# Patient Record
Sex: MALE | Race: WHITE | Employment: UNEMPLOYED | URBAN - METROPOLITAN AREA
[De-identification: names, ages, dates, MRNs, and addresses within clinical notes are randomized per-mention and may not be internally consistent; named-entity substitution may affect disease eponyms.]

---

## 2020-08-17 ENCOUNTER — HOSPITAL ENCOUNTER (INPATIENT)
Age: 55
LOS: 2 days | Discharge: HOME | DRG: 751 | End: 2020-08-20
Attending: EMERGENCY MEDICINE | Admitting: PSYCHIATRY & NEUROLOGY
Payer: MEDICAID

## 2020-08-17 LAB
ETHANOL PERCENT: 0.26 %
ETHANOL: 258 MG/DL

## 2020-08-17 PROCEDURE — 36415 COLL VENOUS BLD VENIPUNCTURE: CPT

## 2020-08-17 PROCEDURE — G0480 DRUG TEST DEF 1-7 CLASSES: HCPCS

## 2020-08-17 PROCEDURE — 99285 EMERGENCY DEPT VISIT HI MDM: CPT

## 2020-08-17 RX ORDER — LISINOPRIL 5 MG/1
5 TABLET ORAL DAILY
Status: ON HOLD | COMMUNITY
Start: 2020-07-14 | End: 2020-09-18 | Stop reason: SDUPTHER

## 2020-08-17 RX ORDER — ESCITALOPRAM OXALATE 20 MG/1
20 TABLET ORAL DAILY
Status: ON HOLD | COMMUNITY
Start: 2020-07-14 | End: 2020-09-18 | Stop reason: SDUPTHER

## 2020-08-17 ASSESSMENT — ENCOUNTER SYMPTOMS
COUGH: 0
BACK PAIN: 0
DIARRHEA: 0
VOMITING: 0
SHORTNESS OF BREATH: 0
ABDOMINAL PAIN: 0

## 2020-08-18 PROBLEM — F33.9 MAJOR DEPRESSION, RECURRENT (HCC): Status: ACTIVE | Noted: 2020-08-18

## 2020-08-18 LAB
SARS-COV-2, PCR: NORMAL
SARS-COV-2, RAPID: NORMAL
SARS-COV-2: NOT DETECTED
SOURCE: NORMAL

## 2020-08-18 PROCEDURE — 1240000000 HC EMOTIONAL WELLNESS R&B

## 2020-08-18 PROCEDURE — 6370000000 HC RX 637 (ALT 250 FOR IP): Performed by: PSYCHIATRY & NEUROLOGY

## 2020-08-18 PROCEDURE — 90792 PSYCH DIAG EVAL W/MED SRVCS: CPT | Performed by: NURSE PRACTITIONER

## 2020-08-18 PROCEDURE — 6370000000 HC RX 637 (ALT 250 FOR IP): Performed by: NURSE PRACTITIONER

## 2020-08-18 PROCEDURE — U0003 INFECTIOUS AGENT DETECTION BY NUCLEIC ACID (DNA OR RNA); SEVERE ACUTE RESPIRATORY SYNDROME CORONAVIRUS 2 (SARS-COV-2) (CORONAVIRUS DISEASE [COVID-19]), AMPLIFIED PROBE TECHNIQUE, MAKING USE OF HIGH THROUGHPUT TECHNOLOGIES AS DESCRIBED BY CMS-2020-01-R: HCPCS

## 2020-08-18 RX ORDER — ACETAMINOPHEN 325 MG/1
650 TABLET ORAL EVERY 4 HOURS PRN
Status: DISCONTINUED | OUTPATIENT
Start: 2020-08-18 | End: 2020-08-20 | Stop reason: HOSPADM

## 2020-08-18 RX ORDER — TRAZODONE HYDROCHLORIDE 50 MG/1
50 TABLET ORAL NIGHTLY PRN
Status: DISCONTINUED | OUTPATIENT
Start: 2020-08-18 | End: 2020-08-20 | Stop reason: HOSPADM

## 2020-08-18 RX ORDER — MAGNESIUM HYDROXIDE/ALUMINUM HYDROXICE/SIMETHICONE 120; 1200; 1200 MG/30ML; MG/30ML; MG/30ML
30 SUSPENSION ORAL EVERY 6 HOURS PRN
Status: DISCONTINUED | OUTPATIENT
Start: 2020-08-18 | End: 2020-08-20 | Stop reason: HOSPADM

## 2020-08-18 RX ORDER — HYDROXYZINE HYDROCHLORIDE 25 MG/1
25 TABLET, FILM COATED ORAL 3 TIMES DAILY PRN
Status: DISCONTINUED | OUTPATIENT
Start: 2020-08-18 | End: 2020-08-20 | Stop reason: HOSPADM

## 2020-08-18 RX ORDER — BENZTROPINE MESYLATE 1 MG/ML
2 INJECTION INTRAMUSCULAR; INTRAVENOUS 2 TIMES DAILY PRN
Status: DISCONTINUED | OUTPATIENT
Start: 2020-08-18 | End: 2020-08-20 | Stop reason: HOSPADM

## 2020-08-18 RX ORDER — ESCITALOPRAM OXALATE 20 MG/1
20 TABLET ORAL DAILY
Status: DISCONTINUED | OUTPATIENT
Start: 2020-08-19 | End: 2020-08-20 | Stop reason: HOSPADM

## 2020-08-18 RX ORDER — LISINOPRIL 5 MG/1
5 TABLET ORAL DAILY
Status: DISCONTINUED | OUTPATIENT
Start: 2020-08-18 | End: 2020-08-20 | Stop reason: HOSPADM

## 2020-08-18 RX ORDER — NICOTINE 21 MG/24HR
1 PATCH, TRANSDERMAL 24 HOURS TRANSDERMAL DAILY
Status: DISCONTINUED | OUTPATIENT
Start: 2020-08-18 | End: 2020-08-18

## 2020-08-18 RX ORDER — OLANZAPINE 5 MG/1
5 TABLET ORAL EVERY 4 HOURS PRN
Status: DISCONTINUED | OUTPATIENT
Start: 2020-08-18 | End: 2020-08-20 | Stop reason: HOSPADM

## 2020-08-18 RX ORDER — OLANZAPINE 10 MG/1
10 INJECTION, POWDER, LYOPHILIZED, FOR SOLUTION INTRAMUSCULAR EVERY 4 HOURS PRN
Status: DISCONTINUED | OUTPATIENT
Start: 2020-08-18 | End: 2020-08-20 | Stop reason: HOSPADM

## 2020-08-18 RX ADMIN — LISINOPRIL 5 MG: 5 TABLET ORAL at 11:25

## 2020-08-18 RX ADMIN — NICOTINE POLACRILEX 2 MG: 2 GUM, CHEWING BUCCAL at 11:25

## 2020-08-18 ASSESSMENT — SLEEP AND FATIGUE QUESTIONNAIRES
DIFFICULTY ARISING: NO
DO YOU HAVE DIFFICULTY SLEEPING: YES
AVERAGE NUMBER OF SLEEP HOURS: 5
DIFFICULTY STAYING ASLEEP: YES
DIFFICULTY FALLING ASLEEP: YES
SLEEP PATTERN: DIFFICULTY FALLING ASLEEP;DISTURBED/INTERRUPTED SLEEP;INSOMNIA;RESTLESSNESS
RESTFUL SLEEP: NO
DO YOU USE A SLEEP AID: NO

## 2020-08-18 ASSESSMENT — LIFESTYLE VARIABLES
HISTORY_ALCOHOL_USE: YES
HISTORY_ALCOHOL_USE: YES

## 2020-08-18 ASSESSMENT — PATIENT HEALTH QUESTIONNAIRE - PHQ9: SUM OF ALL RESPONSES TO PHQ QUESTIONS 1-9: 13

## 2020-08-18 NOTE — BH NOTE
1:1 discussion/interaction with the patient which addressed the following   1) Recognizing danger situations (alcohol use during first month, being around smoke/other smokers or times/situations when the patient routinely smoked or other triggers). 2) Developing coping skills (managing stress, exercising, relaxation breathing, changing routines & distraction techniques to prevent tobacco use).    3) Basic information provided on quitting (benefits of quitting tobacco, how to quit techniques, & available resources to support quitting - including discussion regarding Quitline Referral [phone])

## 2020-08-18 NOTE — BH NOTE
Patient given tobacco quitline number 81331200542 at this time, refusing to call at this time, states \" I just dont want to quit now\"- patient given information as to the dangers of long term tobacco use. Continue to reinforce the importance of tobacco cessation.

## 2020-08-18 NOTE — ED PROVIDER NOTES
EMERGENCY DEPARTMENT ENCOUNTER    Pt Name: Tiffanie Hartmann  MRN: [id number]  Brandy Lindsey 1965  Date of evaluation: 8/17/20  CHIEF COMPLAINT       Chief Complaint   Patient presents with    Other     hopelessness, troubles on the home front    Suicidal     wants to jump in front of a train     [facility]     This is a 52-year-old male with a history of alcohol use and mental health disorder who comes in today. The patient states that when normally happens his him and his girlfriend drink alcohol and then they get into a fight. The patient states that he left his house and he has a plan to jump in front of a train tracks. Patient states that he normally goes to [id number] [place] [address] he has never been to [facility]. He denies any medical complaints at this time. REVIEW OF SYSTEMS     Review of Systems   Constitutional: Negative for fever. HENT: Negative for congestion. Respiratory: Negative for cough and shortness of breath. Cardiovascular: Negative for chest pain. Gastrointestinal: Negative for abdominal pain, diarrhea and vomiting. Genitourinary: Negative for dysuria. Musculoskeletal: Negative for back pain. Skin: Negative for rash. Neurological: Negative for headaches. Psychiatric/Behavioral: Positive for suicidal ideas. All other systems reviewed and are negative. PASTMEDICAL HISTORY   No past medical history on file. SURGICAL HISTORY     No past surgical history on file. CURRENT MEDICATIONS       Previous Medications    ESCITALOPRAM (LEXAPRO) 20 MG TABLET    Take 20 mg by mouth daily    LISINOPRIL (PRINIVIL;ZESTRIL) 5 MG TABLET    Take 5 mg by mouth daily     ALLERGIES     has No Known Allergies. FAMILY HISTORY     has no family status information on file.       SOCIAL HISTORY       Social History     Tobacco Use    Smoking status: Not on file   Substance Use Topics    Alcohol use: Not on file    Drug use: Not on file     PHYSICAL EXAM     INITIAL VITALS: BP 98/67   Pulse 89   Temp 98.3 °F (36.8 °C)   Resp 15   Ht 5' 11\" (1.803 m)   Wt 194 lb (88 kg)   SpO2 96%   BMI 27.06 kg/m²    Physical Exam  Vitals signs and nursing note reviewed. Constitutional:       General: He is not in acute distress. Appearance: He is well-developed. HENT:      Head: Normocephalic and atraumatic. Eyes:      Conjunctiva/sclera: Conjunctivae normal.   Neck:      Musculoskeletal: Neck supple. Cardiovascular:      Rate and Rhythm: Normal rate and regular rhythm. Heart sounds: No murmur. No friction rub. Pulmonary:      Effort: Pulmonary effort is normal. No respiratory distress. Breath sounds: Normal breath sounds. No wheezing or rhonchi. Abdominal:      General: There is no distension. Palpations: Abdomen is soft. Tenderness: There is no abdominal tenderness. There is no guarding or rebound. Skin:     General: Skin is warm and dry. Capillary Refill: Capillary refill takes less than 2 seconds. Neurological:      Mental Status: He is alert. Psychiatric:      Comments: Suicidal ideation       DIAGNOSTIC RESULTS   RADIOLOGY:All plain film, CT, MRI, and formal ultrasound images (except ED bedside ultrasound) are read by the radiologist, see reports below, unless otherwisenoted in MDM or here. No orders to display     LABS: All lab results were reviewed by myself, and all abnormals are listed below. Labs Reviewed   ETHANOL - Abnormal; Notable for the following components:       Result Value    Ethanol 258 (*)     All other components within normal limits   COVID-19       EMERGENCY DEPARTMENTCOURSE:   Differential diagnosis includes exacerbation of chronic mental illness polysubstance abuse alcohol intoxication. 6:24 AM EDT  Patient's blood ethanol level is 258. He is now clinically sober. He continues to feel suicidal ideation.   Psychiatry was consulted they recommended inpatient psych the patient will be admitted for further management of his mental health disorder. Vitals:    Vitals:    08/17/20 2156   BP: 98/67   Pulse: 89   Resp: 15   Temp: 98.3 °F (36.8 °C)   SpO2: 96%   Weight: 194 lb (88 kg)   Height: 5' 11\" (1.803 m)         FINAL IMPRESSION      1. Suicidal ideation    2.  Acute alcoholic intoxication without complication [facility])         DISPOSITION/PLAN   DISPOSITION Decision To Admit 08/18/2020 06:26:03 AM    Robert Paniagua MD  Attending Emergency Physician    This charting supersedes any ED resident or staff charting and was written using speech recognition software        Robert Paniagua MD  08/18/20 5567

## 2020-08-18 NOTE — BH NOTE
`[facility]  Admission Note     Admission Type:   Admission Type: Voluntary    Reason for admission:  Reason for Admission: SI to jump n front of a train or into traffic due to life stressors and verbal arguement with girlfriend. PATIENT STRENGTHS:  Strengths: Communication, No significant Physical Illness, Employment, Social Skills    Patient Strengths and Limitations:  Limitations: Difficulty problem solving/relies on others to help solve problems, Inappropriate/potentially harmful leisure interests    Addictive Behavior:   Addictive Behavior  In the past 3 months, have you felt or has someone told you that you have a problem with:  : None  Do you have a history of Chemical Use?: No  Do you have a history of Alcohol Use?: Yes  Do you have a history of Street Drug Abuse?: No  Histroy of Prescripton Drug Abuse?: No    Medical Problems:   No past medical history on file.     Status EXAM:  Status and Exam  Normal: Yes  Facial Expression: Flat  Affect: Appropriate  Level of Consciousness: Alert  Mood:Normal: Yes  Motor Activity:Normal: Yes  Interview Behavior: Cooperative  Preception: Benton to Person, Julee Rebel to Time, Benton to Place, Benton to Situation  Attention:Normal: Yes  Thought Processes: Circumstantial  Thought Content:Normal: Yes  Hallucinations: None  Delusions: No  Memory:Normal: Yes  Insight and Judgment: No  Insight and Judgment: Poor Insight  Present Suicidal Ideation: No  Present Homicidal Ideation: No    Tobacco Screening:  Practical Counseling, on admission, david X, if applicable and completed (first 3 are required if patient doesn't refuse):            ( )  Recognizing danger situations (included triggers and roadblocks)                    ( )  Coping skills (new ways to manage stress, exercise, relaxation techniques, changing routine, distraction)                                                           ( )  Basic information about quitting (benefits of quitting, techniques in how to quit, available resources  ( ) Referral for counseling faxed to [facility]                                           (X) Patient refused counseling  ( ) Patient has not smoked in the last 30 days    Metabolic Screening:    No results found for: LABA1C    No results found for: CHOL  No results found for: TRIG  No results found for: HDL  No components found for: LDLCAL  No results found for: LABVLDL      Body mass index is 27.06 kg/m². BP Readings from Last 2 Encounters:   08/18/20 128/88           Pt admitted with followings belongings:        Valuables placed in safe in security envelope, number:  [id number]. Patient oriented to surroundings and program expectations and copy of patient rights given. Received admission packet:  Yes. Consents reviewed, signed Yes. Patient verbalize understanding:  Yes. Patient education on precautions: Yes    Patient admitted to [facility] unit room 205. Patient changed out into hospital attire and scanned with metal detector. Food and beverage was provided to patient,   Denies thoughts of self harm.                     Sabina Nova RN

## 2020-08-18 NOTE — ED NOTES
Provisional Diagnosis:   Depression    Psychosocial and Contextual Factors:   Pt is experiencing relationship issues. C-SSRS Summary:  x    Patient: x  Family:   Agency:       Present Suicidal Behavior:  x    Verbal: Pt is suicidal, pt is contemplating jumping in front of a train or jumping into traffic    Attempt:Pt denies    Past Suicidal Behavior: x    Verbal:Pt reports past SI    Attempt:Pt denies past attempts      Self-Injurious/Self-Mutilation:Pt denies      Protective Factors:    Pt is employed. Pt has insurance. Risk Factors:    Pt has poor coping skills. Pt is not linked. Pt has not taken his psychiatric medications in 1 week. Clinical Summary:    Jeannette Faith is a 48year old male who presents to the ED via [facility]. Pt states he called [employer] because he was going to walk in front of a train. Pt intoxicated upon arrival. Pt assessed at sober time. Pt states PTA him and his girlfriend were in an argument. Pt states he thinks the relationship is ending. Pt states he does not want to live life without her. Pt states he continues to feel suicidal. Pt states he feels hopeless. Pt is tearful. Pt states 'I'm just so unhappy I just want to do it'. Pt states he continues to have thoughts of jumping off a train overpass by his house, and running into traffic. Pt denies past suicide attempts, pt reports past SI. Pt denies HI. Pt denies [address] Pt reports alcohol use 'a couple times a month'. Pt denies other substance use. Pt states he normally goes to [facility]. Pt states he was admitted at [facility] in June. Pt states he was set up with [facility] at discharge, pt states he did not follow-up. Pt states he takes Lexipro, pt states he receives this from his PCP. Pt states he missed an appointment with his PCP so he has been out of his medication for 1 week. Pt states he was laid off from his job due to [place], pt states he has been working at a new job for the past 3 weeks.  Pt states he is unsure if he has housing, as he was living with his girlfriend. Pt is voluntary. Level of Care Disposition:    Jackelin Rodriguez NP consulted and accepted pt for admission to the [facility] for safety and stabilization.

## 2020-08-18 NOTE — BH NOTE
RT provided pt with packet of worksheets to assist with identifying needs and begin working on goals/finding resources r/t discharge planning. RT talked 1:1 with pt and pt was receptive to worksheets/discussion.

## 2020-08-18 NOTE — PROGRESS NOTES
BHI Biopsychosocial Assessment    Current Level of Psychosocial Functioning     Independent X   Dependent    Minimal Assist     Comments:    Psychosocial High Risk Factors (check all that apply)    Unable to obtain meds   Chronic illness/pain    Substance abuse X   Lack of Family Support X    Financial stress   Isolation   Inadequate Community Resources  Suicide attempt(s)  Not taking medications   Victim of crime   Developmental Delay  Unable to manage personal needs    Age 72 or older   Homeless  No transportation   Readmission within 30 days  Unemployment  Traumatic Event    Comments:   Psychiatric Advanced Directives:  N/A   Family to Involve in Treatment:   No MARIO for family signed. Sexual Orientation:    Heterosexual   Patient Strengths:  Pt is independent in self-care activities, pt is employed. Patient Barriers:   Pt lacks insight. Opiate Education Provided:  N/A   CMHC/mental health history:  Pt is not linked, but wishes to be linked with [facility] upon discharge. Plan of Care   medication management, group/individual therapies, family meetings, psycho -education, treatment team meetings to assist with stabilization    Initial Discharge Plan:    Pt is planning to return home and follow-up with [facility]. Clinical Summary:    Pt is a 48 y.o.  male who presented to the ED for SI with a plan to run into traffic or jump in front of a train. Pt reported the trigger for this was a fight with his girlfriend. Pt denied current SI, HI, and hallucinations. Pt denied past suicide attempts. Pt was oriented to person, place, time, and situation. Pt is not linked, but wishes to be linked to [facility] upon discharge. Pt reports he works at [employer] for income. Pt currently lives with his girlfriend and a friend. Pt reports his mother has dementia and he has conflict with his brothers. Pt's father is  and he has no children. Trauma hx denied. Legal hx denied.  Pt reports drinking a 6 pack 1-2xs a month and that although this leads to problems with his girlfriend, he does not want AOD tx. Pt is planning to return home and follow-up with [facility].

## 2020-08-19 LAB
ABSOLUTE EOS #: 0.1 K/UL (ref 0–0.4)
ABSOLUTE IMMATURE GRANULOCYTE: ABNORMAL K/UL (ref 0–0.3)
ABSOLUTE LYMPH #: 1 K/UL (ref 1–4.8)
ABSOLUTE MONO #: 0.7 K/UL (ref 0.1–1.3)
ALBUMIN SERPL-MCNC: 3.3 G/DL (ref 3.5–5.2)
ALBUMIN/GLOBULIN RATIO: ABNORMAL (ref 1–2.5)
ALP BLD-CCNC: 59 U/L (ref 40–129)
ALT SERPL-CCNC: 17 U/L (ref 5–41)
ANION GAP SERPL CALCULATED.3IONS-SCNC: 8 MMOL/L (ref 9–17)
AST SERPL-CCNC: 25 U/L
BASOPHILS # BLD: 1 % (ref 0–2)
BASOPHILS ABSOLUTE: 0 K/UL (ref 0–0.2)
BILIRUB SERPL-MCNC: 0.48 MG/DL (ref 0.3–1.2)
BUN BLDV-MCNC: 6 MG/DL (ref 6–20)
BUN/CREAT BLD: ABNORMAL (ref 9–20)
CALCIUM SERPL-MCNC: 8.7 MG/DL (ref 8.6–10.4)
CHLORIDE BLD-SCNC: 105 MMOL/L (ref 98–107)
CHOLESTEROL/HDL RATIO: 2.7
CHOLESTEROL: 179 MG/DL
CO2: 27 MMOL/L (ref 20–31)
CREAT SERPL-MCNC: 0.61 MG/DL (ref 0.7–1.2)
DIFFERENTIAL TYPE: ABNORMAL
EOSINOPHILS RELATIVE PERCENT: 1 % (ref 0–4)
ESTIMATED AVERAGE GLUCOSE: 108 MG/DL
GFR AFRICAN AMERICAN: >60 ML/MIN
GFR NON-AFRICAN AMERICAN: >60 ML/MIN
GFR SERPL CREATININE-BSD FRML MDRD: ABNORMAL ML/MIN/{1.73_M2}
GFR SERPL CREATININE-BSD FRML MDRD: ABNORMAL ML/MIN/{1.73_M2}
GLUCOSE BLD-MCNC: 107 MG/DL (ref 70–99)
HBA1C MFR BLD: 5.4 % (ref 4–6)
HCT VFR BLD CALC: 47.9 % (ref 41–53)
HDLC SERPL-MCNC: 66 MG/DL
HEMOGLOBIN: 16.6 G/DL (ref 13.5–17.5)
IMMATURE GRANULOCYTES: ABNORMAL %
LDL CHOLESTEROL: 89 MG/DL (ref 0–130)
LYMPHOCYTES # BLD: 21 % (ref 24–44)
MCH RBC QN AUTO: 32.6 PG (ref 26–34)
MCHC RBC AUTO-ENTMCNC: 34.6 G/DL (ref 31–37)
MCV RBC AUTO: 94.1 FL (ref 80–100)
MONOCYTES # BLD: 14 % (ref 1–7)
NRBC AUTOMATED: ABNORMAL PER 100 WBC
PDW BLD-RTO: 14.1 % (ref 11.5–14.9)
PLATELET # BLD: 232 K/UL (ref 150–450)
PLATELET ESTIMATE: ABNORMAL
PMV BLD AUTO: 7.2 FL (ref 6–12)
POTASSIUM SERPL-SCNC: 4.5 MMOL/L (ref 3.7–5.3)
RBC # BLD: 5.09 M/UL (ref 4.5–5.9)
RBC # BLD: ABNORMAL 10*6/UL
SEG NEUTROPHILS: 63 % (ref 36–66)
SEGMENTED NEUTROPHILS ABSOLUTE COUNT: 3 K/UL (ref 1.3–9.1)
SODIUM BLD-SCNC: 140 MMOL/L (ref 135–144)
THYROXINE, FREE: 0.93 NG/DL (ref 0.93–1.7)
TOTAL PROTEIN: 5.8 G/DL (ref 6.4–8.3)
TRIGL SERPL-MCNC: 118 MG/DL
TSH SERPL DL<=0.05 MIU/L-ACNC: 1.65 MIU/L (ref 0.3–5)
VLDLC SERPL CALC-MCNC: NORMAL MG/DL (ref 1–30)
WBC # BLD: 4.8 K/UL (ref 3.5–11)
WBC # BLD: ABNORMAL 10*3/UL

## 2020-08-19 PROCEDURE — 83036 HEMOGLOBIN GLYCOSYLATED A1C: CPT

## 2020-08-19 PROCEDURE — 84443 ASSAY THYROID STIM HORMONE: CPT

## 2020-08-19 PROCEDURE — 80053 COMPREHEN METABOLIC PANEL: CPT

## 2020-08-19 PROCEDURE — 84439 ASSAY OF FREE THYROXINE: CPT

## 2020-08-19 PROCEDURE — 99232 SBSQ HOSP IP/OBS MODERATE 35: CPT | Performed by: NURSE PRACTITIONER

## 2020-08-19 PROCEDURE — 85025 COMPLETE CBC W/AUTO DIFF WBC: CPT

## 2020-08-19 PROCEDURE — 6370000000 HC RX 637 (ALT 250 FOR IP): Performed by: NURSE PRACTITIONER

## 2020-08-19 PROCEDURE — 36415 COLL VENOUS BLD VENIPUNCTURE: CPT

## 2020-08-19 PROCEDURE — 80061 LIPID PANEL: CPT

## 2020-08-19 PROCEDURE — 1240000000 HC EMOTIONAL WELLNESS R&B

## 2020-08-19 RX ADMIN — ESCITALOPRAM OXALATE 20 MG: 20 TABLET ORAL at 07:58

## 2020-08-19 RX ADMIN — LISINOPRIL 5 MG: 5 TABLET ORAL at 07:58

## 2020-08-19 NOTE — H&P
pertinent family history. SOCIAL HISTORY       Social History     Socioeconomic History    Marital status: Unknown     Spouse name: None    Number of children: None    Years of education: None    Highest education level: None   Occupational History    None   Social Needs    Financial resource strain: None    Food insecurity     Worry: None     Inability: None    Transportation needs     Medical: None     Non-medical: None   Tobacco Use    Smoking status: Current Every Day Smoker     Packs/day: 1.00     Years: 30.00     Pack years: 30.00     Types: Cigarettes     Start date: 8/18/1990    Smokeless tobacco: Never Used   Substance and Sexual Activity    Alcohol use: None    Drug use: None    Sexual activity: None   Lifestyle    Physical activity     Days per week: None     Minutes per session: None    Stress: None   Relationships    Social connections     Talks on phone: None     Gets together: None     Attends Pentecostalism service: None     Active member of club or organization: None     Attends meetings of clubs or organizations: None     Relationship status: None    Intimate partner violence     Fear of current or ex partner: None     Emotionally abused: None     Physically abused: None     Forced sexual activity: None   Other Topics Concern    None   Social History Narrative    None        REVIEW OF SYSTEMS      No Known Allergies    No current facility-administered medications on file prior to encounter. Current Outpatient Medications on File Prior to Encounter   Medication Sig Dispense Refill    lisinopril (PRINIVIL;ZESTRIL) 5 MG tablet Take 5 mg by mouth daily      escitalopram (LEXAPRO) 20 MG tablet Take 20 mg by mouth daily                     General health:  Fairly good. No fever or chills. Skin:  No itching, redness or rash. Head, eyes, ears, nose, throat:  No headache, epistaxis, rhinorrhea, hearing loss or sore throat.        Neck:  No pain, stiffness or

## 2020-08-19 NOTE — VIRTUAL HEALTH
Psychiatric Admission Note         Trell Mancia is a 48 y.o. male who was admitted from the emergency department with increased depression and suicidal ideations. Patient states that he remains stable for the most part but will have periods of time when he becomes mor depressed. He endorses SI without plan. He denies HI or AVH. He is not paranoid or delusional.  He states he has not been sleeping well and appetite is poor. He denies a history of abuse or trauma. Patient feels helpless and hopeless at times and cannot contract for safety outside of the hospital setting. There is no safe alternative at this time than inpatient stabilization. Past Psychiatric History   Patient Reports noncompliance with outpatient psychiatric care. Reported history of psychiatric inpatient hospitalizations. Reported history of suicide attempts. History of Substance Abuse     Denies alcohol use or use of any illicit drugs. Family History of psychiatric disorders    Family history: denied . Medical History   Allergies:  Patient has no known allergies. No past medical history on file. No past surgical history on file. Neurologic Exam    Labs  Recent Results (from the past 72 hour(s))   Ethanol    Collection Time: 08/17/20 10:53 PM   Result Value Ref Range    Ethanol 258 (H) <10 mg/dL    Ethanol percent 0.258 %   COVID-19    Collection Time: 08/18/20  7:13 AM    Specimen: Other   Result Value Ref Range    SARS-CoV-2 Not Detected Not Detected    SARS-CoV-2, Rapid          Source . NASOPHARYNGEAL SWAB     SARS-CoV-2, PCR             SOCIAL HISTORY  Social History     Socioeconomic History    Marital status: Unknown     Spouse name: Not on file    Number of children: Not on file    Years of education: Not on file    Highest education level: Not on file   Occupational History    Not on file   Social Needs    Financial resource strain: Not on file    Food insecurity Worry: Not on file     Inability: Not on file    Transportation needs     Medical: Not on file     Non-medical: Not on file   Tobacco Use    Smoking status: Current Every Day Smoker     Packs/day: 1.00     Years: 30.00     Pack years: 30.00     Types: Cigarettes     Start date: 8/18/1990    Smokeless tobacco: Never Used   Substance and Sexual Activity    Alcohol use: Not on file    Drug use: Not on file    Sexual activity: Not on file   Lifestyle    Physical activity     Days per week: Not on file     Minutes per session: Not on file    Stress: Not on file   Relationships    Social connections     Talks on phone: Not on file     Gets together: Not on file     Attends Taoism service: Not on file     Active member of club or organization: Not on file     Attends meetings of clubs or organizations: Not on file     Relationship status: Not on file    Intimate partner violence     Fear of current or ex partner: Not on file     Emotionally abused: Not on file     Physically abused: Not on file     Forced sexual activity: Not on file   Other Topics Concern    Not on file   Social History Narrative    Not on file       Review of systems  Constitutional:  negative for chills, fevers, sweats  Respiratory:  negative for cough, dyspnea on exertion, hemoptysis, shortness of breath, wheezing  Cardiovascular:  negative for chest pain, chest pressure/discomfort, lower extremity edema, palpitations  Gastrointestinal:  negative for abdominal pain, constipation, diarrhea, nausea, vomiting  Neurological:  negative for dizziness, headache    Mental Status  Pt. was alert, fully oriented, and cooperative. Appearance and hygiene weredisheveled, poor hygiene . Mood was depressed. Affect was \"dysthymic and poorly reactive Thought process was linear and well-organized. Patient denied any hallucinations or paranoia. Patient endorsed suicidal ideations. Patient endorsed homicidal ideations .  Patient's gross cognitive functions were intact. Insight and judgement were poor. Both recent and remote memory were intact. Psychomotor status was slowed     Diagnostic Impression  Active Problems:    Major depression, recurrent (HCC)  Resolved Problems:    * No resolved hospital problems. *          Medications   lisinopril  5 mg Oral Daily     acetaminophen, traZODone, benztropine mesylate, magnesium hydroxide, aluminum & magnesium hydroxide-simethicone, OLANZapine **OR** OLANZapine, sterile water, hydrOXYzine, nicotine polacrilex    Treatment Plan:    1. Admit to inpatient psychiatric treatment  2. Supportive therapy with medication management. Reviewed risks and benefits as well as potential side effects with patient. 3. Therapeutic activities and groups  4. Follow up at [facility] after symptoms stabilized  5. Restart home medications  6. Social work for discharge planning    Estimated length of stay: 5-7 days    Willy Haas, APRN - [phone]  Psychiatric Nurse Practitioner  Dragon voice recognition software used in portions of this document. Occasionally words are mis-transcribed    Ceasr Montes De Oca is a 48 y.o. male being evaluated by a Virtual Visit (video visit) encounter to address concerns as mentioned above. A caregiver was present when appropriate. Due to this being a TeleHealth encounter (During [facility] public health emergency), evaluation of the following organ systems was limited: Vitals/Constitutional/EENT/Resp/CV/GI//MS/Neuro/Skin/Heme-Lymph-Imm. Pursuant to the emergency declaration under the [address] [place] [address], [address] [place] [address] authority and the Related Content Database (RCDb) and [employer] General Act, this Virtual Visit was conducted with patient's (and/or legal guardian's) consent, to reduce the risk of exposure to COVID-19 and provide necessary medical care.       Total time spent on this encounter: Not billed by time    Services were provided through a video synchronous

## 2020-08-19 NOTE — GROUP NOTE
Group Therapy Note    Date: 8/19/2020    Group Start Time: 1330  Group End Time: 1565  Group Topic: Recreational    SHANNAN Castro Christophe, [place]    Attendees: 6         Patient's Goal:  To demonstrate increased interpersonal skills. Notes:  Patient attended group and actively participated in task at hand. Patient conversed appropriately with peers and Dudley Skinner. Status After Intervention:  Improved    Participation Level:  Active Listener and Interactive    Participation Quality: Appropriate, Attentive and Sharing      Speech:  normal      Thought Process/Content: Logical      Affective Functioning: Congruent      Mood: euthymic      Level of consciousness:  Alert, Oriented x4 and Attentive      Response to Learning: Able to verbalize current knowledge/experience, Able to verbalize/acknowledge new learning, Able to retain information, Capable of insight and Progressing to goal      Endings: None Reported       Modes of Intervention: Socialization, Exploration, Clarifying, Problem-solving, Activity, Limit-setting and Reality-testing      Discipline Responsible: Psychoeducational Specialist      Signature:  Alexus Serrano

## 2020-08-19 NOTE — PROGRESS NOTES
Pharmacy Med Education Group Note    Date: 08/19/20  Start Time: 36  End Time: 5629    Number Participants in Group:  10    Goal:  Patient will demonstrate an understanding of the medications intended purpose and possible adverse effects  Topic: [place] for Pharmacy Med Ed Group    Discipline Responsible:     OT  AT  [facility].  RT     X Other       Participation Level:     None  Minimal      X Active Listener    X Interactive    Monopolizing         Participation Quality:    X Appropriate  Inappropriate     X       Attentive        Intrusive          Sharing        Resistant          Supportive        Lethargic       Affective:     X Congruent  Incongruent  Blunted  Flat    Constricted  Anxious  Elated  Angry    Labile  Depressed  Other         Cognitive:    X Alert  Oriented PPTP     Concentration   X G  F  P   Attention Span   X G  F  P   Short-Term Memory   X G  F  P   Long-Term Memory  G  F  P   ProblemSolving/  Decision Making  G  F  P   Ability to Process  Information   X G  F  P      Contributing Factors             Delusional             Hallucinating             Flight of Ideas             Other:       Modes of Intervention:    X Education   X Support  Exploration    Clarifying  Problem Solving  Confrontation    Socialization  Limit Setting  Reality Testing    Activity  Movement  Media    Other:            Response to Learning:    X Able to verbalize current knowledge/experience    Able to verbalize/acknowledge new learning    Able to retain information    Capable of insight    Able to change behavior    Progressing to goal    Other:        Comments:     Adilia Leach PharmD, BCPS  8/19/2020 5:36 PM

## 2020-08-19 NOTE — GROUP NOTE
Group Therapy Note    Date: 8/19/2020    Group Start Time: 1430  Group End Time: 1736  Group Topic: Cognitive Skills    [facility]    [place], [place]        Group Therapy Note    Attendees: 5/18           Patient's Goal:  To increase interpersonal interaction. Notes:  Pt attended and participated in group. Status After Intervention:  Improved    Participation Level:  Active Listener and Interactive    Participation Quality: Appropriate, Attentive and Sharing      Speech:  normal      Thought Process/Content: Logical      Affective Functioning: Congruent      Mood: euthymic      Level of consciousness:  Alert and Attentive      Response to Learning: Progressing to goal      Endings: None Reported    Modes of Intervention: Socialization, Problem-solving, Activity, Media and Reality-testing      Discipline Responsible: Psychoeducational Specialist      Signature:  Batsheva Tenorio

## 2020-08-19 NOTE — VIRTUAL HEALTH
Department of Psychiatry  Attending Progress Note  Chief Complaint: Major depression, recurrent ([address]     SUBJECTIVE:  Airam Whitehead is seen today via tele-health with staff present. He states he feels a little better. He endorses depression but denies SI today. He states he slept well last night and appetite is good. He has not participated I groups. States he is going to stay on his medication and refrain from alcohol use when discharged. Patient feels helpless and hopeless at times about current situation and cannot contract for safety outside of the hospital setting. There is no safe alternative at this time than continued hospitalization. OBJECTIVE    Physical  /81   Pulse 57   Temp 98.1 °F (36.7 °C) (Oral)   Resp 16   Ht 5' 11\" (1.803 m)   Wt 194 lb (88 kg)   SpO2 98%   BMI 27.06 kg/m²      Mental Status Evaluation:  Orientation: alertness: alert   Mood:. depressed      Affect:  normal and flat      Appearance:  age appropriate   Activity:  Within Normal Limits   Gait/Posture: Normal   Speech:  normal pitch and normal volume   Thought Process:  circumstantial   Thought Content:  Denies hallucinations or paranoia   Sensorium:  Person, place, time, situation   Cognition:  inact   Memory: intact   Insight:  poor   Judgment: poor   Suicidal Ideations: denies   Homicidal Ideations: denies   Medication Side Effects: absent    Attention Span Age appropriate       Labs  Recent Results (from the past 72 hour(s))   Ethanol    Collection Time: 08/17/20 10:53 PM   Result Value Ref Range    Ethanol 258 (H) <10 mg/dL    Ethanol percent 0.258 %   COVID-19    Collection Time: 08/18/20  7:13 AM    Specimen: Other   Result Value Ref Range    SARS-CoV-2 Not Detected Not Detected    SARS-CoV-2, Rapid          Source . NASOPHARYNGEAL SWAB     SARS-CoV-2, PCR         Comprehensive Metabolic Panel w/ Reflex to MG    Collection Time: 08/19/20  7:42 AM   Result Value Ref Range    Glucose 107 (H) 70 - 99 mg/dL    BUN 6 6 - 20 mg/dL    CREATININE 0.61 (L) 0.70 - 1.20 mg/dL    Bun/Cre Ratio NOT REPORTED 9 - 20    Calcium 8.7 8.6 - 10.4 mg/dL    Sodium 140 135 - 144 mmol/L    Potassium 4.5 3.7 - 5.3 mmol/L    Chloride 105 98 - 107 mmol/L    CO2 27 20 - 31 mmol/L    Anion Gap 8 (L) 9 - 17 mmol/L    Alkaline Phosphatase 59 40 - 129 U/L    ALT 17 5 - 41 U/L    AST 25 <40 U/L    Total Bilirubin 0.48 0.3 - 1.2 mg/dL    Total Protein 5.8 (L) 6.4 - 8.3 g/dL    Alb 3.3 (L) 3.5 - 5.2 g/dL    Albumin/Globulin Ratio NOT REPORTED 1.0 - 2.5    GFR Non-African American >60 >60 mL/min    GFR African American >60 >60 mL/min    GFR Comment          GFR Staging NOT REPORTED    Hemoglobin A1c    Collection Time: 08/19/20  7:42 AM   Result Value Ref Range    Hemoglobin A1C 5.4 4.0 - 6.0 %    Estimated Avg Glucose 108 mg/dL   TSH without Reflex    Collection Time: 08/19/20  7:42 AM   Result Value Ref Range    TSH 1.65 0.30 - 5.00 mIU/L   T4, free    Collection Time: 08/19/20  7:42 AM   Result Value Ref Range    Thyroxine, Free 0.93 0.93 - 1.70 ng/dL   Lipid panel - fasting    Collection Time: 08/19/20  7:42 AM   Result Value Ref Range    Cholesterol 179 <200 mg/dL    HDL 66 >40 mg/dL    LDL Cholesterol 89 0 - 130 mg/dL    Chol/HDL Ratio 2.7 <5    Triglycerides 118 <150 mg/dL    VLDL NOT REPORTED 1 - 30 mg/dL   CBC auto differential    Collection Time: 08/19/20  7:42 AM   Result Value Ref Range    WBC 4.8 3.5 - 11.0 k/uL    RBC 5.09 4.5 - 5.9 m/uL    Hemoglobin 16.6 13.5 - 17.5 g/dL    Hematocrit 47.9 41 - 53 %    MCV 94.1 80 - 100 fL    MCH 32.6 26 - 34 pg    MCHC 34.6 31 - 37 g/dL    RDW 14.1 11.5 - 14.9 %    Platelets 365 253 - 648 k/uL    MPV 7.2 6.0 - 12.0 fL    NRBC Automated NOT REPORTED per 100 WBC    Differential Type NOT REPORTED     Seg Neutrophils 63 36 - 66 %    Lymphocytes 21 (L) 24 - 44 %    Monocytes 14 (H) 1 - 7 %    Eosinophils % 1 0 - 4 %    Basophils 1 0 - 2 %    Immature Granulocytes NOT REPORTED 0 %    Segs Absolute 3.00 1.3 - 9.1 k/uL Absolute Lymph # 1.00 1.0 - 4.8 k/uL    Absolute Mono # 0.70 0.1 - 1.3 k/uL    Absolute Eos # 0.10 0.0 - 0.4 k/uL    Basophils Absolute 0.00 0.0 - 0.2 k/uL    Absolute Immature Granulocyte NOT REPORTED 0.00 - 0.30 k/uL    WBC Morphology NOT REPORTED     RBC Morphology NOT REPORTED     Platelet Estimate NOT REPORTED        Medications  Current Facility-Administered Medications   Medication Dose Route Frequency Provider Last Rate Last Dose    acetaminophen (TYLENOL) tablet 650 mg  650 mg Oral Q4H PRN Alice Felty, APRN - CNP        traZODone (DESYREL) tablet 50 mg  50 mg Oral Nightly PRN Alice Felty, APRN - CNP        benztropine mesylate (COGENTIN) injection 2 mg  2 mg Intramuscular BID PRN Alice Felty, APRN - CNP        magnesium hydroxide (MILK OF MAGNESIA) 400 MG/5ML suspension 30 mL  30 mL Oral Daily PRN Alice Felty, APRN - CNP        aluminum & magnesium hydroxide-simethicone (MAALOX) 200-200-20 MG/5ML suspension 30 mL  30 mL Oral Q6H PRN Alice Felty, APRN - CNP        OLANZapine (ZYPREXA) tablet 5 mg  5 mg Oral Q4H PRN Alice Felty, APRN - CNP        Or    OLANZapine (ZYPREXA) injection 10 mg  10 mg Intramuscular Q4H PRN Alice Felty, APRN - CNP        sterile water injection 2.1 mL  2.1 mL Intramuscular Q4H PRN Alice Felty, APRN - CNP        hydrOXYzine (ATARAX) tablet 25 mg  25 mg Oral TID PRN Alice Felty, APRN - CNP        lisinopril (PRINIVIL;ZESTRIL) tablet 5 mg  5 mg Oral Daily Alice Felty, APRN - CNP   5 mg at 08/19/20 0758    nicotine polacrilex (NICORETTE) gum 2 mg  2 mg Oral PRN Kayla RASMUSSEN MD   2 mg at 08/18/20 1125    escitalopram (LEXAPRO) tablet 20 mg  20 mg Oral Daily Alice Felty, APRN - CNP   20 mg at 08/19/20 0758         lisinopril  5 mg Oral Daily    escitalopram  20 mg Oral Daily       ASSESSMENT  Major depression, recurrent (HCC)     Patient's Response to Treatment: positive    PLAN  1. Continue inpatient hospitalization  2. Medication management  3.  Participation in groups and therapeutic milieu  4. Social work for discharge planning      Electronically signed by SHARAN Hsieh CNP on 8/19/2020 at 1:53 PM.    Dragon voice recognition software used in portions of this document. Tiffanie Hartmann is a 48 y.o. male being evaluated by a Virtual Visit (video visit) encounter to address concerns as mentioned above. A caregiver was present when appropriate. Due to this being a TeleHealth encounter (During [place] [address] public [facility] emergency), evaluation of the following organ systems was limited: Vitals/Constitutional/EENT/Resp/CV/GI//MS/Neuro/Skin/Heme-Lymph-Imm. Pursuant to the emergency declaration under the [address] [place] [address], [address] [place] [address] authority and the [employer] and [employer] Act, this Virtual Visit was conducted with patient's (and/or legal guardian's) consent, to reduce the risk of exposure to COVID-19 and provide necessary medical care. Total time spent on this encounter: Not billed by time    Services were provided through a video synchronous discussion virtually to substitute for in-person encounter. --SHARAN Hsieh CNP on 8/19/2020 at 1:55 PM    An electronic signature was used to authenticate this note. Patient Location:  [address] [place] [address]    Provider Location ([facility]):   Horacio ramires [facility]    This virtual visit was conducted via interactive/real-time audio/video.

## 2020-08-20 VITALS
HEIGHT: 71 IN | BODY MASS INDEX: 27.16 KG/M2 | SYSTOLIC BLOOD PRESSURE: 134 MMHG | TEMPERATURE: 98 F | HEART RATE: 58 BPM | OXYGEN SATURATION: 98 % | WEIGHT: 194 LBS | DIASTOLIC BLOOD PRESSURE: 82 MMHG | RESPIRATION RATE: 14 BRPM

## 2020-08-20 PROCEDURE — 6370000000 HC RX 637 (ALT 250 FOR IP): Performed by: NURSE PRACTITIONER

## 2020-08-20 PROCEDURE — 99238 HOSP IP/OBS DSCHRG MGMT 30/<: CPT | Performed by: NURSE PRACTITIONER

## 2020-08-20 RX ORDER — TRAZODONE HYDROCHLORIDE 50 MG/1
50 TABLET ORAL NIGHTLY PRN
Qty: 14 TABLET | Refills: 0 | Status: ON HOLD | OUTPATIENT
Start: 2020-08-20 | End: 2020-09-18 | Stop reason: HOSPADM

## 2020-08-20 RX ORDER — HYDROXYZINE HYDROCHLORIDE 25 MG/1
25 TABLET, FILM COATED ORAL 3 TIMES DAILY PRN
Qty: 30 TABLET | Refills: 0 | Status: ON HOLD | OUTPATIENT
Start: 2020-08-20 | End: 2020-09-18 | Stop reason: HOSPADM

## 2020-08-20 RX ADMIN — ESCITALOPRAM OXALATE 20 MG: 20 TABLET ORAL at 09:45

## 2020-08-20 RX ADMIN — LISINOPRIL 5 MG: 5 TABLET ORAL at 09:45

## 2020-08-20 NOTE — GROUP NOTE
Group Therapy Note    Date: 8/20/2020    Group Start Time: 0905  Group End Time: 9291  Group Topic: Community Meeting    SHANNAN Beckham [place], [place]    Attendees: 5         Patient's Goal:  To be informed of programming schedule for today and unit guidelines/rules. To verbalize obtainable an short term goal for today pertaining to mental health and stability that can be achieved by this evening. Notes:  Patient verbalized goal for today to talk with the doctor and to work on discharge planning. Status After Intervention:  Improved    Participation Level:  Active Listener and Interactive    Participation Quality: Appropriate, Attentive and Sharing      Speech:  normal      Thought Process/Content: Logical      Affective Functioning: Congruent      Mood: euthymic      Level of consciousness:  Alert, Oriented x4 and Attentive      Response to Learning: Able to verbalize current knowledge/experience, Able to verbalize/acknowledge new learning, Able to retain information, Capable of insight and Progressing to goal      Endings: None Reported      Modes of Intervention: Support, Socialization, Exploration, Clarifying, Problem-solving, Confrontation, Limit-setting and Reality-testing      Discipline Responsible: Psychoeducational Specialist      Signature:  Donta Roblero

## 2020-08-20 NOTE — GROUP NOTE
Group Therapy Note    Date: 2020    Group Start Time: 1100  Group End Time:   Group Topic: Psychotherapy    CHENCHO Saenz        Group Therapy Note    Attendees:          Patient's Goal:  ***    Notes:  ***    Status After Intervention:  {Status After Intervention:695778477}    Participation Level: {Participation Level:983116144}    Participation Quality: {[facility] PARTICIPATION QUALITY:348873761}      Speech:  {ED  CD_SPEECH:56138}      Thought Process/Content: {Thought Process/Content:051847384}      Affective Functioning: {Affective Functionin}      Mood: {Mood:304188825}      Level of consciousness:  {Level of consciousness:899793758}      Response to Learnin Devora Jaskaran [facility] Responses to Learnin}      Endings: {[facility] Endings:63706}    Modes of Intervention: {[facility] Modes of Intervention:434867481}      Discipline Responsible: {[facility] Multidisciplinary:575638372}      Signature:  CHENCHO Benjamin

## 2020-08-20 NOTE — GROUP NOTE
Group Therapy Note    Date: 8/20/2020    Group Start Time: 1330  Group End Time: 5130  Group Topic: Cognitive Skills    [facility]    [place], [place]        Group Therapy Note    Attendees: 6/22       Pt did not attend RT skills group d/t resting in room despite staff invitation to attend. 1:1 talk time offered as alternative to group session, pt declined.

## 2020-08-20 NOTE — BH NOTE
Patient given tobacco quitline number 54646220010 at this time, refusing to call at this time, states \" I just dont want to quit now\"- patient given information as to the dangers of long term tobacco use. Continue to reinforce the importance of tobacco cessation.

## 2020-08-20 NOTE — PLAN OF CARE
[facility]  Day 3 Interdisciplinary Treatment Plan NOTE    Review Date & Time: 8/19/2020   0945    Patient was in treatment team    Admission Type:   Admission Type: Voluntary    Reason for admission:  Reason for Admission: SI to jump n front of a train or into traffic due to life stressors and verbal arguement with girlfriend. Estimated Length of Stay Update:  5-7 days   Estimated Discharge Date Update: to be determined by physician     PATIENT STRENGTHS:  Patient Strengths Strengths: Communication, No significant Physical Illness, Employment, Social Skills  Patient Strengths and Limitations:Limitations: Inappropriate/potentially harmful leisure interests, Difficult relationships / poor social skills, Difficulty problem solving/relies on others to help solve problems, Multiple barriers to leisure interests, External locus of control  Addictive Behavior:Addictive Behavior  In the past 3 months, have you felt or has someone told you that you have a problem with:  : None  Do you have a history of Chemical Use?: No  Do you have a history of Alcohol Use?: Yes  Do you have a history of Street Drug Abuse?: No  Histroy of Prescripton Drug Abuse?: No  Medical Problems:No past medical history on file. Risk:  Fall RiskTotal: 61  Landon Scale Landon Scale Score: 21  BVC Total: 0  Change in scores0.  Changes to plan of Care 0    Status EXAM:   Status and Exam  Normal: Yes  Facial Expression: Brightened  Affect: Congruent, Appropriate  Level of Consciousness: Alert  Mood:Normal: Yes  Motor Activity:Normal: Yes  Interview Behavior: Cooperative  Preception: [place] to Person, Jean Claude Baize to Time, [place] to Place, [place] to Situation  Attention:Normal: Yes  Thought Processes: Circumstantial  Thought Content:Normal: Yes  Hallucinations: None  Delusions: No  Memory:Normal: Yes  Insight and Judgment: No  Insight and Judgment: Poor Insight  Present Suicidal Ideation: No  Present Homicidal Ideation: No    Daily Assessment
Problem: Suicide risk  Goal: Provide patient with safe environment  Description: Provide patient with safe environment  8/18/2020 2016 by Charlie Estrada RN  Outcome: Met This Shift  Note: 15 minute and random safety checks maintained, environmental checks maintained    Problem: Depressive Behavior With or Without Suicide Precautions:  Goal: Able to verbalize acceptance of life and situations over which he or she has no control  Description: Able to verbalize acceptance of life and situations over which he or she has no control  8/18/2020 2016 by hCarlie Estrada RN  Outcome: Met This Shift  Note:  Pt denies suicidal ideations at this time. Pt agreed to seek staff at anytime he/she felt like any urges to harm self would arise. Safety checks maintained [id number].       Problem: Depressive Behavior With or Without Suicide Precautions:  Goal: Able to verbalize and/or display a decrease in depressive symptoms  Description: Able to verbalize and/or display a decrease in depressive symptoms  8/18/2020 2016 by Charlie Estrada RN  Outcome: Met This Shift  Note: Patient reports he was out earlier watching television, resting in room now, pleasant and cooperative with evening care, reports he is feeling a little better since coming in
Problem: Suicide risk  Goal: Provide patient with safe environment  Description: Provide patient with safe environment  8/19/2020 1131 by Madhuri Lara LPN  Outcome: Ongoing  Note: Patient remains on a unit where visual rounds are preformed every 15 minutes to ensure patient and unit safety. Patient is currently denying any thoughts to harm himself or anyone else. Patient is also denying suicidal and homicidal thoughts. Staff will continue to monitor and provide support. Problem: Depressive Behavior With or Without Suicide Precautions:  Goal: Able to verbalize and/or display a decrease in depressive symptoms  Description: Able to verbalize and/or display a decrease in depressive symptoms  8/19/2020 1131 by Madhuri Lara LPN  Outcome: Ongoing  Note: Patient continues to admit to slight depression, however states it is improving. Patient has adequate nutritional intake and sleep. Patients hygiene appears appropriate, no additional encouragement needed from staff. Patient is independent of all cares, and is medication compliant and remains in behavior control. Patient is pleasant and cooperative with staff, staff will continue to monitor.
Problem: Suicide risk  Goal: Provide patient with safe environment  Description: Provide patient with safe environment  8/19/2020 235[address] by [address] Boateng RN  Outcome: Ongoing  Note: Patient provided safe environment within [facility] milieu. Will continue to monitor and provide q15 min safety checks. Problem: Depressive Behavior With or Without Suicide Precautions:  Goal: Able to verbalize acceptance of life and situations over which he or she has no control  Description: Able to verbalize acceptance of life and situations over which he or she has no control  8/19/2020 [address] by [address] Boateng RN  Outcome: Ongoing     Problem: Depressive Behavior With or Without Suicide Precautions:  Goal: Able to verbalize and/or display a decrease in depressive symptoms  Description: Able to verbalize and/or display a decrease in depressive symptoms  8/19/2020 [address] by [address] Boateng RN  Outcome: Ongoing  Note: Patient presented isolative to room this shift. Bright and cheerful during interview. Denies any suicidal ideation. Will continue to monitor and provide q15 min safety checks.       Problem: Depressive Behavior With or Without Suicide Precautions:  Goal: Able to verbalize support systems  Description: Able to verbalize support systems  8/19/2020 [address] by [address] Boateng RN  Outcome: Ongoing
for care    Method:group therapy, medication compliance, individualized assessments and care planning    Outcome: needs reinforcement    PATIENT GOALS: to be discussed with patient within 72 hours    PLAN/TREATMENT RECOMMENDATIONS:     continue group therapy , medications compliance, goal setting, individualized assessments and care, continue to monitor pt on unit      SHORT-TERM GOALS:   Time frame for Short-Term Goals: 5-7 days    LONG-TERM GOALS:  Time frame for Long-Term Goals: 6 months  Members Present in Team Meeting: See Signature Sheet    Ministerio Moore

## 2020-08-20 NOTE — BH NOTE
patient refused to attend wellness group at 1600 after encouragement from staff. 1:1 talk time provided as alternative to group session.  Cirilo Hernandez RN

## 2020-08-20 NOTE — DISCHARGE SUMMARY
DISCHARGE SUMMARY  Patient ID:  Mariola Diaz  [id number]  33 y.o.  1965    Admit date: 8/17/2020    Discharge date and time: 8/20/2020  1:17 PM     Admitting Physician: Aamir Radford MD     Discharge Physician:  SHARAN Snow CNP    Admission Diagnoses: Major depression, recurrent [facility]) [F33.9]    Discharge Diagnoses:   Major depression, recurrent [facility])     Patient Active Problem List   Diagnosis Code    Major depression, recurrent ([facility] [address] F33.9        Admission Condition: poor    Discharged Condition: stable    Indication for Admission: threat to self    History of Present Illnes (present tense wording indicates findings from admission exam on 8/17/2020 and are not necessarily indicative of current findings): Hospital Course:   Upon admission, Mariola Diaz was provided a safe secure environment, introduced to unit milieu. Patient participated in groups and individual therapies. Meds were adjusted. After few days of hospital care, patient began to feel improvement. Depression lifted, thoughts to harm self ceased. Sleep improved, appetite was good. On morning rounds 8/20/2020, patient endorsed feeling ready for discharge. Patient denies suicidal or homicidal ideations, denies hallucinations or delusions. Denies SE's from meds. It was decided that pt had achieved maximum benefit from hospital care and can be discharged     Consults:   None    Significant Diagnostic Studies: Routine labs and diagnostics    Treatments: Psychotropic medications, therapy with group, milieu, and 1:1 with nurses, social workers, SHAINA/CNP, and Attending physician.       Discharge Medications:  Current Discharge Medication List      START taking these medications    Details   hydrOXYzine (ATARAX) 25 MG tablet Take 1 tablet by mouth 3 times daily as needed for Anxiety (Does not have to be 8 hours apart as long as no more than three doses in a day)  Qty: 30 tablet, Refills: 0      traZODone (DESYREL) 50 MG tablet Take 1 tablet by mouth nightly as needed for Sleep  Qty: 14 tablet, Refills: 0         CONTINUE these medications which have NOT CHANGED    Details   lisinopril (PRINIVIL;ZESTRIL) 5 MG tablet Take 5 mg by mouth daily      escitalopram (LEXAPRO) 20 MG tablet Take 20 mg by mouth daily                Core Measures statement:   Not applicable    Discharge Exam:  Level of consciousness:  Within normal limits  Appearance: Street clothes, seated, with good grooming  Behavior/Motor: No abnormalities noted  Attitude toward examiner:  Cooperative, attentive, good eye contact  Speech:  spontaneous, normal rate, normal volume and well articulated  Mood:  euthymic  Affect:  mood congruent  Thought processes:  linear, goal directed and coherent  Thought content:  Homocidal ideation denies  Suicidal Ideation:  denies suicidal ideation  Delusions:  no evidence of delusions  Perceptual Disturbance:  denies any perceptual disturbance  Cognition:  In tact  Memory: age appropriate  Insight & Judgement: fair  Medication side effects:  denies     Disposition: home    Patient Instructions: Activity: activity as tolerated    Follow-up as scheduled with [facility]    Time Spent: 25 minutes    Engagement: Patient displayed a good level of engagement with the treatments offered during this admission. Discharge planning, findings, and recommendations were discussed with the patient and treatment team.      Signed:  Kelsy Villeda CNP  8/20/2020  1:17 PM  Dragon voice recognition software used in portions of this document. Nicolette De Paz is a 48 y.o. male being evaluated by a Virtual Visit (video visit) encounter to address concerns as mentioned above. A caregiver was present when appropriate. Due to this being a TeleHealth encounter ([facility]), evaluation of the following organ systems was limited: Vitals/Constitutional/EENT/Resp/CV/GI//MS/Neuro/Skin/Heme-Lymph-Imm.   Pursuant to the emergency declaration under the [facility], [address] [place] [address] waiver authority and the [employer] and [employer] Act, this Virtual Visit was conducted with patient's (and/or legal guardian's) consent, to reduce the risk of exposure to COVID-19 and provide necessary medical care. Total time spent on this encounter: Not billed by time    Services were provided through a video synchronous discussion virtually to substitute for in-person encounter. --Jeannett Primrose, APRN - CNP on 8/20/2020 at 1:17 PM    An electronic signature was used to authenticate this note.     Patient location: [address] [place] [address] at [facility]  [place]

## 2020-08-20 NOTE — GROUP NOTE
Group Therapy Note    Date: 8/20/2020    Group Start Time: 1100  Group End Time: 9984  Group Topic: Psychotherapy    [facility] CHENCHO Geiger        Group Therapy Note    Attendees: 7/22         Patient's Goal:  To actively participate in psychotherapy group and remain in the here-and-now    Notes:  :  Client actively participates in group as it relates to discussing things they can control in their lives as well as things that are out of their control.      Status After Intervention:  Improved    Participation Level: Interactive    Participation Quality: Appropriate, Attentive and Sharing      Speech:  normal      Thought Process/Content: Logical      Affective Functioning: Congruent      Mood: depressed      Level of consciousness:  Alert, Oriented x4 and Attentive      Response to Learning: Able to verbalize current knowledge/experience and Able to retain information      Endings: None Reported    Modes of Intervention: Education, Support, Socialization, Exploration, Clarifying and Problem-solving      Discipline Responsible: /Counselor      Signature:  CHENCHO Farias

## 2020-08-20 NOTE — GROUP NOTE
Group Therapy Note    Date: 8/20/2020    Group Start Time: 1000  Group End Time: 9562  Group Topic: Recreational    [facility] OR[facility] Dawson, [address]    Attendees: 3         Patient's Goal:  To demonstrate increased interpersonal skills. Notes:  Patient attended group and actively participated in task at hand. Patient conversed appropriately with peers and Venida Fairly. Status After Intervention:  Improved    Participation Level:  Active Listener and Interactive    Participation Quality: Appropriate, Attentive and Sharing      Speech:  normal      Thought Process/Content: Logical      Affective Functioning: Congruent      Mood: euthymic      Level of consciousness:  Alert, Oriented x4 and Attentive      Response to Learning: Able to verbalize current knowledge/experience, Able to verbalize/acknowledge new learning, Able to retain information, Capable of insight and Progressing to goal      Endings: None Reported       Modes of Intervention: Socialization, Exploration, Clarifying, Problem-solving, Activity and Reality-testing      Discipline Responsible: Psychoeducational Specialist      Signature:  Deshawn Estrada

## 2020-09-12 ENCOUNTER — HOSPITAL ENCOUNTER (INPATIENT)
Age: 55
LOS: 6 days | Discharge: HOME | DRG: 751 | End: 2020-09-18
Attending: PSYCHIATRY & NEUROLOGY
Payer: MEDICAID

## 2020-09-12 PROBLEM — F32.9 MAJOR DEPRESSION, SINGLE EPISODE: Status: ACTIVE | Noted: 2020-09-12

## 2020-09-12 PROCEDURE — 1240000000 HC EMOTIONAL WELLNESS R&B

## 2020-09-12 RX ORDER — TRAZODONE HYDROCHLORIDE 50 MG/1
50 TABLET ORAL NIGHTLY PRN
Status: DISCONTINUED | OUTPATIENT
Start: 2020-09-12 | End: 2020-09-15

## 2020-09-12 RX ORDER — ACETAMINOPHEN 325 MG/1
650 TABLET ORAL EVERY 4 HOURS PRN
Status: DISCONTINUED | OUTPATIENT
Start: 2020-09-12 | End: 2020-09-18 | Stop reason: HOSPADM

## 2020-09-12 RX ORDER — HYDROXYZINE 50 MG/1
50 TABLET, FILM COATED ORAL 3 TIMES DAILY PRN
Status: DISCONTINUED | OUTPATIENT
Start: 2020-09-12 | End: 2020-09-18 | Stop reason: HOSPADM

## 2020-09-12 RX ORDER — POLYETHYLENE GLYCOL 3350 17 G/17G
17 POWDER, FOR SOLUTION ORAL DAILY
Status: DISCONTINUED | OUTPATIENT
Start: 2020-09-12 | End: 2020-09-18 | Stop reason: HOSPADM

## 2020-09-12 RX ORDER — MAGNESIUM HYDROXIDE/ALUMINUM HYDROXICE/SIMETHICONE 120; 1200; 1200 MG/30ML; MG/30ML; MG/30ML
30 SUSPENSION ORAL EVERY 6 HOURS PRN
Status: DISCONTINUED | OUTPATIENT
Start: 2020-09-12 | End: 2020-09-18 | Stop reason: HOSPADM

## 2020-09-12 RX ORDER — IBUPROFEN 400 MG/1
400 TABLET ORAL EVERY 6 HOURS PRN
Status: DISCONTINUED | OUTPATIENT
Start: 2020-09-12 | End: 2020-09-18 | Stop reason: HOSPADM

## 2020-09-12 ASSESSMENT — SLEEP AND FATIGUE QUESTIONNAIRES
DIFFICULTY ARISING: NO
AVERAGE NUMBER OF SLEEP HOURS: 5
DIFFICULTY STAYING ASLEEP: YES
DO YOU HAVE DIFFICULTY SLEEPING: YES
RESTFUL SLEEP: NO
SLEEP PATTERN: DIFFICULTY FALLING ASLEEP;RESTLESSNESS;DISTURBED/INTERRUPTED SLEEP
DIFFICULTY FALLING ASLEEP: YES
DO YOU USE A SLEEP AID: YES

## 2020-09-12 ASSESSMENT — PATIENT HEALTH QUESTIONNAIRE - PHQ9: SUM OF ALL RESPONSES TO PHQ QUESTIONS 1-9: 12

## 2020-09-12 ASSESSMENT — LIFESTYLE VARIABLES: HISTORY_ALCOHOL_USE: NO

## 2020-09-12 NOTE — BH NOTE
Best Practice Advisory suggested to place patient on a Suicide Precautions. However,  pt currently does not meet criteria to be on a constant observation precaution. Pt denies any suicidal ideation at this time, ans states he feels safe on the unit. On Call provider Dr. Eriberto Ho notified and ordered to discontinue the Suicide Precautions. Will continue to observe patient on every 15 minute checks.
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
## 2020-09-12 NOTE — BH NOTE
Patient given tobacco quitline number 77623773197 at this time, refusing to call at this time, states \" I just dont want to quit now\"- patient given information as to the dangers of long term tobacco use. Continue to reinforce the importance of tobacco cessation.

## 2020-09-13 PROCEDURE — 1240000000 HC EMOTIONAL WELLNESS R&B

## 2020-09-13 PROCEDURE — 6370000000 HC RX 637 (ALT 250 FOR IP): Performed by: PSYCHIATRY & NEUROLOGY

## 2020-09-13 PROCEDURE — 99223 1ST HOSP IP/OBS HIGH 75: CPT | Performed by: PSYCHIATRY & NEUROLOGY

## 2020-09-13 RX ORDER — LISINOPRIL 5 MG/1
5 TABLET ORAL DAILY
Status: DISCONTINUED | OUTPATIENT
Start: 2020-09-13 | End: 2020-09-18 | Stop reason: HOSPADM

## 2020-09-13 RX ORDER — ESCITALOPRAM OXALATE 20 MG/1
20 TABLET ORAL DAILY
Status: DISCONTINUED | OUTPATIENT
Start: 2020-09-13 | End: 2020-09-18 | Stop reason: HOSPADM

## 2020-09-13 RX ADMIN — NICOTINE POLACRILEX 2 MG: 2 GUM, CHEWING BUCCAL at 10:17

## 2020-09-13 RX ADMIN — LISINOPRIL 5 MG: 5 TABLET ORAL at 10:16

## 2020-09-13 RX ADMIN — ESCITALOPRAM OXALATE 20 MG: 20 TABLET ORAL at 10:16

## 2020-09-13 ASSESSMENT — LIFESTYLE VARIABLES: HISTORY_ALCOHOL_USE: NO
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
## 2020-09-13 NOTE — GROUP NOTE
Group Therapy Note    Date: 9/13/2020    Group Start Time: 1000  Group End Time: 4117  Group Topic: Psychoeducation    Via BECCA Hernández LSW        Group Therapy Note    Attendees: 4/18         Patient's Goal: Increased effective communication/ Active listening      Notes: Active listening; minimal engagement ; able to focus     Status After Intervention:  Improved    Participation Level: Interactive    Participation Quality: Appropriate, Attentive and Sharing      Speech:  normal      Thought Process/Content: Linear      Affective Functioning: Congruent      Mood: euthymic      Level of consciousness:  Alert and Oriented x4      Response to Learning: Progressing to goal      Endings: None Reported    Modes of Intervention: Education, Support and Socialization      Discipline Responsible: /Counselor      Signature:   BECCA Guerra LSW

## 2020-09-13 NOTE — GROUP NOTE
Group Therapy Note    Date: 9/13/2020    Group Start Time: 0900  Group End Time: 0920  Group Topic: Community Meeting    SHANNAN [place], [place]    Attendees: 2         Patient's Goal:  To be informed of programming schedule for today and unit guidelines/rules. To verbalize obtainable an short term goal for today pertaining to mental health and stability that can be achieved by this evening. Notes:  Patient verbalized goal for today to decrease racing thoughts and to have clearer thoughts. Status After Intervention:  Improved    Participation Level:  Active Listener and Interactive    Participation Quality: Appropriate, Attentive and Sharing      Speech:  normal      Thought Process/Content: Logical      Affective Functioning: Flat      Mood: dysphoric      Level of consciousness:  Alert, Oriented x4 and Attentive      Response to Learning: Able to verbalize current knowledge/experience, Able to verbalize/acknowledge new learning, Able to retain information, Capable of insight and Progressing to goal      Endings: None Reported    Modes of Intervention: Support, Socialization, Exploration, Clarifying, Problem-solving, Confrontation, Limit-setting and Reality-testing      Discipline Responsible: Psychoeducational Specialist      Signature:  Alida Curtis

## 2020-09-13 NOTE — BH NOTE
Had 1:1 interview with patient. Patient states he feels like he has to decide if he should return home to live with his girlfriend. He states they \"get along most of the time until these episodes\" of them arguing. He states they argue when they both have become intoxicated with alcohol, and he states she tells him to leave. Patient admits he wants to be with her, but agrees that he is not sure if it is the best situation for him. Patient became tearful during the 1:1 interview, and states this is a difficult decision for him.

## 2020-09-13 NOTE — CARE COORDINATION
the home. At this time he is homeless and unable to discharge to [facility]. He will be able to discharge to [facility]. He further states he feels depressed, has poor sleep, anxious and hopeless. He denied positive supports. He states he quit his job 1 week ago because he could not physically perform, which has increased his stressors financially. He has no income. He has [employer]. Pt states Dr. Gary Winston prescribes medications because he did not like Zepf; however, he is considering to link to [facility] on [address] (transportation barriers) to explore med management and therapy. He denies AOD concerns. He denied legal concerns. He denied history of trauma and abuse. Pt contracts for safety at this time and states he does not wish to be dead. He denied audio and visual hallucinations, denied history of self harm and previous attempts. He identified this suicide attempt as impulsive. He denies thoughts to harm others.  SW will continue engagement and explore discharge link and placement as symptoms decrease.

## 2020-09-13 NOTE — GROUP NOTE
Group Therapy Note    Date: 9/13/2020    Group Start Time: 1600  Group End Time: 8666  Group Topic: Healthy Living/Wellness    SHANNAN GODINEZ    Diaz Disla LPN        Group Therapy Note    Attendees: 7/19         Patient's Goal: To learn about healthy coping skills     Notes:      Status After Intervention:  Unchanged    Participation Level:  Active Listener    Participation Quality: Appropriate, Attentive and Sharing      Speech:  normal      Thought Process/Content: Logical      Affective Functioning: Congruent      Mood: euthymic      Level of consciousness:  Alert and Oriented x4      Response to Learning: Progressing to goal      Endings: None Reported    Modes of Intervention: Education      Discipline Responsible: Licensed Practical Nurse      Signature:  Diaz Disla LPN

## 2020-09-13 NOTE — GROUP NOTE
Group Therapy Note    Date: 9/13/2020    Group Start Time: 1330  Group End Time: 1974  Group Topic: Recreational    [facility], [facility]    Patient refused to attend Recreational Therapy Group at 1330 after encouragement from staff. 1:1 talk time offered.     Signature:  Alexus Serrano
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
## 2020-09-13 NOTE — BH NOTE
Pt did not participate in Open Recreational group at 1400 due to resting in room and choosing to not attend.

## 2020-09-14 PROCEDURE — 6370000000 HC RX 637 (ALT 250 FOR IP): Performed by: PSYCHIATRY & NEUROLOGY

## 2020-09-14 PROCEDURE — 1240000000 HC EMOTIONAL WELLNESS R&B

## 2020-09-14 PROCEDURE — 99232 SBSQ HOSP IP/OBS MODERATE 35: CPT | Performed by: PSYCHIATRY & NEUROLOGY

## 2020-09-14 RX ORDER — MIRTAZAPINE 15 MG/1
7.5 TABLET, FILM COATED ORAL NIGHTLY
Status: DISCONTINUED | OUTPATIENT
Start: 2020-09-14 | End: 2020-09-15

## 2020-09-14 RX ADMIN — MIRTAZAPINE 7.5 MG: 15 TABLET, FILM COATED ORAL at 22:04

## 2020-09-14 RX ADMIN — ESCITALOPRAM OXALATE 20 MG: 20 TABLET ORAL at 08:29

## 2020-09-14 RX ADMIN — NICOTINE POLACRILEX 2 MG: 2 GUM, CHEWING BUCCAL at 17:50

## 2020-09-14 RX ADMIN — LISINOPRIL 5 MG: 5 TABLET ORAL at 08:29

## 2020-09-14 NOTE — PLAN OF CARE
Problem: Depressive Behavior With or Without Suicide Precautions:  Goal: Able to verbalize acceptance of life and situations over which he or she has no control  Description: Able to verbalize acceptance of life and situations over which he or she has no control  Outcome: Ongoing   Pt is seclusive to his room aloof of staff and peers but is pleasant and cooperative when approached. He reads in his room quietly    Problem: Depressive Behavior With or Without Suicide Precautions:  Goal: Ability to disclose and discuss suicidal ideas will improve  Description: Ability to disclose and discuss suicidal ideas will improve  Outcome: Ongoing   Pt denies thoughts of harming themself and verbally agrees to remain safe while on the unit.  No self harming behaviors are noted this shift

## 2020-09-14 NOTE — GROUP NOTE
Group Therapy Note    Date: September 14th, 2020     Group Start Time: 1000                     Group End Time: 3484     Group Topic: Psychotherapy     SHANNAN OROURKE    Stephy Saloni RODARTE, JOCELYN, MERRY     Group Therapy Note     Attendees: 5/8    Patient's Goal: develop stress management / identify triggers and safety planning / discharge planning/ community resources / family support system     Notes:  participated in group      Status After Intervention:  Improved     Participation Level:  Active Listener and Interactive     Participation Quality: Appropriate, Attentive and Sharing     Speech:  Normal     Thought Process/Content: Logical     Affective Functioning: Congruent     Mood: Euthymic      Level of consciousness:  Alert, Oriented x4 and Attentive     Response to Learning: Able to verbalize current knowledge/experience, Able to verbalize/acknowledge new learning, Able to retain information, Capable of insight, Able to change behavior and Progressing to goal     Endings: None Reported     Modes of Intervention: Support, Exploration, Clarifying and Problem-solving     Discipline Responsible: Licensed Professional Counselor     Signature:  Malick RODARTE, JOCELYN, LPC

## 2020-09-14 NOTE — PLAN OF CARE
Problem: Tobacco Use:  Goal: Inpatient tobacco use cessation counseling participation  Description: Inpatient tobacco use cessation counseling participation  9/14/2020 1026 by Kip Martin RN  Outcome: Ongoing   Patient refused      Problem: Suicide risk  Goal: Provide patient with safe environment  Description: Provide patient with safe environment  9/14/2020 1026 by Kip Martin RN  Outcome: Ongoing   Patient will continue to be provided a safe environment      Problem: Depressive Behavior With or Without Suicide Precautions:  Goal: Ability to disclose and discuss suicidal ideas will improve  Description: Ability to disclose and discuss suicidal ideas will improve  9/14/2020 1026 by Kip Martin RN  Outcome: Ongoing   Patient denies suicidal ideation, homicidal ideation, visual hallucinations, and auditory hallucinations. Patient reports anxiety and depression. Patient states depression is \"slightly better, almost a 10 out of 10 though\". Patient is isolative to self and only comes out of room for needs. Patient is accepting of nourishment from staff. Remains behavioral control and med compliant. Despite encouragement from staff patient refused shower. Q15 minute checks maintained.

## 2020-09-14 NOTE — GROUP NOTE
Group Therapy Note    Date: 9/14/2020    Group Start Time: 1400  Group End Time: 1407  Group Topic: Psychoeducation    SHANNAN Melendez      Patient declined to attend coping skills/stress awareness group at 1400 despite encouragement from staff. 1:1 talk time offered by staff as alternative to group session.       Signature:  Jana Perez

## 2020-09-14 NOTE — GROUP NOTE
Group Therapy Note    Date: 9/14/2020    Group Start Time: 1100  Group End Time: 1130  Group Topic: Psychoeducation    [facility]    [facility] [address]        Group Therapy Note    Attendees: 4/11         Patient's Goal:  To increase interpersonal interaction. Notes:  Pt attended and participated     Status After Intervention:  Improved    Participation Level:  Active Listener and Interactive    Participation Quality: Appropriate and Attentive      Speech:  normal      Thought Process/Content: Logical      Affective Functioning: Congruent      Mood: euthymic      Level of consciousness:  Alert and Attentive      Response to Learning: Progressing to goal      Endings: None Reported    Modes of Intervention: Socialization, Problem-solving, Media and Reality-testing      Discipline Responsible: Psychoeducational Specialist      Signature:  Esme Rodriguez

## 2020-09-14 NOTE — PROGRESS NOTES
Daily Progress Note  Donell Galeazzi, MD  9/14/2020  CHIEF COMPLAINT: Depression with suicidal ideation    Reviewed patient's current plan of care and vital signs with nursing staff. Sleep: Intermittently slept last night  Attending groups: No: Sleeps in room    SUBJECTIVE:    Patient reports poor sleep last night. He states he feel feels depressed. Still battling suicidal thoughts. Able to contract for safety on the unit. Reports wanting additional help with his sleep at bedtime. Reports poor appetite. Denying any auditory or visual hallucinations. Mental Status Exam  Level of consciousness:  Within normal limits  Appearance: Hospital attire, seated in chair, with good grooming and hygiene   Behavior/Motor: No abnormalities noted  Attitude toward examiner:  Cooperative, attentive, good eye contact  Speech:  spontaneous, normal rate, normal volume and well articulated  Mood: Depressed  Affect: Congruent  Thought processes:  linear, goal directed and coherent  Thought content:  denies homicidal ideation  Suicidal Ideation: Endorses suicidal ideation but contracts for safety on the unit delusions:  no evidence of delusions  Perceptual Disturbance:  denies any perceptual disturbance and does not appear to respond to internal stimuli  Cognition:  Oriented to self, location, time, and situation  Memory: age appropriate  Insight & Judgement: improving  Medication side effects:  denies       Data   height is 5' 11\" (1.803 m) and weight is 193 lb (87.5 kg). His oral temperature is 98.1 °F (36.7 °C). His blood pressure is 136/82 and his pulse is 64. His respiration is 16. Labs:   No visits with results within 2 Day(s) from this visit. Latest known visit with results is:   No results found for any previous visit.             Medications  Current Facility-Administered Medications: escitalopram (LEXAPRO) tablet 20 mg, 20 mg, Oral, Daily  lisinopril (PRINIVIL;ZESTRIL) tablet 5 mg, 5 mg, Oral, Daily  acetaminophen

## 2020-09-14 NOTE — PLAN OF CARE
days    LONG-TERM GOALS UPDATE:   Time frame for Long-Term Goals: 6 months  Members Present in Team Meeting: See Signature [address]

## 2020-09-14 NOTE — GROUP NOTE
Wellness Group Note   Group Topic: Positive Coping Skills   Date: September 14, 2020   Group Start Time: 1600   Group End Time: [address]   Attendees: 15/19   Patient's Goal: Increased understanding of methods and ways to cope. Notes: Patient participated in discussion regarding coping skills. Status After Intervention: Improved   Participation Level:  Active Listener and Interactive   Participation Quality: Appropriate, attentive and supportive   Speech: Normal   Thought Process/Content: Logical and linear   Affective Functioning: Congruent   Mood: WDL   Level of consciousness: Oriented x4   Response to Learning: Progressing to goal; able to verbalize current knowledge/experience, acknowledge new learning, retain information and change behavior   Endings: None reported   Modes of Intervention: Education and exploration   Discipline Responsible: Behavioral Health Tech   Signature: CARSON Jung

## 2020-09-14 NOTE — GROUP NOTE
Group Therapy Note    Date: 9/14/2020    Group Start Time: 0900  Group End Time: 0915  Group Topic: Community Meeting    SHANNAN OROURKE    Angelica Newton        Group Therapy Note    Attendees: 6/20         Patient's Goal:  Patient stated that his goal for the day is \"to get my thoughts organized\". Notes:  Patient was appropriate and pleasant during the group session     Status After Intervention:  Improved    Participation Level:  Active Listener and Interactive    Participation Quality: Appropriate, Attentive and Sharing      Speech:  normal      Thought Process/Content: Logical      Affective Functioning: Congruent      Mood: euthymic      Level of consciousness:  Alert and Oriented x4      Response to Learning: Able to verbalize current knowledge/experience, Able to verbalize/acknowledge new learning and Progressing to goal      Endings: None Reported    Modes of Intervention: Education, Support, Socialization, Exploration, Clarifying and Problem-solving      Discipline Responsible: Psychoeducational Specialist      Signature:  Angelica Newton

## 2020-09-15 PROCEDURE — 1240000000 HC EMOTIONAL WELLNESS R&B

## 2020-09-15 PROCEDURE — 6370000000 HC RX 637 (ALT 250 FOR IP): Performed by: PSYCHIATRY & NEUROLOGY

## 2020-09-15 PROCEDURE — 99232 SBSQ HOSP IP/OBS MODERATE 35: CPT | Performed by: PSYCHIATRY & NEUROLOGY

## 2020-09-15 RX ORDER — TRAZODONE HYDROCHLORIDE 150 MG/1
150 TABLET ORAL NIGHTLY
Status: DISCONTINUED | OUTPATIENT
Start: 2020-09-15 | End: 2020-09-18 | Stop reason: HOSPADM

## 2020-09-15 RX ORDER — MIRTAZAPINE 15 MG/1
15 TABLET, FILM COATED ORAL NIGHTLY
Status: DISCONTINUED | OUTPATIENT
Start: 2020-09-15 | End: 2020-09-18 | Stop reason: HOSPADM

## 2020-09-15 RX ADMIN — LISINOPRIL 5 MG: 5 TABLET ORAL at 08:13

## 2020-09-15 RX ADMIN — TRAZODONE HYDROCHLORIDE 150 MG: 150 TABLET ORAL at 22:50

## 2020-09-15 RX ADMIN — ESCITALOPRAM OXALATE 20 MG: 20 TABLET ORAL at 08:13

## 2020-09-15 RX ADMIN — MIRTAZAPINE 15 MG: 15 TABLET, FILM COATED ORAL at 22:50

## 2020-09-15 RX ADMIN — NICOTINE POLACRILEX 2 MG: 2 GUM, CHEWING BUCCAL at 20:04

## 2020-09-15 NOTE — PLAN OF CARE
Problem: Depressive Behavior With or Without Suicide Precautions:  Goal: Able to verbalize acceptance of life and situations over which he or she has no control  Description: Able to verbalize acceptance of life and situations over which he or she has no control  9/14/2020 2311 by Sangeeta Alonso  Outcome: Ongoing  Note: Pt is working coping with life situations he has no control over. Pt admits to depression and anxiety, stating that they are \"high\" right now but refuses to comment further. Pt is isolative to his room for long periods, out for needs only. Pt remains safe on unit. Every 15 min checks for safety continue. 9/14/2020 0920 by Fiorella Dye  Outcome: Ongoing  Goal: Ability to disclose and discuss suicidal ideas will improve  Description: Ability to disclose and discuss suicidal ideas will improve  9/14/2020 2311 by Sangeeta Alonso  Outcome: Ongoing  Note: Pt denies suicidal thoughts at this time. Pt states he feels safe in the hospital. Pt remains free from self harm. Pt agrees to seek out staff is he feels he can no longer keep himself safe or these thoughts return.   9/14/2020 1026 by Carmella Bhakta RN  Outcome: Ongoing  9/14/2020 0920 by Fiorella Dye  Outcome: Ongoing     Problem: Suicide risk  Goal: Provide patient with safe environment  Description: Provide patient with safe environment  9/14/2020 2311 by Sangeeta Alonso  Outcome: Ongoing  9/14/2020 1026 by Carmella Bhakta RN  Outcome: Ongoing  9/14/2020 0920 by Fiorella Dye  Outcome: Ongoing     Problem: Tobacco Use:  Goal: Inpatient tobacco use cessation counseling participation  Description: Inpatient tobacco use cessation counseling participation  9/14/2020 1026 by Carmella Bhakta RN  Outcome: Ongoing  9/14/2020 0920 by Fiorella Dye  Outcome: Ongoing

## 2020-09-15 NOTE — GROUP NOTE
Group Therapy Note    Date: 9/15/2020    Group Start Time: 1330  Group End Time: 8874  Group Topic: Recreational    SHANNAN Sanchez [place], [place]    Attendees: 2         Patient's Goal:  To demonstrate increased interpersonal skills. Notes:  Patient attended group and actively participated in task at hand. Patient conversed appropriately with peers and Millie Bartholomew but was easily irritated with task. Status After Intervention:  Improved    Participation Level:  Active Listener and Interactive    Participation Quality: Appropriate, Attentive and Sharing      Speech:  normal      Thought Process/Content: Logical      Affective Functioning: Congruent      Mood: irritable      Level of consciousness:  Alert, Oriented x4 and Attentive      Response to Learning: Able to verbalize current knowledge/experience, Able to verbalize/acknowledge new learning, Able to retain information, Capable of insight and Progressing to goal      Endings: None Reported       Modes of Intervention: Socialization, Exploration, Clarifying, Problem-solving, Activity, Limit-setting and Reality-testing      Discipline Responsible: Psychoeducational Specialist      Signature:  Wolf Gonzalez

## 2020-09-15 NOTE — PLAN OF CARE
Problem: Depressive Behavior With or Without Suicide Precautions:  Goal: Able to verbalize acceptance of life and situations over which he or she has no control  Description: Able to verbalize acceptance of life and situations over which he or she has no control  9/15/2020 1126 by Gildardo Wilburn  Outcome: Ongoing  Pt out for breakfast but has been mostly isolative in his room. Pt is medication compliant. Pt. Says he is feeling better but meds make him tired. He does feel the medication has helped his mood. Pt is very pleasant and polite on approach. Problem: Depressive Behavior With or Without Suicide Precautions:  Goal: Ability to disclose and discuss suicidal ideas will improve  Description: Ability to disclose and discuss suicidal ideas will improve  9/15/2020 1126 by Gildardo Wilburn  Outcome: Ongoing  Pt denies hallucinations and denies suicidal or homicidal thoughts. Problem: Suicide risk  Goal: Provide patient with safe environment  Description: Provide patient with safe environment  9/15/2020 1126 by Gildardo Wilburn  Outcome: Ongoing  Pt remains safe on unit. Denies suicidal thoughts. Pt. Remains safe on the unit. Q 15 minute checks for safety maintained.

## 2020-09-15 NOTE — GROUP NOTE
Group Therapy Note    Date: 9/15/2020    Group Start Time: 1100  Group End Time: 1120  Group Topic: Psychoeducation    [facility]    [place], [place]        Group Therapy Note    Attendees: 1/8         Pt did not attend RT skills group d/t resting in room despite staff invitation to attend. 1:1 talk time offered as alternative to group session, pt declined.

## 2020-09-15 NOTE — PROGRESS NOTES
Daily Progress Note  Joel Mandel MD  9/15/2020  CHIEF COMPLAINT: Depression with suicidal ideation    Reviewed patient's current plan of care and vital signs with nursing staff. Sleep: Intermittently slept last night  Attending groups: No: Sleeps in room    SUBJECTIVE:    Patient reports poor sleep last night. He states he feel feels depressed. Still battling suicidal thoughts. Able to contract for safety on the unit. States the Remeron was mildly effective in a mild improvement in sleep but overall still poor. Reports appetite somewhat improved. Reports he is feeling hopeless and helpless denying any auditory or visual hallucinations. Mental Status Exam  Level of consciousness:  Within normal limits  Appearance: Hospital attire, seated in chair, with good grooming and hygiene   Behavior/Motor: No abnormalities noted  Attitude toward examiner:  Cooperative, attentive, good eye contact  Speech:  spontaneous, normal rate, normal volume and well articulated  Mood: Depressed  Affect: Congruent  Thought processes:  linear, goal directed and coherent  Thought content:  denies homicidal ideation  Suicidal Ideation: Endorses suicidal ideation but contracts for safety on the unit   delusions:  no evidence of delusions  Perceptual Disturbance:  denies any perceptual disturbance and does not appear to respond to internal stimuli  Cognition:  Oriented to self, location, time, and situation  Memory: age appropriate  Insight & Judgement: unchanged from yesterday  Medication side effects:  denies       Data   height is 5' 11\" (1.803 m) and weight is 193 lb (87.5 kg). His oral temperature is 97.9 °F (36.6 °C). His blood pressure is 114/74 and his pulse is 81. His respiration is 14. Labs:   No visits with results within 2 Day(s) from this visit. Latest known visit with results is:   No results found for any previous visit.             Medications  Current Facility-Administered Medications: mirtazapine (REMERON) tablet 7.5 mg, 7.5 mg, Oral, Nightly  escitalopram (LEXAPRO) tablet 20 mg, 20 mg, Oral, Daily  lisinopril (PRINIVIL;ZESTRIL) tablet 5 mg, 5 mg, Oral, Daily  acetaminophen (TYLENOL) tablet 650 mg, 650 mg, Oral, Q4H PRN  aluminum & magnesium hydroxide-simethicone (MAALOX) 200-200-20 MG/5ML suspension 30 mL, 30 mL, Oral, Q6H PRN  hydrOXYzine (ATARAX) tablet 50 mg, 50 mg, Oral, TID PRN  ibuprofen (ADVIL;MOTRIN) tablet 400 mg, 400 mg, Oral, Q6H PRN  nicotine polacrilex (NICORETTE) gum 2 mg, 2 mg, Oral, Q1H PRN  polyethylene glycol (GLYCOLAX) packet 17 g, 17 g, Oral, Daily  traZODone (DESYREL) tablet 50 mg, 50 mg, Oral, Nightly PRN    ASSESSMENT  Major depression, single episode     PLAN  Patient s symptoms   show modest improvement with modest change in sleep and improvement in appetite   Increase Remeron to 15 mg  Increase trazodone to 150 mg  Attempt to develop insight  Psycho-education conducted. Supportive Therapy conducted. Probable discharge is 3 to 5 days  Follow-up daily while in the inpatient unit        Electronically signed by Alejandrina Rocha MD on 9/14/20 at 3:11 PM EDT    **This report has been created using voice recognition software. It may contain minor errors which are inherent in voice recognition technology. **

## 2020-09-15 NOTE — GROUP NOTE
Group Therapy Note    Date: 9/15/2020    Group Start Time: 3099  Group End Time: 8205  Group Topic: Healthy Living/Wellness    [facility]        Group Therapy Note    Attendees: 9/16       Patient's Goal:  Identify a way to support themselves    Notes: Things to do and say to support yourself    Status After Intervention:  Unchanged    Participation Level: Active Listener and Interactive    Participation Quality: Appropriate and Attentive      Speech:  normal      Thought Process/Content: Logical      Affective Functioning: Congruent      Mood: stable      Level of consciousness:  Alert and Attentive      Response to Learning: Able to verbalize current knowledge/experience and Progressing to goal      Endings: None Reported    Modes of Intervention: Education      Discipline Responsible: Behavorial Health Tech      Signature:   Alize Hutchins

## 2020-09-16 PROCEDURE — 1240000000 HC EMOTIONAL WELLNESS R&B

## 2020-09-16 PROCEDURE — 6370000000 HC RX 637 (ALT 250 FOR IP): Performed by: PSYCHIATRY & NEUROLOGY

## 2020-09-16 PROCEDURE — 99232 SBSQ HOSP IP/OBS MODERATE 35: CPT | Performed by: PSYCHIATRY & NEUROLOGY

## 2020-09-16 RX ADMIN — TRAZODONE HYDROCHLORIDE 150 MG: 150 TABLET ORAL at 20:50

## 2020-09-16 RX ADMIN — NICOTINE POLACRILEX 2 MG: 2 GUM, CHEWING BUCCAL at 19:16

## 2020-09-16 RX ADMIN — HYDROXYZINE HYDROCHLORIDE 50 MG: 50 TABLET, FILM COATED ORAL at 09:00

## 2020-09-16 RX ADMIN — ESCITALOPRAM OXALATE 20 MG: 20 TABLET ORAL at 08:27

## 2020-09-16 RX ADMIN — MIRTAZAPINE 15 MG: 15 TABLET, FILM COATED ORAL at 20:50

## 2020-09-16 NOTE — PLAN OF CARE
Problem: Depressive Behavior With or Without Suicide Precautions:  Goal: Able to verbalize acceptance of life and situations over which he or she has no control  Description: Able to verbalize acceptance of life and situations over which he or she has no control  Outcome: Ongoing  Note: Pt is working on coping with situations out of his control. Pt states his depression and anxiety are improving and he feels like the medications are working. Pt is isolative to long period, out only for needs. Goal: Ability to disclose and discuss suicidal ideas will improve  Description: Ability to disclose and discuss suicidal ideas will improve  Outcome: Ongoing  Note: Pt denies any suicidal ideas at this time. Problem: Suicide risk  Goal: Provide patient with safe environment  Description: Provide patient with safe environment  Outcome: Ongoing  Note: Pt remains free from harm at this time. Pt denies and thought of suicide and states he feels safe in the hospital. Pt agrees to seek out staff if these thoughts return or he no longer feels safe. Pt remains safe on unit. Every 15 min checks for safety continue.

## 2020-09-16 NOTE — PROGRESS NOTES
Daily Progress Note  Nina Curtis CNP  9/16/2020    CHIEF COMPLAINT: Depression with suicidal ideation    Reviewed patient's current plan of care and vital signs with nursing staff. Sleep: Reports improved sleep overnight  Attending groups: Has attended group session today    SUBJECTIVE:    Dash Diaz reports that he slept well last night. He endorses irritability and anxiety. He reports that he is nervous about what will happen when he discharges from the facility. He denies any auditory/visual hallucinations. He denies any suicidal ideation, intent or plan. He continues to use as needed hydroxyzine for anxiety. He is attending group sessions and socializing on the unit with select peers. He will continue to coordinate with social work with regards to discharge plan. Mental Status Exam  Level of consciousness: Awake and alert  Appearance: Hospital attire, seated in chair, with good grooming and hygiene   Behavior/Motor: No abnormalities noted, ambulating around unit  Attitude toward examiner:  Cooperative, attentive, good eye contact  Speech:  spontaneous, normal rate, normal volume and well articulated  Mood: Anxious, irritable   affect: Congruent  Thought processes:  linear, goal directed and coherent  Thought content:  denies homicidal ideation  Suicidal Ideation: Suicidal ideation improving   delusions:  no evidence of delusions  Perceptual Disturbance:  denies any perceptual disturbance and does not appear to respond to internal stimuli  Cognition:  Oriented to self, location, time, and situation  Memory: age appropriate  Insight & Judgement: Fair   medication side effects:  denies       Data   height is 5' 11\" (1.803 m) and weight is 193 lb (87.5 kg). His oral temperature is 98.2 °F (36.8 °C). His blood pressure is 105/61 and his pulse is 98. His respiration is 14. Labs:   No visits with results within 2 Day(s) from this visit.    Latest known visit with results is:   Admission on 08/17/2020, Final    Albumin/Globulin Ratio 08/19/2020 NOT REPORTED  1.0 - 2.5 Final    GFR Non- 08/19/2020 >60  >60 mL/min Final    GFR  08/19/2020 >60  >60 mL/min Final    GFR Comment 08/19/2020        Final    Comment: Average GFR for 52-63 years old:   80 mL/min/1.73sq m  Chronic Kidney Disease:   <60 mL/min/1.73sq m  Kidney failure:   <15 mL/min/1.73sq m              eGFR calculated using average adult body mass. Additional eGFR calculator available at:        [employer]            GFR Staging 08/19/2020 NOT REPORTED   Final    Hemoglobin A1C 08/19/2020 5.4  4.0 - 6.0 % Final    Estimated Avg Glucose 08/19/2020 108  mg/dL Final    Comment: The ADA and AACC recommend providing the estimated average glucose result to permit better   patient understanding of their HBA1c result.  TSH 08/19/2020 1.65  0.30 - 5.00 mIU/L Final    Thyroxine, Free 08/19/2020 0.93  0.93 - 1.70 ng/dL Final    Cholesterol 08/19/2020 179  <200 mg/dL Final    Comment:    Cholesterol Guidelines:      <200  Desirable   200-240  Borderline      >240  Undesirable         HDL 08/19/2020 66  >40 mg/dL Final    Comment:    HDL Guidelines:    <40     Undesirable   40-59    Borderline    >59     Desirable         LDL Cholesterol 08/19/2020 89  0 - 130 mg/dL Final    Comment:    LDL Guidelines:     <100    Desirable   100-129   Near to/above Desirable   130-159   Borderline      >159   Undesirable     Direct (measured) LDL and calculated LDL are not interchangeable tests.  Chol/HDL Ratio 08/19/2020 2.7  <5 Final            Triglycerides 08/19/2020 118  <150 mg/dL Final    Comment:    Triglyceride Guidelines:     <150   Desirable   150-199  Borderline   200-499  High     >499   Very high   Based on AHA Guidelines for fasting triglyceride, October 2012.          VLDL 08/19/2020 NOT REPORTED  1 - 30 mg/dL Final    WBC 08/19/2020 4.8  3.5 - 11.0 k/uL Final    RBC 08/19/2020 (NICORETTE) gum 2 mg, 2 mg, Oral, Q1H PRN  polyethylene glycol (GLYCOLAX) packet 17 g, 17 g, Oral, Daily    ASSESSMENT  Major depression, single episode     PLAN  Discussed with Dr. Rajendra Castañeda. **This report has been created using voice recognition software. It may contain minor errors which are inherent in voice recognition technology. **    I independently saw and evaluated the patient. I reviewed the nurse practitioners documentation above. Any additional comments or changes to the nurse practitioners documentation are stated below otherwise agree with assessment. Plan will be as follows:    PLAN  Patient s symptoms   are improving    Attempt to develop insight  Psycho-education conducted. Supportive Therapy conducted.   Probable discharge is tomorrow  Follow-up daily while on inpatient unit

## 2020-09-16 NOTE — GROUP NOTE
Group Therapy Note    Date: 9/16/2020    Group Start Time: 1100  Group End Time: 2113  Group Topic: Psychoeducation    [facility]    [place], [place]        Group Therapy Note    Attendees: 3/7         Pt did not attend RT skills group d/t resting in room despite staff invitation to attend. 1:1 talk time offered as alternative to group session, pt declined.

## 2020-09-16 NOTE — PROGRESS NOTES
Pharmacy Med Education Group Note    Date: 09/16/20  Start Time: 36  End Time: 5963    Number Participants in Group:  8    Goal:  Patient will demonstrate an understanding of the medications intended purpose and possible adverse effects  Topic: [place] for Pharmacy Med Ed Group    Discipline Responsible:     OT  AT  [facility].  RT     X Other       Participation Level:     None  Minimal      X Active Listener    X Interactive    Monopolizing         Participation Quality:    X Appropriate  Inappropriate     X       Attentive        Intrusive          Sharing        Resistant          Supportive        Lethargic       Affective:     X Congruent  Incongruent  Blunted  Flat    Constricted  Anxious  Elated  Angry    Labile  Depressed  Other         Cognitive:    X Alert  Oriented PPTP     Concentration   X G  F  P   Attention Span   X G  F  P   Short-Term Memory   X G  F  P   Long-Term Memory  G  F  P   ProblemSolving/  Decision Making  G  F  P   Ability to Process  Information   X G  F  P      Contributing Factors             Delusional             Hallucinating             Flight of Ideas             Other:       Modes of Intervention:    X Education   X Support  Exploration    Clarifying  Problem Solving  Confrontation    Socialization  Limit Setting  Reality Testing    Activity  Movement  Media    Other:            Response to Learning:    X Able to verbalize current knowledge/experience    Able to verbalize/acknowledge new learning    Able to retain information    Capable of insight    Able to change behavior    Progressing to goal    Other:        Comments:     Neris Ortega PharmD, BCPS  9/16/2020 5:08 PM

## 2020-09-16 NOTE — GROUP NOTE
Group Therapy Note    Date: 9/16/2020    Group Start Time: 0900  Group End Time: 4066  Group Topic: Community Meeting    SHANNAN OROURKE    Misty Albarran        Group Therapy Note    Attendees: 7/16       Patient's Goal:  To orient to unit and set a daily goal    Notes:  Patient attended and participated in group. Patient was scratching pant leg for prolonged time during group, as well as his legs bouncing up and down. This appeared to be an anxious behavior. Daily Goal:  \"get my anxiety under control. \" Patient stated reading helps him control his anxiety. Status After Intervention:  Improved    Participation Level:  Active Listener and Interactive    Participation Quality: Appropriate, Attentive, Sharing       Speech:  normal      Thought Process/Content: Logical  Linear      Affective Functioning: Congruent      Mood: anxious      Level of consciousness:  Alert and Attentive      Response to Learning: Progressing to goal      Endings: None Reported    Modes of Intervention: Education, Support, Socialization, Exploration, Clarifying and Reality-testing      Discipline Responsible: Psychoeducational Specialist      Signature:  Misty Albarran

## 2020-09-16 NOTE — GROUP NOTE
Group Therapy Note    Date: 9/16/2020    Group Start Time: 1430  Group End Time: 2701  Group Topic: Cognitive Skills    [facility]    [place], [place]        Group Therapy Note    Attendees: 5/16         Patient's Goal:  To increase interpersonal interaction. Notes:  Pt attended and participated in group. Status After Intervention:  Improved    Participation Level:  Active Listener and Interactive    Participation Quality: Appropriate, Attentive and Sharing      Speech:  normal      Thought Process/Content: Logical      Affective Functioning: Congruent      Mood: euthymic      Level of consciousness:  Alert and Attentive      Response to Learning: Able to verbalize current knowledge/experience and Progressing to goal      Endings: None Reported    Modes of Intervention: Socialization, Problem-solving, Media and Reality-testing      Discipline Responsible: Psychoeducational Specialist      Signature:  Leidy Miller

## 2020-09-16 NOTE — PLAN OF CARE
Problem: Depressive Behavior With or Without Suicide Precautions:  Goal: Ability to disclose and discuss suicidal ideas will improve  Description: Ability to disclose and discuss suicidal ideas will improve  9/16/2020 1013 by Fidencio Westbrook LPN  Outcome: Ongoing     Problem: Suicide risk  Goal: Provide patient with safe environment  Description: Provide patient with safe environment  9/16/2020 1013 by Fidencio Westbrook LPN  Outcome: Ongoing  Patient denies thoughts of self harm and is agreeable to seeking out staff should thoughts of self harm arise. Safe environment maintained. Patient continues to be provided with a safe environment and states that he is having anxiety 8/10 and depression 10/10.  15 minute checks for safety continued per unit policy. Will continue to monitor for safety and provide support and reassurance as needed.

## 2020-09-17 PROCEDURE — 6370000000 HC RX 637 (ALT 250 FOR IP): Performed by: PSYCHIATRY & NEUROLOGY

## 2020-09-17 PROCEDURE — 99232 SBSQ HOSP IP/OBS MODERATE 35: CPT | Performed by: PSYCHIATRY & NEUROLOGY

## 2020-09-17 PROCEDURE — 1240000000 HC EMOTIONAL WELLNESS R&B

## 2020-09-17 RX ADMIN — MIRTAZAPINE 15 MG: 15 TABLET, FILM COATED ORAL at 21:57

## 2020-09-17 RX ADMIN — TRAZODONE HYDROCHLORIDE 150 MG: 150 TABLET ORAL at 21:57

## 2020-09-17 RX ADMIN — ESCITALOPRAM OXALATE 20 MG: 20 TABLET ORAL at 08:43

## 2020-09-17 RX ADMIN — LISINOPRIL 5 MG: 5 TABLET ORAL at 08:42

## 2020-09-17 RX ADMIN — HYDROXYZINE HYDROCHLORIDE 50 MG: 50 TABLET, FILM COATED ORAL at 21:57

## 2020-09-17 NOTE — PLAN OF CARE
Problem: Depressive Behavior With or Without Suicide Precautions:  Goal: Able to verbalize acceptance of life and situations over which he or she has no control  Description: Able to verbalize acceptance of life and situations over which he or she has no control  Outcome: Ongoing  Pt denies suicidal thoughts. Every 15 minute checks maintained for pt safety. Goal: Ability to disclose and discuss suicidal ideas will improve  Description: Ability to disclose and discuss suicidal ideas will improve  9/16/2020 2132 by Lorene De La Cruz LPN  Outcome: Ongoing  Pt contracts for safety.      Problem: Suicide risk  Goal: Provide patient with safe environment  Description: Provide patient with safe environment  9/16/2020 2132 by Lorene De La Cruz LPN  Outcome: Ongoing    Problem: Tobacco Use:  Goal: Inpatient tobacco use cessation counseling participation  Description: Inpatient tobacco use cessation counseling participation  Outcome: Ongoing

## 2020-09-17 NOTE — GROUP NOTE
Group Therapy Note    Date: 9/17/2020    Group Start Time: 1330  Group End Time: 1057  Group Topic: Cognitive Skills    [facility]    [place], [place]        Group Therapy Note    Attendees: 8/16         Patient's Goal:  To increase interpersonal interaction. Notes:  Pt attended and participated in group. Status After Intervention:  Improved    Participation Level:  Active Listener and Interactive    Participation Quality: Appropriate, Attentive and Sharing      Speech:  normal      Thought Process/Content: Logical      Affective Functioning: Congruent      Mood: euthymic      Level of consciousness:  Alert and Attentive      Response to Learning: Able to verbalize current knowledge/experience, Able to verbalize/acknowledge new learning and Progressing to goal      Endings: None Reported    Modes of Intervention: Education, Exploration, Clarifying, Problem-solving and Reality-testing      Discipline Responsible: Psychoeducational Specialist      Signature:  Lyric Greenwood

## 2020-09-17 NOTE — GROUP NOTE
Group Therapy Note    Date: 9/17/2020    Group Start Time: 5916  Group End Time: 0920  Group Topic: Community Meeting    SHANNAN Jaramillo [place], [place]    Attendees: 6         Patient's Goal:  To be informed of programming schedule for today and unit guidelines/rules. To verbalize obtainable an short term goal for today pertaining to mental health and stability that can be achieved by this evening. Notes:  Patient verbalized goal for today to work on discharge plans and mentally prepare self for discharge. Status After Intervention:  Improved    Participation Level:  Active Listener and Interactive    Participation Quality: Appropriate, Attentive and Sharing      Speech:  normal      Thought Process/Content: Logical      Affective Functioning: Congruent      Mood: euthymic      Level of consciousness:  Alert, Oriented x4 and Attentive      Response to Learning: Able to verbalize current knowledge/experience, Able to verbalize/acknowledge new learning, Able to retain information, Capable of insight and Progressing to goal      Endings: None Reported       Modes of Intervention: Support, Socialization, Exploration, Clarifying, Problem-solving, Confrontation, Limit-setting and Reality-testing      Discipline Responsible: Psychoeducational Specialist      Signature:  Rachel Gaspar

## 2020-09-17 NOTE — GROUP NOTE
Group Therapy Note    Date: 9/17/2020    Group Start Time: 1100  Group End Time: 5436  Group Topic: Recreational    [place]    Attendees: 5         Patient's Goal:  To demonstrate increased interpersonal skills. Notes:  Patient attended group and actively participated in task at hand. Patient conversed appropriately with peers and Lito Harinder. Status After Intervention:  Improved    Participation Level:  Active Listener and Interactive    Participation Quality: Appropriate, Attentive and Sharing      Speech:  normal      Thought Process/Content: Logical      Affective Functioning: Congruent      Mood: euthymic      Level of consciousness:  Alert, Oriented x4 and Attentive      Response to Learning: Able to verbalize current knowledge/experience, Able to verbalize/acknowledge new learning, Able to retain information, Capable of insight and Progressing to goal      Endings: None Reported       Modes of Intervention: Socialization, Exploration, Clarifying, Problem-solving, Activity, Confrontation, Limit-setting and Reality-testing      Discipline Responsible: Psychoeducational Specialist      Signature:  Silvia Dennis

## 2020-09-17 NOTE — PLAN OF CARE
Problem: Depressive Behavior With or Without Suicide Precautions:  Goal: Able to verbalize acceptance of life and situations over which he or she has no control  Description: Able to verbalize acceptance of life and situations over which he or she has no control  9/17/2020 1051 by Colton Leahy RN  Outcome: Ongoing    Patient is calm, controlled, and medication compliant. Patient denies suicidal ideations but reports feelings of depression/anxiety. Patient attends groups, and is polite with staff. Patient reports eating and sleeping adequately with safety checks Q15min and at irregular intervals. Problem: Depressive Behavior With or Without Suicide Precautions:  Goal: Ability to disclose and discuss suicidal ideas will improve  Description: Ability to disclose and discuss suicidal ideas will improve  9/17/2020 1051 by Colton Leahy RN  Outcome: Ongoing   Patient is calm, controlled, and medication compliant. Patient denies suicidal ideations but reports feelings of depression/anxiety. Patient attends groups, and is polite with staff. Patient reports eating and sleeping adequately with safety checks Q15min and at irregular intervals. Problem: Suicide risk  Goal: Provide patient with safe environment  Description: Provide patient with safe environment  9/17/2020 1051 by Colton Leahy RN  Outcome: Ongoing   Patient is calm, controlled, and medication compliant. Patient denies suicidal ideations but reports feelings of depression/anxiety. Patient attends groups, and is polite with staff. Patient reports eating and sleeping adequately with safety checks Q15min and at irregular intervals. Problem: Tobacco Use:  Goal: Inpatient tobacco use cessation counseling participation  Description: Inpatient tobacco use cessation counseling participation  9/17/2020 1051 by Colton Leahy RN  Outcome: Ongoing   Smoking education provided.

## 2020-09-17 NOTE — GROUP NOTE
Group Therapy Note    Date: 9/17/2020    Group Start Time: 1600  Group End Time: 6050  Group Topic: Healthy Living/Wellness    SHANNAN OROURKE    Romario Larkin        Group Therapy Note    Attendees: 7/16       Patient's Goal:  Participate in group discussion    Notes:  Outbursts    Status After Intervention:  Unchanged    Participation Level: Active Listener and Interactive    Participation Quality: Appropriate, Attentive and Sharing      Speech:  normal      Thought Process/Content: Logical      Affective Functioning: Congruent      Mood: Stable      Level of consciousness:  Alert and Attentive      Response to Learning: Able to verbalize current knowledge/experience and Progressing to goal      Endings: None Reported    Modes of Intervention: Activity      Discipline Responsible: Behavorial Health Tech      Signature:   Derian Jones

## 2020-09-17 NOTE — PROGRESS NOTES
Daily Progress Note  Yessi Prince, CNP  9/16/2020    CHIEF COMPLAINT: Depression with suicidal ideation    Reviewed patient's current plan of care and vital signs with nursing staff. Sleep: Reports improved sleep overnight  Attending groups: Has attended group session today    SUBJECTIVE:    Guillermina Fulton reports that he slept well another night in a row. He reports that the sleep alone has likely contributed to an improvement in mood. He continues to feel anxious about his discharge plan, but reports that he is ready to go tomorrow. He denies any feelings of depression or suicidal ideation. He denies any paranoia or auditory/visual hallucination. He reports good appetite. He reports that he participated in group activities today, and felt that they were beneficial.  He reports that staff has treated him well, and he feels thankful that he has a safe discharge plan. He reports that his tremors have improved. Mental Status Exam  Level of consciousness: Awake and alert  Appearance: Hospital attire, ambulating on unit, with good grooming and hygiene   Behavior/Motor: No abnormalities noted. Attitude toward examiner:  Cooperative, attentive, good eye contact  Speech:  spontaneous, normal rate, normal volume and well articulated  Mood: Anxious  affect: Congruent  Thought processes:  linear, goal directed and coherent  Thought content:  denies homicidal ideation  Suicidal Ideation: Suicidal ideation improving   delusions:  no evidence of delusions  Perceptual Disturbance:  denies any perceptual disturbance and does not appear to respond to internal stimuli  Cognition:  Oriented to self, location, time, and situation  Memory: age appropriate  Insight & Judgement: Fair   medication side effects:  denies       Data   height is 5' 11\" (1.803 m) and weight is 193 lb (87.5 kg). His oral temperature is 97.7 °F (36.5 °C). His blood pressure is 103/72 and his pulse is 79. His respiration is 12.    Labs:   No visits with mg/dL Final    Total Protein 08/19/2020 5.8* 6.4 - 8.3 g/dL Final    Alb 08/19/2020 3.3* 3.5 - 5.2 g/dL Final    Albumin/Globulin Ratio 08/19/2020 NOT REPORTED  1.0 - 2.5 Final    GFR Non- 08/19/2020 >60  >60 mL/min Final    GFR  08/19/2020 >60  >60 mL/min Final    GFR Comment 08/19/2020        Final    Comment: Average GFR for 52-63 years old:   80 mL/min/1.73sq m  Chronic Kidney Disease:   <60 mL/min/1.73sq m  Kidney failure:   <15 mL/min/1.73sq m              eGFR calculated using average adult body mass. Additional eGFR calculator available at:        [facility]            GFR Staging 08/19/2020 NOT REPORTED   Final    Hemoglobin A1C 08/19/2020 5.4  4.0 - 6.0 % Final    Estimated Avg Glucose 08/19/2020 108  mg/dL Final    Comment: The ADA and AACC recommend providing the estimated average glucose result to permit better   patient understanding of their HBA1c result.  TSH 08/19/2020 1.65  0.30 - 5.00 mIU/L Final    Thyroxine, Free 08/19/2020 0.93  0.93 - 1.70 ng/dL Final    Cholesterol 08/19/2020 179  <200 mg/dL Final    Comment:    Cholesterol Guidelines:      <200  Desirable   200-240  Borderline      >240  Undesirable         HDL 08/19/2020 66  >40 mg/dL Final    Comment:    HDL Guidelines:    <40     Undesirable   40-59    Borderline    >59     Desirable         LDL Cholesterol 08/19/2020 89  0 - 130 mg/dL Final    Comment:    LDL Guidelines:     <100    Desirable   100-129   Near to/above Desirable   130-159   Borderline      >159   Undesirable     Direct (measured) LDL and calculated LDL are not interchangeable tests.  Chol/HDL Ratio 08/19/2020 2.7  <5 Final            Triglycerides 08/19/2020 118  <150 mg/dL Final    Comment:    Triglyceride Guidelines:     <150   Desirable   150-199  Borderline   200-499  High     >499   Very high   Based on AHA Guidelines for fasting triglyceride, October 2012.          VLDL 08/19/2020 NOT REPORTED  1 - 30 mg/dL Final    WBC 08/19/2020 4.8  3.5 - 11.0 k/uL Final    RBC 08/19/2020 5.09  4.5 - 5.9 m/uL Final    Hemoglobin 08/19/2020 16.6  13.5 - 17.5 g/dL Final    Hematocrit 08/19/2020 47.9  41 - 53 % Final    MCV 08/19/2020 94.1  80 - 100 fL Final    MCH 08/19/2020 32.6  26 - 34 pg Final    MCHC 08/19/2020 34.6  31 - 37 g/dL Final    RDW 08/19/2020 14.1  11.5 - 14.9 % Final    Platelets 43/34/7243 232  150 - 450 k/uL Final    MPV 08/19/2020 7.2  6.0 - 12.0 fL Final    NRBC Automated 08/19/2020 NOT REPORTED  per 100 WBC Final    Differential Type 08/19/2020 NOT REPORTED   Final    Seg Neutrophils 08/19/2020 63  36 - 66 % Final    Lymphocytes 08/19/2020 21* 24 - 44 % Final    Monocytes 08/19/2020 14* 1 - 7 % Final    Eosinophils % 08/19/2020 1  0 - 4 % Final    Basophils 08/19/2020 1  0 - 2 % Final    Immature Granulocytes 08/19/2020 NOT REPORTED  0 % Final    Segs Absolute 08/19/2020 3.00  1.3 - 9.1 k/uL Final    Absolute Lymph # 08/19/2020 1.00  1.0 - 4.8 k/uL Final    Absolute Mono # 08/19/2020 0.70  0.1 - 1.3 k/uL Final    Absolute Eos # 08/19/2020 0.10  0.0 - 0.4 k/uL Final    Basophils Absolute 08/19/2020 0.00  0.0 - 0.2 k/uL Final    Absolute Immature Granulocyte 08/19/2020 NOT REPORTED  0.00 - 0.30 k/uL Final    WBC Morphology 08/19/2020 NOT REPORTED   Final    RBC Morphology 08/19/2020 NOT REPORTED   Final    Platelet Estimate 19/42/7890 NOT REPORTED   Final            Medications  Current Facility-Administered Medications: traZODone (DESYREL) tablet 150 mg, 150 mg, Oral, Nightly  mirtazapine (REMERON) tablet 15 mg, 15 mg, Oral, Nightly  escitalopram (LEXAPRO) tablet 20 mg, 20 mg, Oral, Daily  lisinopril (PRINIVIL;ZESTRIL) tablet 5 mg, 5 mg, Oral, Daily  acetaminophen (TYLENOL) tablet 650 mg, 650 mg, Oral, Q4H PRN  aluminum & magnesium hydroxide-simethicone (MAALOX) 200-200-20 MG/5ML suspension 30 mL, 30 mL, Oral, Q6H PRN  hydrOXYzine (ATARAX) tablet 50 mg, 50 mg, Oral, TID PRN  ibuprofen (ADVIL;MOTRIN) tablet 400 mg, 400 mg, Oral, Q6H PRN  nicotine polacrilex (NICORETTE) gum 2 mg, 2 mg, Oral, Q1H PRN  polyethylene glycol (GLYCOLAX) packet 17 g, 17 g, Oral, Daily    ASSESSMENT  Major depression, single episode     PLAN  Discussed with Dr. Nolan Klinefelter. I independently saw and evaluated the patient. I reviewed the nurse practitioners documentation above. Any additional comments or changes to the nurse practitioners documentation are stated below otherwise agree with assessment. Plan will be as follows: Likely discharge tomorrow    PLAN  Patient s symptoms   are improving  Continue with current medications for now  Attempt to develop insight  Psycho-education conducted. Supportive Therapy conducted. Probable discharge is tomorrow  Follow-up daily while on inpatient unit          **This report has been created using voice recognition software. It may contain minor errors which are inherent in voice recognition technology. **

## 2020-09-17 NOTE — GROUP NOTE
Group Therapy Note    Date: 9/17/2020    Group Start Time: 1000  Group End Time: 3845  Group Topic: Psychotherapy    SHANNAN OROURKE    BECCA Cool, [place]        Group Therapy Note    Attendees: 3/9         Patient's Goal:  Interpersonal relationships and communication     Notes:  Bright affect, denied all. States he feels improved mood since [facility] admission and plans to follow up with a [facility] at [place] and not his PCP only    Status After Intervention:  Improved    Participation Level: Interactive    Participation Quality: Appropriate, Attentive, Sharing and Supportive      Speech:  normal      Thought Process/Content: Logical  Linear      Affective Functioning: Congruent       Mood: euthymic      Level of consciousness:  Alert, Oriented x4 and Attentive      Response to Learning: Able to verbalize current knowledge/experience, Able to verbalize/acknowledge new learning, Able to retain information, Capable of insight and Progressing to goal      Endings: None Reported    Modes of Intervention: Support      Discipline Responsible: /Counselor      Signature:   BECCA Cool LSW

## 2020-09-18 VITALS
BODY MASS INDEX: 27.02 KG/M2 | RESPIRATION RATE: 16 BRPM | TEMPERATURE: 97.8 F | DIASTOLIC BLOOD PRESSURE: 61 MMHG | SYSTOLIC BLOOD PRESSURE: 121 MMHG | HEIGHT: 71 IN | WEIGHT: 193 LBS | HEART RATE: 88 BPM

## 2020-09-18 PROBLEM — F32.9 MAJOR DEPRESSION, SINGLE EPISODE: Status: RESOLVED | Noted: 2020-09-12 | Resolved: 2020-09-18

## 2020-09-18 PROCEDURE — 6370000000 HC RX 637 (ALT 250 FOR IP): Performed by: PSYCHIATRY & NEUROLOGY

## 2020-09-18 PROCEDURE — 99239 HOSP IP/OBS DSCHRG MGMT >30: CPT | Performed by: PSYCHIATRY & NEUROLOGY

## 2020-09-18 RX ORDER — ESCITALOPRAM OXALATE 20 MG/1
20 TABLET ORAL DAILY
Qty: 30 TABLET | Refills: 3 | Status: ON HOLD | OUTPATIENT
Start: 2020-09-18 | End: 2021-01-28 | Stop reason: HOSPADM

## 2020-09-18 RX ORDER — MIRTAZAPINE 15 MG/1
15 TABLET, FILM COATED ORAL NIGHTLY
Qty: 30 TABLET | Refills: 3 | Status: ON HOLD | OUTPATIENT
Start: 2020-09-18 | End: 2021-01-28 | Stop reason: SDUPTHER

## 2020-09-18 RX ORDER — HYDROXYZINE 50 MG/1
50 TABLET, FILM COATED ORAL 3 TIMES DAILY PRN
Qty: 30 TABLET | Refills: 0 | Status: SHIPPED | OUTPATIENT
Start: 2020-09-18 | End: 2020-09-28

## 2020-09-18 RX ORDER — TRAZODONE HYDROCHLORIDE 150 MG/1
150 TABLET ORAL NIGHTLY
Qty: 30 TABLET | Refills: 0 | Status: ON HOLD | OUTPATIENT
Start: 2020-09-18 | End: 2021-01-28 | Stop reason: HOSPADM

## 2020-09-18 RX ORDER — LISINOPRIL 5 MG/1
5 TABLET ORAL DAILY
Qty: 30 TABLET | Refills: 3 | Status: ON HOLD | OUTPATIENT
Start: 2020-09-18 | End: 2021-01-28 | Stop reason: HOSPADM

## 2020-09-18 RX ADMIN — LISINOPRIL 5 MG: 5 TABLET ORAL at 09:55

## 2020-09-18 RX ADMIN — ESCITALOPRAM OXALATE 20 MG: 20 TABLET ORAL at 09:55
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
## 2020-09-18 NOTE — GROUP NOTE
Group Therapy Note    Date: 9/18/2020    Group Start Time: 1100  Group End Time: 2724  Group Topic: Recreational    [facility]    Attendees: 5         Patient's Goal:  To demonstrate increased interpersonal skills. Notes:  Patient attended group and actively participated in task at hand. Patient conversed appropriately with peers and [address]. Status After Intervention:  Improved    Participation Level:  Active Listener and Interactive    Participation Quality: Appropriate, Attentive and Sharing      Speech:  normal      Thought Process/Content: Logical      Affective Functioning: Congruent      Mood: euthymic      Level of consciousness:  Alert, Oriented x4 and Attentive      Response to Learning: Able to verbalize current knowledge/experience, Able to verbalize/acknowledge new learning, Able to retain information, Capable of insight and Progressing to goal      Endings: None Reported       Modes of Intervention: Socialization, Exploration, Clarifying, Problem-solving, Activity, Limit-setting and Reality-testing      Discipline Responsible: Psychoeducational Specialist      Signature:  Silvina Rodriguez

## 2020-09-18 NOTE — PLAN OF CARE
Problem: Depressive Behavior With or Without Suicide Precautions:  Goal: Able to verbalize acceptance of life and situations over which he or she has no control  Description: Able to verbalize acceptance of life and situations over which he or she has no control  9/17/2020 2226 by Pranay Martin RN  Outcome: Ongoing  Safety plan reviewed with patient, agrees to approach staff when feeling upset. 15 minute and random checks maintained for safety. No violent or escalating behaviors noted during this shift. Patient is currently calm, controlled and medication-compliant. Noted to be isolative to room, reading most of the shift. Pleasant and cooperative with staff. Problem: Depressive Behavior With or Without Suicide Precautions:  Goal: Ability to disclose and discuss suicidal ideas will improve  Description: Ability to disclose and discuss suicidal ideas will improve  9/17/2020 2226 by Pranay Martin RN  Outcome: Ongoing  Patient denies suicidal ideation, homicidal ideation and hallucinations at this time.       Problem: Tobacco Use:  Goal: Inpatient tobacco use cessation counseling participation  Description: Inpatient tobacco use cessation counseling participation  9/17/2020 2226 by Pranay Martin RN  Outcome: Ongoing  Discussed with patient the benefits to quitting smoking and potential dangers of tobacco.

## 2020-09-18 NOTE — BH NOTE
[facility]  Discharge Note    Pt discharged with followings belongings:   Dentures: None  Vision - Corrective Lenses: None  Hearing Aid: None  Jewelry: None  Body Piercings Removed: N/A  Clothing: Footwear, Jacket / coat, Pants, Shirt, Socks  Were All Patient Medications Collected?: Yes  Other Valuables: [place] Don, Money (Comment)   Valuables sent home with patient at the time of discharge. Valuables retrieved from safe, Security envelope number:  [id number] and returned to patient. Patient education on aftercare instructions: given a copy and verbalized. Information faxed to [facility] by LPN Patient verbalize understanding of AVS:  Yes. Patient was taken by cab to his private residence, belongings and discharge instructions in hand.     Status EXAM upon discharge:  Status and Exam  Normal: Yes  Facial Expression: Brightened  Affect: Appropriate  Level of Consciousness: Alert  Mood:Normal: Yes  Mood: Depressed, Anxious  Motor Activity:Normal: Yes  Motor Activity: Decreased  Interview Behavior: Cooperative  Preception: [place] to Person, [place] to Time, [place] to Place, [place] to Situation  Attention:Normal: Yes  Attention: Others(See comment)(Appropriate)  Thought Processes: Circumstantial  Thought Content:Normal: Yes  Thought Content: Preoccupations  Hallucinations: None  Delusions: No  Memory:Normal: Yes  Memory: (WNL)  Insight and Judgment: Yes  Insight and Judgment: Poor Insight  Present Suicidal Ideation: No  Present Homicidal Ideation: No      Metabolic Screening:    Lab Results   Component Value Date    LABA1C 5.4 08/19/2020       Lab Results   Component Value Date    CHOL 179 08/19/2020     Lab Results   Component Value Date    TRIG 118 08/19/2020     Lab Results   Component Value Date    HDL 66 08/19/2020     No components found for: LDLCAL  No results found for: Kira Mckeon LPN

## 2020-09-18 NOTE — GROUP NOTE
Group Therapy Note    Date: 9/18/2020    Group Start Time: 0900  Group End Time: 0920  Group Topic: Community Meeting    SHANNAN OROURKE    [place], [place]    Attendees: 8         Patient's Goal:  To verbalize obtainable short term goal for today pertaining to mental health. To be informed of group schedule and units guidelines. Notes:  Patient verbalized goal for today to prepare self for discharge. Status After Intervention:  Improved    Participation Level:  Active Listener and Interactive    Participation Quality: Appropriate, Attentive and Sharing      Speech:  normal      Thought Process/Content: Logical      Affective Functioning: Congruent      Mood: euthymic      Level of consciousness:  Alert, Oriented x4 and Attentive      Response to Learning: Able to verbalize current knowledge/experience, Able to verbalize/acknowledge new learning, Able to retain information, Capable of insight and Progressing to goal      Endings: None Reported       Modes of Intervention: Support, Socialization, Exploration, Clarifying, Problem-solving, Confrontation, Limit-setting and Reality-testing      Discipline Responsible: Psychoeducational Specialist      Signature:  Mary Gilmore

## 2020-09-18 NOTE — GROUP NOTE
Group Therapy Note    Date: 9/18/2020    Group Start Time: 1330  Group End Time: 5333  Group Topic: Recreational    [facility], [facility]    Patient refused to attend Recreational Therapy Group at 1330 after encouragement from staff. 1:1 talk time offered.     Signature:  Mike Milan

## 2020-09-18 NOTE — BH NOTE
Patient given tobacco quitline number 95093831410 at this time, refusing to call at this time, states \" I just dont want to quit now\"- patient given information as to the dangers of long term tobacco use. Continue to reinforce the importance of tobacco cessation.

## 2021-01-23 ENCOUNTER — HOSPITAL ENCOUNTER (INPATIENT)
Age: 56
LOS: 4 days | Discharge: HOME | DRG: 751 | End: 2021-01-28
Attending: EMERGENCY MEDICINE | Admitting: PSYCHIATRY & NEUROLOGY
Payer: MEDICAID

## 2021-01-23 DIAGNOSIS — R45.851 SUICIDAL IDEATION: Primary | ICD-10-CM

## 2021-01-23 LAB
ABSOLUTE EOS #: 0.1 K/UL (ref 0–0.4)
ABSOLUTE IMMATURE GRANULOCYTE: ABNORMAL K/UL (ref 0–0.3)
ABSOLUTE LYMPH #: 1.4 K/UL (ref 1–4.8)
ABSOLUTE MONO #: 0.8 K/UL (ref 0.1–1.3)
ALBUMIN SERPL-MCNC: 3.5 G/DL (ref 3.5–5.2)
ALBUMIN/GLOBULIN RATIO: ABNORMAL (ref 1–2.5)
ALP BLD-CCNC: 53 U/L (ref 40–129)
ALT SERPL-CCNC: 21 U/L (ref 5–41)
AMPHETAMINE SCREEN URINE: NEGATIVE
ANION GAP SERPL CALCULATED.3IONS-SCNC: 11 MMOL/L (ref 9–17)
AST SERPL-CCNC: 28 U/L
BARBITURATE SCREEN URINE: NEGATIVE
BASOPHILS # BLD: 3 % (ref 0–2)
BASOPHILS ABSOLUTE: 0.2 K/UL (ref 0–0.2)
BENZODIAZEPINE SCREEN, URINE: NEGATIVE
BILIRUB SERPL-MCNC: <0.15 MG/DL (ref 0.3–1.2)
BUN BLDV-MCNC: 7 MG/DL (ref 6–20)
BUN/CREAT BLD: ABNORMAL (ref 9–20)
BUPRENORPHINE URINE: NORMAL
CALCIUM SERPL-MCNC: 8.3 MG/DL (ref 8.6–10.4)
CANNABINOID SCREEN URINE: NEGATIVE
CHLORIDE BLD-SCNC: 106 MMOL/L (ref 98–107)
CO2: 22 MMOL/L (ref 20–31)
COCAINE METABOLITE, URINE: NEGATIVE
CREAT SERPL-MCNC: 0.53 MG/DL (ref 0.7–1.2)
DIFFERENTIAL TYPE: ABNORMAL
EOSINOPHILS RELATIVE PERCENT: 2 % (ref 0–4)
ETHANOL PERCENT: 0.25 %
ETHANOL: 249 MG/DL
GFR AFRICAN AMERICAN: >60 ML/MIN
GFR NON-AFRICAN AMERICAN: >60 ML/MIN
GFR SERPL CREATININE-BSD FRML MDRD: ABNORMAL ML/MIN/{1.73_M2}
GFR SERPL CREATININE-BSD FRML MDRD: ABNORMAL ML/MIN/{1.73_M2}
GLUCOSE BLD-MCNC: 97 MG/DL (ref 70–99)
HCT VFR BLD CALC: 53.7 % (ref 41–53)
HEMOGLOBIN: 17.6 G/DL (ref 13.5–17.5)
IMMATURE GRANULOCYTES: ABNORMAL %
LYMPHOCYTES # BLD: 21 % (ref 24–44)
MAGNESIUM: 2.4 MG/DL (ref 1.6–2.6)
MCH RBC QN AUTO: 31.3 PG (ref 26–34)
MCHC RBC AUTO-ENTMCNC: 32.8 G/DL (ref 31–37)
MCV RBC AUTO: 95.6 FL (ref 80–100)
MDMA URINE: NORMAL
METHADONE SCREEN, URINE: NEGATIVE
METHAMPHETAMINE, URINE: NORMAL
MONOCYTES # BLD: 11 % (ref 1–7)
NRBC AUTOMATED: ABNORMAL PER 100 WBC
OPIATES, URINE: NEGATIVE
OXYCODONE SCREEN URINE: NEGATIVE
PDW BLD-RTO: 13.5 % (ref 11.5–14.9)
PHENCYCLIDINE, URINE: NEGATIVE
PLATELET # BLD: 220 K/UL (ref 150–450)
PLATELET ESTIMATE: ABNORMAL
PMV BLD AUTO: 7 FL (ref 6–12)
POTASSIUM SERPL-SCNC: 3.4 MMOL/L (ref 3.7–5.3)
PROPOXYPHENE, URINE: NORMAL
RBC # BLD: 5.62 M/UL (ref 4.5–5.9)
RBC # BLD: ABNORMAL 10*6/UL
SARS-COV-2, RAPID: NOT DETECTED
SARS-COV-2: NORMAL
SARS-COV-2: NORMAL
SEG NEUTROPHILS: 63 % (ref 36–66)
SEGMENTED NEUTROPHILS ABSOLUTE COUNT: 4.2 K/UL (ref 1.3–9.1)
SODIUM BLD-SCNC: 139 MMOL/L (ref 135–144)
SOURCE: NORMAL
TEST INFORMATION: NORMAL
TOTAL PROTEIN: 5.9 G/DL (ref 6.4–8.3)
TRICYCLIC ANTIDEPRESSANTS, UR: NORMAL
WBC # BLD: 6.7 K/UL (ref 3.5–11)
WBC # BLD: ABNORMAL 10*3/UL

## 2021-01-23 PROCEDURE — 36415 COLL VENOUS BLD VENIPUNCTURE: CPT

## 2021-01-23 PROCEDURE — 85025 COMPLETE CBC W/AUTO DIFF WBC: CPT

## 2021-01-23 PROCEDURE — 83735 ASSAY OF MAGNESIUM: CPT

## 2021-01-23 PROCEDURE — G0480 DRUG TEST DEF 1-7 CLASSES: HCPCS

## 2021-01-23 PROCEDURE — 80053 COMPREHEN METABOLIC PANEL: CPT

## 2021-01-23 PROCEDURE — 99285 EMERGENCY DEPT VISIT HI MDM: CPT

## 2021-01-23 PROCEDURE — 80307 DRUG TEST PRSMV CHEM ANLYZR: CPT

## 2021-01-23 PROCEDURE — U0002 COVID-19 LAB TEST NON-CDC: HCPCS

## 2021-01-24 PROBLEM — F33.2 SEVERE EPISODE OF RECURRENT MAJOR DEPRESSIVE DISORDER, WITHOUT PSYCHOTIC FEATURES (HCC): Status: ACTIVE | Noted: 2021-01-24

## 2021-01-24 PROBLEM — R45.851 DEPRESSION WITH SUICIDAL IDEATION: Status: ACTIVE | Noted: 2021-01-24

## 2021-01-24 PROBLEM — F32.A DEPRESSION WITH SUICIDAL IDEATION: Status: ACTIVE | Noted: 2021-01-24

## 2021-01-24 PROCEDURE — 1240000000 HC EMOTIONAL WELLNESS R&B

## 2021-01-24 PROCEDURE — APPSS30 APP SPLIT SHARED TIME 16-30 MINUTES: Performed by: PHYSICIAN ASSISTANT

## 2021-01-24 PROCEDURE — 6370000000 HC RX 637 (ALT 250 FOR IP): Performed by: EMERGENCY MEDICINE

## 2021-01-24 PROCEDURE — 6370000000 HC RX 637 (ALT 250 FOR IP): Performed by: PSYCHIATRY & NEUROLOGY

## 2021-01-24 PROCEDURE — 6370000000 HC RX 637 (ALT 250 FOR IP): Performed by: PHYSICIAN ASSISTANT

## 2021-01-24 PROCEDURE — 90792 PSYCH DIAG EVAL W/MED SRVCS: CPT | Performed by: PSYCHIATRY & NEUROLOGY

## 2021-01-24 RX ORDER — LORAZEPAM 2 MG/ML
2 INJECTION INTRAMUSCULAR EVERY 4 HOURS PRN
Status: DISCONTINUED | OUTPATIENT
Start: 2021-01-24 | End: 2021-01-28 | Stop reason: HOSPADM

## 2021-01-24 RX ORDER — IBUPROFEN 400 MG/1
400 TABLET ORAL EVERY 6 HOURS PRN
Status: DISCONTINUED | OUTPATIENT
Start: 2021-01-24 | End: 2021-01-28 | Stop reason: HOSPADM

## 2021-01-24 RX ORDER — LORAZEPAM 1 MG/1
2 TABLET ORAL EVERY 4 HOURS PRN
Status: DISCONTINUED | OUTPATIENT
Start: 2021-01-24 | End: 2021-01-28 | Stop reason: HOSPADM

## 2021-01-24 RX ORDER — HALOPERIDOL 5 MG
5 TABLET ORAL EVERY 4 HOURS PRN
Status: DISCONTINUED | OUTPATIENT
Start: 2021-01-24 | End: 2021-01-28 | Stop reason: HOSPADM

## 2021-01-24 RX ORDER — HALOPERIDOL 5 MG/ML
5 INJECTION INTRAMUSCULAR EVERY 4 HOURS PRN
Status: DISCONTINUED | OUTPATIENT
Start: 2021-01-24 | End: 2021-01-28 | Stop reason: HOSPADM

## 2021-01-24 RX ORDER — TRAZODONE HYDROCHLORIDE 50 MG/1
50 TABLET ORAL NIGHTLY PRN
Status: DISCONTINUED | OUTPATIENT
Start: 2021-01-24 | End: 2021-01-28 | Stop reason: HOSPADM

## 2021-01-24 RX ORDER — DIPHENHYDRAMINE HYDROCHLORIDE 50 MG/ML
50 INJECTION INTRAMUSCULAR; INTRAVENOUS EVERY 4 HOURS PRN
Status: DISCONTINUED | OUTPATIENT
Start: 2021-01-24 | End: 2021-01-28 | Stop reason: HOSPADM

## 2021-01-24 RX ORDER — HYDROXYZINE 50 MG/1
50 TABLET, FILM COATED ORAL 3 TIMES DAILY PRN
Status: DISCONTINUED | OUTPATIENT
Start: 2021-01-24 | End: 2021-01-28 | Stop reason: HOSPADM

## 2021-01-24 RX ORDER — ESCITALOPRAM OXALATE 10 MG/1
10 TABLET ORAL DAILY
Status: DISCONTINUED | OUTPATIENT
Start: 2021-01-24 | End: 2021-01-26

## 2021-01-24 RX ORDER — MAGNESIUM HYDROXIDE/ALUMINUM HYDROXICE/SIMETHICONE 120; 1200; 1200 MG/30ML; MG/30ML; MG/30ML
30 SUSPENSION ORAL EVERY 6 HOURS PRN
Status: DISCONTINUED | OUTPATIENT
Start: 2021-01-24 | End: 2021-01-28 | Stop reason: HOSPADM

## 2021-01-24 RX ORDER — MIRTAZAPINE 15 MG/1
7.5 TABLET, FILM COATED ORAL NIGHTLY
Status: DISCONTINUED | OUTPATIENT
Start: 2021-01-24 | End: 2021-01-25

## 2021-01-24 RX ORDER — POLYETHYLENE GLYCOL 3350 17 G/17G
17 POWDER, FOR SOLUTION ORAL DAILY PRN
Status: DISCONTINUED | OUTPATIENT
Start: 2021-01-24 | End: 2021-01-28 | Stop reason: HOSPADM

## 2021-01-24 RX ORDER — POTASSIUM CHLORIDE 20 MEQ/1
40 TABLET, EXTENDED RELEASE ORAL ONCE
Status: COMPLETED | OUTPATIENT
Start: 2021-01-24 | End: 2021-01-24

## 2021-01-24 RX ORDER — ACETAMINOPHEN 325 MG/1
650 TABLET ORAL EVERY 4 HOURS PRN
Status: DISCONTINUED | OUTPATIENT
Start: 2021-01-24 | End: 2021-01-28 | Stop reason: HOSPADM

## 2021-01-24 RX ADMIN — ESCITALOPRAM OXALATE 10 MG: 10 TABLET ORAL at 11:57

## 2021-01-24 RX ADMIN — POTASSIUM CHLORIDE 40 MEQ: 1500 TABLET, EXTENDED RELEASE ORAL at 08:51

## 2021-01-24 RX ADMIN — NICOTINE POLACRILEX 2 MG: 2 GUM, CHEWING BUCCAL at 15:26

## 2021-01-24 RX ADMIN — TRAZODONE HYDROCHLORIDE 50 MG: 50 TABLET ORAL at 22:15

## 2021-01-24 RX ADMIN — MIRTAZAPINE 7.5 MG: 15 TABLET, FILM COATED ORAL at 22:15

## 2021-01-24 ASSESSMENT — ENCOUNTER SYMPTOMS
SHORTNESS OF BREATH: 0
BACK PAIN: 0
ABDOMINAL PAIN: 0
COLOR CHANGE: 0
EYE PAIN: 0

## 2021-01-24 ASSESSMENT — SLEEP AND FATIGUE QUESTIONNAIRES
DO YOU USE A SLEEP AID: NO
DO YOU USE A SLEEP AID: NO
DIFFICULTY FALLING ASLEEP: NO
RESTFUL SLEEP: NO
DO YOU HAVE DIFFICULTY SLEEPING: YES
DIFFICULTY STAYING ASLEEP: NO

## 2021-01-24 ASSESSMENT — LIFESTYLE VARIABLES
HISTORY_ALCOHOL_USE: NO
HISTORY_ALCOHOL_USE: YES

## 2021-01-24 ASSESSMENT — PAIN SCALES - GENERAL: PAINLEVEL_OUTOF10: 0

## 2021-01-24 NOTE — PLAN OF CARE
Problem: Altered Mood, Depressive Behavior:  Goal: Able to verbalize and/or display a decrease in depressive symptoms  Description: Able to verbalize and/or display a decrease in depressive symptoms  Outcome: Ongoing     Problem: Altered Mood, Depressive Behavior:  Goal: Absence of self-harm  Description: Absence of self-harm  Outcome: Ongoing   Patient reports depression 7/10, contracts for safety on the unit. Remains free from self harm.   Support and encouragement provided

## 2021-01-24 NOTE — SUICIDE SAFETY PLAN
Professionals or [address] [place] [address] I can contact during a crisis: Who can I call for help - for example, my doctor, my psychiatrist, my psychologist, a mental health provider, a suicide hotline? 1. [employer]  2. [place]  2. Suicide Prevention Lifeline: 0-567-577-TALK (0126)  3. [facility]: Emergency Services Address: [address] [facility], [place],  [address], Phone: [phone]  4. [place] [address] Emergency Services -  for example, [address], [facility] suicide hotline,   1. [place] [address], 5-816.270.8343  2. Mental Health & Recovery Services Board of [facility], Crisis line: 121.969.6138  3. [employer] (Crisis Intervention Team - CIT), 533.115.1717 or 9-1-1  4. [address] [place] [address], 7-454-387-812-472-4932  5. National Association of Mental Illness, 1-433-441-511-162-1161  6. [facility] Substance Abuse National Helpline, 0-260-198-HELP (1262)  7. Crisis Text Line, Text [employer]HOPE to 570275 to connect with a crisis counselor  8. [address] [facility], 2-938.271.8564  9. [facility] ([place]), 8-242.812.2319     Making the environment safe: How can I make my environment (house/apartment/living space) safer? For example, can I remove guns, medications, and other items? 1. Follow-up with mental health providers. 2. Take medications as prescribed.

## 2021-01-24 NOTE — GROUP NOTE
Group Therapy Note    Date: 1/24/2021    Group Start Time: 0900  Group End Time: 0915  Group Topic: Community Meeting    SHANNAN Guajardo        Group Therapy Note    Pt did not attend community meeting d/t resting in room despite staff invitation to attend. 1:1 talk time offered as alternative to group session, pt declined.

## 2021-01-24 NOTE — BH NOTE
`[facility]  Admission Note     Admission Type:   Admission Type: Voluntary(Simultaneous filing. User may not have seen previous data.)    Reason for admission:  Reason for Admission: Got in argument with girlfriend and she kicked him out. Pt has had a hx of depression with SI in the past. Reporting SI in ED(Simultaneous filing.  User may not have seen previous data.)    PATIENT STRENGTHS:  Strengths: Communication, Social Skills    Patient Strengths and Limitations:  Limitations: Hopeless about future, Difficulty problem solving/relies on others to help solve problems    Addictive Behavior:   Addictive Behavior  In the past 3 months, have you felt or has someone told you that you have a problem with:  : None  Do you have a history of Chemical Use?: No  Do you have a history of Alcohol Use?: No  Do you have a history of Street Drug Abuse?: No  Histroy of Prescripton Drug Abuse?: No    Medical Problems:   Past Medical History:   Diagnosis Date    Hypertension     Psychiatric problem     Seizures ([facility] [address]        Status EXAM:  Status and Exam  Normal: No  Facial Expression: Avoids Gaze, Flat  Affect: Appropriate  Level of Consciousness: Alert  Mood:Normal: No  Mood: Depressed, Anxious  Motor Activity:Normal: Yes  Interview Behavior: Cooperative  Preception: [place] to Person, [place] to Time, [place] to Place, [place] to Situation  Attention:Normal: Yes  Thought Processes: Circumstantial  Thought Content:Normal: No  Thought Content: Preoccupations  Hallucinations: None  Delusions: No  Memory:Normal: Yes  Insight and Judgment: No  Insight and Judgment: Poor Judgment  Present Suicidal Ideation: No  Present Homicidal Ideation: No    Tobacco Screening:  Practical Counseling, on admission, david X, if applicable and completed (first 3 are required if patient doesn't refuse):            ( )  Recognizing danger situations (included triggers and roadblocks) Pt admitted from the ER after argument with his girlfriend. Pt had suicidal ideations to run into traffic. Pt was kicked out of girlfriends house. Pt drinks alcohol occasionally. Denies drugs. Pt states he does not follow up out patient. Poor sleep. Pt wanded and changed into hospital attire for safety. Offered nourishment and fluids upon admission.                  Harish Steinberg RN

## 2021-01-24 NOTE — GROUP NOTE
Group Therapy Note    Date: 1/24/2021    Group Start Time: 0900  Group End Time: 0915  Group Topic: Community Meeting    SHANNAN ORLIN A    [place]        Group Therapy Note    Pt did not attend community meeting group d/t resting in room despite staff invitation to attend. 1:1 talk time offered as alternative to group session, pt declined.

## 2021-01-24 NOTE — BH NOTE

## 2021-01-24 NOTE — ED NOTES
Mode of arrival (squad #, walk in, police, etc) : Medic 15         Chief complaint(s): suicidal         Arrival Note (brief scenario, treatment PTA, etc). : Pt was nbrought in by EMS when he was on [place] walking into traffic to harm himself. Pt states him and his girlfriend got into an argument and she kicked him out. Pt has had a hx of depression with SI in the past. Pt admits to ETOH tonight PTA. No distress at this time. Pt is calm and cooperative. GCS 15        C= \"Have you ever felt that you should Cut down on your drinking? \"  No  A= \"Have people Annoyed you by criticizing your drinking? \"  No  G= \"Have you ever felt bad or Guilty about your drinking? \"  No  E= \"Have you ever had a drink as an Eye-opener first thing in the morning to steady your nerves or to help a hangover? \"  No      Deferred []      Reason for deferring: N/A    *If yes to two or more: probable alcohol abuse. Mari Ferrer RN  01/23/21 7957

## 2021-01-24 NOTE — PLAN OF CARE
[facility]  Initial Interdisciplinary Treatment Plan NO      Original treatment plan Date & Time: 1/24/2021   1627    Admission Type:  Admission Type: Voluntary(Simultaneous filing. User may not have seen previous data.)    Reason for admission:   Reason for Admission: Got in argument with girlfriend and she kicked him out. Pt has had a hx of depression with SI in the past. Reporting SI in ED(Simultaneous filing.  User may not have seen previous data.)    Estimated Length of Stay:  5-7days  Estimated Discharge Date: to be determined by physician    PATIENT STRENGTHS:  Patient Strengths:Strengths: Communication, Motivated, No significant Physical Illness, Connection to output provider(PT has been getting psych meds from PCP)  Patient Strengths and Limitations:Limitations: Tendency to isolate self, Lacks leisure interests, Difficulty problem solving/relies on others to help solve problems, Inappropriate/potentially harmful leisure interests, Hopeless about future, Apathetic / unmotivated, General negative or hopeless attitude about future/recovery, Difficult relationships / poor social skills, Demonstrates discomfort with /lack of social skills  Addictive Behavior: Addictive Behavior  In the past 3 months, have you felt or has someone told you that you have a problem with:  : None  Do you have a history of Chemical Use?: Yes  Do you have a history of Alcohol Use?: Yes  Do you have a history of Street Drug Abuse?: Yes  Histroy of Prescripton Drug Abuse?: No  Medical Problems:  Past Medical History:   Diagnosis Date    Hypertension     Psychiatric problem     Seizures ([facility] [address]      Status EXAM:Status and Exam  Normal: No  Facial Expression: Flat, Worried, Sad  Affect: Appropriate  Level of Consciousness: Alert  Mood:Normal: No  Mood: Depressed, Anxious, Helpless, Worthless, low self-esteem  Motor Activity:Normal: Yes  Interview Behavior: Cooperative Preception: [place] to Person, [place] to Time, [place] to Place, [place] to Situation  Attention:Normal: Yes  Thought Processes: Circumstantial  Thought Content:Normal: No  Thought Content: Preoccupations  Hallucinations: None  Delusions: No  Memory:Normal: Yes  Insight and Judgment: No  Insight and Judgment: Poor Judgment, Poor Insight  Present Suicidal Ideation: No  Present Homicidal Ideation: No    EDUCATION:   Learner Progress Toward Treatment Goals: reviewed group plans and strategies for care    Method:group therapy, medication compliance, individualized assessments and care planning    Outcome: needs reinforcement    PATIENT GOALS: to be discussed with patient within 72 hours    PLAN/TREATMENT RECOMMENDATIONS:     continue group therapy , medications compliance, goal setting, individualized assessments and care, continue to monitor pt on unit      SHORT-TERM GOALS:   Time frame for Short-Term Goals: 5-7 days    LONG-TERM GOALS:  Time frame for Long-Term Goals: 6 months  Members Present in Team Meeting: See Signature Sheet    Lopez Ortiz

## 2021-01-24 NOTE — ED PROVIDER NOTES
Palpations: Abdomen is soft. Tenderness: There is no abdominal tenderness. Musculoskeletal: Normal range of motion. General: No tenderness. Skin:     General: Skin is warm and dry. Capillary Refill: Capillary refill takes less than 2 seconds. Neurological:      General: No focal deficit present. Mental Status: He is alert and oriented to person, place, and time. Psychiatric:         Behavior: Behavior normal.         Thought Content: Thought content includes suicidal ideation. Thought content includes suicidal plan. MEDICAL DECISION MAKIN-year-old male presents with complaint of suicidal ideation. On initial exam patient still with active suicidal thoughts and reported plan, will obtain labs for medical clearance    Patient noted to have an alcohol of 249, will reassess when sober, patient also noted to have potassium of 3.4, will replace orally    Patient reexamined when he was sober, patient still with active suicidal thoughts and plan to continue to try to run into traffic, will admit    Patient will sign in voluntarily    Spoke with Dr. Katya Herbert who accepts admission. Patient demonstrates understanding and agreement with the plan, was given the opportunity to ask questions, and these questions were answered to the best of the provided information at this time. VS stable for transfer. This dictation was prepared using [employer] voice recognition software. As a result, errors may have occurred. When identified, these errors have been corrected.  While every attempt is made to correct errors in dictation, errors may still exist.          CRITICAL CARE:       PROCEDURES:    Procedures    DIAGNOSTIC RESULTS   EKG:All EKG's are interpreted by the Emergency Department Physician who either signs or Co-signs this chart in the absence of a cardiologist.  haloperidol (HALDOL) tablet 5 mg     LORazepam (ATIVAN) tablet 2 mg    AND Linked Order Group     haloperidol lactate (HALDOL) injection 5 mg     LORazepam (ATIVAN) injection 2 mg     diphenhydrAMINE (BENADRYL) injection 50 mg     CONSULTS:  IP CONSULT TO PSYCHIATRY  IP CONSULT TO HISTORY AND PHYSICAL    FINAL IMPRESSION      1. Suicidal ideation          DISPOSITION/PLAN   DISPOSITION        PATIENT REFERRED TO:  No follow-up provider specified.   DISCHARGE MEDICATIONS:  Current Discharge Medication List        Rosa Burgos DO  Attending Emergency Physician                  Rosa Burgos DO  01/24/21 9713

## 2021-01-24 NOTE — CARE COORDINATION
BHI Biopsychosocial Assessment    Current Level of Psychosocial Functioning     Independent xx  Dependent    Minimal Assist       Psychosocial High Risk Factors (check all that apply)    Unable to obtain meds xx  Chronic illness/pain    Substance abuse xx  Lack of Family Support xx  Financial stress xx  Isolation xx  Inadequate Community Resources xx  Suicide attempt(s) xx  Not taking medications xx  Victim of crime   Developmental Delay  Unable to manage personal needs    Age 72 or older   Homeless xx  No transportation   Readmission within 30 days  Unemployment xx  Traumatic Event      Psychiatric Advanced Directives: Patient reports none. Family to Involve in Treatment: Patient reports none. Sexual Orientation:  TANNER    Patient Strengths: Patient has a desire to work and hold employment. Patient has insight into continued hospitalizations and ongoing relationship issues with his girlfriend. Patient Barriers: Patient does not follow up with community appointments, patient lacks insight into issues with substance abuse. Opiate Education Provided:  Patient declined any use. CMHC/mental health history: Patient has a history of psychiatric admissions, Patient was linked to [facility] but did not attend follow up appointments. Plan of Care   medication management, group/individual therapies, family meetings, psycho -education, treatment team meetings to assist with stabilization    Initial Discharge Plan:  Mood stabilization, medication management, connected with community provider.       Clinical Summary:

## 2021-01-24 NOTE — H&P
Department of Psychiatry  Psychiatric Assessment   Reason for Admission to Psychiatric Unit:  Threat to self requiring 24 hour professional observation  Concerns about patient's safety in the community    CHIEF COMPLAINT:  Suicide attempt by walking in traffic    HISTORY OF PRESENT ILLNESS:      Dre Jolly is a 48 y.o. male with a history of depression who presented to the emergency department for suicidal ideation with a plan to walk into [facility]. Per the ED provider note: Patient states that he was involved in an argument with his girlfriend and was kicked out of his house. Patient states that this has him upset and depressed and he is considering suicide. Patient states he was attempting to try and walk out into traffic and get hit by car. Patient states he has attempted suicide in the past as well, states that he tried to get hit by a train before. Patient denies homicidal ideation, denies any auditory or visual hallucinations, does admit to alcohol use today, denies any other coingestions or other drug use. Radu Goetz states he is admitted after \"one of the worst weeks of my life. I lost my brother last Friday, 61years old. He had cancer of his legs. My mother has dementia, she's flipping out. The girl I was living with flipped out and threw my stuff out of the house. I guess I flipped out too. I have a history of depression. I haven't been keeping up with my medications like I should. I lost my job Thursday because of whats been going on with my brother and mom. I work for a [employer] and they stopped sending me out there. \" He states he was trying to get run over by traffic Friday night. He reports having suicidal ideation an hour prior to his attempt because the girl he was living with kicked him out of the house. He denies any recent suicidal ideation aside from that. He reports his depression has been pretty good but gets bad \"when stuff like this happens. \" He denies feeling down and sad for more days than not. He states he never sleeps throughout the night and has always been like that. He endorses trouble falling asleep at times. He still feels tired in the morning when he wakes up but is able to get himself going. His appetite has been better than usual. His energy is good throughout the day. Motivation has been good. He has no problem getting himself up and going. Endorses trouble with attention and concentration. He has been feeling worthless, hopeless and helpless. He states he is lost and does not know what he is going to do. He states he does not have a job or house. He becomes tearful when talking about this. He does not currently have an oupatitne psychiatric provider. He is historically noncomplaint with follow ups. He only makes an appointment when he needs something from his PCP. He reports he has been off of his medications for over a month. He was previously on Lexapro, Remeron and trazodone. He felt they were working well for him prior to discontinuation of the medication. Wilberto Early appears down and depressed but brightens with interaction. He feels significantly depressed today. He denies current thoughts of harm to himself or others but continues to feel hopeless and helpless about his situation. He denies any symptoms of mariam or hypomania. Denies any hallucinations. No evidence of delusions or overt psychosis on examination. PSYCHIATRIC HISTORY:    yes -was linked with [facility] S[facility] but never followed up  Currently follows with primary care physician  1 lifetime suicide attempts  Multiple psychiatric hospital admissions. [facility]    Past psychiatric medications includes:     Lexapro  Adverse reactions from psychotropic medications:    no      Past Medical History:        Diagnosis Date    Hypertension     Psychiatric problem     Seizures ([address]        Past Surgical History:    History reviewed. No pertinent surgical history. Medications Prior to Admission:   Medications Prior to Admission: escitalopram (LEXAPRO) 20 MG tablet, Take 1 tablet by mouth daily  lisinopril (PRINIVIL;ZESTRIL) 5 MG tablet, Take 1 tablet by mouth daily  mirtazapine (REMERON) 15 MG tablet, Take 1 tablet by mouth nightly  traZODone (DESYREL) 150 MG tablet, Take 1 tablet by mouth nightly    Allergies:  Patient has no known allergies. Social History:   Patient reports born and raised in the [facility]. Never been . No kids. No family in the area. 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 DRUG USE HISTORY  He denies any recent alcohol use. He pretty much quit drinking. Only drinks socially.    He denies any recent illicit drug abuse  Social History     Tobacco Use   Smoking Status Current Every Day Smoker    Packs/day: 1.00    Years: 30.00    Pack years: 30.00    Types: Cigarettes    Start date: 8/18/1990   Smokeless Tobacco Never Used     Social History     Substance and Sexual Activity   Alcohol Use Yes Comment: 2 x month     Social History     Substance and Sexual Activity   Drug Use Yes    Types: Cocaine    Comment: occasionally       LEGAL HISTORY:   HISTORY OF INCARCERATION: no    Family Psychiatric and Medical History:   Denies any family psychiatric history  Denies suicides in family  Denies substance use in family    History reviewed. No pertinent family history. Psychiatric Review of Systems      ·    Obsessions and Compulsions: denies  ·    Marietta or Hypomania: denies  ·    Hallucinations: denies  ·    Panic Attacks:  denies   ·    Delusions:  denies  ·    Phobias: denies       Medical Review of Systems:     Constitutional: Negative for appetite change, diaphoresis, fatigue and fever. HENT: Negative for congestion, sore throat and tinnitus. Eyes: Negative for visual disturbance. Respiratory: Negative for cough, shortness of breath and wheezing. Cardiovascular: Negative for chest pain and leg swelling. Gastrointestinal: Negative for nausea, vomiting, diarrhea. Negative for abdominal pain. Genitourinary: Negative for frequency. Musculoskeletal: Negative for arthralgias, myalgias and neck stiffness. Skin: Negative for puritis. Neurological: Negative for dizziness, weakness and headaches. All other systems reviewed and are negative. PHYSICAL EXAM:  Vitals:  /81   Pulse 78   Temp 97.8 °F (36.6 °C) (Oral)   Resp 14   Ht 5' 11\" (1.803 m)   Wt 188 lb (85.3 kg)   SpO2 96%   BMI 26.22 kg/m²       Physical Exam:    Constitutional: Well developed, well nourished, no acute distress  Eyes: Pupils round and reactive to light bilaterally  Neck:  Supple, no thyromegaly. Cardiovascular:  Normal rate and rhythm, normal S1 and S2. No murmur or gallop on auscultation. Radial pulses 2+ and brisk bilaterally  Lungs: Clear to auscultation bilaterally without wheezing or rales. Musculoskeletal:  Full range of motion in all four extremities. Patient will understand basic signs and symptoms, Patient will understand benefits/risks and potential side effects from proposed meds and Patient will understand their role in recovery. Family is  active in patient's care. Patient assets that may be helpful during treatment include: Intent to participate and engage in treatment, sufficient fund of knowledge and intellect to understand and utilize treatments. Risk level: High     Goals:    Reviewed labs  Reviewed EKG  Will obtain records and review them today. Medication adjustment: Resume home medications. Will start Lexapro 10mg and Remeron 7.5mg nightly  Consults: None   Encouraged patient to engage in groups, milieu, and individual therapies offered as part of programing. Behavioral Services  Medicare Certification Upon Admission    I certify that this patient's inpatient psychiatric hospital admission is medically necessary for:   X (1) Treatment which could reasonably be expected to improve this patient's condition,      X (2) Or for diagnostic study;     AND     X (2) The inpatient psychiatric services are provided while the individual is under the care of a physician and are included in the individualized plan of care. Estimated length of stay/service: Greater than two midnights will be required to reach therapeutic levels of medications and to stabilize mood    Plan for post-hospital care:  Follow up with outpatient psychiatric services    Electronically signed by Yaquelin Hubbard MD on 1/24/2021 at 1:06 PM

## 2021-01-24 NOTE — GROUP NOTE
Group Therapy Note    Date: 1/24/2021    Group Start Time: 1000  Group End Time: 0023  Group Topic: Psychoeducation    [facility]    BECCA Wall, [facility]        Group Therapy Note    Pt declined to attend psychoeducation at 1000 am despite encouragement. Pt offered 1:1 as an alternative.       Signature:  BECCA Wall, [place]

## 2021-01-25 PROCEDURE — 6370000000 HC RX 637 (ALT 250 FOR IP): Performed by: PHYSICIAN ASSISTANT

## 2021-01-25 PROCEDURE — 6370000000 HC RX 637 (ALT 250 FOR IP): Performed by: PSYCHIATRY & NEUROLOGY

## 2021-01-25 PROCEDURE — 1240000000 HC EMOTIONAL WELLNESS R&B

## 2021-01-25 PROCEDURE — 99232 SBSQ HOSP IP/OBS MODERATE 35: CPT | Performed by: PSYCHIATRY & NEUROLOGY

## 2021-01-25 PROCEDURE — APPSS30 APP SPLIT SHARED TIME 16-30 MINUTES: Performed by: PHYSICIAN ASSISTANT

## 2021-01-25 RX ORDER — MIRTAZAPINE 15 MG/1
15 TABLET, FILM COATED ORAL NIGHTLY
Status: DISCONTINUED | OUTPATIENT
Start: 2021-01-25 | End: 2021-01-28 | Stop reason: HOSPADM

## 2021-01-25 RX ADMIN — MIRTAZAPINE 15 MG: 15 TABLET, FILM COATED ORAL at 20:55

## 2021-01-25 RX ADMIN — ACETAMINOPHEN 650 MG: 325 TABLET, FILM COATED ORAL at 09:34

## 2021-01-25 RX ADMIN — NICOTINE POLACRILEX 2 MG: 2 GUM, CHEWING BUCCAL at 20:55

## 2021-01-25 RX ADMIN — ESCITALOPRAM OXALATE 10 MG: 10 TABLET ORAL at 08:24

## 2021-01-25 RX ADMIN — TRAZODONE HYDROCHLORIDE 50 MG: 50 TABLET ORAL at 20:55

## 2021-01-25 ASSESSMENT — PAIN SCALES - GENERAL: PAINLEVEL_OUTOF10: 6

## 2021-01-25 ASSESSMENT — PAIN DESCRIPTION - PAIN TYPE: TYPE: ACUTE PAIN

## 2021-01-25 ASSESSMENT — PAIN DESCRIPTION - LOCATION: LOCATION: HEAD

## 2021-01-25 NOTE — PLAN OF CARE
Problem: Altered Mood, Depressive Behavior:  Goal: Able to verbalize and/or display a decrease in depressive symptoms  Description: Able to verbalize and/or display a decrease in depressive symptoms  1/25/2021 1417 by Humza Irving RN  Outcome: Ongoing  Note: Patient is quiet, isolative to room. Does not participate in unit activities. Patient is controlled and cooperative. Affect flat. Denies suicidal ideations. Denies audio/visual hallucinations. Will continue to monitor and encourage activity in milieu. 1/25/2021 0920 by Madelene Sandhoff, CTRS  Outcome: Ongoing     Problem: Altered Mood, Depressive Behavior:  Goal: Absence of self-harm  Description: Absence of self-harm  1/25/2021 1417 by Humza Irving RN  Note: Patient denies suicidal ideations. No self harm behaviors noted. 1/25/2021 0920 by Madelene Sandhoff, CTRS  Outcome: Ongoing     Problem: Tobacco Use:  Goal: Inpatient tobacco use cessation counseling participation  Description: Inpatient tobacco use cessation counseling participation  Outcome: Ongoing  Note: Patient utilizing ordered medications for cessation of smoking. No voiced complaints. Problem: Pain:  Goal: Pain level will decrease  Description: Pain level will decrease  Outcome: Ongoing  Note: Patient voices pain in his head in the frontal area. Utilizing as needed medications for pain relief.

## 2021-01-25 NOTE — PLAN OF CARE
Motor Activity: Decreased  Interview Behavior: Cooperative  Preception: Winn to Person, Baby Artist to Time, Winn to Place, Winn to Situation  Attention:Normal: Yes  Thought Processes: Circumstantial  Thought Content:Normal: No  Thought Content: Preoccupations  Hallucinations: None  Delusions: No  Memory:Normal: Yes  Insight and Judgment: No  Insight and Judgment: Poor Judgment, Poor Insight  Present Suicidal Ideation: No  Present Homicidal Ideation: No    Daily Assessment Last Entry:   Daily Sleep (WDL): Within Defined Limits         Patient Currently in Pain: Yes  Daily Nutrition (WDL): Within Defined Limits    Patient Monitoring:  Frequency of Checks: 4 times per hour, close    Psychiatric Symptoms:   Depression Symptoms  Depression Symptoms: Feelings of helplessness, Feelings of hopelessess, Isolative  Anxiety Symptoms  Anxiety Symptoms: Generalized  Marietta Symptoms  Marietta Symptoms: No problems reported or observed. Psychosis Symptoms  Delusion Type: No problems reported or observed. Suicide Risk CSSR-S:  1) Within the past month, have you wished you were dead or wished you could go to sleep and not wake up? : Yes  2) Have you actually had any thoughts of killing yourself? : No  3) Have you been thinking about how you might kill yourself? : No  5) Have you started to work out or worked out the details of how to kill yourself?  Do you intend to carry out this plan? : No  6) Have you ever done anything, started to do anything, or prepared to do anything to end your life?: No  Change in Result: No Change in Plan of care: No      EDUCATION:   EDUCATION:   Learner Progress Toward Treatment Goals: Reviewed results and recommendations of this team, Reviewed group plan and strategies, Reviewed signs, symptoms and risk of self harm and violent behavior, Reviewed goals and plan of care    Method:small group, individual verbal education    Outcome:verbalized by patient, but needs reinforcement to obtain goals PATIENT GOALS:  Short term: Improve self esteem. Slow down thought process. Get rid of headache. Long term: Follow up with [facility]. Continue to take medications. Obtain stable housing.      PLAN/TREATMENT RECOMMENDATIONS UPDATE: continue with group therapies, increased socialization, continue planning for after discharge goals, continue with medication compliance    SHORT-TERM GOALS UPDATE:   Time frame for Short-Term Goals: 5-7 days    LONG-TERM GOALS UPDATE:   Time frame for Long-Term Goals: 6 months  Members Present in Team Meeting: See Signature Sheet    [place], [place]

## 2021-01-25 NOTE — PROGRESS NOTES
Department of Psychiatry  Progress Note     Chief Complaint:  Severe episode of recurrent major depressive disorder, without psychotic features ([address]     SUBJECTIVE:    PROGRESS:  Jeannette Luis feels he is better today  He states he does not feel as hopeless as he did when he was admitted. His reports his depression is [place]. I feel like hell. \"   He denies current thoughts of harm to himself or others. He states he did not really have any suicidal ideation yesterday  Continues to feel hopeless and helpless about his situation but it is improving. He reports he slept alright last night. He still woke up last night despite taking Trazodone and Remeron. He still felt soemwhat rested his morning  His appetite has been good   He has been compliant with his medications. Denies any side effects. He went to his first group this morning. He states it was alright. He has been working with social work to find housing placement.      Suicidal ideations: passive   Compliance with medications: good   Medication side effects: absent  ROS: Patient has new complaints:  no  Sleep quality: \"alright\" per patient  Attending groups: yes      OBJECTIVE      Medications  Current Facility-Administered Medications: acetaminophen (TYLENOL) tablet 650 mg, 650 mg, Oral, Q4H PRN  aluminum & magnesium hydroxide-simethicone (MAALOX) 200-200-20 MG/5ML suspension 30 mL, 30 mL, Oral, Q6H PRN  hydrOXYzine (ATARAX) tablet 50 mg, 50 mg, Oral, TID PRN  ibuprofen (ADVIL;MOTRIN) tablet 400 mg, 400 mg, Oral, Q6H PRN  traZODone (DESYREL) tablet 50 mg, 50 mg, Oral, Nightly PRN  polyethylene glycol (GLYCOLAX) packet 17 g, 17 g, Oral, Daily PRN  nicotine polacrilex (NICORETTE) gum 2 mg, 2 mg, Oral, PRN  haloperidol (HALDOL) tablet 5 mg, 5 mg, Oral, Q4H PRN **AND** LORazepam (ATIVAN) tablet 2 mg, 2 mg, Oral, Q4H PRN Medication adjustments: Will increase Remeron to 15mg nightly  Attempt to develop insight, psycho-education and supportive therapy conducted. Probable discharge: TBD   Follow-up: [facility] outpatient, daily while on the inpatient unit     Deidre Combs is a 48 y.o. male being evaluated by a Virtual Visit (video visit) encounter to address concerns as mentioned above. A caregiver was present in the room along with the patient. Pursuant to the emergency declaration under the [facility], [address] [place] [address] authority and the [employer] Act, this Virtual Visit was conducted with patient's (and/or legal guardian's) consent, to reduce the patient's risk of exposure to COVID-19 and provide necessary medical care. Services were provided through a video synchronous discussion virtually to substitute for in-person visit by provider. Patient is present at [facility] in [facility] and I am physically present at my home in [address] on 1/25/2021 at 5:18 PM     An electronic signature was used to authenticate this note. Psychiatry Attending Attestation     I assessed this patient and reviewed the case and plan of care with [facility]. I have reviewed the above documentation and I agree with the findings and treatment plan with the following updates. Patient continues to report feeling sad down and low today. Endorses active thoughts with a intent to kill self. Denies any plan to kill himself. Contracts for safety here on the unit. Has very poor attention and concentration. Has not been attending groups. Largely isolated to his room. Denies any sedative medication. We will continue to titrate his Remeron.   Electronically signed by Caryn John MD on 1/25/21 at 8:20 PM EST **This report has been created using voice recognition software. It may contain minor errors which are inherent in voice recognition technology. **

## 2021-01-25 NOTE — PLAN OF CARE
Problem: Altered Mood, Depressive Behavior:  Goal: Able to verbalize and/or display a decrease in depressive symptoms  Description: Able to verbalize and/or display a decrease in depressive symptoms  1/24/2021 2327 by Claude Sexton, RN  Outcome: Ongoing  Note: Patient reports depression and anxiety. Patient is isolative to room for long intervals and out for needs only. Patient denies suicidal and homicidal ideation at this time. Patient denies hallucinations. Patient is complaint with medication interventions. Problem: Altered Mood, Depressive Behavior:  Goal: Absence of self-harm  Description: Absence of self-harm  1/24/2021 2327 by Claude Sexton, RN  Outcome: Ongoing  Note: 15 minute safety checks, patient free of self harm. Problem: Tobacco Use:  Goal: Inpatient tobacco use cessation counseling participation  Description: Inpatient tobacco use cessation counseling participation  Outcome: Ongoing  Note: Patient refused tobacco counseling.

## 2021-01-25 NOTE — GROUP NOTE
Group Therapy Note    Date: 1/25/2021    Group Start Time: 1000  Group End Time: 5880  Group Topic: Psychoeducation    [facility]    [place], [place]        Group Therapy Note    Attendees: 7/14           Patient's Goal:  To increase interpersonal interaction. Notes:  Pt attended and participated in group. Status After Intervention:  Improved    Participation Level:  Active Listener and Interactive    Participation Quality: Appropriate, Attentive and Sharing      Speech:  normal      Thought Process/Content: Logical      Affective Functioning: Congruent      Mood: euthymic      Level of consciousness:  Alert and Attentive      Response to Learning: Able to verbalize current knowledge/experience and Progressing to goal      Endings: None Reported    Modes of Intervention: Education, Socialization, Problem-solving, Media and Reality-testing      Discipline Responsible: Psychoeducational Specialist      Signature:  Rodney Naidu

## 2021-01-25 NOTE — GROUP NOTE
Group Therapy Note    Date: 1/25/2021    Group Start Time: 0900  Group End Time: 0915  Group Topic: Community Meeting    [facility]    [place], [place]        Group Therapy Note    Attendees: 9/16      Pt did not attend [employer] d/t resting in room despite staff invitation to attend. 1:1 talk time offered as alternative to group session, pt declined.

## 2021-01-25 NOTE — GROUP NOTE
Group Therapy Note    Date: 1/25/2021    Group Start Time: 1330  Group End Time: 2354  Group Topic: Cognitive Skills    Simon Matt        Group Therapy Note    Attendees: 8/16      Pt did not attend RT skills group d/t resting in room despite staff invitation to attend. 1:1 talk time offered as alternative to group session, pt declined.

## 2021-01-25 NOTE — GROUP NOTE
Group Therapy Note    Date: 1/25/2021    Group Start Time: 1610  Group End Time: 5379  Group Topic: Healthy Living/Wellness    CZ BHGRIS Tello RN        Group Therapy Note    Attendees: 10/15         Patient's Goal:  Discharge planning    Notes:  Question and answer    Status After Intervention:  Improved    Participation Level:  Active Listener    Participation Quality: Appropriate      Speech:  normal      Thought Process/Content: Logical      Affective Functioning: Congruent      Mood: euthymic      Level of consciousness:  Alert      Response to Learning: Able to verbalize current knowledge/experience      Endings: None Reported    Modes of Intervention: Education      Discipline Responsible: Registered Nurse      Signature:  Onel Tello RN

## 2021-01-25 NOTE — H&P
Past Medical History:   Diagnosis Date    Psoriasis     Psychiatric problem     Seizures ([facility] [address] [facility]      Pt denies any history of Diabetes mellitus type 2, hypertension, stroke, heart disease, COPD, Asthma, GERD, HLD, Cancer, Thyroid disease, Kidney Disease, Hepatitis, TB.    SURGICAL HISTORY     History reviewed. No pertinent surgical history. FAMILY HISTORY     History reviewed. No pertinent family history. SOCIAL HISTORY       Social History     Socioeconomic History    Marital status: Unknown     Spouse name: None    Number of children: None    Years of education: None    Highest education level: None   Occupational History    None   Social Needs    Financial resource strain: None    Food insecurity     Worry: None     Inability: None    Transportation needs     Medical: None     Non-medical: None   Tobacco Use    Smoking status: Current Every Day Smoker     Packs/day: 1.00     Years: 30.00     Pack years: 30.00     Types: Cigarettes     Start date: 8/18/1990    Smokeless tobacco: Never Used   Substance and Sexual Activity    Alcohol use: Yes     Comment: 2 x month    Drug use: Yes     Types: Cocaine     Comment: occasionally    Sexual activity: None   Lifestyle    Physical activity     Days per week: None     Minutes per session: None    Stress: None   Relationships    Social connections     Talks on phone: None     Gets together: None     Attends [employer] service: None     Active member of club or organization: None     Attends meetings of clubs or organizations: None     Relationship status: None    Intimate partner violence     Fear of current or ex partner: None     Emotionally abused: None     Physically abused: None     Forced sexual activity: None   Other Topics Concern    None   Social History Narrative    None           REVIEW OF SYSTEMS      No Known Allergies    No current facility-administered medications on file prior to encounter. NOSE:  No rhinorrhea, epistaxis or septal deformity. THROAT:  Not congested. No ulceration bleeding or discharge. NECK:  No stiffness, trachea central.  No palpable masses or L.N.      CHEST:  Symmetrical and equal on expansion. HEART:  Regular rate and rhythm. S1 > S2, No audible murmurs or gallops. LUNGS:  Equal on expansion, normal breath sounds. No adventitious sounds. ABDOMEN:  Obese. Soft on palpation. No localized tenderness. No guarding or rigidity. No palpable organomegaly. LYMPHATICS:  No palpable cervical Lymphadenopathy. LOCOMOTOR, BACK AND SPINE:  No tenderness or deformities. EXTREMITIES:  Symmetrical, no pretibial edema. Mallikas sign negative. No discoloration or ulcerations. NEUROLOGIC:  The patient is conscious, alert, oriented,Cranial nerve II-XII intact, taste and smell were not examined. No apparent focal sensory or motor deficits. Muscle strength equal Isaias. No facial droop, tongue protrudes centrally, no slurring of the speech. PROVISIONAL DIAGNOSES:      Principal Problem:    Severe episode of recurrent major depressive disorder, without psychotic features ([facility] [address]  Resolved Problems:    * No resolved hospital problems. *    ASSESSMENT AND PLAN    1. Severe episode of recurrent major depressive disordercontinue current tx plan per psychiatry    2. Psoriasis, Seizure- condition has been stable, continue medication management as ordered     3. Continue routine vital sign monitoring    4.  Medications to be reviewed per attending and continued per admitting service    SHARAN Contreras CNP on 1/25/2021 at 12:39 PM

## 2021-01-25 NOTE — CARE COORDINATION
Counselor met with patient for one on one and he discussed that he wants linked to [facility] due to it being closer to [employer] and he will need to be able to be within walking distance to his appointments.

## 2021-01-25 NOTE — GROUP NOTE
Group Therapy Note    Date: 1/25/2021    Group Start Time: 1100  Group End Time: 3097  Group Topic: Psychoeducation    SHANNAN TRISTONGRIS SHARAD    [place], [place]    Attendees: 9         Patient's Goal:  To be educated on facts vs myths about mental illness in order to better be able to advocate for self. Notes:  Patient attended group and actively participated in task at hand. Patient conversed appropriately with peers and Yoselin Urias. Status After Intervention:  Improved    Participation Level:  Active Listener and Interactive    Participation Quality: Appropriate, Attentive and Sharing      Speech:  normal      Thought Process/Content: Logical      Affective Functioning: Congruent      Mood: euthymic      Level of consciousness:  Alert, Oriented x4 and Attentive      Response to Learning: Able to verbalize current knowledge/experience, Able to verbalize/acknowledge new learning, Able to retain information, Capable of insight and Progressing to goal      Endings: None Reported       Modes of Intervention: Education, Support, Socialization, Exploration, Clarifying, Problem-solving, Activity, Confrontation, Limit-setting and Reality-testing      Discipline Responsible: Psychoeducational Specialist      Signature:  Yunier Blanca

## 2021-01-26 PROCEDURE — 6370000000 HC RX 637 (ALT 250 FOR IP): Performed by: PSYCHIATRY & NEUROLOGY

## 2021-01-26 PROCEDURE — 99232 SBSQ HOSP IP/OBS MODERATE 35: CPT | Performed by: PSYCHIATRY & NEUROLOGY

## 2021-01-26 PROCEDURE — 6370000000 HC RX 637 (ALT 250 FOR IP): Performed by: PHYSICIAN ASSISTANT

## 2021-01-26 PROCEDURE — 1240000000 HC EMOTIONAL WELLNESS R&B

## 2021-01-26 RX ORDER — ESCITALOPRAM OXALATE 10 MG/1
15 TABLET ORAL DAILY
Status: DISCONTINUED | OUTPATIENT
Start: 2021-01-27 | End: 2021-01-28 | Stop reason: HOSPADM

## 2021-01-26 RX ADMIN — MIRTAZAPINE 15 MG: 15 TABLET, FILM COATED ORAL at 21:00

## 2021-01-26 RX ADMIN — TRAZODONE HYDROCHLORIDE 50 MG: 50 TABLET ORAL at 21:00

## 2021-01-26 RX ADMIN — NICOTINE POLACRILEX 2 MG: 2 GUM, CHEWING BUCCAL at 20:18

## 2021-01-26 RX ADMIN — ESCITALOPRAM OXALATE 10 MG: 10 TABLET ORAL at 08:24

## 2021-01-26 NOTE — GROUP NOTE
Group Therapy Note    Date: 1/26/2021    Group Start Time: 1100  Group End Time: 1150  Group Topic: Psychoeducation    [facility]    [place], [place]        Group Therapy Note    Attendees: 7/14           Patient's Goal:  To increase interpersonal interaction. Notes:  PT attended and participated in group. Status After Intervention:  Improved    Participation Level:  Active Listener and Interactive    Participation Quality: Appropriate, Attentive and Sharing      Speech:  normal      Thought Process/Content: Logical      Affective Functioning: Congruent      Mood: euthymic      Level of consciousness:  Alert and Attentive      Response to Learning: Progressing to goal      Endings: None Reported    Modes of Intervention: Education, Support, Exploration, Problem-solving and Reality-testing      Discipline Responsible: Psychoeducational Specialist      Signature:  Edmundo Cruz

## 2021-01-26 NOTE — PLAN OF CARE
Problem: Altered Mood, Depressive Behavior:  Goal: Able to verbalize and/or display a decrease in depressive symptoms  Description: Able to verbalize and/or display a decrease in depressive symptoms  1/25/2021 2131 by Nora Terrazas RN  Outcome: Ongoing   Patient states they are not having thoughts of self harm at this time and verbally agrees to seek staff if feelings of harming self arise,patient reports no withdrawal symptoms. Patient behavior controlled and accepting of medications,flat,denies audio hallucination and visual hallucinations patient eating and sleeping well. Patient spending long intervals in bed and reports being tired . Staff will continue to monitor for safety and offer support as needed. Problem: Altered Mood, Depressive Behavior:  Goal: Absence of self-harm  Description: Absence of self-harm  1/25/2021 2131 by Nora Terrazas RN  Outcome: Ongoing   Remains free from self harm  Problem: Tobacco Use:  Goal: Inpatient tobacco use cessation counseling participation  Description: Inpatient tobacco use cessation counseling participation  1/25/2021 2131 by Nora Terrazas RN  Outcome: Ongoing   Patient given tobacco quitline number 29434905149 at this time, refusing to call at this time, states \" I just dont want to quit now\"- patient given information as to the dangers of long term tobacco use. Continue to reinforce the importance of tobacco cessation.

## 2021-01-26 NOTE — PROGRESS NOTES
Department of Psychiatry  Progress Note     Chief Complaint:  Severe episode of recurrent major depressive disorder, without psychotic features ([address]     SUBJECTIVE:    PROGRESS:  Patient laying in bed, withdrawn. Reports his mood is up and down. Patient is irritable off and on. Reports concentration is poor. Patient feels helpless ,hopless and worthless. Patient is oriented to time place and person. Denies any hallucinations or delusions. Sleep is fluctuating, appetite is poor. Patient feels down depressed nervous and anxious. Reports constant suicidal thoughts. Denies any plan and contracts to safety. Patient has marked anticipatory anxiety that was explored during the session, support was given and clarification done. No side effects from medication reported. Side-effect of medication were discussed with the patient. Case was discussed with the  and with the staff. Session was supportive. Will continue to titrate his antidepressant medication.       OBJECTIVE      Medications  Current Facility-Administered Medications: mirtazapine (REMERON) tablet 15 mg, 15 mg, Oral, Nightly  acetaminophen (TYLENOL) tablet 650 mg, 650 mg, Oral, Q4H PRN  aluminum & magnesium hydroxide-simethicone (MAALOX) 200-200-20 MG/5ML suspension 30 mL, 30 mL, Oral, Q6H PRN  hydrOXYzine (ATARAX) tablet 50 mg, 50 mg, Oral, TID PRN  ibuprofen (ADVIL;MOTRIN) tablet 400 mg, 400 mg, Oral, Q6H PRN  traZODone (DESYREL) tablet 50 mg, 50 mg, Oral, Nightly PRN  polyethylene glycol (GLYCOLAX) packet 17 g, 17 g, Oral, Daily PRN  nicotine polacrilex (NICORETTE) gum 2 mg, 2 mg, Oral, PRN  haloperidol (HALDOL) tablet 5 mg, 5 mg, Oral, Q4H PRN **AND** LORazepam (ATIVAN) tablet 2 mg, 2 mg, Oral, Q4H PRN  haloperidol lactate (HALDOL) injection 5 mg, 5 mg, Intramuscular, Q4H PRN **AND** LORazepam (ATIVAN) injection 2 mg, 2 mg, Intramuscular, Q4H PRN **AND** diphenhydrAMINE (BENADRYL) injection 50 mg, 50 mg, Intramuscular, Q4H PRN escitalopram (LEXAPRO) tablet 10 mg, 10 mg, Oral, Daily     Physical     height is 5' 11\" (1.803 m) and weight is 188 lb (85.3 kg). His oral temperature is 97.6 °F (36.4 °C). His blood pressure is 149/83 (abnormal) and his pulse is 74. His respiration is 14 and oxygen saturation is 96%. Lab Results   Component Value Date    WBC 6.7 01/23/2021    HGB 17.6 (H) 01/23/2021    HCT 53.7 (H) 01/23/2021     01/23/2021    CHOL 179 08/19/2020    TRIG 118 08/19/2020    HDL 66 08/19/2020    ALT 21 01/23/2021    AST 28 01/23/2021     01/23/2021    K 3.4 (L) 01/23/2021     01/23/2021    CREATININE 0.53 (L) 01/23/2021    BUN 7 01/23/2021    CO2 22 01/23/2021    TSH 1.65 08/19/2020    LABA1C 5.4 08/19/2020          Mental Status Exam:   Level of consciousness:  alert and awake  Appearance:  well-appearing, hospital attire, in chair, fair grooming and fair hygiene  Behavior/Motor:  Pleasant, brightens with interaction  Attitude toward examiner:  cooperative, attentive and good eye contact  Speech:  spontaneous, normal rate and normal volume  Mood:  Depressed  Affect:  blunted  Thought processes:  linear, goal directed and coherent  Thought content:  Denies homicidal ideation  Suicidal Ideation:  passive without plan and intent  Delusions:  no evidence of delusions  Perceptual Disturbance:  denies any perceptual disturbance  Cognition: Patient is oriented to person, place, time and situation  Concentration: clinically adequate  Memory: intact  Insight & Judgement: fair        ASSESSMENT   Severe episode of recurrent major depressive disorder, without psychotic features ([facility] [address]     PLAN  Patient's symptoms show minimal improvement today  Medication adjustments: Will increase Lexapro to 15mg daily  Attempt to develop insight, psycho-education and supportive therapy conducted.   Probable discharge: TBD   Follow-up: [facility] outpatient, daily while on the inpatient unit Electronically signed by Megan Terrazas MD on 1/26/21 at 4:34 PM EST

## 2021-01-26 NOTE — PLAN OF CARE
Problem: Altered Mood, Depressive Behavior:  Goal: Absence of self-harm  Description: Absence of self-harm  Outcome: Ongoing   Patient denies thoughts of self harm, remains free from self harm.  Support and encouragement provided

## 2021-01-26 NOTE — GROUP NOTE
Group Therapy Note    Date: 1/26/2021    Group Start Time: 0900  Group End Time: 0915  Group Topic: Community Meeting    [facility]    [place], [place]        Group Therapy Note    Attendees: 7/16       Patient's Goal:  To increase interpersonal interaction. Notes:  Pt attended and participated in group. Status After Intervention:  Improved    Participation Level:  Active Listener and Interactive    Participation Quality: Appropriate and Attentive      Speech:  normal      Thought Process/Content: Logical      Affective Functioning: Congruent      Mood: euthymic      Level of consciousness:  Alert and Attentive      Response to Learning: Able to verbalize current knowledge/experience, Able to retain information and Progressing to goal      Endings: None Reported    Modes of Intervention: Education, Support, Socialization, Clarifying and Reality-testing      Discipline Responsible: Psychoeducational Specialist      Signature:  Taylor Carter

## 2021-01-27 PROCEDURE — 1240000000 HC EMOTIONAL WELLNESS R&B

## 2021-01-27 PROCEDURE — 6370000000 HC RX 637 (ALT 250 FOR IP): Performed by: PSYCHIATRY & NEUROLOGY

## 2021-01-27 PROCEDURE — 6370000000 HC RX 637 (ALT 250 FOR IP): Performed by: PHYSICIAN ASSISTANT

## 2021-01-27 PROCEDURE — 99232 SBSQ HOSP IP/OBS MODERATE 35: CPT | Performed by: PSYCHIATRY & NEUROLOGY

## 2021-01-27 RX ADMIN — ESCITALOPRAM OXALATE 15 MG: 10 TABLET ORAL at 09:18

## 2021-01-27 RX ADMIN — NICOTINE POLACRILEX 2 MG: 2 GUM, CHEWING BUCCAL at 10:44

## 2021-01-27 RX ADMIN — TRAZODONE HYDROCHLORIDE 50 MG: 50 TABLET ORAL at 21:59

## 2021-01-27 RX ADMIN — MIRTAZAPINE 15 MG: 15 TABLET, FILM COATED ORAL at 21:59

## 2021-01-27 NOTE — GROUP NOTE
Group Therapy Note    Date: 1/27/2021    Group Start Time: 3398  Group End Time: 1708  Group Topic: Recreational    SHANNAN ORORUKE    [place], [place]    Attendees: 7         Patient's Goal:  To demonstrate increased interpersonal skills. Notes:  Patient attended group and actively participated in task at hand. Patient conversed appropriately with peers and Alejandra Amaya. Status After Intervention:  Improved    Participation Level:  Active Listener and Interactive    Participation Quality: Appropriate, Attentive and Sharing      Speech:  normal      Thought Process/Content: Logical      Affective Functioning: Congruent      Mood: euthymic      Level of consciousness:  Alert, Oriented x4 and Attentive      Response to Learning: Able to verbalize current knowledge/experience, Able to retain information, Capable of insight and Progressing to goal      Endings: None Reported      Modes of Intervention: Socialization, Exploration, Clarifying, Problem-solving, Activity, Limit-setting and Reality-testing      Discipline Responsible: Psychoeducational Specialist      Signature:  Grupo Alfredo

## 2021-01-27 NOTE — PLAN OF CARE
Problem: Altered Mood, Depressive Behavior:  Goal: Able to verbalize and/or display a decrease in depressive symptoms  Description: Able to verbalize and/or display a decrease in depressive symptoms  Outcome: Ongoing   Patient is pleasant and cooperative. Patient denies suicidal ideations. Patient is compliant with his medications. Problem: Altered Mood, Depressive Behavior:  Goal: Absence of self-harm  Description: Absence of self-harm  1/26/2021 2325 by Rosa Meier RN  Outcome: Ongoing   Patient denies thoughts of self harm. Patient agrees to notify staff if symptoms arise. Patient remains safe on unit. Patient safety maintained q15 minute checks.

## 2021-01-27 NOTE — CARE COORDINATION
Counselor met with patient for one on one and he reports he is still trying to work things out with his girlfriend and will know tomorrow if he has to go to the shelter or back home with her at discharge. He reports he plans on following up with his appointment with [facility].

## 2021-01-27 NOTE — GROUP NOTE
Group Therapy Note    Date: 1/27/2021    Group Start Time: 0900  Group End Time: 0915  Group Topic: Community Meeting    SHANNAN [place], [place]    Attendees: 4         Patient's Goal:  To be informed of programming schedule for today and unit guidelines/rules. To verbalize obtainable an short term goal for today pertaining to mental health and stability that can be achieved by this evening. Notes:  Patient verbalized goal for today to work on discharge planning and to make phone calls. Status After Intervention:  Improved    Participation Level:  Active Listener and Interactive    Participation Quality: Appropriate, Attentive and Sharing      Speech:  normal      Thought Process/Content: Logical      Affective Functioning: Congruent      Mood: euthymic      Level of consciousness:  Alert, Oriented x4 and Attentive      Response to Learning: Able to verbalize current knowledge/experience, Able to verbalize/acknowledge new learning, Able to retain information, Capable of insight and Progressing to goal      Endings: None Reported       Modes of Intervention: Support, Socialization, Exploration, Clarifying, Problem-solving, Confrontation, Limit-setting and Reality-testing      Discipline Responsible: Psychoeducational Specialist      Signature:  Adolfo Campbell

## 2021-01-27 NOTE — GROUP NOTE
Group Therapy Note    Date: 1/27/2021    Group Start Time: 1000  Group End Time: 2352  Group Topic: Psychoeducation    [facility]    [place], [place]        Group Therapy Note    Attendees: 4/16         Patient's Goal:  To increase interpersonal interaction. Notes:  PT attended and participated in group. Status After Intervention:  Improved    Participation Level:  Active Listener and Interactive    Participation Quality: Appropriate, Attentive and Sharing      Speech:  normal      Thought Process/Content: Logical      Affective Functioning: Congruent      Mood: euthymic      Level of consciousness:  Alert and Attentive      Response to Learning: Progressing to goal      Endings: None Reported    Modes of Intervention: Socialization, Problem-solving, Activity, Media and Reality-testing      Discipline Responsible: Psychoeducational Specialist      Signature:  Alena Hogue

## 2021-01-27 NOTE — GROUP NOTE
Group Therapy Note    Date: 1/27/2021    Group Start Time: 1330  Group End Time: 9321  Group Topic: Cognitive Skills    [facility]    [place], [place]        Group Therapy Note    Attendees: 4/16         Patient's Goal:  To increase interpersonal interaction. Notes:  Pt attended and participated in group. Status After Intervention:  Improved    Participation Level:  Active Listener and Interactive    Participation Quality: Appropriate and Attentive      Speech:  normal      Thought Process/Content: Logical      Affective Functioning: Congruent      Mood: euthymic      Level of consciousness:  Alert and Attentive      Response to Learning: Progressing to goal      Endings: None Reported    Modes of Intervention: Education, Socialization, Clarifying, Problem-solving, Activity, Media and Reality-testing      Discipline Responsible: Psychoeducational Specialist      Signature:  Danyelle Barriga

## 2021-01-27 NOTE — PROGRESS NOTES
Department of Psychiatry  Progress Note     Chief Complaint:  Severe episode of recurrent major depressive disorder, without psychotic features ([address]     SUBJECTIVE:    PROGRESS:  Patient feels better than before. Mood and affect are better. Patient reports fleeting suicidal thoughts with no intent or plan. Patient notes that these thoughts are occurring less frequently. Denies any homicidal thoughts, that was explored with the patient. Oriented to time place and person. Recent and remote memory is intact. Patient feels hopeful. Sleep and appetite is good. No side effect from medication reported. Side-effect of medication were discussed with the patient . Patient is responding to current treatment. Discharge soon, if patient continues to show improvement. Case discussed with the staff. Patient reports that he had a good discussion with her significant other and has plans to go back and live with her. Reports they reconciled their differences and he is in a good place today.     OBJECTIVE      Medications  Current Facility-Administered Medications: escitalopram (LEXAPRO) tablet 15 mg, 15 mg, Oral, Daily  mirtazapine (REMERON) tablet 15 mg, 15 mg, Oral, Nightly  acetaminophen (TYLENOL) tablet 650 mg, 650 mg, Oral, Q4H PRN  aluminum & magnesium hydroxide-simethicone (MAALOX) 200-200-20 MG/5ML suspension 30 mL, 30 mL, Oral, Q6H PRN  hydrOXYzine (ATARAX) tablet 50 mg, 50 mg, Oral, TID PRN  ibuprofen (ADVIL;MOTRIN) tablet 400 mg, 400 mg, Oral, Q6H PRN  traZODone (DESYREL) tablet 50 mg, 50 mg, Oral, Nightly PRN  polyethylene glycol (GLYCOLAX) packet 17 g, 17 g, Oral, Daily PRN  nicotine polacrilex (NICORETTE) gum 2 mg, 2 mg, Oral, PRN  haloperidol (HALDOL) tablet 5 mg, 5 mg, Oral, Q4H PRN **AND** LORazepam (ATIVAN) tablet 2 mg, 2 mg, Oral, Q4H PRN haloperidol lactate (HALDOL) injection 5 mg, 5 mg, Intramuscular, Q4H PRN **AND** LORazepam (ATIVAN) injection 2 mg, 2 mg, Intramuscular, Q4H PRN **AND** diphenhydrAMINE (BENADRYL) injection 50 mg, 50 mg, Intramuscular, Q4H PRN     Physical     height is 5' 11\" (1.803 m) and weight is 188 lb (85.3 kg). His oral temperature is 98 °F (36.7 °C). His blood pressure is 136/78 and his pulse is 87. His respiration is 14 and oxygen saturation is 96%. Lab Results   Component Value Date    WBC 6.7 01/23/2021    HGB 17.6 (H) 01/23/2021    HCT 53.7 (H) 01/23/2021     01/23/2021    CHOL 179 08/19/2020    TRIG 118 08/19/2020    HDL 66 08/19/2020    ALT 21 01/23/2021    AST 28 01/23/2021     01/23/2021    K 3.4 (L) 01/23/2021     01/23/2021    CREATININE 0.53 (L) 01/23/2021    BUN 7 01/23/2021    CO2 22 01/23/2021    TSH 1.65 08/19/2020    LABA1C 5.4 08/19/2020          Mental Status Exam:   Level of consciousness:  alert and awake  Appearance:  well-appearing, hospital attire, in chair, fair grooming and fair hygiene  Behavior/Motor:  Pleasant, brightens with interaction  Attitude toward examiner:  cooperative, attentive and good eye contact  Speech:  spontaneous, normal rate and normal volume  Mood:  euthymic  Affect:  mood congruent  Thought processes:  linear, goal directed and coherent  Thought content:  Denies homicidal ideation  Suicidal Ideation:  passive without plan and intent  Delusions:  no evidence of delusions  Perceptual Disturbance:  denies any perceptual disturbance  Cognition: Patient is oriented to person, place, time and situation  Concentration: clinically adequate  Memory: intact  Insight & Judgement: fair        ASSESSMENT   Severe episode of recurrent major depressive disorder, without psychotic features ([facility] [address]     PLAN  Patient's symptoms show minimal improvement today  Medication adjustments:  Will continue same medication today and observe Attempt to develop insight, psycho-education and supportive therapy conducted.   Probable discharge: tomorrow  Follow-up: [facility] outpatient, daily while on the inpatient unit     Electronically signed by Audra Bartlett MD on 1/27/21 at 5:21 PM EST

## 2021-01-27 NOTE — GROUP NOTE
Group Therapy Note    Date: 1/27/2021    Group Start Time: 1100  Group End Time: 4604  Group Topic: Recreational    SHANNAN [place], [place]    Attendees: 7         Patient's Goal:  To demonstrate increased interpersonal skills. Notes:  Patient attended group and actively participated in task at hand. Patient conversed appropriately with peers and Placido Ramirez. Status After Intervention:  Improved    Participation Level:  Active Listener and Interactive    Participation Quality: Appropriate, Attentive and Sharing      Speech:  normal      Thought Process/Content: Logical      Affective Functioning: Congruent      Mood: euthymic      Level of consciousness:  Alert, Oriented x4 and Attentive      Response to Learning: Able to verbalize current knowledge/experience, Able to verbalize/acknowledge new learning, Able to retain information, Capable of insight and Progressing to goal      Endings: None Reported      Modes of Intervention: Socialization, Exploration, Clarifying, Problem-solving, Activity, Limit-setting and Reality-testing      Discipline Responsible: Psychoeducational Specialist      Signature:  Dannie Franklin

## 2021-01-27 NOTE — PLAN OF CARE
Problem: Altered Mood, Depressive Behavior:  Goal: Absence of self-harm  Description: Absence of self-harm  1/27/2021 1001 by Alec Adair LPN  Outcome: Ongoing   Patient denies thoughts of self harm, remains free from self harm.  Support and encouragement provided

## 2021-01-27 NOTE — PROGRESS NOTES
Pharmacy Med Education Group Note    Date: 01/27  Start Time: 36  End Time: 1700    Number Participants in Group:  9    Goal:  Patient will demonstrate an understanding of the medications intended purpose and possible adverse effects  Topic: [place] for Pharmacy Med Ed Group    Discipline Responsible:     OT  AT  [facility].  RT     X Other       Participation Level:     None  Minimal      X Active Listener    X Interactive    Monopolizing         Participation Quality:    X Appropriate  Inappropriate     X       Attentive        Intrusive          Sharing        Resistant          Supportive        Lethargic       Affective:     X Congruent  Incongruent  Blunted  Flat    Constricted  Anxious  Elated  Angry    Labile  Depressed  Other         Cognitive:    X Alert  Oriented PPTP     Concentration   X G  F  P   Attention Span   X G  F  P   Short-Term Memory   X G  F  P   Long-Term Memory  G  F  P   ProblemSolving/  Decision Making  G  F  P   Ability to Process  Information   X G  F  P      Contributing Factors             Delusional             Hallucinating             Flight of Ideas             Other:       Modes of Intervention:    X Education   X Support  Exploration    Clarifying  Problem Solving  Confrontation    Socialization  Limit Setting  Reality Testing    Activity  Movement  Media    Other:            Response to Learning:    X Able to verbalize current knowledge/experience    Able to verbalize/acknowledge new learning    Able to retain information    Capable of insight    Able to change behavior    Progressing to goal    Other:        Comments:   Great job in group!     Liliya Sun, pharmacy student

## 2021-01-28 VITALS
BODY MASS INDEX: 26.32 KG/M2 | WEIGHT: 188 LBS | TEMPERATURE: 97.8 F | OXYGEN SATURATION: 96 % | HEIGHT: 71 IN | RESPIRATION RATE: 14 BRPM | SYSTOLIC BLOOD PRESSURE: 136 MMHG | DIASTOLIC BLOOD PRESSURE: 78 MMHG | HEART RATE: 83 BPM

## 2021-01-28 PROCEDURE — 6370000000 HC RX 637 (ALT 250 FOR IP): Performed by: PSYCHIATRY & NEUROLOGY

## 2021-01-28 PROCEDURE — 99238 HOSP IP/OBS DSCHRG MGMT 30/<: CPT | Performed by: PSYCHIATRY & NEUROLOGY

## 2021-01-28 RX ORDER — ESCITALOPRAM OXALATE 10 MG/1
15 TABLET ORAL DAILY
Qty: 45 TABLET | Refills: 0 | Status: ON HOLD | OUTPATIENT
Start: 2021-01-29 | End: 2021-09-26

## 2021-01-28 RX ORDER — MIRTAZAPINE 15 MG/1
15 TABLET, FILM COATED ORAL NIGHTLY
Qty: 30 TABLET | Refills: 0 | Status: ON HOLD | OUTPATIENT
Start: 2021-01-28 | End: 2022-04-12 | Stop reason: SDUPTHER

## 2021-01-28 RX ORDER — TRAZODONE HYDROCHLORIDE 50 MG/1
50 TABLET ORAL NIGHTLY PRN
Qty: 30 TABLET | Refills: 0 | Status: ON HOLD | OUTPATIENT
Start: 2021-01-28 | End: 2022-04-12 | Stop reason: SDUPTHER

## 2021-01-28 RX ORDER — HYDROXYZINE 50 MG/1
50 TABLET, FILM COATED ORAL 3 TIMES DAILY PRN
Qty: 90 TABLET | Refills: 0 | Status: SHIPPED | OUTPATIENT
Start: 2021-01-28 | End: 2021-02-27

## 2021-01-28 RX ADMIN — ESCITALOPRAM OXALATE 15 MG: 10 TABLET ORAL at 08:42

## 2021-01-28 RX ADMIN — HYDROXYZINE HYDROCHLORIDE 50 MG: 50 TABLET, FILM COATED ORAL at 08:45
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
## 2021-01-28 NOTE — BH NOTE
Patient given tobacco quitline number 28612523552 at this time, refusing to call at this time, states \" I just dont want to quit now\"- patient given information as to the dangers of long term tobacco use. Continue to reinforce the importance of tobacco cessation.

## 2021-01-28 NOTE — PLAN OF CARE
Problem: Altered Mood, Depressive Behavior:  Goal: Able to verbalize and/or display a decrease in depressive symptoms  Description: Able to verbalize and/or display a decrease in depressive symptoms  Outcome: Ongoing   Patient is pleasant and cooperative. Patient denies suicidal ideations. Patient is compliant with his medications. Problem: Altered Mood, Depressive Behavior:  Goal: Absence of self-harm  Description: Absence of self-harm  1/26/2021 2325 by Mitchell Wilson RN  Outcome: Ongoing   Patient denies thoughts of self harm. Patient agrees to notify staff if symptoms arise. Patient remains safe on unit. Patient safety maintained q15 minute checks.

## 2021-01-28 NOTE — GROUP NOTE
Group Therapy Note    Date: 1/28/2021    Group Start Time: 0900  Group End Time: 0915  Group Topic: Community Meeting    [facility]    [place], [place]        Group Therapy Note    Attendees: 4/15         Patient's Goal:  To increase interpersonal interaction. Notes:  Pt attended and participated in group. Status After Intervention:  Improved    Participation Level:  Active Listener and Interactive    Participation Quality: Appropriate, Attentive and Sharing      Speech:  normal      Thought Process/Content: Logical      Affective Functioning: Congruent      Mood: euthymic      Level of consciousness:  Alert and Attentive      Response to Learning: Able to verbalize current knowledge/experience, Able to retain information and Progressing to goal      Endings: None Reported    Modes of Intervention: Education, Support, Socialization, Clarifying and Reality-testing      Discipline Responsible: Psychoeducational Specialist      Signature:  Rad Rebolledo

## 2021-01-28 NOTE — BH NOTE
[facility]  Discharge Note    Patient was discharged home and picked up at the main entrance by a family member. Patient was discharged with all of their belongings, information on where to  their medications, and discharge instructions. Patient was calm and cooperative throughout the discharge process. Patient was wearing a mask when escorted to the main entrance. Pt discharged with followings belongings:   Dentures: None  Vision - Corrective Lenses: None  Hearing Aid: None  Jewelry: None  Body Piercings Removed: N/A  Clothing: Footwear, Jacket / coat, Pants, Shirt, Sweater, Socks, Undergarments (Comment)  Were All Patient Medications Collected?: Yes  Other Valuables: Cell phone, Money (Comment), Wallet, [address] sent home with patient. Valuables retrieved from safe, Security envelope number:  [phone] and returned to patient. Patient education on aftercare instructions: Yes  Information faxed to [facility] by Nurse Patient verbalize understanding of AVS:  Yes.     Status EXAM upon discharge:  Status and Exam  Normal: Yes  Facial Expression: Brightened  Affect: Appropriate  Level of Consciousness: Alert  Mood:Normal: No  Mood: Depressed, Anxious  Motor Activity:Normal: Yes  Motor Activity: Decreased  Interview Behavior: Cooperative  Preception: [place] to Person, Laurance Heads to Time, [place] to Place, [place] to Situation  Attention:Normal: Yes  Thought Processes: Circumstantial  Thought Content:Normal: Yes  Thought Content: Preoccupations  Hallucinations: None  Delusions: No  Memory:Normal: Yes  Insight and Judgment: No  Insight and Judgment: Poor Insight  Present Suicidal Ideation: No  Present Homicidal Ideation: No      Metabolic Screening:    Lab Results   Component Value Date    LABA1C 5.4 08/19/2020       Lab Results   Component Value Date    CHOL 179 08/19/2020     Lab Results   Component Value Date    TRIG 118 08/19/2020     Lab Results   Component Value Date HDL 66 08/19/2020     No components found for: LDLCAL  No results found for: Kiesha Merchant RN

## 2021-01-28 NOTE — DISCHARGE INSTR - DIET

## 2021-01-28 NOTE — PROGRESS NOTES
Group Therapy Note    Date: 1/28/2021    Group Start Time: 1000  Group End Time: 4085  Group Topic: Psychoeducation    SHANNA[facility] [place]    Attendees: 7         Patient's Goal:  To discuss what it means to be unique and the importance of accepting ones self for who you are. To be able to verbalize three personal traits that make you unique. Notes:  Patient attended group and actively participated in task at hand. Patient conversed appropriately with peers and Courtney Grier. Status After Intervention:  Improved    Participation Level:  Active Listener and Interactive    Participation Quality: Appropriate, Attentive and Sharing      Speech:  normal      Thought Process/Content: Logical      Affective Functioning: Congruent      Mood: euthymic      Level of consciousness:  Alert, Oriented x4 and Attentive      Response to Learning: Able to verbalize current knowledge/experience, Able to verbalize/acknowledge new learning, Able to retain information, Capable of insight and Progressing to goal      Endings: None Reported       Modes of Intervention: Education, Support, Socialization, Exploration, Clarifying, Problem-solving, Activity, Confrontation, Limit-setting and Reality-testing      Discipline Responsible: Psychoeducational Specialist      Signature:  Aline Becerra

## 2021-06-25 ENCOUNTER — HOSPITAL ENCOUNTER (EMERGENCY)
Age: 56
Discharge: HOME | End: 2021-06-26
Attending: EMERGENCY MEDICINE
Payer: MEDICAID

## 2021-06-25 DIAGNOSIS — S01.81XA FACIAL LACERATION, INITIAL ENCOUNTER: ICD-10-CM

## 2021-06-25 DIAGNOSIS — F10.920 ALCOHOLIC INTOXICATION WITHOUT COMPLICATION (HCC): ICD-10-CM
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
## 2021-06-25 PROCEDURE — 6370000000 HC RX 637 (ALT 250 FOR IP): Performed by: EMERGENCY MEDICINE

## 2021-06-25 PROCEDURE — 90715 TDAP VACCINE 7 YRS/> IM: CPT | Performed by: EMERGENCY MEDICINE

## 2021-06-25 PROCEDURE — 90471 IMMUNIZATION ADMIN: CPT | Performed by: EMERGENCY MEDICINE

## 2021-06-25 PROCEDURE — 93005 ELECTROCARDIOGRAM TRACING: CPT | Performed by: EMERGENCY MEDICINE

## 2021-06-25 PROCEDURE — 99285 EMERGENCY DEPT VISIT HI MDM: CPT

## 2021-06-25 PROCEDURE — 6360000002 HC RX W HCPCS: Performed by: EMERGENCY MEDICINE

## 2021-06-25 PROCEDURE — 12011 RPR F/E/E/N/L/M 2.5 CM/<: CPT

## 2021-06-25 RX ORDER — ACETAMINOPHEN 500 MG
1000 TABLET ORAL ONCE
Status: COMPLETED | OUTPATIENT
Start: 2021-06-25 | End: 2021-06-25

## 2021-06-25 RX ADMIN — TETANUS TOXOID, REDUCED DIPHTHERIA TOXOID AND ACELLULAR PERTUSSIS VACCINE, ADSORBED 0.5 ML: 5; 2.5; 8; 8; 2.5 SUSPENSION INTRAMUSCULAR at 22:44

## 2021-06-25 RX ADMIN — ACETAMINOPHEN 1000 MG: 500 TABLET ORAL at 22:42

## 2021-06-25 ASSESSMENT — PAIN SCALES - GENERAL
PAINLEVEL_OUTOF10: 9
PAINLEVEL_OUTOF10: 7

## 2021-06-25 ASSESSMENT — PAIN DESCRIPTION - LOCATION: LOCATION: HEAD

## 2021-06-26 ENCOUNTER — APPOINTMENT (OUTPATIENT)
Dept: CT IMAGING | Age: 56
End: 2021-06-26
Payer: MEDICAID

## 2021-06-26 VITALS
WEIGHT: 160 LBS | RESPIRATION RATE: 16 BRPM | DIASTOLIC BLOOD PRESSURE: 73 MMHG | HEIGHT: 70 IN | HEART RATE: 90 BPM | SYSTOLIC BLOOD PRESSURE: 109 MMHG | TEMPERATURE: 98.2 F | BODY MASS INDEX: 22.9 KG/M2 | OXYGEN SATURATION: 94 %

## 2021-06-26 PROCEDURE — 72125 CT NECK SPINE W/O DYE: CPT

## 2021-06-26 PROCEDURE — 70450 CT HEAD/BRAIN W/O DYE: CPT

## 2021-06-26 ASSESSMENT — ENCOUNTER SYMPTOMS
NAUSEA: 0
VOMITING: 0
COUGH: 0
RHINORRHEA: 0
SHORTNESS OF BREATH: 0
ABDOMINAL DISTENTION: 0
WHEEZING: 0
CONSTIPATION: 0
SORE THROAT: 0
DIARRHEA: 0

## 2021-06-26 NOTE — ED NOTES
Bed: 27  Expected date: 6/25/21  Expected time: 9:49 PM  Means of arrival: [facility] SURGICAL AND CARDIOVASCULAR [facility]  Comments:  Rekha Virgen [address] [place] [address]  06/25/21 9292

## 2021-06-26 NOTE — PROCEDURES
PROCEDURE NOTE - LACERATION CLOSURE    PATIENT NAME: [address] [place] [address] RECORD NO. [id number]  DATE: 6/26/2021  ATTENDING PHYSICIAN: Meaghan Recio     PREOPERATIVE DIAGNOSIS: Laceration(s) as follows:  -Location: Left Brow  -Length: 1 cm  -Layered closure: No    POSTOPERATIVE DIAGNOSIS:  Same  PROCEDURE PERFORMED:  Suture closure of laceration  PERFORMING PHYSICIAN: Fernando Post MD  ANESTHESIA:  Local utilizing  Lidocaine 1% without epinephrine  ESTIMATED BLOOD LOSS:  Less than 25 ml. DISCUSSION:  Yane Alexandre is a 55y.o.-year-old male. Patient requires laceration repair. The history and physical examination were reviewed and confirmed. CONSENT: The patient provided verbal consent for this procedure. PROCEDURE:  Prior to starting, the procedure and patient were confirmed by those present. The wound area was irrigated with sterile saline, cleansed with hexachlorophene and draped in a sterile fashion. The wound area was anesthetized with Lidocaine 1% without epinephrine. The wound was explored with the following results No foreign bodies found. The wound was repaired with 4-0 Prolene using interrupted sutures. The wound was dressed with bacitracin. All sponge, instrument and needle counts were correct at the completion of the procedure. The patient tolerated the procedure well.      SUTURE COUNT:  None    COMPLICATIONS:  None     Fernando Post MD  12:29 AM, 6/26/21
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
## 2021-06-29 LAB
EKG ATRIAL RATE: 89 BPM
EKG P AXIS: 72 DEGREES
EKG P-R INTERVAL: 204 MS
EKG Q-T INTERVAL: 364 MS
EKG QRS DURATION: 94 MS
EKG QTC CALCULATION (BAZETT): 442 MS
EKG R AXIS: 32 DEGREES
EKG T AXIS: 58 DEGREES
EKG VENTRICULAR RATE: 89 BPM

## 2021-09-26 ENCOUNTER — HOSPITAL ENCOUNTER (OUTPATIENT)
Age: 56
Setting detail: OBSERVATION
Discharge: HOME | End: 2021-09-27
Attending: EMERGENCY MEDICINE | Admitting: EMERGENCY MEDICINE
Payer: MEDICAID

## 2021-09-26 ENCOUNTER — APPOINTMENT (OUTPATIENT)
Dept: MRI IMAGING | Age: 56
End: 2021-09-26
Payer: MEDICAID

## 2021-09-26 ENCOUNTER — APPOINTMENT (OUTPATIENT)
Dept: CT IMAGING | Age: 56
End: 2021-09-26
Payer: MEDICAID

## 2021-09-26 DIAGNOSIS — G45.9 TIA (TRANSIENT ISCHEMIC ATTACK): Primary | ICD-10-CM

## 2021-09-26 LAB
% CKMB: 3.3 % (ref 0–3.5)
ABSOLUTE EOS #: 0.06 K/UL (ref 0–0.44)
ABSOLUTE EOS #: 0.08 K/UL (ref 0–0.44)
ABSOLUTE IMMATURE GRANULOCYTE: 0.03 K/UL (ref 0–0.3)
ABSOLUTE IMMATURE GRANULOCYTE: <0.03 K/UL (ref 0–0.3)
ABSOLUTE LYMPH #: 1.33 K/UL (ref 1.1–3.7)
ABSOLUTE LYMPH #: 1.78 K/UL (ref 1.1–3.7)
ABSOLUTE MONO #: 0.42 K/UL (ref 0.1–1.2)
ABSOLUTE MONO #: 0.69 K/UL (ref 0.1–1.2)
ACETAMINOPHEN LEVEL: <5 UG/ML (ref 10–30)
ALBUMIN SERPL-MCNC: 3.6 G/DL (ref 3.5–5.2)
ALBUMIN/GLOBULIN RATIO: 1.3 (ref 1–2.5)
ALLEN TEST: ABNORMAL
ALP BLD-CCNC: 66 U/L (ref 40–129)
ALT SERPL-CCNC: 46 U/L (ref 5–41)
ANION GAP SERPL CALCULATED.3IONS-SCNC: 12 MMOL/L (ref 9–17)
ANION GAP SERPL CALCULATED.3IONS-SCNC: 14 MMOL/L (ref 9–17)
ANION GAP: 9 MMOL/L (ref 7–16)
AST SERPL-CCNC: 65 U/L
BASOPHILS # BLD: 1 % (ref 0–2)
BASOPHILS # BLD: 1 % (ref 0–2)
BASOPHILS ABSOLUTE: 0.03 K/UL (ref 0–0.2)
BASOPHILS ABSOLUTE: 0.06 K/UL (ref 0–0.2)
BILIRUB SERPL-MCNC: 0.22 MG/DL (ref 0.3–1.2)
BUN BLDV-MCNC: 7 MG/DL (ref 6–20)
BUN BLDV-MCNC: 9 MG/DL (ref 6–20)
BUN/CREAT BLD: ABNORMAL (ref 9–20)
BUN/CREAT BLD: ABNORMAL (ref 9–20)
CALCIUM SERPL-MCNC: 7.7 MG/DL (ref 8.6–10.4)
CALCIUM SERPL-MCNC: 8.4 MG/DL (ref 8.6–10.4)
CHLORIDE BLD-SCNC: 96 MMOL/L (ref 98–107)
CHLORIDE BLD-SCNC: 99 MMOL/L (ref 98–107)
CHOLESTEROL/HDL RATIO: 2
CHOLESTEROL: 157 MG/DL
CK MB: 5 NG/ML
CKMB INTERPRETATION: ABNORMAL
CO2: 18 MMOL/L (ref 20–31)
CO2: 19 MMOL/L (ref 20–31)
CREAT SERPL-MCNC: 0.49 MG/DL (ref 0.7–1.2)
CREAT SERPL-MCNC: 0.62 MG/DL (ref 0.7–1.2)
DIFFERENTIAL TYPE: ABNORMAL
DIFFERENTIAL TYPE: ABNORMAL
EOSINOPHILS RELATIVE PERCENT: 2 % (ref 1–4)
EOSINOPHILS RELATIVE PERCENT: 2 % (ref 1–4)
ESTIMATED AVERAGE GLUCOSE: 117 MG/DL
ETHANOL PERCENT: 0.2 %
ETHANOL: 197 MG/DL
FIO2: ABNORMAL
GFR AFRICAN AMERICAN: >60 ML/MIN
GFR AFRICAN AMERICAN: >60 ML/MIN
GFR NON-AFRICAN AMERICAN: >60 ML/MIN
GFR SERPL CREATININE-BSD FRML MDRD: >60 ML/MIN
GFR SERPL CREATININE-BSD FRML MDRD: ABNORMAL ML/MIN/{1.73_M2}
GFR SERPL CREATININE-BSD FRML MDRD: NORMAL ML/MIN/{1.73_M2}
GLUCOSE BLD-MCNC: 73 MG/DL (ref 70–99)
GLUCOSE BLD-MCNC: 90 MG/DL (ref 74–100)
GLUCOSE BLD-MCNC: 94 MG/DL (ref 70–99)
HBA1C MFR BLD: 5.7 % (ref 4–6)
HCO3 VENOUS: 24.9 MMOL/L (ref 22–29)
HCT VFR BLD CALC: 42 % (ref 40.7–50.3)
HCT VFR BLD CALC: 42.6 % (ref 40.7–50.3)
HDLC SERPL-MCNC: 80 MG/DL
HEMOGLOBIN: 13.1 G/DL (ref 13–17)
HEMOGLOBIN: 13.7 G/DL (ref 13–17)
IMMATURE GRANULOCYTES: 0 %
IMMATURE GRANULOCYTES: 1 %
INR BLD: 0.9
LDL CHOLESTEROL: 68 MG/DL (ref 0–130)
LYMPHOCYTES # BLD: 38 % (ref 24–43)
LYMPHOCYTES # BLD: 40 % (ref 24–43)
MCH RBC QN AUTO: 30.2 PG (ref 25.2–33.5)
MCH RBC QN AUTO: 30.4 PG (ref 25.2–33.5)
MCHC RBC AUTO-ENTMCNC: 31.2 G/DL (ref 28.4–34.8)
MCHC RBC AUTO-ENTMCNC: 32.2 G/DL (ref 28.4–34.8)
MCV RBC AUTO: 94.5 FL (ref 82.6–102.9)
MCV RBC AUTO: 96.8 FL (ref 82.6–102.9)
MODE: ABNORMAL
MONOCYTES # BLD: 13 % (ref 3–12)
MONOCYTES # BLD: 15 % (ref 3–12)
MYOGLOBIN: 29 NG/ML (ref 28–72)
NEGATIVE BASE EXCESS, VEN: 1 (ref 0–2)
NRBC AUTOMATED: 0 PER 100 WBC
NRBC AUTOMATED: 0 PER 100 WBC
O2 DEVICE/FLOW/%: ABNORMAL
O2 SAT, VEN: 86 % (ref 60–85)
PARTIAL THROMBOPLASTIN TIME: 24.1 SEC (ref 20.5–30.5)
PATIENT TEMP: ABNORMAL
PCO2, VEN: 43.6 MM HG (ref 41–51)
PDW BLD-RTO: 13.2 % (ref 11.8–14.4)
PDW BLD-RTO: 13.2 % (ref 11.8–14.4)
PH VENOUS: 7.37 (ref 7.32–7.43)
PLATELET # BLD: 225 K/UL (ref 138–453)
PLATELET # BLD: ABNORMAL K/UL (ref 138–453)
PLATELET ESTIMATE: ABNORMAL
PLATELET ESTIMATE: ABNORMAL
PLATELET, FLUORESCENCE: NORMAL K/UL (ref 138–453)
PMV BLD AUTO: 8.9 FL (ref 8.1–13.5)
PMV BLD AUTO: ABNORMAL FL (ref 8.1–13.5)
PO2, VEN: 53.8 MM HG (ref 30–50)
POC BUN: 9 MG/DL (ref 8–26)
POC CHLORIDE: 107 MMOL/L (ref 98–107)
POC CREATININE: 0.67 MG/DL (ref 0.51–1.19)
POC HEMATOCRIT: 42 % (ref 41–53)
POC HEMOGLOBIN: 14.4 G/DL (ref 13.5–17.5)
POC IONIZED CALCIUM: 1.07 MMOL/L (ref 1.15–1.33)
POC LACTIC ACID: 1.34 MMOL/L (ref 0.56–1.39)
POC PCO2 TEMP: ABNORMAL MM HG
POC PH TEMP: ABNORMAL
POC PO2 TEMP: ABNORMAL MM HG
POC POTASSIUM: 3.9 MMOL/L (ref 3.5–4.5)
POC SODIUM: 140 MMOL/L (ref 138–146)
POC TCO2: 26 MMOL/L (ref 22–30)
POSITIVE BASE EXCESS, VEN: ABNORMAL (ref 0–3)
POTASSIUM SERPL-SCNC: 3.5 MMOL/L (ref 3.7–5.3)
POTASSIUM SERPL-SCNC: 3.7 MMOL/L (ref 3.7–5.3)
PROTHROMBIN TIME: 9.6 SEC (ref 9.1–12.3)
RBC # BLD: 4.34 M/UL (ref 4.21–5.77)
RBC # BLD: 4.51 M/UL (ref 4.21–5.77)
RBC # BLD: ABNORMAL 10*6/UL
RBC # BLD: ABNORMAL 10*6/UL
SALICYLATE LEVEL: 6 MG/DL (ref 3–10)
SAMPLE SITE: ABNORMAL
SARS-COV-2, RAPID: NOT DETECTED
SEG NEUTROPHILS: 43 % (ref 36–65)
SEG NEUTROPHILS: 45 % (ref 36–65)
SEGMENTED NEUTROPHILS ABSOLUTE COUNT: 1.49 K/UL (ref 1.5–8.1)
SEGMENTED NEUTROPHILS ABSOLUTE COUNT: 1.99 K/UL (ref 1.5–8.1)
SODIUM BLD-SCNC: 128 MMOL/L (ref 135–144)
SODIUM BLD-SCNC: 130 MMOL/L (ref 135–144)
SPECIMEN DESCRIPTION: NORMAL
TOTAL CK: 153 U/L (ref 39–308)
TOTAL CO2, VENOUS: ABNORMAL MMOL/L (ref 23–30)
TOTAL PROTEIN: 6.4 G/DL (ref 6.4–8.3)
TOXIC TRICYCLIC SC,BLOOD: NEGATIVE
TRIGL SERPL-MCNC: 43 MG/DL
TROPONIN INTERP: ABNORMAL
TROPONIN T: ABNORMAL NG/ML
TROPONIN, HIGH SENSITIVITY: <6 NG/L (ref 0–22)
VLDLC SERPL CALC-MCNC: NORMAL MG/DL (ref 1–30)
WBC # BLD: 3.3 K/UL (ref 3.5–11.3)
WBC # BLD: 4.6 K/UL (ref 3.5–11.3)
WBC # BLD: ABNORMAL 10*3/UL
WBC # BLD: ABNORMAL 10*3/UL

## 2021-09-26 PROCEDURE — 97535 SELF CARE MNGMENT TRAINING: CPT

## 2021-09-26 PROCEDURE — 82565 ASSAY OF CREATININE: CPT

## 2021-09-26 PROCEDURE — 70551 MRI BRAIN STEM W/O DYE: CPT

## 2021-09-26 PROCEDURE — 96372 THER/PROPH/DIAG INJ SC/IM: CPT

## 2021-09-26 PROCEDURE — 85055 RETICULATED PLATELET ASSAY: CPT

## 2021-09-26 PROCEDURE — 83874 ASSAY OF MYOGLOBIN: CPT

## 2021-09-26 PROCEDURE — 2580000003 HC RX 258: Performed by: STUDENT IN AN ORGANIZED HEALTH CARE EDUCATION/TRAINING PROGRAM

## 2021-09-26 PROCEDURE — 83036 HEMOGLOBIN GLYCOSYLATED A1C: CPT

## 2021-09-26 PROCEDURE — 97166 OT EVAL MOD COMPLEX 45 MIN: CPT

## 2021-09-26 PROCEDURE — 99285 EMERGENCY DEPT VISIT HI MDM: CPT

## 2021-09-26 PROCEDURE — 82803 BLOOD GASES ANY COMBINATION: CPT

## 2021-09-26 PROCEDURE — 99220 PR INITIAL OBSERVATION CARE/DAY 70 MINUTES: CPT | Performed by: PSYCHIATRY & NEUROLOGY

## 2021-09-26 PROCEDURE — 96374 THER/PROPH/DIAG INJ IV PUSH: CPT

## 2021-09-26 PROCEDURE — 80179 DRUG ASSAY SALICYLATE: CPT

## 2021-09-26 PROCEDURE — 2500000003 HC RX 250 WO HCPCS: Performed by: STUDENT IN AN ORGANIZED HEALTH CARE EDUCATION/TRAINING PROGRAM

## 2021-09-26 PROCEDURE — 94760 N-INVAS EAR/PLS OXIMETRY 1: CPT

## 2021-09-26 PROCEDURE — 6360000004 HC RX CONTRAST MEDICATION: Performed by: STUDENT IN AN ORGANIZED HEALTH CARE EDUCATION/TRAINING PROGRAM

## 2021-09-26 PROCEDURE — 80061 LIPID PANEL: CPT

## 2021-09-26 PROCEDURE — 80048 BASIC METABOLIC PNL TOTAL CA: CPT

## 2021-09-26 PROCEDURE — 85610 PROTHROMBIN TIME: CPT

## 2021-09-26 PROCEDURE — 85025 COMPLETE CBC W/AUTO DIFF WBC: CPT

## 2021-09-26 PROCEDURE — 87635 SARS-COV-2 COVID-19 AMP PRB: CPT

## 2021-09-26 PROCEDURE — 6360000002 HC RX W HCPCS: Performed by: STUDENT IN AN ORGANIZED HEALTH CARE EDUCATION/TRAINING PROGRAM

## 2021-09-26 PROCEDURE — 6370000000 HC RX 637 (ALT 250 FOR IP): Performed by: STUDENT IN AN ORGANIZED HEALTH CARE EDUCATION/TRAINING PROGRAM

## 2021-09-26 PROCEDURE — 70496 CT ANGIOGRAPHY HEAD: CPT

## 2021-09-26 PROCEDURE — 80307 DRUG TEST PRSMV CHEM ANLYZR: CPT

## 2021-09-26 PROCEDURE — 82947 ASSAY GLUCOSE BLOOD QUANT: CPT

## 2021-09-26 PROCEDURE — 80053 COMPREHEN METABOLIC PANEL: CPT

## 2021-09-26 PROCEDURE — 70450 CT HEAD/BRAIN W/O DYE: CPT

## 2021-09-26 PROCEDURE — 97530 THERAPEUTIC ACTIVITIES: CPT

## 2021-09-26 PROCEDURE — G0378 HOSPITAL OBSERVATION PER HR: HCPCS

## 2021-09-26 PROCEDURE — 82330 ASSAY OF CALCIUM: CPT

## 2021-09-26 PROCEDURE — 85014 HEMATOCRIT: CPT

## 2021-09-26 PROCEDURE — G0480 DRUG TEST DEF 1-7 CLASSES: HCPCS

## 2021-09-26 PROCEDURE — 84484 ASSAY OF TROPONIN QUANT: CPT

## 2021-09-26 PROCEDURE — 85730 THROMBOPLASTIN TIME PARTIAL: CPT

## 2021-09-26 PROCEDURE — 82550 ASSAY OF CK (CPK): CPT

## 2021-09-26 PROCEDURE — 84520 ASSAY OF UREA NITROGEN: CPT

## 2021-09-26 PROCEDURE — 6370000000 HC RX 637 (ALT 250 FOR IP): Performed by: FAMILY MEDICINE

## 2021-09-26 PROCEDURE — 80143 DRUG ASSAY ACETAMINOPHEN: CPT

## 2021-09-26 PROCEDURE — 82553 CREATINE MB FRACTION: CPT

## 2021-09-26 PROCEDURE — 97162 PT EVAL MOD COMPLEX 30 MIN: CPT

## 2021-09-26 PROCEDURE — 80051 ELECTROLYTE PANEL: CPT

## 2021-09-26 PROCEDURE — 83605 ASSAY OF LACTIC ACID: CPT

## 2021-09-26 RX ORDER — SODIUM CHLORIDE 0.9 % (FLUSH) 0.9 %
5-40 SYRINGE (ML) INJECTION PRN
Status: DISCONTINUED | OUTPATIENT
Start: 2021-09-26 | End: 2021-09-27 | Stop reason: HOSPADM

## 2021-09-26 RX ORDER — ACETAMINOPHEN 650 MG/1
650 SUPPOSITORY RECTAL EVERY 6 HOURS PRN
Status: DISCONTINUED | OUTPATIENT
Start: 2021-09-26 | End: 2021-09-27 | Stop reason: HOSPADM

## 2021-09-26 RX ORDER — SODIUM CHLORIDE 0.9 % (FLUSH) 0.9 %
5-40 SYRINGE (ML) INJECTION EVERY 12 HOURS SCHEDULED
Status: DISCONTINUED | OUTPATIENT
Start: 2021-09-26 | End: 2021-09-27 | Stop reason: HOSPADM

## 2021-09-26 RX ORDER — POTASSIUM CHLORIDE 20 MEQ/1
40 TABLET, EXTENDED RELEASE ORAL PRN
Status: DISCONTINUED | OUTPATIENT
Start: 2021-09-26 | End: 2021-09-27 | Stop reason: HOSPADM

## 2021-09-26 RX ORDER — ACETAMINOPHEN 325 MG/1
650 TABLET ORAL EVERY 6 HOURS PRN
Status: DISCONTINUED | OUTPATIENT
Start: 2021-09-26 | End: 2021-09-27 | Stop reason: HOSPADM

## 2021-09-26 RX ORDER — MIRTAZAPINE 15 MG/1
15 TABLET, FILM COATED ORAL NIGHTLY
Status: DISCONTINUED | OUTPATIENT
Start: 2021-09-26 | End: 2021-09-27 | Stop reason: HOSPADM

## 2021-09-26 RX ORDER — CLOPIDOGREL BISULFATE 75 MG/1
75 TABLET ORAL DAILY
Status: DISCONTINUED | OUTPATIENT
Start: 2021-09-26 | End: 2021-09-27 | Stop reason: HOSPADM

## 2021-09-26 RX ORDER — ONDANSETRON 2 MG/ML
4 INJECTION INTRAMUSCULAR; INTRAVENOUS EVERY 4 HOURS PRN
Status: DISCONTINUED | OUTPATIENT
Start: 2021-09-26 | End: 2021-09-27 | Stop reason: HOSPADM

## 2021-09-26 RX ORDER — CLOPIDOGREL 300 MG/1
300 TABLET, FILM COATED ORAL ONCE
Status: COMPLETED | OUTPATIENT
Start: 2021-09-26 | End: 2021-09-26

## 2021-09-26 RX ORDER — ESCITALOPRAM OXALATE 20 MG/1
20 TABLET ORAL DAILY
Status: ON HOLD | COMMUNITY
End: 2022-04-12 | Stop reason: HOSPADM

## 2021-09-26 RX ORDER — 0.9 % SODIUM CHLORIDE 0.9 %
1000 INTRAVENOUS SOLUTION INTRAVENOUS ONCE
Status: COMPLETED | OUTPATIENT
Start: 2021-09-26 | End: 2021-09-26

## 2021-09-26 RX ORDER — POLYETHYLENE GLYCOL 3350 17 G/17G
17 POWDER, FOR SOLUTION ORAL DAILY PRN
Status: DISCONTINUED | OUTPATIENT
Start: 2021-09-26 | End: 2021-09-27 | Stop reason: HOSPADM

## 2021-09-26 RX ORDER — ESCITALOPRAM OXALATE 10 MG/1
15 TABLET ORAL DAILY
Status: DISCONTINUED | OUTPATIENT
Start: 2021-09-26 | End: 2021-09-27 | Stop reason: HOSPADM

## 2021-09-26 RX ORDER — LISINOPRIL 5 MG/1
5 TABLET ORAL DAILY
Status: ON HOLD | COMMUNITY
End: 2022-04-12 | Stop reason: HOSPADM

## 2021-09-26 RX ORDER — POTASSIUM CHLORIDE 7.45 MG/ML
10 INJECTION INTRAVENOUS PRN
Status: DISCONTINUED | OUTPATIENT
Start: 2021-09-26 | End: 2021-09-27 | Stop reason: HOSPADM

## 2021-09-26 RX ORDER — SODIUM CHLORIDE 9 MG/ML
25 INJECTION, SOLUTION INTRAVENOUS PRN
Status: DISCONTINUED | OUTPATIENT
Start: 2021-09-26 | End: 2021-09-27 | Stop reason: HOSPADM

## 2021-09-26 RX ORDER — SODIUM CHLORIDE 9 MG/ML
INJECTION, SOLUTION INTRAVENOUS CONTINUOUS
Status: DISCONTINUED | OUTPATIENT
Start: 2021-09-26 | End: 2021-09-27 | Stop reason: HOSPADM

## 2021-09-26 RX ORDER — ASPIRIN 81 MG/1
81 TABLET ORAL DAILY
Status: DISCONTINUED | OUTPATIENT
Start: 2021-09-26 | End: 2021-09-27 | Stop reason: HOSPADM

## 2021-09-26 RX ORDER — ATORVASTATIN CALCIUM 80 MG/1
40 TABLET, FILM COATED ORAL NIGHTLY
Status: DISCONTINUED | OUTPATIENT
Start: 2021-09-26 | End: 2021-09-27

## 2021-09-26 RX ORDER — TRAZODONE HYDROCHLORIDE 50 MG/1
50 TABLET ORAL NIGHTLY PRN
Status: DISCONTINUED | OUTPATIENT
Start: 2021-09-26 | End: 2021-09-27 | Stop reason: HOSPADM

## 2021-09-26 RX ADMIN — FAMOTIDINE 20 MG: 10 INJECTION INTRAVENOUS at 21:20

## 2021-09-26 RX ADMIN — ENOXAPARIN SODIUM 40 MG: 40 INJECTION SUBCUTANEOUS at 08:47

## 2021-09-26 RX ADMIN — CLOPIDOGREL BISULFATE 300 MG: 300 TABLET, FILM COATED ORAL at 02:15

## 2021-09-26 RX ADMIN — SODIUM CHLORIDE: 9 INJECTION, SOLUTION INTRAVENOUS at 03:07

## 2021-09-26 RX ADMIN — CLOPIDOGREL 75 MG: 75 TABLET, FILM COATED ORAL at 08:46

## 2021-09-26 RX ADMIN — MIRTAZAPINE 15 MG: 15 TABLET, FILM COATED ORAL at 22:32

## 2021-09-26 RX ADMIN — SODIUM CHLORIDE 1000 ML: 9 INJECTION, SOLUTION INTRAVENOUS at 02:53

## 2021-09-26 RX ADMIN — ASPIRIN 81 MG: 81 TABLET, COATED ORAL at 08:46

## 2021-09-26 RX ADMIN — ESCITALOPRAM OXALATE 15 MG: 10 TABLET ORAL at 08:46

## 2021-09-26 RX ADMIN — SODIUM CHLORIDE, PRESERVATIVE FREE 10 ML: 5 INJECTION INTRAVENOUS at 21:20

## 2021-09-26 RX ADMIN — ATORVASTATIN CALCIUM 40 MG: 80 TABLET, FILM COATED ORAL at 21:20

## 2021-09-26 RX ADMIN — ASPIRIN 325 MG: 325 TABLET, COATED ORAL at 02:15

## 2021-09-26 RX ADMIN — IOPAMIDOL 90 ML: 755 INJECTION, SOLUTION INTRAVENOUS at 01:27

## 2021-09-26 ASSESSMENT — PAIN DESCRIPTION - LOCATION: LOCATION: LEG

## 2021-09-26 ASSESSMENT — ENCOUNTER SYMPTOMS
BACK PAIN: 0
COUGH: 0
SHORTNESS OF BREATH: 0
ABDOMINAL PAIN: 0

## 2021-09-26 ASSESSMENT — PAIN DESCRIPTION - RADICULAR PAIN: RADICULAR_PAIN: ABSENT

## 2021-09-26 ASSESSMENT — PAIN SCALES - GENERAL
PAINLEVEL_OUTOF10: 3
PAINLEVEL_OUTOF10: 0
PAINLEVEL_OUTOF10: 0

## 2021-09-26 ASSESSMENT — PAIN DESCRIPTION - ORIENTATION: ORIENTATION: LEFT;UPPER

## 2021-09-26 NOTE — FLOWSHEET NOTE
[facility] [place] [address]     Emergency/Trauma Note    PATIENT NAME: Marvin Munson    Shift date: 9/26/2021  Shift day: Saturday   Shift # 3    Room # 17/17   Name: Marvin Munson            Age: 55 y.o. Gender: male          Shinto: No Pentecostalism on file   Place of Scientologist:    Trauma/Incident type: Stroke Alert  Admit Date & Time: 9/26/2021 12:27 AM  TRAUMA NAME:     ADVANCE DIRECTIVES IN CHART? No    NAME OF DECISION MAKER:     RELATIONSHIP OF DECISION MAKER TO PATIENT:     PATIENT/EVENT DESCRIPTION:  Marvin Munson is a 55 y.o. male who arrived as a  Stroke Alert. Patient presents with extremity weakness. Pt to be admitted to 17/17. SPIRITUAL ASSESSMENT/INTERVENTION:  Jyothi Valladares was ministry of presence.  provided space for patient to share his story.  provided phone for patient to call his girlfriend Thai Booth  [phone]. Patient expressed gratitude. PATIENT BELONGINGS:  With patient    ANY BELONGINGS OF SIGNIFICANT VALUE NOTED:  None noted    REGISTRATION STAFF NOTIFIED? No      WHAT IS YOUR SPIRITUAL CARE PLAN FOR THIS PATIENT?:   [facility] will remain available for spiritual and emotional support as needed.     Electronically signed by Jacob Langston      09/26/21 0154   Encounter Summary   Services provided to: Patient   Referral/Consult From: Multi-disciplinary team   Support System Significant other   Continue Visiting   (9/26/2021)   Complexity of Encounter Moderate   Length of Encounter 1 hour   Spiritual Assessment Completed Yes   Crisis   Type Stroke Alert   Assessment Approachable   Intervention Active listening;Sustaining presence/ Ministry of presence   Outcome Expressed gratitude   , on 9/26/2021 at 1:56 AM.  [facility]  [phone]

## 2021-09-26 NOTE — ED NOTES
Patient return from CT via stretcher with Stanley Baum back on monitor  No other needs at this time  RR even and non-labored     Sally Davidson RN  09/26/21 3391

## 2021-09-26 NOTE — CARE COORDINATION
Case Management Initial Discharge Plan  Joann Mathews,             Met with:patient to discuss discharge plans. Information verified: address, contacts, phone number, , insurance Yes  Insurance Provider: [facility]    Emergency Contact/Next of Kin name & number: Tenisha Pedersen [phone]  Who are involved in patient's support system? No one lives at the [facility]    PCP: No primary care provider on file. Date of last visit: follows with Danitza Sharma, last visit was over a year ago      Discharge Planning    Living Arrangements:    Lives at [place] [address] for the past month    Home has 3 stories  3 flights of stairs to climb to get into front door,   Location of bedroom/bathroom in home main    Patient able to perform ADL's:Independent    Current Services (outpatient & in home) DME  DME equipment: none  DME provider: na    Is patient receiving oral anticoagulation therapy? No    If indicated:   Physician managing anticoagulation treatment: na  Where does patient obtain lab work for ATC treatment? na      Potential Assistance Needed:       Patient agreeable to home care: No  [place] of choice provided:  n/a    Prior SNF/Rehab Placement and Facility: none  Agreeable to SNF/Rehab: No  [place] of choice provided: n/a     Evaluation: no    Expected Discharge date:       Patient expects to be discharged to: If home: is the family and/or caregiver wiling & able to provide support at home? no  Who will be providing this support? States staying at the [facility], girlfriend and himself was not getting along    Follow Up Appointment: Best Day/ Time:      Transportation provider: will need  Transportation arrangements needed for discharge: Yes    Readmission Risk              Risk of Unplanned Readmission:  0             Does patient have a readmission risk score greater than 14?: No  If yes, follow-up appointment must be made within 7 days of discharge.      Goals of Care: self care      Educated pt on transitional options, provided freedom of choice and are agreeable with plan      Dis[facility] Plan: [facility] will [facility]          Electronically signed by Axel Lnodon RN on 9/26/21 at 11:33 AM EDT

## 2021-09-26 NOTE — ED NOTES
Writer at bedside with Dr. Sudhir Ramirez to watch patient ambulate  Patient unsteady on left leg and assisted back to stretcher  Stroke alert paged and EPOC started     Trina Ndiaye, MARY ANNE  09/26/21 7287

## 2021-09-26 NOTE — CONSULTS
Department of Endovascular Neurosurgery  Resident Consult Note  Stroke Alert paged @ 12:53 AM  ER Room # 17   Arrival to patient bedside @ 12:58 AM        Reason for Consult:  Stroke alert  Requesting Physician:  Dr. Mitesh Nielsen  Stroke Neurologist:   []Dr. Gladys Pate  []Dr. Trevon Borges  []Dr. Foster Alfonso   [x]Dr. Lupe Alvarez    History Obtained From:  patient, electronic medical record    CHIEF COMPLAINT:       Left leg weakness    HISTORY OF PRESENT ILLNESS:       The patient is a 55 y.o. male with PMH of depression, alcohol abuse who presents with complaint of sudden onset left leg weakness with difficulty ambulating due to leg giving out. Reports he is staying at the [place] [address] right now. He was getting up to go meet a friend at [facility] and subsequently had a fall. He attempted to get up again and fell a second time. Denies any head trauma. No other symptoms such as numbness, tingling, or weakness. No dysarthria or aphasia    Last know well: 12:00 AM (midnight)    On presentation:  BP: 133/84  BSL: 94    Prior to arrival patient was on  Antiplatelets/anticoagulants: no  Statins: no    Smoking history: smoker 6-7 cigs/day for many years       PAST MEDICAL HISTORY :       Past Medical History:        Diagnosis Date    Psoriasis     Psychiatric problem     Seizures ([facility] [address]        Past Surgical History:    No past surgical history on file.     Social History:   Social History     Socioeconomic History    Marital status: Unknown     Spouse name: Not on file    Number of children: Not on file    Years of education: Not on file    Highest education level: Not on file   Occupational History    Not on file   Tobacco Use    Smoking status: Current Every Day Smoker     Packs/day: 1.00     Years: 30.00     Pack years: 30.00     Types: Cigarettes     Start date: 8/18/1990    Smokeless tobacco: Never Used   Vaping Use    Vaping Use: Never used   Substance and Sexual Activity    Alcohol use: Yes     Comment: 2 x month    Drug use: negative for chest pain, palpitations   GASTROINTESTINAL: negative for nausea, vomiting   GENITOURINARY: negative for incontinence   MUSCULOSKELETAL: negative for neck or back pain   NEUROLOGICAL: negative for seizures   PSYCHIATRIC: negative for fatigue     Review of systems otherwise negative. PHYSICAL EXAM:       /69   Pulse 88   Temp 97.9 °F (36.6 °C)   Resp 16   Ht 5' 10\" (1.778 m)   Wt 155 lb (70.3 kg)   SpO2 98%   BMI 22.24 kg/m²     CONSTITUTIONAL:  Well developed, well nourished, alert and oriented x 3, in no acute distress. GCS 15, nontoxic. No dysarthria, no aphasia. HEAD:  normocephalic, atraumatic    EYES:  PERRLA, EOMI.   ENT:  moist mucous membranes   NECK:  supple, symmetric, no midline tenderness to palpation    BACK:  without midline tenderness, step-offs or deformities    LUNGS:  Equal air entry bilaterally   CARDIOVASCULAR:  normal s1 / s2   ABDOMEN:  Soft, no rigidity   NEUROLOGIC:  Mental Status:  A & O x3,awake             Cranial Nerves:    cranial nerves II-XII are grossly intact    Motor Exam:    Drift:  absent  Tone:  normal    Motor exam is symmetrical 5 out of 5 all extremities bilaterally    Sensory:    Touch:    Right Upper Extremity:  normal  Left Upper Extremity:  normal  Right Lower Extremity:  normal  Left Lower Extremity:  normal    Deep Tendon Reflexes:    Right Bicep:  2+  Left Bicep:  2+  Right Knee:  2+  Left Knee:  2+    Plantar Response:  Right:  downgoing  Left:  downgoing    Clonus:  N/A  Chaidez's:  N/A    Coordination/Dysmetria:  Heel to Shin:  Right:  normal  Left:  normal  Finger to Nose:   Right:  normal  Left:  normal   Dysdiadochokinesia:  N/A    Gait:      INITIAL NIH STROKE SCALE:    Time Performed:  1:00 AM     1a. Level of consciousness:  0 - alert; keenly responsive  1b. Level of consciousness questions:  0 - answers both questions correctly  1c. Level of consciousness questions:  0 - performs both tasks correctly  2.     Best Gaze:  0 - normal  3. Visual:  0 - no visual loss  4. Facial Palsy:  0 - normal symmetric movement  5a. Motor left arm:  0 - no drift, limb holds 90 (or 45) degrees for full 10 seconds  5b. Motor right arm:  0 - no drift, limb holds 90 (or 45) degrees for full 10 seconds  6a. Motor left le - no drift; leg holds 30 degree position for full 5 seconds  6b. Motor right le - no drift; leg holds 30 degree position for full 5 seconds  7. Limb Ataxia:  0 - absent  8. Sensory:  0 - normal; no sensory loss  9. Best Language:  0 - no aphasia, normal  10. Dysarthria:  0 - normal  11. Extinction and Inattention:  0 - no abnormality    TOTAL:  0     SKIN:  no rash      Modified Louisa Score Scale:     [] Zero: No symptoms at all   [x] 1: No significant disability despite symptoms; able to carry out all usual duties and activities   [] 2: Slight disability; unable to carry out all previous activities, but able to look after own affairs without assistance   [] 3:Moderate disability; requiring some help, but able to walk without assistance   [] 4: Moderately severe disability; unable to walk and attend to bodily needs without assistance   [] 5:Severe disability; bedridden, incontinent and requiring constant nursing care and attention      ABCD2 Score  (Estimate Risk of Stroke after TIA)   POINTS   Age    < 61   ? 60     [x] 0  [] 1   BP:     SBP <140 or DBP < 90   SBP ? 140 or DBP ? 90       [x] 0  [] 1   Clinical Features of TIA     Other Symptoms                 Speech Disturbances W/O Weakness   Unilateral Weakness     [] 0  [] 1  [x] 2   Duration of symptoms     < 10 Minutes                                     10-59 Minutes   ?  61 Minutes     [] 0  [x] 1  [] 2   Diabetes     No                    Yes     [x] 0  [] 1   TOTAL 3   0-3 Points: Low Risk -> Work up could be done OPD   2-Day Stroke Risk: 1%   7- Day Stroke Risk: 1.2%   90 Days Stroke Risk: 3.1%    4-5 Points: Moderate Risk    2-Day Stroke Risk: 4.1%   7- Day Stroke Risk: 5.9%    90 Days Stroke Risk: 9.8%    6-7 Points: High Risk -> Warrant Admission for Workup   2-Day Stroke Risk: 8.1%   7- Day Stroke Risk: 11.7%   90 Days Stroke Risk: 17.8%      LABS AND IMAGING:     CBC with Differential:    Lab Results   Component Value Date    WBC 4.6 09/26/2021    RBC 4.51 09/26/2021    HGB 13.7 09/26/2021    HCT 42.6 09/26/2021     09/26/2021    MCV 94.5 09/26/2021    MCH 30.4 09/26/2021    MCHC 32.2 09/26/2021    RDW 13.2 09/26/2021    LYMPHOPCT 38 09/26/2021    MONOPCT 15 09/26/2021    BASOPCT 1 09/26/2021    MONOSABS 0.69 09/26/2021    LYMPHSABS 1.78 09/26/2021    EOSABS 0.08 09/26/2021    BASOSABS 0.06 09/26/2021    DIFFTYPE NOT REPORTED 09/26/2021         BMP:    Lab Results   Component Value Date     09/26/2021    K 3.5 09/26/2021    CL 96 09/26/2021    CO2 18 09/26/2021    BUN 9 09/26/2021    LABALBU 3.5 01/23/2021    CREATININE 0.67 09/26/2021    CREATININE 0.62 09/26/2021    CALCIUM 8.4 09/26/2021    GFRAA >60 09/26/2021    LABGLOM >60 09/26/2021    GLUCOSE 94 09/26/2021       Radiology Review:    1.) CT brain without contrast:   Impression   Stable appearance of the brain without acute intracranial process identified.       The findings were sent to the Radiology Results [id number] at 1:26   am on 9/26/2021to be communicated to the Stroke Neurology service. 2.) CTA Head/Neck:  Impression   No flow-limiting arterial stenosis in the head and neck. 3.) Brain MRI W/O:  Ordered    ASSESSMENT AND PLAN:       Patient Active Problem List   Diagnosis    Major depression, recurrent ([facility] [address]    Severe episode of recurrent major depressive disorder, without psychotic features [facility])       Assessment                 55 y.o. male with PMH of depression, alcohol abuse who presents with complaint of sudden onset left leg weakness with difficulty ambulating due to leg giving out. NIHSS of 0  ABCD2 score of 3  TIA evaluation    1.  Last Known Well (date and time): 9/26/2021 @ 12:00AM(midnight)    2. Candidate for IV tPA therapy     Yes []     No  [x] due to the following exclusion criteria: low NIH, symptom resolution    3. Candidate for Thrombectomy    Yes []      No [x] due to the following exclusion criteria: no LVO    - Discussed with Dr. Ab Montez     Recommendations:    [] General Neurology Care Status - prefer 5th floor (5A/5C)   [] Internal Medicine General Care Status   [] NICU Status - (5B)     [] MICU Status   [x] Observation Status    Please use the following admission order set for stroke admission:   [] [id number] - CLYDE Intercerebral Hemorrhage Admission   [] [id number] - CLYDE Sub Arachnoid Hemorrhage Admission   [] [id number] - CLYDE Ischemic Stroke TPA Treatment Focused   [] [id number] - IP Ischemic Stroke ICU Post Alteplase (TPA) Admission    [] [id number] - GEN Ischemic Stroke Non-Thrombolytic Focussed      Imaging   - CT Head  WO : done   - CTA Head and Neck : done  - MRI Brain WO :  - ECHO    Medications   -  Aspirin 325 mg loaded followed by 81mg daily  - Clopidogrel 300 mg loaded followed by 75mg daily for 21 days   - Atorvastatin 40 mg nightly     Labs  - Fasting Lipid panel  - HgbA1c lab      - PT, OT, Speech eval   - Hydrate with IVF NS  - Telemetry   - Neuro checks per protocol  - We recommend SBP <200, permissive HTN  - Blood glucose goal less than 180  - Please avoid dextrose containing solutions        Additional recommendations may follow    Please contact EV NSG with any changes in patients neurologic status. Thank you for your consult.        Andreia Branch MD   PGY 3 Neurology Resident  9/26/2021 at 1:35 AM

## 2021-09-26 NOTE — ED NOTES
Patient resting comfortably on stretcher, in no apparent distress  Respirations even and non-labored  Patient has no needs at this time  Call light remains within reach     Cassy Wright RN  09/26/21 [id number]

## 2021-09-26 NOTE — ED PROVIDER NOTES
[facility]  ED  Emergency Department Encounter  EmergencyMedicine Resident     Pt Harrison Uriostegui  MRN: [id number]  Parmjitgfedgardo 1965  Date of evaluation: 9/26/21  PCP:  No primary care provider on file. This patient was evaluated in the Emergency Department for symptoms described in the history of present illness. The patient was evaluated in the context of the global COVID-19 pandemic, which necessitated consideration that the patient might be at risk for infection with the SARS-CoV-2 virus that causes COVID-19. Institutional protocols and algorithms that pertain to the evaluation of patients at risk for COVID-19 are in a state of rapid change based on information released by regulatory bodies including the CDC and federal and state organizations. These policies and algorithms were followed during the patient's care in the ED. CHIEF COMPLAINT       Chief Complaint   Patient presents with    Extremity Weakness     left leg weakness       HISTORY OF PRESENT ILLNESS  (Location/Symptom, Timing/Onset, Context/Setting, Quality, Duration, Modifying Factors, Severity.)      Bobby Pedraza is a 55-year-old male with a history of hypertension, psoriasis, smoking and alcohol use who presents today with left lower extremity weakness that started 45 minutes ago. Patient reports that he ended work and had a couple of 24 ounce of beers. He got up to meet up with a friend at [facility] and fell. He got up and continued to walk and fell again. He reports that his leg just does not want to do what he wants it to do. He has never had an episode like this before and denies any pain. He denies any loss of sensation, numbness, tingling, weakness anywhere else in his body. He has no recent illnesses or recent traumas. PAST MEDICAL / SURGICAL / SOCIAL / FAMILY HISTORY      has a past medical history of Psoriasis, Psychiatric problem, and Seizures ([facility] [address]      has no past surgical history on file.    Social History     Socioeconomic History    Marital status: Unknown     Spouse name: Not on file    Number of children: Not on file    Years of education: Not on file    Highest education level: Not on file   Occupational History    Not on file   Tobacco Use    Smoking status: Current Every Day Smoker     Packs/day: 1.00     Years: 30.00     Pack years: 30.00     Types: Cigarettes     Start date: 8/18/1990    Smokeless tobacco: Never Used   Vaping Use    Vaping Use: Never used   Substance and Sexual Activity    Alcohol use: Yes     Comment: 2 x month    Drug use: Yes     Types: Cocaine     Comment: occasionally    Sexual activity: Not on file   Other Topics Concern    Not on file   Social History Narrative    Not on file     Social Determinants of Health     Financial Resource Strain:     Difficulty of Paying Living Expenses:    Food Insecurity:     Worried About Running Out of Food in the Last Year:     Ran Out of Food in the Last Year:    Transportation Needs:     Lack of Transportation (Medical):  Lack of Transportation (Non-Medical):    Physical Activity:     Days of Exercise per Week:     Minutes of Exercise per Session:    Stress:     Feeling of Stress :    Social Connections:     Frequency of Communication with Friends and Family:     Frequency of Social Gatherings with Friends and Family:     Attends Christianity Services:     Active Member of Clubs or Organizations:     Attends Club or Organization Meetings:     Marital Status:    Intimate Partner Violence:     Fear of Current or Ex-Partner:     Emotionally Abused:     Physically Abused:     Sexually Abused:        No family history on file. Allergies:  Patient has no known allergies. Home Medications:  Prior to Admission medications    Medication Sig Start Date End Date Taking?  Authorizing Provider   escitalopram (LEXAPRO) 10 MG tablet Take 1.5 tablets by mouth daily 1/29/21  Yes Ana Payne MD mirtazapine (REMERON) 15 MG tablet Take 1 tablet by mouth nightly 1/28/21  Yes Chiara Dhaliwal MD   traZODone (DESYREL) 50 MG tablet Take 1 tablet by mouth nightly as needed for Sleep 1/28/21  Yes Chiara Dhaliwal MD       REVIEW OF SYSTEMS    (2-9 systems for level 4, 10 or more for level 5)      Review of Systems   Constitutional: Negative for fever. HENT: Negative for hearing loss. Eyes: Negative for visual disturbance. Respiratory: Negative for cough and shortness of breath. Cardiovascular: Negative for chest pain. Gastrointestinal: Negative for abdominal pain. Musculoskeletal: Negative for back pain and neck pain. Skin: Negative for wound. Neurological: Positive for weakness. Negative for dizziness, seizures, numbness and headaches. Psychiatric/Behavioral: Negative for confusion. PHYSICAL EXAM   (up to 7 for level 4, 8 or more for level 5)      INITIAL VITALS:   /69   Pulse 88   Temp 97.9 °F (36.6 °C)   Resp 16   Ht 5' 10\" (1.778 m)   Wt 155 lb (70.3 kg)   SpO2 98%   BMI 22.24 kg/m²     Physical Exam  Constitutional:       General: He is not in acute distress. HENT:      Head: Normocephalic and atraumatic. Nose: Nose normal.   Eyes:      Extraocular Movements: Extraocular movements intact. Conjunctiva/sclera: Conjunctivae normal.      Pupils: Pupils are equal, round, and reactive to light. Cardiovascular:      Rate and Rhythm: Normal rate. Heart sounds: Normal heart sounds. Pulmonary:      Effort: Pulmonary effort is normal.      Breath sounds: Normal breath sounds. Abdominal:      General: Abdomen is flat. There is no distension. Tenderness: There is no abdominal tenderness. Musculoskeletal:         General: No deformity. Cervical back: Neck supple. Skin:     General: Skin is warm and dry. Capillary Refill: Capillary refill takes less than 2 seconds.       Comments: Diffuse psoriasis patches   Neurological: Mental Status: He is alert. Comments: Falls to left when standing with eyes open. Plantar and dorsiflexion 5/5 strength bilaterally. Sensation and proprioception intact in bilateral feet. Psychiatric:         Mood and Affect: Mood normal.         DIFFERENTIAL  DIAGNOSIS     PLAN (LABS / IMAGING / EKG):  Orders Placed This Encounter   Procedures    COVID-19, Rapid    CT HEAD WO CONTRAST    CTA HEAD NECK W CONTRAST    STROKE PANEL    ELECTROLYTES PLUS    Hemoglobin and hematocrit, blood    CALCIUM, IONIC (POC)    TOX SCR, BLD, ED    CBC    Basic Metabolic Panel w/ Reflex to MG    Diet NPO    ADULT DIET;  Regular    Vital signs per unit routine    Telemetry monitoring - 72 hour duration    Notify physician    Up as tolerated    Place intermittent pneumatic compression device    Full Code    Inpatient consult to Stroke Team    Consult to Neurology    Initiate Oxygen Therapy Protocol    Venous Blood Gas, POC    Creatinine W/GFR Point of Care    POCT urea (BUN)    Lactic Acid, POC    POCT Glucose    EKG 12 lead    PATIENT STATUS (FROM ED OR OR/PROCEDURAL) Observation       MEDICATIONS ORDERED:  Orders Placed This Encounter   Medications    iopamidol (ISOVUE-370) 76 % injection 90 mL    aspirin EC tablet 325 mg    clopidogrel (PLAVIX) tablet 300 mg    escitalopram (LEXAPRO) tablet 15 mg    mirtazapine (REMERON) tablet 15 mg    traZODone (DESYREL) tablet 50 mg    0.9 % sodium chloride infusion    sodium chloride flush 0.9 % injection 5-40 mL    sodium chloride flush 0.9 % injection 5-40 mL    0.9 % sodium chloride infusion    OR Linked Order Group     potassium chloride (KLOR-CON M) extended release tablet 40 mEq     potassium bicarb-citric acid (EFFER-K) effervescent tablet 40 mEq     potassium chloride 10 mEq/100 mL IVPB (Peripheral Line)    enoxaparin (LOVENOX) injection 40 mg    ondansetron (ZOFRAN) injection 4 mg    polyethylene glycol (GLYCOLAX) packet 17 g    famotidine (PEPCID) injection 20 mg    OR Linked Order Group     acetaminophen (TYLENOL) tablet 650 mg     acetaminophen (TYLENOL) suppository 650 mg    0.9 % sodium chloride bolus       DDX: TIA, stroke, ACL/PCL injury    DIAGNOSTIC RESULTS / EMERGENCY DEPARTMENT COURSE / MDM   LAB RESULTS:  Results for orders placed or performed during the hospital encounter of 09/26/21   COVID-19, Rapid    Specimen: Nasopharyngeal Swab   Result Value Ref Range    Specimen Description . NASOPHARYNGEAL SWAB     SARS-CoV-2, Rapid Not Detected Not Detected   STROKE PANEL   Result Value Ref Range    Glucose 94 70 - 99 mg/dL    BUN 9 6 - 20 mg/dL    CREATININE 0.62 (L) 0.70 - 1.20 mg/dL    Bun/Cre Ratio NOT REPORTED 9 - 20    Calcium 8.4 (L) 8.6 - 10.4 mg/dL    Sodium 128 (L) 135 - 144 mmol/L    Potassium 3.5 (L) 3.7 - 5.3 mmol/L    Chloride 96 (L) 98 - 107 mmol/L    CO2 18 (L) 20 - 31 mmol/L    Anion Gap 14 9 - 17 mmol/L    GFR Non-African American >60 >60 mL/min    GFR African American >60 >60 mL/min    GFR Comment          GFR Staging NOT REPORTED     WBC 4.6 3.5 - 11.3 k/uL    RBC 4.51 4.21 - 5.77 m/uL    Hemoglobin 13.7 13.0 - 17.0 g/dL    Hematocrit 42.6 40.7 - 50.3 %    MCV 94.5 82.6 - 102.9 fL    MCH 30.4 25.2 - 33.5 pg    MCHC 32.2 28.4 - 34.8 g/dL    RDW 13.2 11.8 - 14.4 %    Platelets 258 343 - 038 k/uL    MPV 8.9 8.1 - 13.5 fL    NRBC Automated 0.0 0.0 per 100 WBC    Total  39 - 308 U/L    CK-MB 5.0 <10.5 ng/mL    % CKMB 3.3 0.0 - 3.5 %    CKMB Interpretation NORMAL ISOENZYME PATTERN     Differential Type NOT REPORTED     Seg Neutrophils 43 36 - 65 %    Lymphocytes 38 24 - 43 %    Monocytes 15 (H) 3 - 12 %    Eosinophils % 2 1 - 4 %    Basophils 1 0 - 2 %    Immature Granulocytes 1 (H) 0 %    Segs Absolute 1.99 1.50 - 8.10 k/uL    Absolute Lymph # 1.78 1.10 - 3.70 k/uL    Absolute Mono # 0.69 0.10 - 1.20 k/uL    Absolute Eos # 0.08 0.00 - 0.44 k/uL    Basophils Absolute 0.06 0.00 - 0.20 k/uL    Absolute Immature Granulocyte 0.03 0.00 - 0.30 k/uL    WBC Morphology NOT REPORTED     RBC Morphology NOT REPORTED     Platelet Estimate NOT REPORTED     Myoglobin 29 28 - 72 ng/mL    Protime 9.6 9.1 - 12.3 sec    INR 0.9     PTT 24.1 20.5 - 30.5 sec    Troponin, High Sensitivity <6 0 - 22 ng/L    Troponin T NOT REPORTED <0.03 ng/mL    Troponin Interp NOT REPORTED    ELECTROLYTES PLUS   Result Value Ref Range    POC Sodium 140 138 - 146 mmol/L    POC Potassium 3.9 3.5 - 4.5 mmol/L    POC Chloride 107 98 - 107 mmol/L    POC TCO2 26 22 - 30 mmol/L    Anion Gap 9 7 - 16 mmol/L   Hemoglobin and hematocrit, blood   Result Value Ref Range    POC Hemoglobin 14.4 13.5 - 17.5 g/dL    POC Hematocrit 42 41 - 53 %   CALCIUM, IONIC (POC)   Result Value Ref Range    POC Ionized Calcium 1.07 (L) 1.15 - 1.33 mmol/L   TOX SCR, BLD, ED   Result Value Ref Range    Acetaminophen Level <5 (L) 10 - 30 ug/mL    Ethanol 197 (H) <10 mg/dL    Ethanol percent 0.197 (H) <0.201 %    Salicylate Lvl 6 3 - 10 mg/dL    Toxic Tricyclic Sc,Blood NEGATIVE NEGATIVE   Venous Blood Gas, POC   Result Value Ref Range    pH, Santiago 7.365 7.320 - 7.430    pCO2, Santiago 43.6 41.0 - 51.0 mm Hg    pO2, Santiago 53.8 (H) 30 - 50 mm Hg    HCO3, Venous 24.9 22.0 - 29.0 mmol/L    Total CO2, Venous NOT REPORTED 23.0 - 30.0 mmol/L    Negative Base Excess, Santiago 1 0.0 - 2.0    Positive Base Excess, Santiago NOT REPORTED 0.0 - 3.0    O2 Sat, Santiago 86 (H) 60.0 - 85.0 %    O2 Device/Flow/% NOT REPORTED     Kvng Test NOT REPORTED     Sample Site NOT REPORTED     Mode NOT REPORTED     FIO2 NOT REPORTED     Pt Temp NOT REPORTED     POC pH Temp NOT REPORTED     POC pCO2 Temp NOT REPORTED mm Hg    POC pO2 Temp NOT REPORTED mm Hg   Creatinine W/GFR Point of Care   Result Value Ref Range    POC Creatinine 0.67 0.51 - 1.19 mg/dL    GFR Comment >60 >60 mL/min    GFR Non-African American >60 >60 mL/min    GFR Comment         POCT urea (BUN)   Result Value Ref Range    POC BUN 9 8 - 26 mg/dL   Lactic Acid, POC   Result Value Ref Range    POC Lactic Acid 1.34 0.56 - 1.39 mmol/L   POCT Glucose   Result Value Ref Range    POC Glucose 90 74 - 100 mg/dL         RADIOLOGY:  CT HEAD WO CONTRAST    Result Date: 9/26/2021  Stable appearance of the brain without acute intracranial process identified. The findings were sent to the Radiology Results [address] at 1:26 am on 9/26/2021to be communicated to the Stroke Neurology service. EMERGENCY DEPARTMENT COURSE:  ED Course as of Sep 26 0328   [facility]   0057 Patient is a 28-year-old male with a history of hypertension, psoriasis, smoking and alcohol use who presents today with left lower extremity weakness that started 45 minutes ago. Patient reports that he ended work and had a couple of 24 ounce of beers. He got up to meet up with a friend at [facility] [place] and fell. He got up and continued to walk and fell again. He reports that his leg just does not want to do what he wants it to do. He has never had an episode like this before and denies any pain. He denies any loss of sensation, numbness, tingling, weakness anywhere else in his body. He has no recent illnesses or recent traumas. On examination he has 5+ strength in dorsi and plantar flexion, when stood up to walk. He falls to the left side. He was put back in bed he has sensation over the plantar and dorsi aspects of his feet is able to lift his legs off the bed for 5 seconds. We will proceed with CT head and stroke panel. Stroke alert critical called. [ML]   0300 Discussed with neurology would like to admit for further evaluation of possible TIA. Given aspirin Plavix. [ML]      ED Course User Index  [ML] Lorenzo Meyers DO         CONSULTS:  IP CONSULT TO STROKE TEAM  IP CONSULT TO NEUROLOGY      FINAL IMPRESSION      1. TIA (transient ischemic attack)          DISPOSITION / PLAN     DISPOSITION Admitted 09/26/2021 02:22:20 AM      PATIENT REFERRED TO:  No follow-up provider specified.     DISCHARGE MEDICATIONS:  New Prescriptions    No medications on file       Sheryl Kunz DO  Emergency Medicine Resident    (Please note that portions of thisnote were completed with a voice recognition program.  Efforts were made to edit the dictations but occasionally words are mis-transcribed.)       Mag Boyer DO  Resident  09/26/21 8631

## 2021-09-26 NOTE — H&P
[facility]  CDU / OBSERVATION eNCOUnter  Resident Note     Pt Name: Zenaida Harrell  MRN: [id number]  [place] 1965  Date of evaluation: 9/26/21  Patient's PCP is : No primary care provider on file. CHIEF COMPLAINT       Chief Complaint   Patient presents with    Extremity Weakness     left leg weakness         HISTORY OF PRESENT ILLNESS    Zenaida Harrell is a 55 y.o. male who presents sudden onset left leg weakness. Patient states she was at a restaurant bar on the evening of arrival into the ED where he claims to have had 2 beers, had not eaten yet, and attempted to get up from his seat and his left leg gave out he fell twice. This was witnessed by his friends and which is  who called 911 due to concern for stroke. Patient evaluated in the emergency department neurology was consulted stroke panel labs, imaging of his head were unrevealing. Patient did not have pain on arrival and does not have pain at this time. He denies weakness at this time. He denies ever having this symptom before. He states he is otherwise good health playing hockey his whole life and is usually very coordinated and especially in his legs. He denies trauma during the fall hitting his head or loss of consciousness and he recalls the event.       REVIEW OF SYSTEMS       General ROS - No fevers, No malaise   Ophthalmic ROS - No discharge, No changes in vision  ENT ROS -  No sore throat, No rhinorrhea,   Respiratory ROS - no shortness of breath, no cough, no  wheezing  Cardiovascular ROS - No chest pain, no dyspnea on exertion  Gastrointestinal ROS - No abdominal pain, no nausea or vomiting, no change in bowel habits, no black or bloody stools  Genito-Urinary ROS - No dysuria, trouble voiding, or hematuria  Musculoskeletal ROS - No myalgias, No arthalgias  Neurological ROS - No headache, no dizziness/lightheadedness, No focal weakness, no loss of sensation  Dermatological ROS - No lesions, No rash (PQRS) Advance directives on face sheet per hospital policy. No change unless specifically mentioned in chart    [address]    has a past medical history of Arthritis, Hypertension, Liver disease, Psoriasis, Psychiatric problem, and Seizures ([facility] [address] I have reviewed the past medical history with the patient and it is pertinent to this complaint. SURGICAL HISTORY      has a past surgical history that includes Tonsillectomy. I have reviewed and agree with Surgical History entered and it is pertinent to this complaint. CURRENT MEDICATIONS     escitalopram (LEXAPRO) tablet 15 mg, Daily  mirtazapine (REMERON) tablet 15 mg, Nightly  traZODone (DESYREL) tablet 50 mg, Nightly PRN  0.9 % sodium chloride infusion, Continuous  sodium chloride flush 0.9 % injection 5-40 mL, 2 times per day  sodium chloride flush 0.9 % injection 5-40 mL, PRN  0.9 % sodium chloride infusion, PRN  potassium chloride (KLOR-CON M) extended release tablet 40 mEq, PRN   Or  potassium bicarb-citric acid (EFFER-K) effervescent tablet 40 mEq, PRN   Or  potassium chloride 10 mEq/100 mL IVPB (Peripheral Line), PRN  enoxaparin (LOVENOX) injection 40 mg, Daily  ondansetron (ZOFRAN) injection 4 mg, Q4H PRN  polyethylene glycol (GLYCOLAX) packet 17 g, Daily PRN  famotidine (PEPCID) injection 20 mg, BID  acetaminophen (TYLENOL) tablet 650 mg, Q6H PRN   Or  acetaminophen (TYLENOL) suppository 650 mg, Q6H PRN  aspirin EC tablet 81 mg, Daily  clopidogrel (PLAVIX) tablet 75 mg, Daily  atorvastatin (LIPITOR) tablet 40 mg, Nightly        All medication charted and reviewed. ALLERGIES     has No Known Allergies. FAMILY HISTORY     has no family status information on file. family history is not on file. The patient denies any pertinent family history. I have reviewed and agree with the family history entered.   I have reviewed the Family History and it is not significant to the case    SOCIAL HISTORY      reports that he has been smoking cigarettes. He started smoking about 31 years ago. He has a 7.50 pack-year smoking history. He has never used smokeless tobacco. He reports current alcohol use. He reports previous drug use. Drug: Cocaine. I have reviewed and agree with all Social.  There are concerns for substance abuse/use , alcohol. PHYSICAL EXAM     INITIAL VITALS:  height is 5' 10\" (1.778 m) and weight is 155 lb (70.3 kg). His oral temperature is 97.5 °F (36.4 °C). His blood pressure is 142/73 (abnormal) and his pulse is 103. His respiration is 18 and oxygen saturation is 97%. CONSTITUTIONAL: AOx4, no apparent distress, appears stated age , unkept. HEAD: normocephalic, atraumatic   EYES: PERRLA, EOMI    ENT: moist mucous membranes, uvula midline   NECK: supple, symmetric   BACK: symmetric   LUNGS: clear to auscultation bilaterally   CARDIOVASCULAR: regular rate and rhythm, no murmurs, rubs or gallops   ABDOMEN: soft, non-tender, non-distended with normal active bowel sounds   NEUROLOGIC:  MAEx4, no focal sensory or motor deficits   MUSCULOSKELETAL: no clubbing, cyanosis or edema   SKIN: no rash or wounds       DIFFERENTIAL DIAGNOSIS/MDM:     Stroke/ TIA/ Facial Droop:  DDX: Stroke/ TIA, Vascular, Infectious/inflammatory, Neoplastic/neurological, Drug induced, Iatrogenic, Cardiopulmonary, Autoimmune, Endocrine, Degenerative, Psychogenic/psychiatric, MSK  Immediate: BHCG, INR, fingerstick  INITIAL NIH STROKE SCALE:    Time Performed:  9983 AM     1a. Level of consciousness:  0 - alert; keenly responsive  1b. Level of consciousness questions:  0 - answers both questions correctly  1c. Level of consciousness questions:  0 - performs both tasks correctly  2. Best Gaze:  0 - normal  3. Visual:  0 - no visual loss  4. Facial Palsy:  0 - normal symmetric movement  5a. Motor left arm:  0 - no drift, limb holds 90 (or 45) degrees for full 10 seconds  5b.   Motor right arm:  0 - no drift, limb holds 90 (or 45) degrees for full 10 seconds  6a. Motor left le - no drift; leg holds 30 degree position for full 5 seconds  6b. Motor right le - no drift; leg holds 30 degree position for full 5 seconds  7. Limb Ataxia:  0 - absent  8. Sensory:  0 - normal; no sensory loss  9. Best Language:  0 - no aphasia, normal  10. Dysarthria:  0 - normal  11. Extinction and Inattention:  0 - no abnormality    TOTAL:  0\    Weakness:  DDX: CVA, MS, Guillain [place], Transverse myelitis, Myasthenia gravis, cardiac, anemia, electrolytes, infection, change in medications, hypothyroid, rheumatalgic, depression, dehydration    DIAGNOSTIC RESULTS     EKG: All EKG's are interpreted by the Observation Physician who either signs or Co-signs this chart in the absence of a cardiologist.    EKG Interpretation    No EKG in ED    RADIOLOGY:   I directly visualized the following  images and reviewed the radiologist interpretations:    CT HEAD WO CONTRAST    Result Date: 2021  EXAMINATION: CT OF THE HEAD WITHOUT CONTRAST  2021 1:18 am TECHNIQUE: CT of the head was performed without the administration of intravenous contrast. Dose modulation, iterative reconstruction, and/or weight based adjustment of the mA/kV was utilized to reduce the radiation dose to as low as reasonably achievable. COMPARISON: 2021 HISTORY: ORDERING SYSTEM PROVIDED HISTORY: stroke TECHNOLOGIST PROVIDED HISTORY: stroke Decision Support Exception - unselect if not a suspected or confirmed emergency medical condition->Emergency Medical Condition (MA) FINDINGS: BRAIN/VENTRICLES: There is no acute infarct or acute intracranial hemorrhage present. There is no mass effect or midline shift present. There is no ventriculomegaly or abnormal extra-axial fluid collection present. ORBITS: Limited evaluation of the orbits is unremarkable. SINUSES: Mild mucosal thickening is present within the paranasal sinuses. No fluid levels are identified.  SOFT TISSUES/SKULL:  No lytic or blastic osseous lesions are identified. Stable appearance of the brain without acute intracranial process identified. The findings were sent to the Radiology Results [address] at 1:26 am on 9/26/2021to be communicated to the Stroke Neurology service. CTA HEAD NECK W CONTRAST    Result Date: 9/26/2021  EXAMINATION: CTA OF THE HEAD AND NECK WITH CONTRAST 9/26/2021 1:18 am: TECHNIQUE: CTA of the head and neck was performed with the administration of intravenous contrast. Multiplanar reformatted images are provided for review. MIP images are provided for review. Stenosis of the internal carotid arteries measured using NASCET criteria. Dose modulation, iterative reconstruction, and/or weight based adjustment of the mA/kV was utilized to reduce the radiation dose to as low as reasonably achievable. COMPARISON: None. HISTORY: ORDERING SYSTEM PROVIDED HISTORY: L leg weakness, inability to ambulate FINDINGS: CTA NECK: AORTIC ARCH/ARCH VESSELS: No dissection or arterial injury. No significant stenosis of the brachiocephalic or subclavian arteries. CAROTID ARTERIES: No dissection, arterial injury, or hemodynamically significant stenosis by NASCET criteria. VERTEBRAL ARTERIES: No dissection, arterial injury, or significant stenosis. SOFT TISSUES: The lung apices are clear. No cervical or superior mediastinal lymphadenopathy. The larynx and pharynx are unremarkable. No acute abnormality of the salivary and thyroid glands. BONES: No acute osseous abnormality. CTA HEAD: ANTERIOR CIRCULATION: No significant stenosis of the intracranial internal carotid, anterior cerebral, or middle cerebral arteries. No aneurysm. POSTERIOR CIRCULATION: No significant stenosis of the vertebral, basilar, or posterior cerebral arteries. No aneurysm. OTHER: No dural venous sinus thrombosis on this non-dedicated study. BRAIN: No mass effect or midline shift. No extra-axial fluid collection.  The gray-white differentiation is maintained. No flow-limiting arterial stenosis in the head and neck. MRI BRAIN WO CONTRAST    Result Date: 9/26/2021  EXAMINATION: MRI OF THE BRAIN WITHOUT CONTRAST  9/26/2021 9:56 am TECHNIQUE: Multiplanar multisequence MRI of the brain was performed without the administration of intravenous contrast. COMPARISON: No MRI comparison available. Comparison with 09/26/2021 CT brain. HISTORY: ORDERING SYSTEM PROVIDED HISTORY: difficulty ambulating due to patient reported left leg weakness TECHNOLOGIST PROVIDED HISTORY: difficulty ambulating due to patient reported left leg weakness Reason for Exam: lt leg weakness FINDINGS: INTRACRANIAL STRUCTURES/VENTRICLES: Scattered periventricular, deep, and subcortical white matter T2/FLAIR hyperintensities are nonspecific and likely related to microvascular ischemic disease. Right frontal lobe T2/FLAIR hyperintensity may represent gliosis from remote insult. There is no acute infarct. No mass effect or midline shift. No evidence of an acute intracranial hemorrhage. The ventricles and sulci are normal in size and configuration. The sellar/suprasellar regions appear unremarkable. The normal signal voids within the major intracranial vessels appear maintained. ORBITS: The visualized portion of the orbits demonstrate no acute abnormality. SINUSES: Mucosal thickening of the bilateral ethmoid and maxillary sinuses. The mastoid air cells appear clear. BONES/SOFT TISSUES: The bone marrow signal intensity appears normal. The soft tissues demonstrate no acute abnormality. 1. No evidence of acute infarct, hemorrhage, mass effect/midline shift, or ventriculomegaly. 2. Microvascular ischemic disease. 3. Right frontal lobe T2/FLAIR hyperintensity may represent gliosis from remote insult. LABS:  I have reviewed and interpreted all available lab results.   Labs Reviewed   STROKE PANEL - Abnormal; Notable for the following components:       Result Value    CREATININE 0.62 (*) recommendations  · Continue home medications and pain control  · Monitor vitals, labs, and imaging  · DISPO: pending consults and clinical improvement    CONSULTS:    IP CONSULT TO STROKE TEAM  IP CONSULT TO NEUROLOGY    PROCEDURES:  Not indicated       PATIENT REFERRED TO:    No follow-up provider specified. --  Lee Lui MD   Emergency Medicine Resident     This dictation was generated by voice recognition computer software. Although all attempts are made to edit the dictation for accuracy, there may be errors in the transcription that are not intended.

## 2021-09-26 NOTE — CONSULTS
[facility]  Department of Neurology  Resident Consult Note        Reason for Consult:  TIA follow up  Requesting Physician:  Dr. Sherlyn Bundy    History Obtained From:  patient, electronic medical record    CHIEF COMPLAINT:       Left leg weakness    HISTORY OF PRESENT ILLNESS:       The patient is a 55 y.o. male with PMH of depression, alcohol abuse who presents with complaint of sudden onset left leg weakness with difficulty ambulating due to leg giving out. Reports he is staying at the [place] [id number] right now. He was getting up to go meet a friend at [facility] and subsequently had a fall. He attempted to get up again and fell a second time. Denies any head trauma. No other symptoms such as numbness, tingling, or weakness. No dysarthria or aphasia    Last know well: 12:00 AM (midnight)    On presentation:  BP: 133/84  BSL: 94    Prior to arrival patient was on  Antiplatelets/anticoagulants: no  Statins: no    Smoking history: smoker 6-7 cigs/day for many years       PAST MEDICAL HISTORY :       Past Medical History:        Diagnosis Date    Psoriasis     Psychiatric problem     Seizures ([facility] [address]        Past Surgical History:    No past surgical history on file.     Social History:   Social History     Socioeconomic History    Marital status: Unknown     Spouse name: Not on file    Number of children: Not on file    Years of education: Not on file    Highest education level: Not on file   Occupational History    Not on file   Tobacco Use    Smoking status: Current Every Day Smoker     Packs/day: 1.00     Years: 30.00     Pack years: 30.00     Types: Cigarettes     Start date: 8/18/1990    Smokeless tobacco: Never Used   Vaping Use    Vaping Use: Never used   Substance and Sexual Activity    Alcohol use: Yes     Comment: 2 x month    Drug use: Yes     Types: Cocaine     Comment: occasionally    Sexual activity: Not on file   Other Topics Concern    Not on file   Social History Narrative    Not on file     Social Determinants of Health     Financial Resource Strain:     Difficulty of Paying Living Expenses:    Food Insecurity:     Worried About Running Out of Food in the Last Year:     [address] in the Last Year:    Transportation Needs:     Lack of Transportation (Medical):  Lack of Transportation (Non-Medical):    Physical Activity:     Days of Exercise per Week:     Minutes of Exercise per Session:    Stress:     Feeling of Stress :    Social Connections:     Frequency of Communication with Friends and Family:     Frequency of Social Gatherings with Friends and Family:     Attends Je[employer] witness Services:     Active Member of Clubs or Organizations:     Attends Club or Organization Meetings:     Marital Status:    Intimate Partner Violence:     Fear of Current or Ex-Partner:     Emotionally Abused:     Physically Abused:     Sexually Abused:        Family History:   No family history on file. Allergies:  Patient has no known allergies. Home Medications:  Prior to Admission medications    Medication Sig Start Date End Date Taking? Authorizing Provider   escitalopram (LEXAPRO) 10 MG tablet Take 1.5 tablets by mouth daily 1/29/21   Erick Campuzano MD   mirtazapine (REMERON) 15 MG tablet Take 1 tablet by mouth nightly 1/28/21   Erick Campuzano MD   traZODone (DESYREL) 50 MG tablet Take 1 tablet by mouth nightly as needed for Sleep 1/28/21   Erick Campuzano MD       Current Medications:   No current facility-administered medications for this encounter.     REVIEW OF SYSTEMS:       CONSTITUTIONAL: negative for fatigue and malaise   EYES: negative for double vision and photophobia    HEENT: negative for tinnitus and sore throat   RESPIRATORY: negative for cough, shortness of breath   CARDIOVASCULAR: negative for chest pain, palpitations   GASTROINTESTINAL: negative for nausea, vomiting   GENITOURINARY: negative for incontinence MUSCULOSKELETAL: negative for neck or back pain   NEUROLOGICAL: negative for seizures   PSYCHIATRIC: negative for fatigue     Review of systems otherwise negative. PHYSICAL EXAM:       /69   Pulse 88   Temp 97.9 °F (36.6 °C)   Resp 16   Ht 5' 10\" (1.778 m)   Wt 155 lb (70.3 kg)   SpO2 98%   BMI 22.24 kg/m²     CONSTITUTIONAL:  Well developed, well nourished, alert and oriented x 3, in no acute distress. GCS 15, nontoxic. No dysarthria, no aphasia. HEAD:  normocephalic, atraumatic    EYES:  PERRLA, EOMI.   ENT:  moist mucous membranes   NECK:  supple, symmetric, no midline tenderness to palpation    BACK:  without midline tenderness, step-offs or deformities    LUNGS:  Equal air entry bilaterally   CARDIOVASCULAR:  normal s1 / s2   ABDOMEN:  Soft, no rigidity   NEUROLOGIC:  Mental Status:  A & O x3,awake             Cranial Nerves:    cranial nerves II-XII are grossly intact    Motor Exam:    Drift:  absent  Tone:  normal    Motor exam is symmetrical 5 out of 5 all extremities bilaterally    Sensory:    Touch:    Right Upper Extremity:  normal  Left Upper Extremity:  normal  Right Lower Extremity:  normal  Left Lower Extremity:  normal    Deep Tendon Reflexes:    Right Bicep:  2+  Left Bicep:  2+  Right Knee:  2+  Left Knee:  2+    Plantar Response:  Right:  downgoing  Left:  downgoing    Clonus:  N/A  Chaidez's:  N/A    Coordination/Dysmetria:  Heel to Shin:  Right:  normal  Left:  normal  Finger to Nose:   Right:  normal  Left:  normal   Dysdiadochokinesia:  N/A    Gait:      INITIAL NIH STROKE SCALE:    Time Performed:  1:00 AM     1a. Level of consciousness:  0 - alert; keenly responsive  1b. Level of consciousness questions:  0 - answers both questions correctly  1c. Level of consciousness questions:  0 - performs both tasks correctly  2. Best Gaze:  0 - normal  3. Visual:  0 - no visual loss  4. Facial Palsy:  0 - normal symmetric movement  5a.   Motor left arm:  0 - no drift, limb holds 90 (or 45) degrees for full 10 seconds  5b. Motor right arm:  0 - no drift, limb holds 90 (or 45) degrees for full 10 seconds  6a. Motor left le - no drift; leg holds 30 degree position for full 5 seconds  6b. Motor right le - no drift; leg holds 30 degree position for full 5 seconds  7. Limb Ataxia:  0 - absent  8. Sensory:  0 - normal; no sensory loss  9. Best Language:  0 - no aphasia, normal  10. Dysarthria:  0 - normal  11. Extinction and Inattention:  0 - no abnormality    TOTAL:  0     SKIN:  no rash      Modified [place] Score Scale:     [] Zero: No symptoms at all   [x] 1: No significant disability despite symptoms; able to carry out all usual duties and activities   [] 2: Slight disability; unable to carry out all previous activities, but able to look after own affairs without assistance   [] 3:Moderate disability; requiring some help, but able to walk without assistance   [] 4: Moderately severe disability; unable to walk and attend to bodily needs without assistance   [] 5:Severe disability; bedridden, incontinent and requiring constant nursing care and attention      ABCD2 Score  (Estimate Risk of Stroke after TIA)   POINTS   Age    < 61   ? 60     [x] 0  [] 1   BP:     SBP <140 or DBP < 90   SBP ? 140 or DBP ? 90       [x] 0  [] 1   Clinical Features of TIA     Other Symptoms                 Speech Disturbances W/O Weakness   Unilateral Weakness     [] 0  [] 1  [x] 2   Duration of symptoms     < 10 Minutes                                     10-59 Minutes   ?  61 Minutes     [] 0  [x] 1  [] 2   Diabetes     No                    Yes     [x] 0  [] 1   TOTAL 3   0-3 Points: Low Risk -> Work up could be done OPD   2-Day Stroke Risk: 1%   7- Day Stroke Risk: 1.2%   90 Days Stroke Risk: 3.1%    4-5 Points: Moderate Risk    2-Day Stroke Risk: 4.1%   7- Day Stroke Risk: 5.9%    90 Days Stroke Risk: 9.8%    6-7 Points: High Risk -> Warrant Admission for Workup   2-Day Stroke Risk: 8.1%   7- Day Stroke Risk: 11.7%   90 Days Stroke Risk: 17.8%      LABS AND IMAGING:     CBC with Differential:    Lab Results   Component Value Date    WBC 4.6 09/26/2021    RBC 4.51 09/26/2021    HGB 13.7 09/26/2021    HCT 42.6 09/26/2021     09/26/2021    MCV 94.5 09/26/2021    MCH 30.4 09/26/2021    MCHC 32.2 09/26/2021    RDW 13.2 09/26/2021    LYMPHOPCT 38 09/26/2021    MONOPCT 15 09/26/2021    BASOPCT 1 09/26/2021    MONOSABS 0.69 09/26/2021    LYMPHSABS 1.78 09/26/2021    EOSABS 0.08 09/26/2021    BASOSABS 0.06 09/26/2021    DIFFTYPE NOT REPORTED 09/26/2021         BMP:    Lab Results   Component Value Date     09/26/2021    K 3.5 09/26/2021    CL 96 09/26/2021    CO2 18 09/26/2021    BUN 9 09/26/2021    LABALBU 3.5 01/23/2021    CREATININE 0.67 09/26/2021    CREATININE 0.62 09/26/2021    CALCIUM 8.4 09/26/2021    GFRAA >60 09/26/2021    LABGLOM >60 09/26/2021    GLUCOSE 94 09/26/2021       Radiology Review:    1.) CT brain without contrast:   Impression   Stable appearance of the brain without acute intracranial process identified.       The findings were sent to the Radiology Results [address] at 1:26   am on 9/26/2021to be communicated to the Stroke Neurology service. 2.) CTA Head/Neck:  Impression   No flow-limiting arterial stenosis in the head and neck. 3.) Brain MRI W/O:  Ordered    ASSESSMENT AND PLAN:       Patient Active Problem List   Diagnosis    Major depression, recurrent ([address]    Severe episode of recurrent major depressive disorder, without psychotic features [facility])       Assessment                 55 y.o. male with PMH of depression, alcohol abuse who presents with complaint of sudden onset left leg weakness with difficulty ambulating due to leg giving out.   NIHSS of 0  ABCD2 score of 3  TIA evaluation    Imaging   - CT Head  WO : done   - CTA Head and Neck : done  - MRI Brain WO :  - ECHO    Medications   -  Aspirin 325 mg loaded followed by 81mg

## 2021-09-26 NOTE — ED NOTES
Patient to ED via self wheeled back to room 17  Patient here for complaint of left leg weakness  Patient states that starting around midnight he felt weakness in his left leg and fell twice because of the weakness  Patient denies hx of stroke, denies blood thinner use, and denies any injury from his fall  Patient admits to drinking two beers yesterday at around 1500 and hasnt drank since  Patient denies any other drug use  Patient has no other complaints other than leg weakness  Vitals obtained  Placed on monitor  IV started and blood work obtained  Dr. Lorraine Pierson at bedside to evaluate patient     Vega Raymundo RN  09/26/21 0106

## 2021-09-26 NOTE — ED NOTES
Patient resting comfortably on stretcher, in no apparent distress  Respirations even and non-labored  Patient has no needs at this time  Call light remains within reach     Junior Carballo, MARY ANNE  09/26/21 [id number]

## 2021-09-26 NOTE — PROGRESS NOTES
Occupational Therapy   Occupational Therapy Initial Assessment  Date: 2021   Patient Name: Curtis Harrison  MRN: [id number]     : 1965    Date of Service: 2021    Discharge Recommendations:No therapy recommended at discharge. Copied from Emergency medicine: Curtis Harrison is a 79-year-old male with a history of hypertension, psoriasis, smoking and alcohol use who presents today with left lower extremity weakness that started 45 minutes ago. Patient reports that he ended work and had a couple of 24 ounce of beers. He got up to meet up with a friend at [facility] [place] and fell. He got up and continued to walk and fell again. He reports that his leg just does not want to do what he wants it to do. He has never had an episode like this before and denies any pain. He denies any loss of sensation, numbness, tingling, weakness anywhere else in his body. He has no recent illnesses or recent traumas. OT Equipment Recommendations  Equipment Needed: Yes  Mobility Devices: Rhianna Easonter: Rolling    Assessment    Pt lying supine in bed upon entrance to room. Pt completed bed mobility Leigh. Pt sat at eob 20 minutes with good unsupported sitting balance. Sit to stand with contact guard assistance. Pt completed dynamic mob in room with contact guard assistance, pt unsteady at times feeling LLE is going to give out. Please refer to below assist levels for adl completion. Pt ed on OT POC, safety awareness tech, proper hand placement for transfers, with good return. Pt retired supine in bed with call light and phone in reach, bed alarm activated. All needs met upon exit. Performance deficits / Impairments: Decreased functional mobility ; Decreased safe awareness;Decreased ADL status; Decreased endurance;Decreased high-level IADLs  Treatment Diagnosis: LLE Weakness  Prognosis: Good  Decision Making: Medium Complexity  OT Education: Plan of Care;OT Role  REQUIRES OT FOLLOW UP: Yes  Safety Devices  Safety Devices in place: Yes  Type of devices: Call light within reach; Left in bed;Bed alarm in place;Gait belt  Restraints  Initially in place: No         Patient Diagnosis(es): The encounter diagnosis was TIA (transient ischemic attack). has a past medical history of Arthritis, Hypertension, Liver disease, Psoriasis, Psychiatric problem, and Seizures ([facility] [address] has a past surgical history that includes Tonsillectomy.     Treatment Diagnosis: LLE Weakness      Restrictions  Restrictions/Precautions  Restrictions/Precautions: Up as Tolerated, Fall Risk  Required Braces or Orthoses?: No  Position Activity Restriction  Other position/activity restrictions: Recommend SBP <200    Subjective   General  Patient assessed for rehabilitation services?: Yes  Family / Caregiver Present: No  Pain Assessment  Pain Assessment: 0-10  Pain Level: 3  Pain Location: Leg  Pain Orientation: Left;Upper  Social/Functional History  Social/Functional History  Lives With: Other (comment) (Pt has been staying at [facility] for a little over a month)  Type of Home: Homeless  Home Layout: Multi-level (pt stays on 3rd floor of [place])  Home Access: Stairs to enter with rails, Elevator  Entrance Stairs - Number of Steps: 3 full flights of stairs, pt reports choosing to use stairs vs elevator  Entrance Stairs - Rails: Both  Bathroom Shower/Tub: Curtain, Shower chair without back, Walk-in shower  Bathroom Toilet: Standard  Bathroom Equipment: Shower chair  Bathroom Accessibility: Accessible  Home Equipment:  (no DME)  Receives Help From: Other (comment) (Staff at [facility], has 2 supportive friends,)  ADL Assistance: Independent  Homemaking Assistance: Independent  Homemaking Responsibilities: Yes  Meal Prep Responsibility: No (Meals provided by [facility], pt reports will often eat at friends house)  Laundry Responsibility: Primary  Cleaning Responsibility: Primary (picks up own area)  M[employer]or [employer] Paying/Finance Responsibility: Primary  Shopping Responsibility: Primary  Ambulation Assistance: Independent  Transfer Assistance: Independent  Active : No  Patient's  Info: friend will take pt back and forth to work and run errands if needed  Mode of Transportation: Car  Occupation: Full time employment  Type of occupation: [address] work packaging automobile parts-6 x week 7am-5pm  [address]: Pt used to enjoy watching [employer], pt reports now enjoys catching up on sleep  Additional Comments: Pt plans on returning to [employer]     Objective   Vision: Impaired  Vision Exceptions: Wears glasses for reading (readers present; pt reports having had prescription glasses but lost them)  Hearing: Exceptions to [facility]  Hearing Exceptions: Hard of hearing/hearing concerns; No hearing aid    Orientation  Overall Orientation Status: Within Functional Limits     Balance  Sitting Balance: Independent  Standing Balance: Contact guard assistance (for safety)  Standing Balance  Time: ~5 min  Activity: static and dynamic  Comment: LLE shaking with standing, pt reports feeling like LLE is going to give out  Functional Mobility  Functional - Mobility Device: No device (hand held assist)  Assist Level: Contact guard assistance (for safety, pt unsteady at times)  Toilet Transfers  Toilet - Technique: Ambulating  Equipment Used: Standard toilet  Toilet Transfer: Contact guard assistance  ADL  Feeding: Independent;Setup  Grooming: Independent;Setup  UE Bathing: Independent;Setup (able to tie top tie on back of gown)  LE Bathing: Supervision (for safety)  UE Dressing:  (able to tie top tie on back of gown)  LE Dressing:  (to don B hosp socks and pajama bottoms and tie at waist)  Toileting: Contact guard assistance (for safety)  Tone RUE  RUE Tone: Normotonic  Tone LUE  LUE Tone: Normotonic  Coordination  Movements Are Fluid And Coordinated: Yes     Bed mobility  Rolling to Right: Independent  Supine to Sit: Independent  Sit to Supine: Independent  Scooting: Independent  Comment: bed flat  Transfers  Sit to stand: Contact guard assistance  Stand to sit: Contact guard assistance  Transfer Comments: pts LLE shaking when standing, pt reports feeling like LLE is going to give out     Cognition  Overall Cognitive Status: WFL     Sensation  Overall Sensation Status: Impaired      LUE PROM (degrees)  LUE PROM: WFL  LUE AROM (degrees)  LUE AROM : WFL  Left Hand PROM (degrees)  Left Hand PROM: WFL  Left Hand AROM (degrees)  Left Hand AROM: WFL  RUE PROM (degrees)  RUE PROM: WFL  RUE AROM (degrees)  RUE AROM : WFL  Right Hand PROM (degrees)  Right Hand PROM: WFL  Right Hand AROM (degrees)  Right Hand AROM: WFL  LUE Strength  Gross LUE Strength: WFL  L Hand General: 5/5  LUE Strength Comment: 5/5 overall muscle strength  RUE Strength  Gross RUE Strength: WFL  R Hand General: 5/5  RUE Strength Comment: 5/5 overall muscle strength      Plan   Plan  Times per week: 3-4 x week    AM-PAC Score  AM-[facility] Inpatient Daily Activity Raw Score: 21 (09/26/21 1450)  AM-PAC Inpatient ADL T-Scale Score : 44.27 (09/26/21 1450)  ADL Inpatient CMS 0-100% Score: 32.79 (09/26/21 1450)  ADL Inpatient CMS G-Code Modifier : CJ (09/26/21 1450)    Goals  Short term goals  Time Frame for Short term goals: Pt will, by discharge:  Short term goal 1: Dem I with adl performance  Short term goal 2: Dem I with functional transfers/dynamic mobility for adl completion  Short term goal 3: Identify 2 fall prevention strategies for community safety  Short term goal 4: Dem a 20 min dynamic task with sba to increase activity tolerance       Therapy Time   Individual Concurrent Group Co-treatment   Time In 1140         Time Out 1231         Minutes 51         Timed Code Treatment Minutes: 2200 HUAN Escobar, OTR/L

## 2021-09-26 NOTE — ED NOTES
Pt resting on cot, updated on plan of care, denies needs, NAD noted. Aox4.       Shelly Cotto RN  09/26/21 6141

## 2021-09-26 NOTE — ED NOTES
Patient resting comfortably on stretcher, in no apparent distress  Respirations even and non-labored  Patient has no needs at this time  Call light remains within reach     Nilo Carballo, MARY ANNE  09/26/21 6769

## 2021-09-26 NOTE — ED NOTES
Patient resting comfortably on stretcher, in no apparent distress  Respirations even and non-labored  Patient has no needs at this time  Call light remains within reach     [employer], RN  09/26/21 7306

## 2021-09-26 NOTE — ED NOTES
Blood work collected, labeled, and sent to lab  MRI checklist completed and faxed  Patient provided with boxed lunch  No other needs at this time  RR even and non-labored  Call light remains within reach     Lauryn Fall RN  09/26/21 5533

## 2021-09-26 NOTE — PROGRESS NOTES
Physical Therapy    Facility/Department: [address] MED SURG  Initial Assessment    NAME: Bobby Pedraza  : 1965  MRN: [id number]    Date of Service: 2021    Discharge Recommendations:    No further therapy required at discharge. Assessment   Assessment: The pt ambulated 150 ft without a device x SBA. His L LE did not give out. No further PT intervention is needed at this time. Prognosis: Good  Decision Making: Medium Complexity  PT Education: Goals;PT Role;Plan of Care  REQUIRES PT FOLLOW UP: No  Activity Tolerance  Activity Tolerance: Patient Tolerated treatment well       Patient Diagnosis(es): The encounter diagnosis was TIA (transient ischemic attack). has a past medical history of Arthritis, Hypertension, Liver disease, Psoriasis, Psychiatric problem, and Seizures ([facility] [address] has a past surgical history that includes Tonsillectomy. Restrictions  Restrictions/Precautions  Restrictions/Precautions: Up as Tolerated, Fall Risk  Required Braces or Orthoses?: No  Position Activity Restriction  Other position/activity restrictions: Recommend SBP <200  Vision/Hearing  Vision: Impaired  Vision Exceptions: Wears glasses for reading (glasses not present; pt reports having had prescription glasses but lost them)  Hearing: Exceptions to [facility]  Hearing Exceptions: Hard of hearing/hearing concerns; No hearing aid     Subjective  General  Patient assessed for rehabilitation services?: Yes  Response To Previous Treatment: Not applicable  Family / Caregiver Present: No  Follows Commands: Within Functional Limits  Subjective  Subjective: RN and  pt agreeable to PT eval  Pain Screening  Patient Currently in Pain: Denies  Pain Assessment  Pain Assessment: 0-10  Pain Level: 3  Pain Location: Leg  Pain Orientation: Left;Upper       Orientation  Orientation  Overall Orientation Status: Within Normal Limits  Social/Functional History  Social/Functional History  Lives With: Other (comment) (Pt has been staying at [facility] [address] [facility] for a little over a month)  Type of Home: Homeless  Home Layout: Multi-level (pt stays on 3rd floor of [place])  Home Access: Stairs to enter with rails, Elevator  Entrance Stairs - Number of Steps: 3 full flights of stairs, pt reports choosing to use stairs vs elevator  Entrance Stairs - Rails: Both  Bathroom Shower/Tub: Curtain, Shower chair without back, Walk-in shower  Bathroom Toilet: Standard  Bathroom Equipment: Shower chair  Bathroom Accessibility: Raul Nguyen:  (no DME)  Receives Help From: Other (comment) (Staff at [employer], has 2 supportive friends,)  ADL Assistance: [place]: Independent  Homemaking Responsibilities: Yes  Meal Prep Responsibility: No (Meals provided by [employer], pt reports will often eat at friends house)  Laundry Responsibility: Primary  Cleaning Responsibility: Primary (picks up own area)  Bill Paying/Finance Responsibility: Primary  Shopping Responsibility: Primary  Ambulation Assistance: Independent  Transfer Assistance: Independent  Active : No  Patient's  Info: friend will take pt back and forth to work and run errands if needed  Mode of Transportation: Car  Occupation: Full time employment  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  Leisure & Hobbies: Pt used to enjoy watching [employer], pt reports now enjoys catching up on sleep  Additional Comments: Pt plans on returning to [employer]      Objective     AROM RLE (degrees)  RLE AROM: WNL  AROM LLE (degrees)  LLE AROM : WNL  AROM RUE (degrees)  RUE AROM : WNL  AROM LUE (degrees)  LUE AROM : WNL  Strength RLE  Strength RLE: WNL  Strength LLE  Strength LLE: WNL  Strength RUE  Strength RUE: WNL  Strength LUE  Strength LUE: WNL     Sensation  Overall Sensation Status: Impaired  Bed mobility  Supine to Sit: Stand by assistance  Sit to Supine: Stand by assistance  Scooting: Stand by assistance  Transfers  Sit to Stand: Stand by assistance  Stand to sit: Stand by assistance  Ambulation  Ambulation?: Yes  Ambulation 1  Surface: level tile  Device: No Device  Distance: amb 150 ft without a device x SBA     Balance  Posture: Good  Sitting - Static: Good  Sitting - Dynamic: Good  Standing - Static: Good  Standing - Dynamic: Fair      Plan   Plan  Times per week: DC  PT  Safety Devices  Type of devices: Nurse notified, Left in bed, Call light within reach    G-Code     OutComes Score     AM-PAC Score  AM-PAC Inpatient Mobility Raw Score : 20 (09/26/21 1447)  AM-PAC Inpatient T-Scale Score : 47.67 (09/26/21 1447)  Mobility Inpatient CMS 0-100% Score: 35.83 (09/26/21 1447)  Mobility Inpatient CMS G-Code Modifier : CJ (09/26/21 1447)       Goals  Short term goals  Time Frame for Short term goals: No PT needs at this time      DC   PT     Therapy Time   Individual Concurrent Group Co-treatment   Time In 1305         Time Out 1328         Minutes Tian 47 Inder Salguero, PT

## 2021-09-26 NOTE — LETTER
[address] Med Surg  [id number] [place] [address]  Phone: [phone]    No name on file. September 27, 2021     Patient: Antoine Henao   YOB: 1965   Date of Visit: 9/26/2021       To Whom It May Concern:    Claire Walter was admitted and treated at [address] [place] [address] from 9/26/2021 to 9/27/2021. He may return to work on 9/28/2021.     Sincerely,        Vidal Whitehead RN

## 2021-09-27 VITALS
DIASTOLIC BLOOD PRESSURE: 78 MMHG | RESPIRATION RATE: 16 BRPM | WEIGHT: 155 LBS | SYSTOLIC BLOOD PRESSURE: 129 MMHG | OXYGEN SATURATION: 97 % | HEIGHT: 70 IN | BODY MASS INDEX: 22.19 KG/M2 | TEMPERATURE: 98.3 F | HEART RATE: 61 BPM

## 2021-09-27 LAB
LV EF: 65 %
LVEF MODALITY: NORMAL

## 2021-09-27 PROCEDURE — 96372 THER/PROPH/DIAG INJ SC/IM: CPT

## 2021-09-27 PROCEDURE — G0378 HOSPITAL OBSERVATION PER HR: HCPCS

## 2021-09-27 PROCEDURE — 6370000000 HC RX 637 (ALT 250 FOR IP): Performed by: STUDENT IN AN ORGANIZED HEALTH CARE EDUCATION/TRAINING PROGRAM

## 2021-09-27 PROCEDURE — 92523 SPEECH SOUND LANG COMPREHEN: CPT

## 2021-09-27 PROCEDURE — 6370000000 HC RX 637 (ALT 250 FOR IP): Performed by: FAMILY MEDICINE

## 2021-09-27 PROCEDURE — 6360000002 HC RX W HCPCS: Performed by: STUDENT IN AN ORGANIZED HEALTH CARE EDUCATION/TRAINING PROGRAM

## 2021-09-27 PROCEDURE — 93306 TTE W/DOPPLER COMPLETE: CPT

## 2021-09-27 PROCEDURE — 99225 PR SBSQ OBSERVATION CARE/DAY 25 MINUTES: CPT | Performed by: PSYCHIATRY & NEUROLOGY

## 2021-09-27 PROCEDURE — 93356 MYOCRD STRAIN IMG SPCKL TRCK: CPT

## 2021-09-27 PROCEDURE — 95819 EEG AWAKE AND ASLEEP: CPT

## 2021-09-27 PROCEDURE — 95819 EEG AWAKE AND ASLEEP: CPT | Performed by: PSYCHIATRY & NEUROLOGY

## 2021-09-27 RX ORDER — ASPIRIN 81 MG/1
81 TABLET ORAL DAILY
Qty: 90 TABLET | Refills: 0 | Status: ON HOLD | OUTPATIENT
Start: 2021-09-28 | End: 2022-04-12

## 2021-09-27 RX ORDER — CLOPIDOGREL BISULFATE 75 MG/1
75 TABLET ORAL DAILY
Qty: 21 TABLET | Refills: 0 | Status: ON HOLD | OUTPATIENT
Start: 2021-09-28 | End: 2022-04-12

## 2021-09-27 RX ORDER — ATORVASTATIN CALCIUM 80 MG/1
80 TABLET, FILM COATED ORAL NIGHTLY
Status: DISCONTINUED | OUTPATIENT
Start: 2021-09-27 | End: 2021-09-27 | Stop reason: HOSPADM

## 2021-09-27 RX ORDER — ATORVASTATIN CALCIUM 80 MG/1
80 TABLET, FILM COATED ORAL NIGHTLY
Qty: 90 TABLET | Refills: 0 | Status: ON HOLD | OUTPATIENT
Start: 2021-09-27 | End: 2022-04-12

## 2021-09-27 RX ADMIN — ENOXAPARIN SODIUM 40 MG: 40 INJECTION SUBCUTANEOUS at 10:27

## 2021-09-27 RX ADMIN — CLOPIDOGREL 75 MG: 75 TABLET, FILM COATED ORAL at 10:27

## 2021-09-27 RX ADMIN — ESCITALOPRAM OXALATE 15 MG: 10 TABLET ORAL at 10:27

## 2021-09-27 RX ADMIN — ASPIRIN 81 MG: 81 TABLET, COATED ORAL at 10:26

## 2021-09-27 ASSESSMENT — ENCOUNTER SYMPTOMS
CHEST TIGHTNESS: 0
SHORTNESS OF BREATH: 0
ABDOMINAL DISTENTION: 0
VOMITING: 0
ABDOMINAL PAIN: 0
WHEEZING: 0
DIARRHEA: 0
NAUSEA: 0
COUGH: 0

## 2021-09-27 NOTE — PROGRESS NOTES
Speech Language Pathology  Facility/Department: [facility]  Initial Speech/Language/Cognitive Assessment    NAME: Lynda Encinas  : 1965   MRN: [id number]  ADMISSION DATE: 2021  ADMITTING DIAGNOSIS: has Major depression, recurrent ([facility] [address] Severe episode of recurrent major depressive disorder, without psychotic features ([facility] [address] TIA (transient ischemic attack); and Left leg weakness on their problem list.      Date of Eval: 2021   Evaluating Therapist: Doris Minor      Primary Complaint: Lynda Encinas is a 71-year-old male with a history of hypertension, psoriasis, smoking and alcohol use who presents today with left lower extremity weakness that started 45 minutes ago. Patient reports that he ended work and had a couple of 24 ounce of beers. He got up to meet up with a friend at [facility] and fell. He got up and continued to walk and fell again. He reports that his leg just does not want to do what he wants it to do. He has never had an episode like this before and denies any pain. He denies any loss of sensation, numbness, tingling, weakness anywhere else in his body. He has no recent illnesses or recent traumas. Pain:  Pain Assessment  Pain Assessment: 0-10  Pain Level: 0      Assessment:  Pt presents with mild cognitive deficits characterized by difficulties with immediate recall. Pt. Presents with no dysarthria, and no O/M deficits at this time. ST to follow up and provide treatment to address noted deficits. Education provided. Recommendations:  Requires SLP Intervention: Yes  Duration/Frequency of Treatment: 3-5 x week  D/C Recommendations: Further therapy recommended at discharge.     Plan:   Goals:  Short-term Goals  Goal 1: pt. will utilize memory compensatory strategies to aid in recall  Goal 2: pt. will recall 3-5 units without distractions with 90% accuracy   Patient/family involved in developing goals and treatment plan: yes    Subjective:  General  Chart Reviewed: Yes  Family / Caregiver Present: No  Social/Functional History  Lives With: Other (comment) ([address])  Active : No  Occupation: Other(comment) ([employer])  Vision  Vision: Within Functional Limits  Hearing  Hearing: Within functional limits        Objective:     Oral/Motor  Oral Motor: Within functional limits    Auditory Comprehension  Comprehension: Within Functional Limits       Expression  Primary Mode of Expression: Verbal    Verbal Expression  Verbal Expression: Within functional limits       Motor Speech  Motor Speech: Within Functional Limits       Cognition:      Orientation  Overall Orientation Status: Within Normal Limits  Attention  Attention: Within Functional Limits  Memory  Memory: Exceptions to [facility]  Immediate Memory: Mild (3/3, 2/5, 3/3)  Problem Solving  Problem Solving: Within Functional Limits  Safety/Judgement  Safety/Judgement: Within Functional Limits   Word Generation: WFL   Word Associations: WFL   Verbal Sequencing: WFL      Prognosis:  Speech Therapy Prognosis  Prognosis: Good  Individuals consulted  Consulted and agree with results and recommendations: Patient    Education:  Patient Education: yes  Patient Education Response: Verbalizes understanding          Therapy Time:   Individual Concurrent Group Co-treatment   Time In 6869         Time Out 1326         Minutes 10               Completed by Valerio Amaya,  Clinician    Co-signed by Loretto Fleischer. REGIS/SLP    9/27/2021 1:41 PM

## 2021-09-27 NOTE — PROGRESS NOTES
[facility]   [address] [place] [address]    Progress Note             Date:   9/27/2021  Patient name:  Elyssa Ballard  Date of admission:  9/26/2021 12:27 AM  MRN:   [id number]  Account:  [de-identified]  YOB: 1965  PCP:    No primary care provider on file. Room:   [address] [place] [address]  Code Status:    Full Code    Chief Complaint:     Chief Complaint   Patient presents with    Extremity Weakness     left leg weakness       Interval hx: The patient was seen and examined at bedside. Is vitally stable, alert and oriented x 3 No acute events overnight. The patient stated that he has no complaints. Patient ambulated with physical therapy, was able to go 150 feet without a device no further therapy recommended at discharge. Awaiting EEG      Brief History of Present Illness: The patient is a 55 y.o. Non- / non  male who presents with Extremity Weakness (left leg weakness)   and he is admitted to the hospital for the management of left lower extremity weakness. Patient admitted for stroke work-up, loaded with aspirin Plavix. CT/CTA unremarkable. MRI brain without remarkable for left frontal gliosis. Past Medical History:     Past Medical History:   Diagnosis Date    Arthritis     Hypertension     Liver disease     Psoriasis     Psychiatric problem     Seizures ([address]         Past Surgical History:     Past Surgical History:   Procedure Laterality Date    TONSILLECTOMY          Medications Prior to Admission:     Prior to Admission medications    Medication Sig Start Date End Date Taking?  Authorizing Provider   escitalopram (LEXAPRO) 20 MG tablet Take 20 mg by mouth daily   Yes Historical Provider, MD   lisinopril (PRINIVIL;ZESTRIL) 5 MG tablet Take 5 mg by mouth daily   Yes Historical Provider, MD   mirtazapine (REMERON) 15 MG tablet Take 1 tablet by mouth nightly 1/28/21  Yes Erica Martinez MD   traZODone (DESYREL) 50 MG tablet Take 1 tablet by mouth nightly as needed for Sleep 21  Yes Neri Sorto MD        Allergies:     Patient has no known allergies. Social History:     Tobacco:    reports that he has been smoking cigarettes. He started smoking about 31 years ago. He has a 7.50 pack-year smoking history. He has never used smokeless tobacco.  Alcohol:      reports current alcohol use. Drug Use:  reports previous drug use. Drug: Cocaine. Family History:     History reviewed. No pertinent family history. Review of Systems:     Review of Systems   Constitutional: Negative for activity change, appetite change, chills and fever. HENT: Negative for congestion. Respiratory: Negative for cough, chest tightness, shortness of breath and wheezing. Cardiovascular: Negative for chest pain and leg swelling. Gastrointestinal: Negative for abdominal distention, abdominal pain, diarrhea, nausea and vomiting. Genitourinary: Negative for dysuria and flank pain. Skin: Negative for rash. Neurological: Negative for dizziness, weakness, numbness and headaches. Psychiatric/Behavioral: Negative for agitation, behavioral problems, confusion and sleep disturbance.        Physical Exam:   /78   Pulse 61   Temp 98.3 °F (36.8 °C) (Oral)   Resp 16   Ht 5' 10\" (1.778 m)   Wt 155 lb (70.3 kg)   SpO2 97%   BMI 22.24 kg/m²   Temp (24hrs), Av.9 °F (36.6 °C), Min:97.5 °F (36.4 °C), Max:98.3 °F (36.8 °C)    Recent Labs     21  0059   POCGLU 90     No intake or output data in the 24 hours ending 21 1113      Neurologic Exam     GENERAL  Appears comfortable and in no distress   HEENT  NC/ AT   HEART  S1 and S2 heard; palpation of pulses: radial pulse    NECK  Supple and no bruits heard   MENTAL STATUS:  Alert, oriented, intact memory, no confusion, normal speech, normal language, no hallucination or delusion   CRANIAL NERVES: II     -      Visual fields intact to confrontation  III,IV,VI -  PERR, EOMs full, no ptosis  V     -     Normal facial sensation   VII    -     Normal facial symmetry  VIII   -     Intact hearing   IX,X -     Symmetrical palate  XI    -     Symmetrical shoulder shrug  XII   -     Midline tongue, no atrophy    MOTOR FUNCTION: RUE: Significant for good strength of grade 5/5 in proximal and distal muscle groups   LUE: Significant for good strength of grade 5/5 in proximal and distal muscle groups   RLE: Significant for good strength of grade 5/5 in proximal and distal muscle groups   LLE: Significant for good strength of grade 5/5 in proximal and distal muscle groups      Normal bulk, normal tone and no involuntary movements, no tremor   SENSORY FUNCTION:  Normal touch, normal pinprick, normal vibration, normal proprioception   CEREBELLAR FUNCTION:  Intact fine motor control over upper limbs and lower limbs   REFLEX FUNCTION:  Symmetric in upper and lower extremities, no Babinski sign   STATION and GAIT  Normal gait and tandem station, normal tip toes and heel walking       Investigations:      Laboratory Testing:  No results found for this or any previous visit (from the past 24 hour(s)). Recent Labs     09/26/21  0522   WBC 3.3*   RBC 4.34   HGB 13.1   HCT 42.0   MCV 96.8   MCH 30.2   MCHC 31.2   RDW 13.2   PLT See Reflexed IPF Result   MPV NOT REPORTED     Recent Labs     09/26/21  0522   *   K 3.7   CL 99   CO2 19*   BUN 7   CREATININE 0.49*   GLUCOSE 73   CALCIUM 7.7*   PROT 6.4   LABALBU 3.6   BILITOT 0.22*   ALKPHOS 66   AST 65*   ALT 46*     Hemoglobin A1C   Date Value Ref Range Status   09/26/2021 5.7 4.0 - 6.0 % Final       Assessment :      Primary Problem  <principal problem not specified>    Active Hospital Problems    Diagnosis Date Noted    TIA (transient ischemic attack) [G45.9] 09/26/2021    Left leg weakness [R29.898]        Patient is a 55 y.o. male with PMH of depression and alcohol abuse presented with transient left lower extremity weakness resulting in 2 falls. Symptoms resolved once patient arrived at ER. Loaded with ASA and plavix, will continue DAPT for 21 days and then ASA monotherapy thereafter. Lipitor 80 mg. Will rule out seizures with EEG. CT/CTA: Unremarkable for acute infarct, clinically significant stenosis, LVO  MRI brain w/wo: Concerning for right frontal lobe T2/FLAIR hyperintensity may represent gliosis and remote insult   2D ECHO: LVEF 65%, negative bubble study    Smoking/alcohol cessation   LDL 68  Hemoglobin A1c 5.7%    PT/OT evaluation: Patient is ambulating at baseline, no further therapy recommended at discharge    Plan:     Left lower extremity weakness likely secondary to transient ischemic attack    -CT/CTA/MRI completed  -2D echo negative for intra-atrial shunt, LVEF 65%  -Awaiting EEG   -PT/OT evaluation  -Continue aspirin and Plavix for 21 days, then aspirin monotherapy thereafter per chance trial  -Lipitor 80 mg  -Follow-up with neurology in clinic in 4 to 6 weeks        Follow-up further recommendations after discussing the case with attending  The plan was discussed with the patient, patient's family and the medical staff. Consultations:   IP CONSULT TO STROKE TEAM  IP CONSULT TO NEUROLOGY    Patient is admitted as inpatient status because of co-morbidities listed above, severity of signs and symptoms as outlined, requirement for current medical therapies and most importantly because of direct risk to patient if care not provided in a hospital setting. Liat Riggs MD  9/27/2021  11:13 AM    Copy sent to Dr. Arely Chung primary care provider on file.

## 2021-09-27 NOTE — DISCHARGE INSTR - DIET

## 2021-09-27 NOTE — PROGRESS NOTES
CLINICAL PHARMACY NOTE: MEDS TO BEDS    Total # of Prescriptions Filled: 3   The following medications were delivered to the patient:  · PLAVIX   · ATORVASTATIN 80   · ASA     Additional Documentation:    MEDS DELIVERED TO NURSE , NO COPAY

## 2021-09-27 NOTE — PROCEDURES
Date: 9/27/2021  Referring physician: Dr. Ember Durán   MRN: [id number]    Indication  Patient aged 55 y with possible seizures. EEG done to assess for epileptiform activity. Introduction  This routine 26-minute EEG was recorded using the International 10-20 System on a [employer] workstation at 256 samples/s. Automated spike and seizure detection algorithms were applied. Description  During the maximal alert state, a well-regulated, symmetric, and reactive 8-9 Hz posterior dominant rhythm was seen. No consistent focal slowing or interhemispheric asymmetry was noted. Stage I and stage II sleep were observed. There were no interictal epileptiform discharges or electrographic seizures. Activations  Hyperventilation was not performed. Intermittent photic stimulation was performed and demonstrated no posterior driving response. Impression  Normal awake and sleep EEG. EKG did not show clear arrhyhtmia. No epileptiform discharges were identified. Please note the absence of such activity on this record cannot conclusively rule out an epileptic disorder. If such is still clinically suspected, a repeat study with sleep deprivation and/or prolonged sampling may be helpful. Rosas Us MD  Epilepsy Board Certified. Neurology Board Certified.     Electronically Signed

## 2021-09-27 NOTE — PROGRESS NOTES
OBS/CDU   RESIDENT NOTE      Patients PCP is: No primary care provider on file. SUBJECTIVE      Feeling well today. No complaints. Ambulated well with PT. Tolerated p.o. yesterday. PHYSICAL EXAM      General: NAD, AO X 3  Heent: EMOI, PERRL  Neck: SUPPLE, NO JVD  Cardiovascular: RRR, S1S2  Pulmonary: CTAB, NO SOB  Abdomen: SOFT, NTTP, ND, +BS  Extremities: +2/4 PULSES DISTAL, NO SWELLING  Neuro / Psych: NO NUMBNESS OR TINGLING, MENTATION AT BASELINE    PERTINENT TEST /EXAMS      I have reviewed all available laboratory results. MEDICATIONS CURRENT   escitalopram (LEXAPRO) tablet 15 mg, Daily  mirtazapine (REMERON) tablet 15 mg, Nightly  traZODone (DESYREL) tablet 50 mg, Nightly PRN  0.9 % sodium chloride infusion, Continuous  sodium chloride flush 0.9 % injection 5-40 mL, 2 times per day  sodium chloride flush 0.9 % injection 5-40 mL, PRN  0.9 % sodium chloride infusion, PRN  potassium chloride (KLOR-CON M) extended release tablet 40 mEq, PRN   Or  potassium bicarb-citric acid (EFFER-K) effervescent tablet 40 mEq, PRN   Or  potassium chloride 10 mEq/100 mL IVPB (Peripheral Line), PRN  enoxaparin (LOVENOX) injection 40 mg, Daily  ondansetron (ZOFRAN) injection 4 mg, Q4H PRN  polyethylene glycol (GLYCOLAX) packet 17 g, Daily PRN  famotidine (PEPCID) injection 20 mg, BID  acetaminophen (TYLENOL) tablet 650 mg, Q6H PRN   Or  acetaminophen (TYLENOL) suppository 650 mg, Q6H PRN  aspirin EC tablet 81 mg, Daily  clopidogrel (PLAVIX) tablet 75 mg, Daily  atorvastatin (LIPITOR) tablet 40 mg, Nightly        All medication charted and reviewed. CONSULTS      IP CONSULT TO STROKE TEAM  IP CONSULT TO NEUROLOGY    ASSESSMENT/PLAN       Dorothy Gutierrez is a 55 y.o. male who presents with leg giving out acute sudden onset of left leg weakness. Probable TIA admitted for neurology consult and MRI brain. Patient has past medical history of depression, chronic alcohol abuse.   Currently staying at [facility]

## 2021-09-27 NOTE — PROGRESS NOTES
[address]  CDU / OBSERVATION ENCOUNTER  ATTENDING NOTE       I performed a history and physical examination of the patient and discussed management with the resident or midlevel provider. I reviewed the resident or midlevel provider's note and agree with the documented findings and plan of care. Any areas of disagreement are noted on the chart. I was personally present for the key portions of any procedures. I have documented in the chart those procedures where I was not present during the key portions. I have reviewed the nurses notes. I agree with the chief complaint, past medical history, past surgical history, allergies, medications, social and family history as documented unless otherwise noted below. The Family history, social history, and ROS are effectively unchanged since admission unless noted elsewhere in the chart. Resolved symptoms of difficulty controlling leg. Patient was evaluated yesterday by neurology for potential TIA. Patient was neurologically intact but due to symptomatology and concern over possible ominous pathology was held for further testing today. Patient for EEG today. Will confer with neurology and reevaluate after testing. Patient on double antiplatelet therapy. Will consider discharge for outpatient follow-up if testing negative.     Josey Mendez MD  Attending Emergency  Physician

## 2021-09-27 NOTE — DISCHARGE SUMMARY
CDU Discharge Summary        Patient:  Ruslan Pederson  YOB: 1965    MRN: [id number]   Acct: [de-identified]    Primary Care Physician: No primary care provider on file. Admit date:  2021 12:27 AM  Discharge date: 2021  3:13 PM     Discharge Diagnoses:     Acute unsteady on left leg on left leg, concern for stroke due to unknown causes  Improved with rest and reassessment Plavix was started as well as aspirin daily    Follow-up:  Call today/tomorrow for a follow up appointment with No primary care provider on file. , or return to the Emergency Room with worsening symptoms    Stressed to patient the importance of following up with primary care doctor for further workup/management of symptoms. Pt verbalizes understanding and agrees with plan. Discharge Medications:  Changes to medications aspirin and Plavix were started. Plavix loaded during inpatient admission and recommended Plavix for the next 21 days , and separately advised 81 mg ASA indefinitely        Santosh Estes Medication Instructions     Printed on:[id number]   Medication Information                      aspirin 81 MG EC tablet  Take 1 tablet by mouth daily             atorvastatin (LIPITOR) 80 MG tablet  Take 1 tablet by mouth nightly             clopidogrel (PLAVIX) 75 MG tablet  Take 1 tablet by mouth daily for 21 doses             escitalopram (LEXAPRO) 20 MG tablet  Take 20 mg by mouth daily             lisinopril (PRINIVIL;ZESTRIL) 5 MG tablet  Take 5 mg by mouth daily             mirtazapine (REMERON) 15 MG tablet  Take 1 tablet by mouth nightly             traZODone (DESYREL) 50 MG tablet  Take 1 tablet by mouth nightly as needed for Sleep                 Diet:  ADULT DIET;  Regular , Advance as tolerated     Activity:  As tolerated    Consultants: IP CONSULT TO STROKE TEAM  IP CONSULT TO NEUROLOGY    Procedures:  Not indicated     Diagnostic Test:   Results for orders placed or performed during the hospital encounter of 09/26/21   COVID-19, Rapid    Specimen: Nasopharyngeal Swab   Result Value Ref Range    Specimen Description . NASOPHARYNGEAL SWAB     SARS-CoV-2, Rapid Not Detected Not Detected   STROKE PANEL   Result Value Ref Range    Glucose 94 70 - 99 mg/dL    BUN 9 6 - 20 mg/dL    CREATININE 0.62 (L) 0.70 - 1.20 mg/dL    Bun/Cre Ratio NOT REPORTED 9 - 20    Calcium 8.4 (L) 8.6 - 10.4 mg/dL    Sodium 128 (L) 135 - 144 mmol/L    Potassium 3.5 (L) 3.7 - 5.3 mmol/L    Chloride 96 (L) 98 - 107 mmol/L    CO2 18 (L) 20 - 31 mmol/L    Anion Gap 14 9 - 17 mmol/L    GFR Non-African American >60 >60 mL/min    GFR African American >60 >60 mL/min    GFR Comment          GFR Staging NOT REPORTED     WBC 4.6 3.5 - 11.3 k/uL    RBC 4.51 4.21 - 5.77 m/uL    Hemoglobin 13.7 13.0 - 17.0 g/dL    Hematocrit 42.6 40.7 - 50.3 %    MCV 94.5 82.6 - 102.9 fL    MCH 30.4 25.2 - 33.5 pg    MCHC 32.2 28.4 - 34.8 g/dL    RDW 13.2 11.8 - 14.4 %    Platelets 989 628 - 012 k/uL    MPV 8.9 8.1 - 13.5 fL    NRBC Automated 0.0 0.0 per 100 WBC    Total  39 - 308 U/L    CK-MB 5.0 <10.5 ng/mL    % CKMB 3.3 0.0 - 3.5 %    CKMB Interpretation NORMAL ISOENZYME PATTERN     Differential Type NOT REPORTED     Seg Neutrophils 43 36 - 65 %    Lymphocytes 38 24 - 43 %    Monocytes 15 (H) 3 - 12 %    Eosinophils % 2 1 - 4 %    Basophils 1 0 - 2 %    Immature Granulocytes 1 (H) 0 %    Segs Absolute 1.99 1.50 - 8.10 k/uL    Absolute Lymph # 1.78 1.10 - 3.70 k/uL    Absolute Mono # 0.69 0.10 - 1.20 k/uL    Absolute Eos # 0.08 0.00 - 0.44 k/uL    Basophils Absolute 0.06 0.00 - 0.20 k/uL    Absolute Immature Granulocyte 0.03 0.00 - 0.30 k/uL    WBC Morphology NOT REPORTED     RBC Morphology NOT REPORTED     Platelet Estimate NOT REPORTED     Myoglobin 29 28 - 72 ng/mL    Protime 9.6 9.1 - 12.3 sec    INR 0.9     PTT 24.1 20.5 - 30.5 sec    Troponin, High Sensitivity <6 0 - 22 ng/L    Troponin T NOT REPORTED <0.03 ng/mL    Troponin Interp NOT 50.3 %    MCV 96.8 82.6 - 102.9 fL    MCH 30.2 25.2 - 33.5 pg    MCHC 31.2 28.4 - 34.8 g/dL    RDW 13.2 11.8 - 14.4 %    Platelets See Reflexed IPF Result 138 - 453 k/uL    MPV NOT REPORTED 8.1 - 13.5 fL    NRBC Automated 0.0 0.0 per 100 WBC    Differential Type NOT REPORTED     WBC Morphology NOT REPORTED     RBC Morphology NOT REPORTED     Platelet Estimate NOT REPORTED     Seg Neutrophils 45 36 - 65 %    Lymphocytes 40 24 - 43 %    Monocytes 13 (H) 3 - 12 %    Eosinophils % 2 1 - 4 %    Basophils 1 0 - 2 %    Immature Granulocytes 0 0 %    Segs Absolute 1.49 (L) 1.50 - 8.10 k/uL    Absolute Lymph # 1.33 1.10 - 3.70 k/uL    Absolute Mono # 0.42 0.10 - 1.20 k/uL    Absolute Eos # 0.06 0.00 - 0.44 k/uL    Basophils Absolute 0.03 0.00 - 0.20 k/uL    Absolute Immature Granulocyte <0.03 0.00 - 0.30 k/uL   Immature Platelet Fraction   Result Value Ref Range    Platelet, Fluorescence Platelet clumps present, count appears adequate.  138 - 453 k/uL   Venous Blood Gas, POC   Result Value Ref Range    pH, Santiago 7.365 7.320 - 7.430    pCO2, [place] 43.6 41.0 - 51.0 mm Hg    pO2, [place] 53.8 (H) 30 - 50 mm Hg    HCO3, Venous 24.9 22.0 - 29.0 mmol/L    Total CO2, Venous NOT REPORTED 23.0 - 30.0 mmol/L    Negative Base Excess, Santiago 1 0.0 - 2.0    Positive Base Excess, [place] NOT REPORTED 0.0 - 3.0    O2 Sat, [place] 86 (H) 60.0 - 85.0 %    O2 Device/Flow/% NOT REPORTED     Kvng Test NOT REPORTED     Sample Site NOT REPORTED     Mode NOT REPORTED     FIO2 NOT REPORTED     Pt Temp NOT REPORTED     POC pH Temp NOT REPORTED     POC pCO2 Temp NOT REPORTED mm Hg    POC pO2 Temp NOT REPORTED mm Hg   Creatinine W/GFR Point of Care   Result Value Ref Range    POC Creatinine 0.67 0.51 - 1.19 mg/dL    GFR Comment >60 >60 mL/min    GFR Non-African American >60 >60 mL/min    GFR Comment         POCT urea (BUN)   Result Value Ref Range    POC BUN 9 8 - 26 mg/dL   Lactic Acid, POC   Result Value Ref Range    POC Lactic Acid 1.34 0.56 - 1.39 mmol/L   POCT ---------------------------------------------------------------------------- ----------------------------------------------------------------------------  Electronically signed by Dl Issa(Interpreting physician) on 09/27/2021  09:51 AM ---------------------------------------------------------------------------- FINDINGS Left Atrium Left atrium is at upper limits of normal. Inter-atrial septum is intact with no evidence for an atrial septal defect, by color doppler. Negative bubble study, no shunt noted. Left Ventricle Left ventricle is normal in size, global left ventricular systolic function is normal, calculated ejection fraction is 65%. Calculated EF via Dumont's method % with global L. strain of -18.9 %. Right Atrium Right atrium is normal in size. Right Ventricle Normal right ventricular size and function. Mitral Valve Thickened mitral valve leaflets. No mitral stenosis. Trivial mitral regurgitation. Aortic Valve Aortic valve is trileaflet. No aortic stenosis. Trivial aortic insufficiency. Tricuspid Valve Tricuspid valve was not well visualized. Trace to mild tricuspid regurgitation. Estimated right ventricular systolic pressure is 26 mmHg. Pulmonic Valve Pulmonic valve not well visualized but Doppler velocities are normal. Pericardial Effusion No significant pericardial effusion is seen. Miscellaneous Normal aortic root dimension. E/E' average = 7.45. IVC normal diameter & inspiratory collapse indicating normal RA filling pressure .  M-mode / 2D Measurements & Calculations:   LVIDd:5 cm(3.7 - 5.6 cm)          Diastolic YONBON:614 ml  IVSd:1 cm(0.6 - 1.1 cm)           Systolic YSYXCX:66 ml  EDSAP:2.4 cm(0.6 - 1.1 cm)        Aortic Root:2.9 cm(2.0 - 3.7 cm)                                    LA Dimension: 3.6 cm(1.9 - 4.0 cm)  Calculated LVEF (%): 64.96 %      LA volume/Index: 61.31 ml /33m^2                                    LVOT:2.5 cm                                    RVDd:3.9 cm   Mitral: Aortic   Valve Area (P1/2-Time): 4.4 cm^2        Peak Velocity: 1.23 m/s  Peak E-Wave: 0.94 m/s                   Mean Velocity: 0.90 m/s  Peak A-Wave: 0.58 m/s                   Peak Gradient: 6.05 mmHg  E/A Ratio: 1.62                         Mean Gradient: 4 mmHg  Peak Gradient: 3.52 mmHg  Mean Gradient: 1 mmHg  Deceleration Time: 194 msec             Area (continuity): 3.78 cm^2  P1/2t: 50 msec                          AV VTI: 25.8 cm   Area (continuity): 3.76 cm^2  Mean Velocity: 0.51 m/s   Tricuspid:                              Pulmonic:   Estimated RVSP: 26 mmHg                 Peak Velocity: 0.86 m/s  Peak TR Velocity: 2.29 m/s              Peak Gradient: 2.92 mmHg  Peak TR Gradient: 20.9764 mmHg  Diastology / Tissue Doppler Septal Wall E' velocity:0.12 m/s Septal Wall E/E':8.2 Lateral Wall E' velocity:0.14 m/s Lateral Wall E/E':6.7    CT HEAD WO CONTRAST    Result Date: 9/26/2021  EXAMINATION: CT OF THE HEAD WITHOUT CONTRAST  9/26/2021 1:18 am TECHNIQUE: CT of the head was performed without the administration of intravenous contrast. Dose modulation, iterative reconstruction, and/or weight based adjustment of the mA/kV was utilized to reduce the radiation dose to as low as reasonably achievable. COMPARISON: 06/26/2021 HISTORY: ORDERING SYSTEM PROVIDED HISTORY: stroke TECHNOLOGIST PROVIDED HISTORY: stroke Decision Support Exception - unselect if not a suspected or confirmed emergency medical condition->Emergency Medical Condition (MA) FINDINGS: BRAIN/VENTRICLES: There is no acute infarct or acute intracranial hemorrhage present. There is no mass effect or midline shift present. There is no ventriculomegaly or abnormal extra-axial fluid collection present. ORBITS: Limited evaluation of the orbits is unremarkable. SINUSES: Mild mucosal thickening is present within the paranasal sinuses. No fluid levels are identified. SOFT TISSUES/SKULL:  No lytic or blastic osseous lesions are identified. Stable appearance of the brain without acute intracranial process identified. The findings were sent to the Radiology Results [id number] at 1:26 am on 9/26/2021to be communicated to the Stroke Neurology service. CTA HEAD NECK W CONTRAST    Result Date: 9/26/2021  EXAMINATION: CTA OF THE HEAD AND NECK WITH CONTRAST 9/26/2021 1:18 am: TECHNIQUE: CTA of the head and neck was performed with the administration of intravenous contrast. Multiplanar reformatted images are provided for review. MIP images are provided for review. Stenosis of the internal carotid arteries measured using NASCET criteria. Dose modulation, iterative reconstruction, and/or weight based adjustment of the mA/kV was utilized to reduce the radiation dose to as low as reasonably achievable. COMPARISON: None. HISTORY: ORDERING SYSTEM PROVIDED HISTORY: L leg weakness, inability to ambulate FINDINGS: CTA NECK: AORTIC ARCH/ARCH VESSELS: No dissection or arterial injury. No significant stenosis of the brachiocephalic or subclavian arteries. CAROTID ARTERIES: No dissection, arterial injury, or hemodynamically significant stenosis by NASCET criteria. VERTEBRAL ARTERIES: No dissection, arterial injury, or significant stenosis. SOFT TISSUES: The lung apices are clear. No cervical or superior mediastinal lymphadenopathy. The larynx and pharynx are unremarkable. No acute abnormality of the salivary and thyroid glands. BONES: No acute osseous abnormality. CTA HEAD: ANTERIOR CIRCULATION: No significant stenosis of the intracranial internal carotid, anterior cerebral, or middle cerebral arteries. No aneurysm. POSTERIOR CIRCULATION: No significant stenosis of the vertebral, basilar, or posterior cerebral arteries. No aneurysm. OTHER: No dural venous sinus thrombosis on this non-dedicated study. BRAIN: No mass effect or midline shift. No extra-axial fluid collection. The gray-white differentiation is maintained.      No flow-limiting arterial

## 2022-04-09 ENCOUNTER — HOSPITAL ENCOUNTER (INPATIENT)
Age: 57
LOS: 6 days | Discharge: HOME | DRG: 751 | End: 2022-04-15
Attending: PSYCHIATRY & NEUROLOGY | Admitting: INTERNAL MEDICINE
Payer: MEDICAID

## 2022-04-09 PROBLEM — F14.10 COCAINE ABUSE, EPISODIC (HCC): Status: ACTIVE | Noted: 2022-04-09

## 2022-04-09 PROBLEM — F32.A DEPRESSION WITH SUICIDAL IDEATION: Status: ACTIVE | Noted: 2022-04-09

## 2022-04-09 PROBLEM — R45.851 DEPRESSION WITH SUICIDAL IDEATION: Status: ACTIVE | Noted: 2022-04-09

## 2022-04-09 PROBLEM — F10.10 ALCOHOL ABUSE, EPISODIC: Status: ACTIVE | Noted: 2022-04-09

## 2022-04-09 PROCEDURE — 6370000000 HC RX 637 (ALT 250 FOR IP): Performed by: PSYCHIATRY & NEUROLOGY

## 2022-04-09 PROCEDURE — 90837 PSYTX W PT 60 MINUTES: CPT | Performed by: PSYCHIATRY & NEUROLOGY

## 2022-04-09 PROCEDURE — APPSS60 APP SPLIT SHARED TIME 46-60 MINUTES: Performed by: NURSE PRACTITIONER

## 2022-04-09 PROCEDURE — 1240000000 HC EMOTIONAL WELLNESS R&B

## 2022-04-09 RX ORDER — CLOPIDOGREL BISULFATE 75 MG/1
75 TABLET ORAL DAILY
Status: DISCONTINUED | OUTPATIENT
Start: 2022-04-09 | End: 2022-04-15 | Stop reason: HOSPADM

## 2022-04-09 RX ORDER — POLYETHYLENE GLYCOL 3350 17 G/17G
17 POWDER, FOR SOLUTION ORAL DAILY PRN
Status: DISCONTINUED | OUTPATIENT
Start: 2022-04-09 | End: 2022-04-15 | Stop reason: HOSPADM

## 2022-04-09 RX ORDER — ASPIRIN 81 MG/1
81 TABLET ORAL DAILY
Status: DISCONTINUED | OUTPATIENT
Start: 2022-04-09 | End: 2022-04-15 | Stop reason: HOSPADM

## 2022-04-09 RX ORDER — TRAZODONE HYDROCHLORIDE 50 MG/1
50 TABLET ORAL NIGHTLY PRN
Status: DISCONTINUED | OUTPATIENT
Start: 2022-04-09 | End: 2022-04-15 | Stop reason: HOSPADM

## 2022-04-09 RX ORDER — HYDROXYZINE 50 MG/1
50 TABLET, FILM COATED ORAL 3 TIMES DAILY PRN
Status: DISCONTINUED | OUTPATIENT
Start: 2022-04-09 | End: 2022-04-15 | Stop reason: HOSPADM

## 2022-04-09 RX ORDER — ESCITALOPRAM OXALATE 10 MG/1
10 TABLET ORAL DAILY
Status: DISCONTINUED | OUTPATIENT
Start: 2022-04-09 | End: 2022-04-12

## 2022-04-09 RX ORDER — MIRTAZAPINE 15 MG/1
7.5 TABLET, FILM COATED ORAL NIGHTLY
Status: DISCONTINUED | OUTPATIENT
Start: 2022-04-09 | End: 2022-04-11

## 2022-04-09 RX ORDER — ATORVASTATIN CALCIUM 20 MG/1
80 TABLET, FILM COATED ORAL NIGHTLY
Status: DISCONTINUED | OUTPATIENT
Start: 2022-04-09 | End: 2022-04-15 | Stop reason: HOSPADM

## 2022-04-09 RX ORDER — MAGNESIUM HYDROXIDE/ALUMINUM HYDROXICE/SIMETHICONE 120; 1200; 1200 MG/30ML; MG/30ML; MG/30ML
30 SUSPENSION ORAL EVERY 6 HOURS PRN
Status: DISCONTINUED | OUTPATIENT
Start: 2022-04-09 | End: 2022-04-15 | Stop reason: HOSPADM

## 2022-04-09 RX ORDER — ACETAMINOPHEN 325 MG/1
650 TABLET ORAL EVERY 4 HOURS PRN
Status: DISCONTINUED | OUTPATIENT
Start: 2022-04-09 | End: 2022-04-15 | Stop reason: HOSPADM

## 2022-04-09 RX ORDER — IBUPROFEN 400 MG/1
400 TABLET ORAL EVERY 6 HOURS PRN
Status: DISCONTINUED | OUTPATIENT
Start: 2022-04-09 | End: 2022-04-15 | Stop reason: HOSPADM

## 2022-04-09 RX ORDER — NICOTINE 21 MG/24HR
1 PATCH, TRANSDERMAL 24 HOURS TRANSDERMAL DAILY
Status: DISCONTINUED | OUTPATIENT
Start: 2022-04-09 | End: 2022-04-09

## 2022-04-09 RX ADMIN — CLOPIDOGREL BISULFATE 75 MG: 75 TABLET ORAL at 09:34

## 2022-04-09 RX ADMIN — ATORVASTATIN CALCIUM 80 MG: 20 TABLET, FILM COATED ORAL at 21:34

## 2022-04-09 RX ADMIN — NICOTINE POLACRILEX 2 MG: 2 GUM, CHEWING BUCCAL at 18:44

## 2022-04-09 RX ADMIN — TRAZODONE HYDROCHLORIDE 50 MG: 50 TABLET ORAL at 21:35

## 2022-04-09 RX ADMIN — MIRTAZAPINE 7.5 MG: 15 TABLET, FILM COATED ORAL at 21:35

## 2022-04-09 RX ADMIN — HYDROXYZINE HYDROCHLORIDE 50 MG: 50 TABLET, FILM COATED ORAL at 21:35

## 2022-04-09 RX ADMIN — ASPIRIN 81 MG: 81 TABLET, COATED ORAL at 09:34

## 2022-04-09 RX ADMIN — ESCITALOPRAM OXALATE 10 MG: 10 TABLET, FILM COATED ORAL at 09:34

## 2022-04-09 ASSESSMENT — PAIN SCALES - GENERAL: PAINLEVEL_OUTOF10: 0

## 2022-04-09 ASSESSMENT — SLEEP AND FATIGUE QUESTIONNAIRES
DO YOU HAVE DIFFICULTY SLEEPING: NO
DO YOU USE A SLEEP AID: NO
AVERAGE NUMBER OF SLEEP HOURS: 8

## 2022-04-09 NOTE — PLAN OF CARE
Problem: Altered Mood, Depressive Behavior:  Goal: Able to verbalize and/or display a decrease in depressive symptoms  Description: Able to verbalize and/or display a decrease in depressive symptoms  4/9/2022 1350 by Richard Lara  Outcome: Ongoing     Problem: Altered Mood, Depressive Behavior:  Goal: Ability to disclose and discuss suicidal ideas will improve  Description: Ability to disclose and discuss suicidal ideas will improve  4/9/2022 1350 by Richard Backer  Outcome: Ongoing    Patient is isolative to room for long intervals, awake laying in bed reading, the Bible, books. Very pleasant and cooperative. Takes medications as ordered, comes out for meals. When asked why he is here, he relates, \"relationship issues\", but is not specific and a little evasive about details. Denies any suicidal thoughts. Accepting of staff support.
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
## 2022-04-10 PROCEDURE — 1240000000 HC EMOTIONAL WELLNESS R&B

## 2022-04-10 PROCEDURE — 99231 SBSQ HOSP IP/OBS SF/LOW 25: CPT | Performed by: NURSE PRACTITIONER

## 2022-04-10 PROCEDURE — 6370000000 HC RX 637 (ALT 250 FOR IP): Performed by: PSYCHIATRY & NEUROLOGY

## 2022-04-10 PROCEDURE — 99223 1ST HOSP IP/OBS HIGH 75: CPT | Performed by: INTERNAL MEDICINE

## 2022-04-10 RX ADMIN — ATORVASTATIN CALCIUM 80 MG: 20 TABLET, FILM COATED ORAL at 20:39

## 2022-04-10 RX ADMIN — CLOPIDOGREL BISULFATE 75 MG: 75 TABLET ORAL at 08:27

## 2022-04-10 RX ADMIN — TRAZODONE HYDROCHLORIDE 50 MG: 50 TABLET ORAL at 20:38

## 2022-04-10 RX ADMIN — HYDROXYZINE HYDROCHLORIDE 50 MG: 50 TABLET, FILM COATED ORAL at 20:38

## 2022-04-10 RX ADMIN — ESCITALOPRAM OXALATE 10 MG: 10 TABLET, FILM COATED ORAL at 08:27

## 2022-04-10 RX ADMIN — MIRTAZAPINE 7.5 MG: 15 TABLET, FILM COATED ORAL at 20:41

## 2022-04-10 RX ADMIN — ASPIRIN 81 MG: 81 TABLET, COATED ORAL at 08:26

## 2022-04-10 NOTE — GROUP NOTE
Group Therapy Note    Date: 4/9/2022    Group Start Time: 1000  Group End Time: 1300  Group Topic: Psychotherapy    SHANNAN Murillo Fees    Brief 1:1      [facility] C/D    Rooms [id number] and 11/17    PT participated in 1:1 individual brief therapy and engages in conversation regarding discharge and safety planning, and short- and long-term goals. PT able to identify all goals as well as starting to plan for discharge.     Patient's Goal:  To actively participate in 1:1 meeting and to start discharge and safety planning as well as given a journal.    Discipline Responsible: /Counselor      Signature:  Dwight Vázquez MSW, LSW

## 2022-04-10 NOTE — H&P
[facility] Internal Medicine  Corina Kwok MD; Tiffani Gordon MD; Wandy Cheng MD; MD John Hill MD; MD SAL Guerrero [facility] Internal Medicine   [facility]    HISTORY AND PHYSICAL EXAMINATION            Date:   4/10/2022  Patient name:  Mindi Olszewski  Date of admission:  4/9/2022  2:50 AM  MRN:   [id number]  Account:  [de-identified]  YOB: 1965  PCP:    No primary care provider on file. Room:   [address] [place] [address]  Code Status:    Full Code    Chief Complaint:     No chief complaint on file. Hypertension hyperlipidemia history of TIA    History Obtained From:     Patient medical record nursing staff    History of Present Illness:     Mindi Olszewski is a 65 y.o. Non- / non  male who presents with No chief complaint on file. and is admitted to the hospital for the management of Major depressive disorder, recurrent severe without psychotic features ([address] [address] HTN  Onset more than 2 years ago  jones mild to mod  Controlled with current po meds  Not associated with headaches or blurry vision  No chest pain    HLD  Onset more than 5 years ago  Severity is mild, not getting worse  Not associated with pancreatitis  Tolerating statin well no muscle pain    Hx of tia    Past Medical History:     Past Medical History:   Diagnosis Date    Arthritis     Hypertension     Liver disease     Psoriasis     Psychiatric problem     Seizures ([address]         Past Surgical History:     Past Surgical History:   Procedure Laterality Date    TONSILLECTOMY          Medications Prior to Admission:     Prior to Admission medications    Medication Sig Start Date End Date Taking?  Authorizing Provider   aspirin 81 MG EC tablet Take 1 tablet by mouth daily 9/28/21 12/27/21  Tara Horowitz MD   atorvastatin (LIPITOR) 80 MG tablet Take 1 tablet by mouth nightly 9/27/21 12/26/21  Tara Horowitz MD   clopidogrel (PLAVIX) 75 MG tablet Take 1 tablet by mouth daily for 21 doses 9/28/21 10/19/21  Brii Arnett MD   escitalopram (LEXAPRO) 20 MG tablet Take 20 mg by mouth daily    Historical Provider, MD   lisinopril (PRINIVIL;ZESTRIL) 5 MG tablet Take 5 mg by mouth daily    Historical Provider, MD   mirtazapine (REMERON) 15 MG tablet Take 1 tablet by mouth nightly 1/28/21   Minda Alcala MD   traZODone (DESYREL) 50 MG tablet Take 1 tablet by mouth nightly as needed for Sleep 1/28/21   Minda Alcala MD        Allergies:     Patient has no known allergies. Social History:     Tobacco:    reports that he has been smoking cigarettes. He started smoking about 31 years ago. He has a 7.50 pack-year smoking history. He has never used smokeless tobacco.  Alcohol:      reports current alcohol use. Drug Use:  reports previous drug use. Drug: Cocaine. Family History:     No family history on file. Review of Systems:     Positive and Negative as described in HPI.     CONSTITUTIONAL:  negative for fevers, chills, sweats, fatigue, weight loss  HEENT:  negative for vision, hearing changes, runny nose, throat pain  RESPIRATORY:  negative for shortness of breath, cough, congestion, wheezing  CARDIOVASCULAR:  negative for chest pain, palpitations  GASTROINTESTINAL:  negative for nausea, vomiting, diarrhea, constipation, change in bowel habits, abdominal pain   GENITOURINARY:  negative for difficulty of urination, burning with urination, frequency   INTEGUMENT:  negative for rash, skin lesions, easy bruising   HEMATOLOGIC/LYMPHATIC:  negative for swelling/edema   ALLERGIC/IMMUNOLOGIC:  negative for urticaria , itching  ENDOCRINE:  negative increase in drinking, increase in urination, hot or cold intolerance  MUSCULOSKELETAL:  negative joint pains, muscle aches, swelling of joints  NEUROLOGICAL:  negative for headaches, dizziness, lightheadedness, numbness, pain, tingling extremities      Physical Exam:   /69   Pulse 71   Temp 98.3 °F (36.8 °C) (Oral)   Resp 14   Ht 5' 11\" (1.803 m)   Wt 172 lb (78 kg)   BMI 23.99 kg/m²   Temp (24hrs), Av.3 °F (36.8 °C), Min:98.2 °F (36.8 °C), Max:98.3 °F (36.8 °C)    No results for input(s): POCGLU in the last 72 hours. No intake or output data in the 24 hours ending 04/10/22 1157    General Appearance: alert, well appearing, and in no acute distress  Mental status: oriented to person, place, and time  Head: normocephalic, atraumatic  Eye: no icterus, redness, pupils equal and reactive, extraocular eye movements intact, conjunctiva clear  Ear: normal external ear, no discharge, hearing intact  Nose: no drainage noted  Mouth: mucous membranes moist  Neck: supple, no carotid bruits, thyroid not palpable  Lungs: Bilateral equal air entry, clear to ausculation, no wheezing, rales or rhonchi, normal effort  Cardiovascular: normal rate, regular rhythm, no murmur, gallop, rub  Abdomen: Soft, nontender, nondistended, normal bowel sounds, no hepatomegaly or splenomegaly  Neurologic: There are no new focal motor or sensory deficits, normal muscle tone and bulk, no abnormal sensation, normal speech, cranial nerves II through XII grossly intact  Skin: No gross lesions, rashes, bruising or bleeding on exposed skin area  Extremities: peripheral pulses palpable, no pedal edema or calf pain with     Investigations:      Laboratory Testing:  No results found for this or any previous visit (from the past 24 hour(s)). Imaging/Diagnostics:  No results found.     Assessment :      Hospital Problems           Last Modified POA    * (Principal) Major depressive disorder, recurrent severe without psychotic features ([address] 2022 Yes    TIA (transient ischemic attack) 4/10/2022 Yes    Cocaine abuse, episodic ([address] 2022 Yes    Alcohol abuse, episodic 2022 Yes          Plan:     11year-old gentleman with history of for TIA transient weakness in left leg in which she recovered  Start aspirin 81 mg Plavix 75 mg  Hyperlipidemia start Lipitor 80 mg  Elevated liver function panel will do hepatitis C work-up possibly due to Lipitor  Alcohol abuse advised against alcohol use    Natalie Lockwood MD  4/10/2022  11:57 AM    Copy sent to Dr. Osullivan Artist primary care provider on file. Please note that this chart was generated using voice recognition Dragon dictation software. Although every effort was made to ensure the accuracy of this automated transcription, some errors in transcription may have occurred.

## 2022-04-10 NOTE — PLAN OF CARE
Problem: Altered Mood, Depressive Behavior:  Goal: Able to verbalize and/or display a decrease in depressive symptoms  Description: Able to verbalize and/or display a decrease in depressive symptoms  4/10/2022 0822 by Shelton Randolph  Outcome: Ongoing  Note:   Patient admits to depression and anxiety this shift, but reports it is manageable. Patient is intermittently present in the day room. Patient is pleasant and cooperative with staff, aloof of peers this shift. Patient denies suicidal/homicidal ideations and hallucinations this shift to writer. Support and active listening provided to patient. Safety maintained with 15-minute purposeful rounds and additional checks and monitoring as needed. .      Problem: Altered Mood, Depressive Behavior:  Goal: Ability to disclose and discuss suicidal ideas will improve  Description: Ability to disclose and discuss suicidal ideas will improve  4/10/2022 1042 by Shelton Randolph  Outcome: Ongoing  Note: Patient denies suicidal ideation to writer this shift. Problem: Altered Mood, Depressive Behavior:  Goal: Absence of self-harm  Description: Absence of self-harm  4/10/2022 5664 by Shelton Randolph  Outcome: Ongoing  Note: Patient remains free from self-harm this shift. Patient agrees to seek out staff should thoughts of self-harm arise. Patient environment checked for items that could pose increase risk for self-harm.

## 2022-04-10 NOTE — PROGRESS NOTES
Daily Progress Note  4/10/2022    Patient Name: Davy Morrow    CHIEF COMPLAINT:  Depression with suicidal ideation with plan to cut self with boxcutter          SUBJECTIVE:   Patient is seen for follow up assessment outside assigned room. Nursing staff report patient has been medication adherent, behaviorally in control, aloof with peers but pleasant and spends much of his time isolated to his room. He was observed to be resting in bed on approach and engaged in a puzzle. He did not appear to be in any acute distress. He reports he feels \"like garbage,\" and his anxiety is \"through the roof. \" He reports his suicidal ideation is improving since his inpatient admission to [facility], stating, \"not in here. \" He states he has been anxious since Internal Medicine MD informed him of elevated cholesterol, elevated blood pressure and would be testing him for Hepatitis C due to elevated liver enzymes. Patient states he sells his plasma and has never been informed of these results before. Patient provided education regarding treatments for Hepatitis C if it turns out to be positive to attempt to ease his anxiety. He reported that this information was helpful. Patient continues to endorse suicidal ideation today with minimal improvement. Patient is unable to contract for safety in the community and requires inpatient hospitalization for stabilization, medication management, and therapeutic milieu.      Compliant with scheduled medications: [x] Yes    [] No   Lexapro, Remeron    Received emergency medications in past 24 hrs: [] Yes   [x] No    Appetite:  [x] Normal/Adequate/Unchanged  [] Increased  [] Decreased      Sleep:       [x] Normal/Adequate/Unchanged  [] Fair  [] Poor      Group Attendance on Unit:   [] Yes  [] Selectively    [x] No    Medication Side Effects: Denies         Mental Status Exam  Level of consciousness: Alert and awake   Appearance: Appropriate attire for setting, standing, with fair  grooming and hygiene Behavior/Motor: Approachable, psychomotor slowing  Attitude toward examiner: Cooperative, attentive, good eye contact   Speech: Normal rate, volume, and depressed tone. Mood: Depressed  Affect: Mood congruent  Thought processes: Goal directed, linear   Thought content: Denies homicidal ideation  Suicidal Ideation: Endorses suicidal ideation with plan, unable to contract for safety in the community  Delusions: None evident  Perceptual Disturbance: patient is not observed responding to internal stimuli   Cognition: Oriented to self, location, time, and situation  Memory: Intact  Insight & Judgement: Poor    Data   height is 5' 11\" (1.803 m) and weight is 172 lb (78 kg). His oral temperature is 98.3 °F (36.8 °C). His blood pressure is 130/69 and his pulse is 71. His respiration is 14. Labs:   No visits with results within 2 Day(s) from this visit. Latest known visit with results is:   Admission on 09/26/2021, Discharged on 09/27/2021   Component Date Value Ref Range Status    Glucose 09/26/2021 94  70 - 99 mg/dL Final    BUN 09/26/2021 9  6 - 20 mg/dL Final    CREATININE 09/26/2021 0.62* 0.70 - 1.20 mg/dL Final    Bun/Cre Ratio 09/26/2021 NOT REPORTED  9 - 20 Final    Calcium 09/26/2021 8.4* 8.6 - 10.4 mg/dL Final    Sodium 09/26/2021 128* 135 - 144 mmol/L Final    Potassium 09/26/2021 3.5* 3.7 - 5.3 mmol/L Final    Chloride 09/26/2021 96* 98 - 107 mmol/L Final    CO2 09/26/2021 18* 20 - 31 mmol/L Final    Anion Gap 09/26/2021 14  9 - 17 mmol/L Final    GFR Non- 09/26/2021 >60  >60 mL/min Final    GFR  09/26/2021 >60  >60 mL/min Final    GFR Comment 09/26/2021        Final    Comment: Average GFR for 52-63 years old:   80 mL/min/1.73sq m  Chronic Kidney Disease:   <60 mL/min/1.73sq m  Kidney failure:   <15 mL/min/1.73sq m              eGFR calculated using average adult body mass.  Additional eGFR calculator available at: [email or web address].br            GFR Staging 09/26/2021 NOT REPORTED   Final    WBC 09/26/2021 4.6  3.5 - 11.3 k/uL Final    RBC 09/26/2021 4.51  4.21 - 5.77 m/uL Final    Hemoglobin 09/26/2021 13.7  13.0 - 17.0 g/dL Final    Hematocrit 09/26/2021 42.6  40.7 - 50.3 % Final    MCV 09/26/2021 94.5  82.6 - 102.9 fL Final    MCH 09/26/2021 30.4  25.2 - 33.5 pg Final    MCHC 09/26/2021 32.2  28.4 - 34.8 g/dL Final    RDW 09/26/2021 13.2  11.8 - 14.4 % Final    Platelets 23/52/9978 225  138 - 453 k/uL Final    MPV 09/26/2021 8.9  8.1 - 13.5 fL Final    NRBC Automated 09/26/2021 0.0  0.0 per 100 WBC Final    Total CK 09/26/2021 153  39 - 308 U/L Final    CK-MB 09/26/2021 5.0  <10.5 ng/mL Final    % CKMB 09/26/2021 3.3  0.0 - 3.5 % Final    CKMB Interpretation 09/26/2021 NORMAL ISOENZYME PATTERN   Final    Differential Type 09/26/2021 NOT REPORTED   Final    Seg Neutrophils 09/26/2021 43  36 - 65 % Final    Lymphocytes 09/26/2021 38  24 - 43 % Final    Monocytes 09/26/2021 15* 3 - 12 % Final    Eosinophils % 09/26/2021 2  1 - 4 % Final    Basophils 09/26/2021 1  0 - 2 % Final    Immature Granulocytes 09/26/2021 1* 0 % Final    Segs Absolute 09/26/2021 1.99  1.50 - 8.10 k/uL Final    Absolute Lymph # 09/26/2021 1.78  1.10 - 3.70 k/uL Final    Absolute Mono # 09/26/2021 0.69  0.10 - 1.20 k/uL Final    Absolute Eos # 09/26/2021 0.08  0.00 - 0.44 k/uL Final    Basophils Absolute 09/26/2021 0.06  0.00 - 0.20 k/uL Final    Absolute Immature Granulocyte 09/26/2021 0.03  0.00 - 0.30 k/uL Final    WBC Morphology 09/26/2021 NOT REPORTED   Final    RBC Morphology 09/26/2021 NOT REPORTED   Final    Platelet Estimate 87/53/5301 NOT REPORTED   Final    Myoglobin 09/26/2021 29  28 - 72 ng/mL Final    Protime 09/26/2021 9.6  9.1 - 12.3 sec Final    INR 09/26/2021 0.9   Final    Comment:       Therapeutic Range:    Moderate Anticoagulant Intensity:     INR = 2.0-3.0   High Anticoagulant Intensity:     INR = 2.5-3.5            PTT 09/26/2021 24.1  20.5 - 30.5 sec Final    Comment:       IV Heparin Therapy Range:  48.6-77.8      Troponin, High Sensitivity 09/26/2021 <6  0 - 22 ng/L Final    Comment:       High Sensitivity Troponin values cannot be compared with other Troponin methodologies. Patients with high levels of Biotin oral intake (i.e >5mg/day) may have falsely decreased   Troponin levels. Samples collected within 8 hours of biotin intake may require additional   information for diagnosis.       Troponin T 09/26/2021 NOT REPORTED  <0.03 ng/mL Final    Troponin Interp 09/26/2021 NOT REPORTED   Final    pH, Santiago 09/26/2021 7.365  7.320 - 7.430 Final    pCO2, [place] 09/26/2021 43.6  41.0 - 51.0 mm Hg Final    pO2, [place] 09/26/2021 53.8* 30.0 - 50.0 mm Hg Final    HCO3, Venous 09/26/2021 24.9  22.0 - 29.0 mmol/L Final    Total CO2, Venous 09/26/2021 NOT REPORTED  23.0 - 30.0 mmol/L Final    Negative Base Excess, Santiago 09/26/2021 1  0.0 - 2.0 Final    Positive Base Excess, [place] 09/26/2021 NOT REPORTED  0.0 - 3.0 Final    O2 Sat, [place] 09/26/2021 86* 60.0 - 85.0 % Final    O2 Device/Flow/% 09/26/2021 NOT REPORTED   Final    Kvng Test 09/26/2021 NOT REPORTED   Final    Sample Site 09/26/2021 NOT REPORTED   Final    Mode 09/26/2021 NOT REPORTED   Final    FIO2 09/26/2021 NOT REPORTED   Final    Pt Temp 09/26/2021 NOT REPORTED   Final    POC pH Temp 09/26/2021 NOT REPORTED   Final    POC pCO2 Temp 09/26/2021 NOT REPORTED  mm Hg Final    POC pO2 Temp 09/26/2021 NOT REPORTED  mm Hg Final    POC Sodium 09/26/2021 140  138 - 146 mmol/L Final    POC Potassium 09/26/2021 3.9  3.5 - 4.5 mmol/L Final    POC Chloride 09/26/2021 107  98 - 107 mmol/L Final    POC TCO2 09/26/2021 26  22 - 30 mmol/L Final    Anion Gap 09/26/2021 9  7 - 16 mmol/L Final    POC Hemoglobin 09/26/2021 14.4  13.5 - 17.5 g/dL Final    POC Hematocrit 09/26/2021 42  41 - 53 % Final    POC Creatinine 09/26/2021 0.67  0.51 - 1.19 mg/dL Final    GFR Comment 09/26/2021 >60  >60 mL/min Final    GFR Non- 09/26/2021 >60  >60 mL/min Final    GFR Comment 09/26/2021        Final    Comment: Average GFR for 52-63 years old:   80 mL/min/1.73sq m  Chronic Kidney Disease:   <60 mL/min/1.73sq m  Kidney failure:   <15 mL/min/1.73sq m              eGFR calculated using average adult body mass. Additional eGFR calculator available at:        [employer]            POC Ionized Calcium 09/26/2021 1.07* 1.15 - 1.33 mmol/L Final    POC BUN 09/26/2021 9  8 - 26 mg/dL Final    POC Lactic Acid 09/26/2021 1.34  0.56 - 1.39 mmol/L Final    POC Glucose 09/26/2021 90  74 - 100 mg/dL Final    Acetaminophen Level 09/26/2021 <5* 10 - 30 ug/mL Final    Ethanol 09/26/2021 197* <10 mg/dL Final    Ethanol percent 09/26/2021 0.197* <7.051 % Final    Salicylate Lvl 46/68/4154 6  3 - 10 mg/dL Final    Toxic Tricyclic Sc,Blood 94/79/7315 NEGATIVE  NEGATIVE Final    Specimen Description 09/26/2021 . NASOPHARYNGEAL SWAB   Final    SARS-CoV-2, Rapid 09/26/2021 Not Detected  Not Detected Final    Comment:       Rapid NAAT:  The specimen is NEGATIVE for SARS-CoV-2, the novel coronavirus associated with   COVID-19. The ID NOW COVID-19 assay is designed to detect the virus that causes COVID-19 in patients   with signs and symptoms of infection who are suspected of COVID-19. An individual without symptoms of COVID-19 and who is not shedding SARS-CoV-2 virus would   expect to have a negative (not detected) result in this assay. Negative results should be treated as presumptive and, if inconsistent with clinical signs   and symptoms or necessary for patient management,  should be tested with an alternative molecular assay. Negative results do not preclude   SARS-CoV-2 infection and   should not be used as the sole basis for patient management decisions.          Fact sheet for Healthcare Providers: Catrachita  Fact sheet for Patients: Jaswant.mike          Methodology: Isothermal Nucleic Acid Amplification      Hemoglobin A1C 09/26/2021 5.7  4.0 - 6.0 % Final    Estimated Avg Glucose 09/26/2021 117  mg/dL Final    Comment: The ADA and AACC recommend providing the estimated average glucose result to permit better   patient understanding of their HBA1c result.  Cholesterol 09/26/2021 157  <200 mg/dL Final    Comment:    Cholesterol Guidelines:      <200  Desirable   200-240  Borderline      >240  Undesirable         HDL 09/26/2021 80  >40 mg/dL Final    Comment:    HDL Guidelines:    <40     Undesirable   40-59    Borderline    >59     Desirable         LDL Cholesterol 09/26/2021 68  0 - 130 mg/dL Final    Comment:    LDL Guidelines:     <100    Desirable   100-129   Near to/above Desirable   130-159   Borderline      >159   Undesirable     Direct (measured) LDL and calculated LDL are not interchangeable tests.  Chol/HDL Ratio 09/26/2021 2.0  <5 Final            Triglycerides 09/26/2021 43  <150 mg/dL Final    Comment:    Triglyceride Guidelines:     <150   Desirable   150-199  Borderline   200-499  High     >499   Very high   Based on AHA Guidelines for fasting triglyceride, October 2012.          VLDL 09/26/2021 NOT REPORTED  1 - 30 mg/dL Final    Glucose 09/26/2021 73  70 - 99 mg/dL Final    BUN 09/26/2021 7  6 - 20 mg/dL Final    CREATININE 09/26/2021 0.49* 0.70 - 1.20 mg/dL Final    Bun/Cre Ratio 09/26/2021 NOT REPORTED  9 - 20 Final    Calcium 09/26/2021 7.7* 8.6 - 10.4 mg/dL Final    Sodium 09/26/2021 130* 135 - 144 mmol/L Final    Potassium 09/26/2021 3.7  3.7 - 5.3 mmol/L Final    Chloride 09/26/2021 99  98 - 107 mmol/L Final    CO2 09/26/2021 19* 20 - 31 mmol/L Final    Anion Gap 09/26/2021 12  9 - 17 mmol/L Final    Alkaline Phosphatase 09/26/2021 66  40 - 129 U/L Final    ALT 09/26/2021 46* 5 - 41 U/L Final    AST 09/26/2021 65* <40 U/L Final    Total Bilirubin 09/26/2021 0.22* 0.3 - 1.2 mg/dL Final    Total Protein 09/26/2021 6.4  6.4 - 8.3 g/dL Final    Albumin 09/26/2021 3.6  3.5 - 5.2 g/dL Final    Albumin/Globulin Ratio 09/26/2021 1.3  1.0 - 2.5 Final    GFR Non- 09/26/2021 >60  >60 mL/min Final    GFR  09/26/2021 >60  >60 mL/min Final    GFR Comment 09/26/2021        Final    Comment: Average GFR for 52-63 years old:   80 mL/min/1.73sq m  Chronic Kidney Disease:   <60 mL/min/1.73sq m  Kidney failure:   <15 mL/min/1.73sq m              eGFR calculated using average adult body mass.  Additional eGFR calculator available at:        [email or web address]            GFR Staging 09/26/2021 NOT REPORTED   Final    WBC 09/26/2021 3.3* 3.5 - 11.3 k/uL Final    RBC 09/26/2021 4.34  4.21 - 5.77 m/uL Final    Hemoglobin 09/26/2021 13.1  13.0 - 17.0 g/dL Final    Hematocrit 09/26/2021 42.0  40.7 - 50.3 % Final    MCV 09/26/2021 96.8  82.6 - 102.9 fL Final    MCH 09/26/2021 30.2  25.2 - 33.5 pg Final    MCHC 09/26/2021 31.2  28.4 - 34.8 g/dL Final    RDW 09/26/2021 13.2  11.8 - 14.4 % Final    Platelets 45/93/3613 See Reflexed IPF Result  138 - 453 k/uL Final    MPV 09/26/2021 NOT REPORTED  8.1 - 13.5 fL Final    NRBC Automated 09/26/2021 0.0  0.0 per 100 WBC Final    Differential Type 09/26/2021 NOT REPORTED   Final    WBC Morphology 09/26/2021 NOT REPORTED   Final    RBC Morphology 09/26/2021 NOT REPORTED   Final    Platelet Estimate 72/50/7198 NOT REPORTED   Final    Seg Neutrophils 09/26/2021 45  36 - 65 % Final    Lymphocytes 09/26/2021 40  24 - 43 % Final    Monocytes 09/26/2021 13* 3 - 12 % Final    Eosinophils % 09/26/2021 2  1 - 4 % Final    Basophils 09/26/2021 1  0 - 2 % Final    Immature Granulocytes 09/26/2021 0  0 % Final    Segs Absolute 09/26/2021 1.49* 1.50 - 8.10 k/uL Final    Absolute Lymph # 09/26/2021 1.33  1.10 - 3.70 k/uL Final    Absolute Mono # 09/26/2021 0.42  0.10 - 1.20 k/uL Final    Absolute Eos # 09/26/2021 0.06  0.00 - 0.44 k/uL Final    Basophils Absolute 09/26/2021 0.03  0.00 - 0.20 k/uL Final    Absolute Immature Granulocyte 09/26/2021 <0.03  0.00 - 0.30 k/uL Final    Platelet, Fluorescence 09/26/2021 Platelet clumps present, count appears adequate. 138 - 453 k/uL Final    Left Ventricular Ejection Fraction 09/27/2021 65   Final-Edited    LVEF MODALITY 09/27/2021 ECHO   Final-Edited         Reviewed patient's current plan of care and vital signs with nursing staff. Labs reviewed: [x] Yes  Last EKG in EMR reviewed: [x] Yes    Medications  Current Facility-Administered Medications: acetaminophen (TYLENOL) tablet 650 mg, 650 mg, Oral, Q4H PRN  aluminum & magnesium hydroxide-simethicone (MAALOX) 200-200-20 MG/5ML suspension 30 mL, 30 mL, Oral, Q6H PRN  hydrOXYzine (ATARAX) tablet 50 mg, 50 mg, Oral, TID PRN  ibuprofen (ADVIL;MOTRIN) tablet 400 mg, 400 mg, Oral, Q6H PRN  polyethylene glycol (GLYCOLAX) packet 17 g, 17 g, Oral, Daily PRN  traZODone (DESYREL) tablet 50 mg, 50 mg, Oral, Nightly PRN  aspirin EC tablet 81 mg, 81 mg, Oral, Daily  atorvastatin (LIPITOR) tablet 80 mg, 80 mg, Oral, Nightly  clopidogrel (PLAVIX) tablet 75 mg, 75 mg, Oral, Daily  escitalopram (LEXAPRO) tablet 10 mg, 10 mg, Oral, Daily  mirtazapine (REMERON) tablet 7.5 mg, 7.5 mg, Oral, Nightly  nicotine polacrilex (NICORETTE) gum 2 mg, 2 mg, Oral, Q2H PRN    ASSESSMENT  Major depressive disorder, recurrent severe without psychotic features ([facility] [address]         PLAN  Patient symptoms are: modestly improving   Continue current medication regimen. Monitor need and frequency of PRN medications. Encourage participation in groups and milieu. Attempt to develop insight. Psycho-education conducted. Supportive Therapy conducted. Probable discharge is per attending physician. Follow-up daily while inpatient.      Patient continues to be monitored in the inpatient psychiatric facility at [facility] for safety and stabilization. Patient continues to need, on a daily basis, active treatment furnished directly by or requiring the supervision of inpatient psychiatric personnel. Electronically signed by Aric Aldana RN, student nurse practitioner on 4/10/2022 at 3:22 PM     Electronically signed by Hay Dinh [address] [place] [address], on 4/10/2022 at 4:25 PM     **This report has been created using voice recognition software. It may contain minor errors which are inherent in voice recognition technology. **

## 2022-04-10 NOTE — PLAN OF CARE
Problem: Altered Mood, Depressive Behavior:  Goal: Able to verbalize and/or display a decrease in depressive symptoms  Description: Able to verbalize and/or display a decrease in depressive symptoms  4/9/2022 2334 by Osvaldo Live LPN  Outcome: Ongoing  Note: Patient admits to depression and anxiety this shift. Patient out and pleasant in dayroom, isolative of peers this shift. Patient cooperative and compliant with assessments and medications this shift. Patient denies suicidal/homicidal ideations and hallucinations this shift. Patient monitored every 15 minutes with environmental safety checks. Problem: Altered Mood, Depressive Behavior:  Goal: Ability to disclose and discuss suicidal ideas will improve  Description: Ability to disclose and discuss suicidal ideas will improve  4/9/2022 2334 by Osvaldo Live LPN  Outcome: Ongoing  Note:  Patient denies suicidal ideations at this time. Patient agreed to seek staff at anytime he felt like any urges to harm self would arise. Safety checks maintained [id number]. Problem: Altered Mood, Depressive Behavior:  Goal: Absence of self-harm  Description: Absence of self-harm  4/9/2022 2334 by Osvaldo Live LPN  Outcome: Ongoing  Note: Patient is absent of self harm at this time. Patient educated and agrees to come to staff if this occurs. Patient monitored every 15 minutes with environmental safety checks.       Problem: Tobacco Use:  Goal: Inpatient tobacco use cessation counseling participation  Description: Inpatient tobacco use cessation counseling participation  Outcome: Ongoing

## 2022-04-10 NOTE — GROUP NOTE
Group Therapy Note    Date: 4/10/2022    Group Start Time: 9709  Group End Time: 5327  Group Topic: Group Documentation    [facility]    Ace Das RN    Patient refused to attend group at this time. Patient was encouraged and educated on the benefits of group.     Signature:  Ace Das RN

## 2022-04-10 NOTE — GROUP NOTE
Group Therapy Note    Date: 4/10/2022    Group Start Time: 1000  Group End Time: 6250  Group Topic: Psychotherapy    SHANNAN Hooper     Brief 1:1      [facility]    Rooms [id number], 7 out of 18    PT participated in 1:1 individual brief therapy and engages in conversation regarding discharge and safety planning, and short- and long-term goals. PT able to identify all goals as well as starting to plan for discharge.     Patient's Goal:  To actively participate in 1:1 meeting and to start discharge and safety planning as well as given a journal.  Discipline Responsible: /Counselor      Signature:  Gaby Huggins, MSW, LSW
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
## 2022-04-10 NOTE — GROUP NOTE
HS Group    Date: April 9, 2022    Patient did not participate in HS group. 1:1 talk time was offered as an alternative. Will continue to encourage patient to participate in unit programming.     Signature: CARSON Zuniga

## 2022-04-11 LAB
ALBUMIN SERPL-MCNC: 3.6 G/DL (ref 3.5–5.2)
ALP BLD-CCNC: 51 U/L (ref 40–129)
ALT SERPL-CCNC: 34 U/L (ref 5–41)
ANION GAP SERPL CALCULATED.3IONS-SCNC: 8 MMOL/L (ref 9–17)
AST SERPL-CCNC: 32 U/L
BILIRUB SERPL-MCNC: 0.3 MG/DL (ref 0.3–1.2)
BILIRUBIN DIRECT: 0.1 MG/DL
BILIRUBIN, INDIRECT: 0.2 MG/DL (ref 0–1)
BUN BLDV-MCNC: 11 MG/DL (ref 6–20)
CALCIUM SERPL-MCNC: 8.7 MG/DL (ref 8.6–10.4)
CHLORIDE BLD-SCNC: 102 MMOL/L (ref 98–107)
CO2: 28 MMOL/L (ref 20–31)
CREAT SERPL-MCNC: 0.55 MG/DL (ref 0.7–1.2)
GFR AFRICAN AMERICAN: >60 ML/MIN
GFR NON-AFRICAN AMERICAN: >60 ML/MIN
GFR SERPL CREATININE-BSD FRML MDRD: ABNORMAL ML/MIN/{1.73_M2}
GLUCOSE BLD-MCNC: 86 MG/DL (ref 70–99)
HEPATITIS C ANTIBODY: NONREACTIVE
POTASSIUM SERPL-SCNC: 4.6 MMOL/L (ref 3.7–5.3)
SODIUM BLD-SCNC: 138 MMOL/L (ref 135–144)
TOTAL PROTEIN: 6 G/DL (ref 6.4–8.3)

## 2022-04-11 PROCEDURE — 6370000000 HC RX 637 (ALT 250 FOR IP): Performed by: PSYCHIATRY & NEUROLOGY

## 2022-04-11 PROCEDURE — 36415 COLL VENOUS BLD VENIPUNCTURE: CPT

## 2022-04-11 PROCEDURE — 86803 HEPATITIS C AB TEST: CPT

## 2022-04-11 PROCEDURE — APPSS30 APP SPLIT SHARED TIME 16-30 MINUTES: Performed by: NURSE PRACTITIONER

## 2022-04-11 PROCEDURE — 99232 SBSQ HOSP IP/OBS MODERATE 35: CPT | Performed by: PSYCHIATRY & NEUROLOGY

## 2022-04-11 PROCEDURE — 99232 SBSQ HOSP IP/OBS MODERATE 35: CPT | Performed by: INTERNAL MEDICINE

## 2022-04-11 PROCEDURE — 80053 COMPREHEN METABOLIC PANEL: CPT

## 2022-04-11 PROCEDURE — 82248 BILIRUBIN DIRECT: CPT

## 2022-04-11 PROCEDURE — 1240000000 HC EMOTIONAL WELLNESS R&B

## 2022-04-11 RX ORDER — MIRTAZAPINE 15 MG/1
15 TABLET, FILM COATED ORAL NIGHTLY
Status: DISCONTINUED | OUTPATIENT
Start: 2022-04-11 | End: 2022-04-15 | Stop reason: HOSPADM

## 2022-04-11 RX ADMIN — HYDROXYZINE HYDROCHLORIDE 50 MG: 50 TABLET, FILM COATED ORAL at 21:06

## 2022-04-11 RX ADMIN — ATORVASTATIN CALCIUM 80 MG: 20 TABLET, FILM COATED ORAL at 21:06

## 2022-04-11 RX ADMIN — ASPIRIN 81 MG: 81 TABLET, COATED ORAL at 09:01

## 2022-04-11 RX ADMIN — CLOPIDOGREL BISULFATE 75 MG: 75 TABLET ORAL at 09:02

## 2022-04-11 RX ADMIN — TRAZODONE HYDROCHLORIDE 50 MG: 50 TABLET ORAL at 21:05

## 2022-04-11 RX ADMIN — MIRTAZAPINE 15 MG: 15 TABLET, FILM COATED ORAL at 21:05

## 2022-04-11 RX ADMIN — ESCITALOPRAM OXALATE 10 MG: 10 TABLET, FILM COATED ORAL at 09:03

## 2022-04-11 NOTE — GROUP NOTE
Group Therapy Note    Date: 4/11/2022    Group Start Time: 1007  Group End Time: 5967  Group Topic: Psychotherapy    BECCA Salas LSW        Group Therapy Note    Attendees: 9/17         Patient's Goal:  Increase interpersonal relationship skills    Notes:  Patient was an active listener during group discussion    Status After Intervention:  Unchanged    Participation Level:  Active Listener and Interactive    Participation Quality: Appropriate and Attentive      Speech:  normal      Thought Process/Content: Logical      Affective Functioning: Congruent      Mood: depressed      Level of consciousness:  Alert, Oriented x4 and Attentive      Response to Learning: Able to verbalize current knowledge/experience and Able to verbalize/acknowledge new learning      Endings: None Reported    Modes of Intervention: Support, Socialization and Exploration      Discipline Responsible: /Counselor      Signature:  Sharyle Qualia, MSW, LSW
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
## 2022-04-11 NOTE — GROUP NOTE
Group Therapy Note    Date: 4/11/2022    Group Start Time: 0900  Group End Time: 0930  Group Topic: Community Meeting    SHANNAN Penyn        Group Therapy Note    Attendees: 8/16         Patient's Goal:  To get through the day    Notes:  Goals Group    Status After Intervention:  Unchanged    Participation Level:  Active Listener and Interactive    Participation Quality: Appropriate, Attentive and Sharing      Speech:  normal      Thought Process/Content: Logical  Linear      Affective Functioning: Congruent      Mood: euthymic      Level of consciousness:  Oriented x4      Response to Learning: Able to verbalize current knowledge/experience and Able to verbalize/acknowledge new learning      Endings: None Reported    Modes of Intervention: Education, Support and Socialization      Discipline Responsible: Behavorial Health Tech      Signature:  Nataliya Penny

## 2022-04-11 NOTE — GROUP NOTE
Group Therapy Note    Date: 4/11/2022    Group Start Time: 1105  Group End Time: 1632  Group Topic: Music Therapy    SHANNAN Sheldon        Group Therapy Note    Pt did not attend music therapy group d/t resting in room despite staff invitation to attend. 1:1 talk time offered as alternative to group session, pt declined.

## 2022-04-11 NOTE — PROGRESS NOTES
[facility] Internal Medicine  Geneva Zuluaga MD; Audra Veloz MD; Anika Kramer MD; MD Tamar Couch MD; MD SAL Castañeda [facility] Internal Medicine   [facility]            Date:   4/11/2022  Patient name:  Adriana Fritz  Date of admission:  4/9/2022  2:50 AM  MRN:   [id number]  Account:  [de-identified]  YOB: 1965  PCP:    No primary care provider on file. Room:   [address] [place] [address]  Code Status:    Full Code    Chief Complaint:     No chief complaint on file. Hypertension hyperlipidemia history of TIA    History Obtained From:     Patient medical record nursing staff    History of Present Illness:   Principal Problem:    Major depressive disorder, recurrent severe without psychotic features ([facility] [address]  Active Problems:    TIA (transient ischemic attack)    Cocaine abuse, episodic (HCC)    Alcohol abuse, episodic  Resolved Problems:    * No resolved hospital problems. *      Adriana Fritz is a 65 y.o. Non- / non  male who presents with No chief complaint on file. and is admitted to the hospital for the management of Major depressive disorder, recurrent severe without psychotic features ([address] HLD  Onset more than 5 years ago  Severity is mild, not getting worse  Not associated with pancreatitis  Tolerating statin well no muscle pain    Hx of tia    Past Medical History:     Past Medical History:   Diagnosis Date    Arthritis     Hypertension     Liver disease     Psoriasis     Psychiatric problem     Seizures ([address]         Past Surgical History:     Past Surgical History:   Procedure Laterality Date    TONSILLECTOMY          Medications Prior to Admission:     Prior to Admission medications    Medication Sig Start Date End Date Taking?  Authorizing Provider   aspirin 81 MG EC tablet Take 1 tablet by mouth daily 9/28/21 12/27/21  Carlos Man MD   atorvastatin (LIPITOR) 80 MG tablet Take 1 tablet by mouth nightly 9/27/21 12/26/21  Carlos Man MD   clopidogrel (PLAVIX) 75 MG tablet Take 1 tablet by mouth daily for 21 doses 9/28/21 10/19/21  Carlos Man MD   escitalopram (LEXAPRO) 20 MG tablet Take 20 mg by mouth daily    Historical Provider, MD   lisinopril (PRINIVIL;ZESTRIL) 5 MG tablet Take 5 mg by mouth daily    Historical Provider, MD   mirtazapine (REMERON) 15 MG tablet Take 1 tablet by mouth nightly 1/28/21   Barby Murdock MD   traZODone (DESYREL) 50 MG tablet Take 1 tablet by mouth nightly as needed for Sleep 1/28/21   Barby Murdock MD        Allergies:     Patient has no known allergies. Social History:     Tobacco:    reports that he has been smoking cigarettes. He started smoking about 31 years ago. He has a 7.50 pack-year smoking history. He has never used smokeless tobacco.  Alcohol:      reports current alcohol use. Drug Use:  reports previous drug use. Drug: Cocaine. Family History:     No family history on file. Review of Systems:     Positive and Negative as described in HPI.     CONSTITUTIONAL:  negative for fevers, chills, sweats, fatigue, weight loss  HEENT:  negative for vision, hearing changes, runny nose, throat pain  RESPIRATORY:  negative for shortness of breath, cough, congestion, wheezing  CARDIOVASCULAR:  negative for chest pain, palpitations  GASTROINTESTINAL:  negative for nausea, vomiting, diarrhea, constipation, change in bowel habits, abdominal pain   GENITOURINARY:  negative for difficulty of urination, burning with urination, frequency   INTEGUMENT:  negative for rash, skin lesions, easy bruising   HEMATOLOGIC/LYMPHATIC:  negative for swelling/edema   ALLERGIC/IMMUNOLOGIC:  negative for urticaria , itching  ENDOCRINE:  negative increase in drinking, increase in urination, hot or cold intolerance  MUSCULOSKELETAL:  negative joint pains, muscle aches, swelling of joints  NEUROLOGICAL:  negative for headaches, dizziness, lightheadedness, numbness, pain, tingling extremities      Physical Exam:     Vitals:    04/09/22 0815 04/09/22 2000 04/10/22 0810 04/10/22 2015   BP: 122/68 129/71 130/69 129/74   Pulse: 66 79 71 68   Resp: 14 14 14 14   Temp: 98.4 °F (36.9 °C) 98.2 °F (36.8 °C) 98.3 °F (36.8 °C) 98.3 °F (36.8 °C)   TempSrc: Oral Oral Oral Oral   Weight:       Height:           General Appearance: alert, well appearing, and in no acute distress  Mental status: oriented to person, place, and time  Head: normocephalic, atraumatic  Eye: no icterus, redness, pupils equal and reactive, extraocular eye movements intact, conjunctiva clear  Ear: normal external ear, no discharge, hearing intact  Nose: no drainage noted  Mouth: mucous membranes moist  Neck: supple, no carotid bruits, thyroid not palpable  Lungs: Bilateral equal air entry, clear to ausculation, no wheezing, rales or rhonchi, normal effort  Cardiovascular: normal rate, regular rhythm, no murmur, gallop, rub  Abdomen: Soft, nontender, nondistended, normal bowel sounds, no hepatomegaly or splenomegaly  Neurologic: There are no new focal motor or sensory deficits, normal muscle tone and bulk, no abnormal sensation, normal speech, cranial nerves II through XII grossly intact  Skin: No gross lesions, rashes, bruising or bleeding on exposed skin area  Extremities: peripheral pulses palpable, no pedal edema or calf pain with     Investigations:      Laboratory Testing:  Recent Results (from the past 24 hour(s))   Comprehensive Metabolic Panel with Bilirubin    Collection Time: 04/11/22  9:47 AM   Result Value Ref Range    Albumin 3.6 3.5 - 5.2 g/dL    Alkaline Phosphatase 51 40 - 129 U/L    ALT 34 5 - 41 U/L    AST 32 <40 U/L    Total Bilirubin 0.30 0.3 - 1.2 mg/dL    Bilirubin, Direct 0.10 <0.31 mg/dL    Bilirubin, Indirect 0.20 0.00 - 1.00 mg/dL    BUN 11 6 - 20 mg/dL    Calcium 8.7 8.6 - 10.4 mg/dL    CREATININE 0.55 (L) 0.70 - 1.20 mg/dL    Glucose 86 70 - 99 mg/dL    Total Protein 6.0 (L) 6.4 - 8.3 g/dL    Sodium 138 135 - 144 mmol/L    Potassium 4.6 3.7 - 5.3 mmol/L    Chloride 102 98 - 107 mmol/L    CO2 28 20 - 31 mmol/L    Anion Gap 8 (L) 9 - 17 mmol/L    GFR Non-African American >60 >60 mL/min    GFR African American >60 >60 mL/min    GFR Comment             Imaging/Diagnostics:  No results found. Assessment :      Hospital Problems           Last Modified POA    * (Principal) Major depressive disorder, recurrent severe without psychotic features ([facility] [address] 4/9/2022 Yes    TIA (transient ischemic attack) 4/10/2022 Yes    Cocaine abuse, episodic ([facility] [address] 4/9/2022 Yes    Alcohol abuse, episodic 4/9/2022 Yes          Plan:       16-year-old gentleman with history of for TIA transient weakness in left leg in which she recovered  Start aspirin 81 mg Plavix 75 mg  Hyperlipidemia start Lipitor 80 mg  Elevated liver function panel will do hepatitis C work-up possibly due to Lipitor  Alcohol abuse advised against alcohol use    4/11/22    · Liver enzymes ok  · Recent TIA  · On asp and lipitor  · Vitals stable 1. Tony Mills MD  4/11/2022  12:48 PM    Copy sent to Dr. Renetta Baca primary care provider on file. Please note that this chart was generated using voice recognition Dragon dictation software. Although every effort was made to ensure the accuracy of this automated transcription, some errors in transcription may have occurred.

## 2022-04-11 NOTE — PROGRESS NOTES
Pharmacy Med Education Group Note    Date: 4/11/22  Start Time: 1330  End Time: 1400    Number Participants in Group:  4    Goal:  Patient will demonstrate an understanding of the medications intended purpose and possible adverse effects  Topic: [place] for Pharmacy Med Ed Group    Discipline Responsible:     OT  AT  [facility].  RT     X Other       Participation Level:     None  Minimal      X Active Listener    X Interactive    Monopolizing         Participation Quality:    X Appropriate  Inappropriate     X       Attentive        Intrusive          Sharing        Resistant          Supportive        Lethargic       Affective:     X Congruent  Incongruent  Blunted  Flat    Constricted  Anxious  Elated  Angry    Labile  Depressed  Other         Cognitive:    X Alert  Oriented PPTP     Concentration   X G  F  P   Attention Span   X G  F  P   Short-Term Memory   X G  F  P   Long-Term Memory  G  F  P   ProblemSolving/  Decision Making  G  F  P   Ability to Process  Information   X G  F  P      Contributing Factors             Delusional             Hallucinating             Flight of Ideas             Other:       Modes of Intervention:    X Education   X Support  Exploration    Clarifying  Problem Solving  Confrontation    Socialization  Limit Setting  Reality Testing    Activity  Movement  Media    Other:            Response to Learning:    X Able to verbalize current knowledge/experience    Able to verbalize/acknowledge new learning    Able to retain information    Capable of insight    Able to change behavior    Progressing to goal    Other:        Comments: Patient provided with a list of current medications.     Nandini Hodgson, PharmD, BCPS, BCPP  4/11/2022 3:02 PM  [phone]

## 2022-04-11 NOTE — PLAN OF CARE
Problem: Altered Mood, Depressive Behavior:  Goal: Able to verbalize and/or display a decrease in depressive symptoms  Description: Able to verbalize and/or display a decrease in depressive symptoms  Outcome: Ongoing   Patient reports depression at this time. Patient encouraged to attend groups to work on coping skills for life stressors. Problem: Altered Mood, Depressive Behavior:  Goal: Ability to disclose and discuss suicidal ideas will improve  Description: Ability to disclose and discuss suicidal ideas will improve  Outcome: Ongoing   Patient denies suicidal ideas at this time. Problem: Altered Mood, Depressive Behavior:  Goal: Absence of self-harm  Description: Absence of self-harm  Outcome: Ongoing   Patient denies self harm at this time. No sign of self harm noted. Problem: Tobacco Use:  Goal: Inpatient tobacco use cessation counseling participation  Description: Inpatient tobacco use cessation counseling participation  Outcome: Ongoing   Patient denies tobacco counseling at this time. Will continue to monitor for safety every 15 minutes.

## 2022-04-12 PROCEDURE — 99232 SBSQ HOSP IP/OBS MODERATE 35: CPT | Performed by: PSYCHIATRY & NEUROLOGY

## 2022-04-12 PROCEDURE — APPSS30 APP SPLIT SHARED TIME 16-30 MINUTES

## 2022-04-12 PROCEDURE — 99232 SBSQ HOSP IP/OBS MODERATE 35: CPT | Performed by: INTERNAL MEDICINE

## 2022-04-12 PROCEDURE — 1240000000 HC EMOTIONAL WELLNESS R&B

## 2022-04-12 PROCEDURE — 6370000000 HC RX 637 (ALT 250 FOR IP): Performed by: PSYCHIATRY & NEUROLOGY

## 2022-04-12 RX ORDER — ASPIRIN 81 MG/1
81 TABLET ORAL DAILY
Qty: 90 TABLET | Refills: 0 | Status: ON HOLD | OUTPATIENT
Start: 2022-04-12 | End: 2022-07-14 | Stop reason: HOSPADM

## 2022-04-12 RX ORDER — ESCITALOPRAM OXALATE 10 MG/1
15 TABLET ORAL DAILY
Status: DISCONTINUED | OUTPATIENT
Start: 2022-04-13 | End: 2022-04-14

## 2022-04-12 RX ORDER — ESCITALOPRAM OXALATE 10 MG/1
10 TABLET ORAL DAILY
Qty: 30 TABLET | Refills: 3 | Status: SHIPPED | OUTPATIENT
Start: 2022-04-13 | End: 2022-04-15

## 2022-04-12 RX ORDER — MIRTAZAPINE 15 MG/1
15 TABLET, FILM COATED ORAL NIGHTLY
Qty: 30 TABLET | Refills: 0 | Status: ON HOLD | OUTPATIENT
Start: 2022-04-12 | End: 2022-07-14 | Stop reason: HOSPADM

## 2022-04-12 RX ORDER — CLOPIDOGREL BISULFATE 75 MG/1
75 TABLET ORAL DAILY
Qty: 21 TABLET | Refills: 0 | Status: ON HOLD | OUTPATIENT
Start: 2022-04-12 | End: 2022-07-14 | Stop reason: HOSPADM

## 2022-04-12 RX ORDER — ATORVASTATIN CALCIUM 80 MG/1
80 TABLET, FILM COATED ORAL NIGHTLY
Qty: 90 TABLET | Refills: 0 | Status: ON HOLD | OUTPATIENT
Start: 2022-04-12 | End: 2022-07-14

## 2022-04-12 RX ORDER — TRAZODONE HYDROCHLORIDE 50 MG/1
50 TABLET ORAL NIGHTLY PRN
Qty: 30 TABLET | Refills: 0 | Status: ON HOLD | OUTPATIENT
Start: 2022-04-12 | End: 2022-07-14 | Stop reason: SDUPTHER

## 2022-04-12 RX ADMIN — ASPIRIN 81 MG: 81 TABLET, COATED ORAL at 08:16

## 2022-04-12 RX ADMIN — HYDROXYZINE HYDROCHLORIDE 50 MG: 50 TABLET, FILM COATED ORAL at 21:11

## 2022-04-12 RX ADMIN — ESCITALOPRAM OXALATE 10 MG: 10 TABLET, FILM COATED ORAL at 08:16

## 2022-04-12 RX ADMIN — MIRTAZAPINE 15 MG: 15 TABLET, FILM COATED ORAL at 21:11

## 2022-04-12 RX ADMIN — TRAZODONE HYDROCHLORIDE 50 MG: 50 TABLET ORAL at 21:11

## 2022-04-12 RX ADMIN — CLOPIDOGREL BISULFATE 75 MG: 75 TABLET ORAL at 08:16

## 2022-04-12 RX ADMIN — ATORVASTATIN CALCIUM 80 MG: 20 TABLET, FILM COATED ORAL at 21:11

## 2022-04-12 NOTE — PROGRESS NOTES
CLINICAL PHARMACY NOTE: MEDS TO BEDS    Total # of Prescriptions Filled: 5   The following medications were delivered to the patient:  · Mirtazapine 15mg  · Clopidogrel 75mg  · Trazodone HCL 50mg  · Atorvastatin 80mg  · Escitalopram 20mg    Additional Documentation:  Delivered medications to nurses station

## 2022-04-12 NOTE — PLAN OF CARE
Problem: Altered Mood, Depressive Behavior:  Goal: Able to verbalize and/or display a decrease in depressive symptoms  Description: Able to verbalize and/or display a decrease in depressive symptoms  Outcome: Ongoing, Patient affect flat; with a sad mood related recent breakup with is wife. Depression 8 out of 10. Patient encourage to seek staff  for 1:1 talk time and continue his medication regime. Problem: Altered Mood, Depressive Behavior:  Goal: Ability to disclose and discuss suicidal ideas will improve  Description: Ability to disclose and discuss suicidal ideas will improve  Outcome: Ongoing, Patient denied suicidal thoughts at this time. Problem: Altered Mood, Depressive Behavior:  Goal: Absence of self-harm  Description: Absence of self-harm  Outcome: Ongoing, Patient remain safe  while on the unit and denied thoughts of self harm. 15 MIN safety checks.

## 2022-04-12 NOTE — BH NOTE
Writer belle Benjamin) Pharmacist; staff is to inform patient to follow GRIS Rossi DR's instructions related to medication after discharge. Patient was informed.

## 2022-04-12 NOTE — GROUP NOTE
Group Therapy Note    Date: 4/12/2022    Group Start Time: 1100  Group End Time: 1130  Group Topic: Cognitive Skills    [facility]        Group Therapy Note    Attendees: 5/15                 Status After Intervention:  Improved    Participation Level:  Active Listener and Interactive    Participation Quality: Appropriate and Sharing      Speech:  normal      Thought Process/Content: Logical      Affective Functioning: Congruent      Mood: depressed and euthymic      Level of consciousness:  Alert and Attentive      Response to Learning: Able to verbalize current knowledge/experience and Able to verbalize/acknowledge new learning      Endings: None Reported    Modes of Intervention: Education and Support      Discipline Responsible: Behavorial Health Tech       Signature:  [facility]

## 2022-04-12 NOTE — PROGRESS NOTES
Daily Progress Note  4/12/2022    Patient Name: Sherron Kaye    CHIEF COMPLAINT:  Depression with suicidal ideation with plan to cut self with boxcutter          SUBJECTIVE:      Patient is seen today for a follow up assessment. Patient is compliant with scheduled medications. Patient has not required emergency medications in the past 24 hours. Patient is agreeable to interview in his room today where he is noted to be reading a book. Patient reports that he continues to struggled with significant depression and anxiety and rates his anxiety as a 10 (0-10 scale with 0 being none and 10 being the worst). He reports that he continues to feel extremely hopeless and helpless regarding his situation with recently breaking up with his wife. He appears very restless and on edge during conversation. He states he is really struggling with his concentration today and is having trouble reading his book. He reports poor sleep stating that he woke up multiple times throughout the night however does feel well rested today. He denies any problems with his appetite. Nathaly Koo endorses active suicidal ideation. He denies homicidal ideation. He continues to feel that he would be very unsafe out of the hospital at this time due to his increased suicidal ideation. He denies auditory and visual hallucinations. He denies paranoia. He currently is very concerned about where he is going to go once he is discharged from the hospital because he left his wife and now is currently homeless. He denies any medication side effects or other medical concerns at this time. We discussed the importance of not only utilizing medication but discussed that with situations like this therapy can be very helpful as well.      Appetite:  [x] Normal/Adequate/Unchanged  [] Increased  [] Decreased      Sleep:       [] Normal/Adequate/Unchanged  [] Fair  [x] Poor      Group Attendance on Unit:   [x] Yes  [] Selectively    [] No    Medication Side Effects:  Patient denies any medication side effects at the time of assessment. Mental Status Exam  Level of consciousness: Alert and awake. Appearance: Appropriate attire for setting, resting in bed, with fair  grooming and hygiene. Behavior/Motor: Approachable, psychomotor agitation, appears restless  Attitude toward examiner: Cooperative, attentive, good eye contact. Speech: Normal rate, normal volume, normal tone becomes irritable at times  Mood:  Patient reports \"not too good\". Affect: Anxious  Thought processes: Linear and coherent. Thought content: Denies homicidal ideation. Suicidal Ideation: Active suicidal ideations  Delusions: No evidence of delusions. Denies paranoia. Perceptual Disturbance: Patient does not appear to be responding to internal stimuli. Denies auditory hallucinations. Denies visual hallucinations. Cognition: Oriented to self, location, time, and situation. Memory: Intact. Insight & Judgement: Poor. Data   height is 5' 11\" (1.803 m) and weight is 172 lb (78 kg). His oral temperature is 97.6 °F (36.4 °C). His blood pressure is 107/67 and his pulse is 59. His respiration is 14. Labs:   Admission on 04/09/2022   Component Date Value Ref Range Status    Hepatitis C Ab 04/11/2022 NONREACTIVE  NONREACTIVE Final    Comment:       The hepatitis C procedure used in our laboratory is a Chemiluminescent test specific for   three recombinant HCV antigens. A negative anti-HCV result indicates that the antibodies to   hepatitis C virus are not present at this time. Individuals with reactive anti-HCV should be considered infected and infectious until proven   otherwise. Confirmation of all equivocal or reactive results is recommended by ordering   HCV RNA by PCR.       Albumin 04/11/2022 3.6  3.5 - 5.2 g/dL Final    Alkaline Phosphatase 04/11/2022 51  40 - 129 U/L Final    ALT 04/11/2022 34  5 - 41 U/L Final    AST 04/11/2022 32  <40 U/L Final    Total Bilirubin 04/11/2022 0.30  0.3 - 1.2 mg/dL Final    Bilirubin, Direct 04/11/2022 0.10  <0.31 mg/dL Final    Bilirubin, Indirect 04/11/2022 0.20  0.00 - 1.00 mg/dL Final    BUN 04/11/2022 11  6 - 20 mg/dL Final    Calcium 04/11/2022 8.7  8.6 - 10.4 mg/dL Final    CREATININE 04/11/2022 0.55* 0.70 - 1.20 mg/dL Final    Glucose 04/11/2022 86  70 - 99 mg/dL Final    Total Protein 04/11/2022 6.0* 6.4 - 8.3 g/dL Final    Sodium 04/11/2022 138  135 - 144 mmol/L Final    Potassium 04/11/2022 4.6  3.7 - 5.3 mmol/L Final    Chloride 04/11/2022 102  98 - 107 mmol/L Final    CO2 04/11/2022 28  20 - 31 mmol/L Final    Anion Gap 04/11/2022 8* 9 - 17 mmol/L Final    GFR Non- 04/11/2022 >60  >60 mL/min Final    GFR  04/11/2022 >60  >60 mL/min Final    GFR Comment 04/11/2022        Final    Comment: Average GFR for 52-63 years old:   80 mL/min/1.73sq m  Chronic Kidney Disease:   <60 mL/min/1.73sq m  Kidney failure:   <15 mL/min/1.73sq m              eGFR calculated using average adult body mass. Additional eGFR calculator available at:        [employer]                 Reviewed patient's current plan of care and vital signs with nursing staff.     Labs reviewed: [x] Yes  Last EKG in EMR reviewed: [x] Yes  QTc: 442    Medications  Current Facility-Administered Medications: [START ON 4/13/2022] escitalopram (LEXAPRO) tablet 15 mg, 15 mg, Oral, Daily  mirtazapine (REMERON) tablet 15 mg, 15 mg, Oral, Nightly  acetaminophen (TYLENOL) tablet 650 mg, 650 mg, Oral, Q4H PRN  aluminum & magnesium hydroxide-simethicone (MAALOX) 200-200-20 MG/5ML suspension 30 mL, 30 mL, Oral, Q6H PRN  hydrOXYzine (ATARAX) tablet 50 mg, 50 mg, Oral, TID PRN  ibuprofen (ADVIL;MOTRIN) tablet 400 mg, 400 mg, Oral, Q6H PRN  polyethylene glycol (GLYCOLAX) packet 17 g, 17 g, Oral, Daily PRN  traZODone (DESYREL) tablet 50 mg, 50 mg, Oral, Nightly PRN  aspirin EC tablet 81 mg, 81 mg, Oral, Daily  atorvastatin (LIPITOR) tablet 80 mg, 80 mg, Oral, Nightly  clopidogrel (PLAVIX) tablet 75 mg, 75 mg, Oral, Daily  nicotine polacrilex (NICORETTE) gum 2 mg, 2 mg, Oral, Q2H PRN    ASSESSMENT  Major depressive disorder, recurrent severe without psychotic features ([facility] [address]         HANDOFF  Patient symptoms are: Remains Unstable. Medications as determined by attending physician     Monitor need and frequency of PRN medications. Encourage participation in groups and milieu. Probable discharge is to be determined by MD.     Electronically signed by SHARAN Malin CNP on 4/12/2022 at 2:56 PM    **This report has been created using voice recognition software. It may contain minor errors which are inherent in voice recognition technology. **    I independently saw and evaluated the patient. I reviewed the  documentation above. Any additional comments or changes to the midlevel provider's documentation are stated below otherwise agree with assessment. Patient continues to be anxious and depressed. He feels unable to contract for safety. He has ongoing suicidal thoughts. He has found little benefit from his antidepressant so far. We will increase the dose as noted below. PLAN  Lexapro increased to 15mg daily  Remeron continued  Attempt to develop insight  Psycho-education conducted. Supportive Therapy conducted.   Probable discharge is in 3-5 days  Follow-up daily while on inpatient unit    Electronically signed by Nabila Gilbert MD on 4/12/22 at 7:58 PM EDT

## 2022-04-12 NOTE — PLAN OF CARE
Problem: Altered Mood, Depressive Behavior:  Goal: Able to verbalize and/or display a decrease in depressive symptoms  Description: Able to verbalize and/or display a decrease in depressive symptoms  Outcome: Ongoing   Coop. With vitals taken. Pleasant on approach. Coop. Et controlled. Spends time in room resting on bed. Affect flat. Depressed mood. Relates  Is working on meds. Relates of hard time sleeping. States has never really slept well at night. Lives on taking naps. Attends all unit groups et unit activities. Nourishment taken well, accepting of all medications provided. Problem: Altered Mood, Depressive Behavior:  Goal: Ability to disclose and discuss suicidal ideas will improve  Description: Ability to disclose and discuss suicidal ideas will improve  Outcome: Ongoing   Relates is feeling safe at this time. No thoughts of self harm. Problem: Altered Mood, Depressive Behavior:  Goal: Absence of self-harm  Description: Absence of self-harm  Outcome: Ongoing   Remains free of self harm this shift.

## 2022-04-12 NOTE — DISCHARGE INSTR - DIET

## 2022-04-12 NOTE — GROUP NOTE
Group Therapy Note    Date: 4/12/2022    Group Start Time: 1000  Group End Time: 4433  Group Topic: Psychotherapy    CZ B[facility] BECCA Flores, CHENCHO        Group Therapy Note    Attendees: 6/15         Patient's Goal:  Increase interpersonal relationship skills    Notes:  Patient was an active participant in group discussion    Status After Intervention:  Unchanged    Participation Level:  Active Listener and Interactive    Participation Quality: Appropriate, Attentive and Sharing      Speech:  normal      Thought Process/Content: Logical  Linear      Affective Functioning: Congruent      Mood: depressed      Level of consciousness:  Alert, Oriented x4 and Attentive      Response to Learning: Able to verbalize current knowledge/experience, Able to verbalize/acknowledge new learning and Able to retain information      Endings: None Reported    Modes of Intervention: Support, Socialization and Exploration      Discipline Responsible: /Counselor      Signature:  BECCA Olivares, CHENCHO

## 2022-04-12 NOTE — PROGRESS NOTES
[facility] Internal Medicine  Erich Levy MD; Rosa Isela Mcghee MD; Teddy Wright MD; MD Colin Briceño MD; MD SAL Christianson [facility] Internal Medicine   [facility]            Date:   4/12/2022  Patient name:  Mendez Hung  Date of admission:  4/9/2022  2:50 AM  MRN:   [id number]  Account:  [de-identified]  YOB: 1965  PCP:    No primary care provider on file. Room:   [address] [place] [address]  Code Status:    Full Code    Chief Complaint:     No chief complaint on file. Hypertension hyperlipidemia history of TIA    History Obtained From:     Patient medical record nursing staff    History of Present Illness:   Principal Problem:    Major depressive disorder, recurrent severe without psychotic features ([facility] [address]  Active Problems:    TIA (transient ischemic attack)    Cocaine abuse, episodic (HCC)    Alcohol abuse, episodic  Resolved Problems:    * No resolved hospital problems. *      Mendez Hung is a 65 y.o. Non- / non  male who presents with No chief complaint on file. and is admitted to the hospital for the management of Major depressive disorder, recurrent severe without psychotic features ([address] HLD  Onset more than 5 years ago  Severity is mild, not getting worse  Not associated with pancreatitis  Tolerating statin well no muscle pain    Hx of tia    Past Medical History:     Past Medical History:   Diagnosis Date    Arthritis     Hypertension     Liver disease     Psoriasis     Psychiatric problem     Seizures ([facility] [address]         Past Surgical History:     Past Surgical History:   Procedure Laterality Date    TONSILLECTOMY          Medications Prior to Admission:     Prior to Admission medications    Medication Sig Start Date End Date Taking?  Authorizing Provider   mirtazapine (REMERON) 15 MG tablet Take 1 tablet by mouth nightly 4/12/22  Yes Norris Dockery MD   traZODone (DESYREL) 50 MG tablet Take 1 tablet by mouth nightly as needed for Sleep 4/12/22  Yes Anna Allen MD   escitalopram (LEXAPRO) 10 MG tablet Take 1 tablet by mouth daily 4/13/22  Yes Anna Allen MD   clopidogrel (PLAVIX) 75 MG tablet Take 1 tablet by mouth daily for 21 doses 4/12/22 5/3/22 Yes Anna Allen MD   atorvastatin (LIPITOR) 80 MG tablet Take 1 tablet by mouth nightly 4/12/22 7/11/22 Yes Anna Allen MD   aspirin 81 MG EC tablet Take 1 tablet by mouth daily 4/12/22 7/11/22 Yes Anna Allen MD        Allergies:     Patient has no known allergies. Social History:     Tobacco:    reports that he has been smoking cigarettes. He started smoking about 31 years ago. He has a 7.50 pack-year smoking history. He has never used smokeless tobacco.  Alcohol:      reports current alcohol use. Drug Use:  reports previous drug use. Drug: Cocaine. Family History:     No family history on file. Review of Systems:     Positive and Negative as described in HPI.     CONSTITUTIONAL:  negative for fevers, chills, sweats, fatigue, weight loss  HEENT:  negative for vision, hearing changes, runny nose, throat pain  RESPIRATORY:  negative for shortness of breath, cough, congestion, wheezing  CARDIOVASCULAR:  negative for chest pain, palpitations  GASTROINTESTINAL:  negative for nausea, vomiting, diarrhea, constipation, change in bowel habits, abdominal pain   GENITOURINARY:  negative for difficulty of urination, burning with urination, frequency   INTEGUMENT:  negative for rash, skin lesions, easy bruising   HEMATOLOGIC/LYMPHATIC:  negative for swelling/edema   ALLERGIC/IMMUNOLOGIC:  negative for urticaria , itching  ENDOCRINE:  negative increase in drinking, increase in urination, hot or cold intolerance  MUSCULOSKELETAL:  negative joint pains, muscle aches, swelling of joints  NEUROLOGICAL:  negative for headaches, dizziness, lightheadedness, numbness, pain, tingling extremities      Physical Exam:     Vitals:    04/10/22 2015 04/11/22 0730 04/11/22 1945 04/12/22 0757   BP: 129/74 125/69 116/60 107/67   Pulse: 68 57 69 59   Resp: 14 12 14 14   Temp: 98.3 °F (36.8 °C) 97.8 °F (36.6 °C) 98.1 °F (36.7 °C) 97.6 °F (36.4 °C)   TempSrc: Oral Oral Oral Oral   Weight:       Height:           General Appearance: alert, well appearing, and in no acute distress  Mental status: oriented to person, place, and time  Head: normocephalic, atraumatic  Eye: no icterus, redness, pupils equal and reactive, extraocular eye movements intact, conjunctiva clear  Ear: normal external ear, no discharge, hearing intact  Nose: no drainage noted  Mouth: mucous membranes moist  Neck: supple, no carotid bruits, thyroid not palpable  Lungs: Bilateral equal air entry, clear to ausculation, no wheezing, rales or rhonchi, normal effort  Cardiovascular: normal rate, regular rhythm, no murmur, gallop, rub  Abdomen: Soft, nontender, nondistended, normal bowel sounds, no hepatomegaly or splenomegaly  Neurologic: There are no new focal motor or sensory deficits, normal muscle tone and bulk, no abnormal sensation, normal speech, cranial nerves II through XII grossly intact  Skin: No gross lesions, rashes, bruising or bleeding on exposed skin area  Extremities: peripheral pulses palpable, no pedal edema or calf pain with     Investigations:      Laboratory Testing:  No results found for this or any previous visit (from the past 24 hour(s)). Imaging/Diagnostics:  No results found.     Assessment :      Hospital Problems           Last Modified POA    * (Principal) Major depressive disorder, recurrent severe without psychotic features ([address] 4/9/2022 Yes    TIA (transient ischemic attack) 4/10/2022 Yes    Cocaine abuse, episodic ([address] 4/9/2022 Yes    Alcohol abuse, episodic 4/9/2022 Yes          Plan:       71-year-old gentleman with history of for TIA transient weakness in left leg in which she recovered  Start aspirin 81 mg Plavix 75 mg  Hyperlipidemia start Lipitor 80 mg  Elevated liver

## 2022-04-12 NOTE — GROUP NOTE
Group Therapy Note    Date: 4/12/2022    Group Start Time: 530  Group End Time: 9680  Group Topic: Music Therapy    CZ BHGRIS G    Maryellen Mins        Group Therapy Note    Pt did not attend MUSIC THERAPY group d/t resting in room despite staff invitation to attend. 1:1 talk time offered as alternative to group session, pt declined.

## 2022-04-12 NOTE — GROUP NOTE
Group Therapy Note    Date: 4/12/2022    Group Start Time: 0900  Group End Time: 0915  Group Topic: Group Therapy    SHANNAN Fountain        Group Therapy Note    Attendees: 4/15         Patient's Goal:  To maintain focus today     Status After Intervention:  Unchanged    Participation Level:  Active Listener and Interactive    Participation Quality: Appropriate and Attentive      Speech:  normal      Thought Process/Content: Linear      Affective Functioning: Congruent      Mood: euthymic      Level of consciousness:  Alert and Oriented x4      Response to Learning: Able to verbalize current knowledge/experience and Able to verbalize/acknowledge new learning      Endings: None Reported    Modes of Intervention: Education and Support      Discipline Responsible: [facility] [address] [facility] [place]      Signature:  Rosy Rea

## 2022-04-13 PROCEDURE — 6370000000 HC RX 637 (ALT 250 FOR IP): Performed by: PSYCHIATRY & NEUROLOGY

## 2022-04-13 PROCEDURE — 99232 SBSQ HOSP IP/OBS MODERATE 35: CPT | Performed by: PSYCHIATRY & NEUROLOGY

## 2022-04-13 PROCEDURE — 1240000000 HC EMOTIONAL WELLNESS R&B

## 2022-04-13 PROCEDURE — APPSS30 APP SPLIT SHARED TIME 16-30 MINUTES

## 2022-04-13 RX ADMIN — ESCITALOPRAM OXALATE 15 MG: 10 TABLET ORAL at 08:08

## 2022-04-13 RX ADMIN — HYDROXYZINE HYDROCHLORIDE 50 MG: 50 TABLET, FILM COATED ORAL at 21:44

## 2022-04-13 RX ADMIN — CLOPIDOGREL BISULFATE 75 MG: 75 TABLET ORAL at 08:08

## 2022-04-13 RX ADMIN — ASPIRIN 81 MG: 81 TABLET, COATED ORAL at 08:08

## 2022-04-13 RX ADMIN — MIRTAZAPINE 15 MG: 15 TABLET, FILM COATED ORAL at 21:44

## 2022-04-13 RX ADMIN — ATORVASTATIN CALCIUM 80 MG: 20 TABLET, FILM COATED ORAL at 21:44

## 2022-04-13 RX ADMIN — TRAZODONE HYDROCHLORIDE 50 MG: 50 TABLET ORAL at 21:43

## 2022-04-13 NOTE — GROUP NOTE
Group Therapy Note    Date: 4/13/2022    Group Start Time: 1100  Group End Time: 1130  Group Topic: Relaxation    ST[facility] JOSE Das, CTRS    Pt did not attend 1100 relaxation group d/t resting in room despite staff invitation to attend. 1:1 talk time offered as alternative to group session, pt declined.           Signature:  Janee Allen

## 2022-04-13 NOTE — GROUP NOTE
Group Therapy Note    Date: 4/13/2022    Group Start Time: 1330  Group End Time: 1400  Group Topic: psychoeducation group    CZ BHVIVIANA Burgos        Group Therapy Note    Attendees: 9         Patient's Goal:  To improve self esteem/impove self awareness     Notes:   Pt was pleasant and participated well     Status After Intervention:  Improved    Participation Level:  Active Listener and Interactive    Participation Quality: Appropriate      Speech:  normal      Thought Process/Content: Logical      Affective Functioning: Flat      Mood: depressed      Level of consciousness:  Alert      Response to Learning: Able to verbalize current knowledge/experience and Progressing to goal      Endings: None Reported    Modes of Intervention: Education, Support and Problem-solving      Discipline Responsible: Psychoeducational Specialist      Signature:  Mahin Macias

## 2022-04-13 NOTE — GROUP NOTE
HS Goal Group     Date: April 12, 2022   Group Start Time: 2030   Group End Time: 2045   [address] [facility] [address]  Attendees: 6/12     Group Topic: HS Goal Group   Notes: Patient participated in HS goal group. Status After Intervention: Improved   Participation Level:  Active Listener and Interactive   Participation Quality: Appropriate, attentive and supportive   Speech: Normal   Thought Process/Content: Logical and linear   Affective Functioning: Congruent   Wood: WDL   Level of consciousness: Oriented x4   Response to Learning: Progressing to goal; able to verbalize current knowledge/experience, acknowledge new learning, retain information and change behavior    Endings: None reported   Modes of Intervention: Education and exploration     Discipline Responsible: Behavioral Health Tech   Signature: CARSON Banks

## 2022-04-13 NOTE — PLAN OF CARE
Problem: Altered Mood, Depressive Behavior:  Goal: Able to verbalize and/or display a decrease in depressive symptoms  Description: Able to verbalize and/or display a decrease in depressive symptoms  Outcome: Ongoing   Patient is calm, controlled and medication provided. Patient denies suicidal ideations but reports feelings of depression and anxiety. Patient is flat, aloof of peers but reports eating and sleeping adequately with safety checks Q15min and at irregular intervals,  Problem: Altered Mood, Depressive Behavior:  Goal: Ability to disclose and discuss suicidal ideas will improve  Description: Ability to disclose and discuss suicidal ideas will improve  Outcome: Ongoing   Patient is calm, controlled and medication provided. Patient denies suicidal ideations but reports feelings of depression and anxiety. Patient is flat, aloof of peers but reports eating and sleeping adequately with safety checks Q15min and at irregular intervals,  Problem: Altered Mood, Depressive Behavior:  Goal: Absence of self-harm  Description: Absence of self-harm  Outcome: Ongoing   Patient is calm, controlled and medication provided. Patient denies suicidal ideations but reports feelings of depression and anxiety.  Patient is flat, aloof of peers but reports eating and sleeping adequately with safety checks Q15min and at irregular intervals,  Problem: Tobacco Use:  Goal: Inpatient tobacco use cessation counseling participation  Description: Inpatient tobacco use cessation counseling participation  Outcome: Ongoing   Smoking education provided

## 2022-04-13 NOTE — PLAN OF CARE
Problem: Altered Mood, Depressive Behavior:  Goal: Able to verbalize and/or display a decrease in depressive symptoms  Description: Able to verbalize and/or display a decrease in depressive symptoms  4/12/2022 2224 by Garry Diaz LPN  Outcome: Ongoing  Patient actively engaged in conversation with staff and other patients, stated he is happier and feeling better than he was. Problem: Altered Mood, Depressive Behavior:  Goal: Absence of self-harm  Description: Absence of self-harm  4/12/2022 2224 by Garry Diaz LPN  Outcome: Ongoing  Patient verbalized he was not feeling the urge to self-harm at this time.

## 2022-04-13 NOTE — PROGRESS NOTES
Daily Progress Note  4/13/2022    Patient Name: Ez Hernández    CHIEF COMPLAINT:  Depression with suicidal ideation with plan to cut self with boxcutter          SUBJECTIVE:      Patient is seen today for a follow up assessment. Patient is compliant with scheduled medications. Patient has not required emergency medications in the past 24 hours. Patient is agreeable to interview in the unit sensory room today where prior to this he is noted to be watching TV with peers. Patient continues to endorse significant feelings of hopelessness and helplessness. He endorses significant depression and anxiety today rating both as a 10 (0-10 scale with 0 being none and 10 being the worst). He reports that he has not noted any change in his mood since arriving to the hospital.  He reports that he slept well last night and feels well rested today. He denies any problems with his appetite. Moraima Alba continues to endorse fleeting suicidal ideation however reports that the thoughts are less intense and severe today. He denies homicidal ideation. He continues to state that he would feel very unsafe out of the hospital at this time due to his continued suicidal thoughts. Patient denies auditory and visual hallucinations. He denies paranoia. He continues to be very hopeless and helpless regarding his situation and does not know where he is going to go once he is discharged from the hospital.  He denies any side effects to his medications or other medical concerns at this time. Appetite:  [x] Normal/Adequate/Unchanged  [] Increased  [] Decreased      Sleep:       [] Normal/Adequate/Unchanged  [x] Fair  [] Poor      Group Attendance on Unit:   [x] Yes  [] Selectively    [] No    Medication Side Effects:  Patient denies any medication side effects at the time of assessment. Mental Status Exam  Level of consciousness: Alert and awake.    Appearance: Appropriate attire for setting, seated in chair, with fair grooming and hygiene. Behavior/Motor: Approachable, psychomotor agitation  Attitude toward examiner: Cooperative, attentive, good eye contact. Speech: Normal rate, normal volume, depressed tone   Mood:  Patient reports \"not well\". Affect: Anxious, Depressed  Thought processes: Linear and coherent. Thought content: Denies homicidal ideation. Suicidal Ideation: Fleeting suicidal ideations  Delusions: No evidence of delusions. Denies paranoia. Perceptual Disturbance: Patient does not appear to be responding to internal stimuli. Denies auditory hallucinations. Denies visual hallucinations. Cognition: Oriented to self, location, time, and situation. Memory: Intact. Insight & Judgement: Poor. Data   height is 5' 11\" (1.803 m) and weight is 172 lb (78 kg). His oral temperature is 97.2 °F (36.2 °C). His blood pressure is 113/67 and his pulse is 79. His respiration is 14. Labs:   Admission on 04/09/2022   Component Date Value Ref Range Status    Hepatitis C Ab 04/11/2022 NONREACTIVE  NONREACTIVE Final    Comment:       The hepatitis C procedure used in our laboratory is a Chemiluminescent test specific for   three recombinant HCV antigens. A negative anti-HCV result indicates that the antibodies to   hepatitis C virus are not present at this time. Individuals with reactive anti-HCV should be considered infected and infectious until proven   otherwise. Confirmation of all equivocal or reactive results is recommended by ordering   HCV RNA by PCR.       Albumin 04/11/2022 3.6  3.5 - 5.2 g/dL Final    Alkaline Phosphatase 04/11/2022 51  40 - 129 U/L Final    ALT 04/11/2022 34  5 - 41 U/L Final    AST 04/11/2022 32  <40 U/L Final    Total Bilirubin 04/11/2022 0.30  0.3 - 1.2 mg/dL Final    Bilirubin, Direct 04/11/2022 0.10  <0.31 mg/dL Final    Bilirubin, Indirect 04/11/2022 0.20  0.00 - 1.00 mg/dL Final    BUN 04/11/2022 11  6 - 20 mg/dL Final    Calcium 04/11/2022 8.7  8.6 - 10.4 mg/dL Final    CREATININE 04/11/2022 0.55* 0.70 - 1.20 mg/dL Final    Glucose 04/11/2022 86  70 - 99 mg/dL Final    Total Protein 04/11/2022 6.0* 6.4 - 8.3 g/dL Final    Sodium 04/11/2022 138  135 - 144 mmol/L Final    Potassium 04/11/2022 4.6  3.7 - 5.3 mmol/L Final    Chloride 04/11/2022 102  98 - 107 mmol/L Final    CO2 04/11/2022 28  20 - 31 mmol/L Final    Anion Gap 04/11/2022 8* 9 - 17 mmol/L Final    GFR Non- 04/11/2022 >60  >60 mL/min Final    GFR  04/11/2022 >60  >60 mL/min Final    GFR Comment 04/11/2022        Final    Comment: Average GFR for 52-63 years old:   80 mL/min/1.73sq m  Chronic Kidney Disease:   <60 mL/min/1.73sq m  Kidney failure:   <15 mL/min/1.73sq m              eGFR calculated using average adult body mass. Additional eGFR calculator available at:        [employer]                 Reviewed patient's current plan of care and vital signs with nursing staff.     Labs reviewed: [x] Yes  Last EKG in EMR reviewed: [x] Yes  QTc: 442    Medications  Current Facility-Administered Medications: escitalopram (LEXAPRO) tablet 15 mg, 15 mg, Oral, Daily  mirtazapine (REMERON) tablet 15 mg, 15 mg, Oral, Nightly  acetaminophen (TYLENOL) tablet 650 mg, 650 mg, Oral, Q4H PRN  aluminum & magnesium hydroxide-simethicone (MAALOX) 200-200-20 MG/5ML suspension 30 mL, 30 mL, Oral, Q6H PRN  hydrOXYzine (ATARAX) tablet 50 mg, 50 mg, Oral, TID PRN  ibuprofen (ADVIL;MOTRIN) tablet 400 mg, 400 mg, Oral, Q6H PRN  polyethylene glycol (GLYCOLAX) packet 17 g, 17 g, Oral, Daily PRN  traZODone (DESYREL) tablet 50 mg, 50 mg, Oral, Nightly PRN  aspirin EC tablet 81 mg, 81 mg, Oral, Daily  atorvastatin (LIPITOR) tablet 80 mg, 80 mg, Oral, Nightly  clopidogrel (PLAVIX) tablet 75 mg, 75 mg, Oral, Daily  nicotine polacrilex (NICORETTE) gum 2 mg, 2 mg, Oral, Q2H PRN    ASSESSMENT  Major depressive disorder, recurrent severe without psychotic features ([facility] [address] HANDOFF  Patient symptoms are: Remains Unstable. Medications as determined by attending physician    Monitor need and frequency of PRN medications. Encourage participation in groups and milieu. Probable discharge is to be determined by MD.     Electronically signed by SHARAN Best CNP on 4/13/2022 at 2:09 PM    **This report has been created using voice recognition software. It may contain minor errors which are inherent in voice recognition technology. **    I independently saw and evaluated the patient. I reviewed the nurse practioner's documentation above. Any additional comments or changes to the    documentation are stated below otherwise agree with assessment. The patient was seen face-to-face. [facility] states that he feels like \"garbage\". [facility] continues to feel very depressed, hopeless and has ongoing suicidal thoughts. [facility] has not noticed much benefit with the medication so far.  We discussed that we have increased his dose yesterday. [facility] has only received a 15mg dose of Lexapro this morning.  We will hold his medications at the current doses.  We will consider increasing again tomorrow if needed. PLAN  Medications as noted above  Attempt to develop insight  Psycho-education conducted. Supportive Therapy conducted.   Probable discharge is 2-5 days  Follow-up daily while on inpatient unit    Electronically signed by Laura Thompson MD on 4/13/22 at 6:58 PM EDT

## 2022-04-13 NOTE — GROUP NOTE
Group Therapy Note    Date: 4/13/2022    Group Start Time: 1010  Group End Time: 5701  Group Topic: Psychotherapy    BECCA Glover, CHENCHO        Group Therapy Note    Attendees: 5/14         Patient's Goal:  Increase interpersonal relationship skills    Notes:  Patient was an active participant in group discussion    Status After Intervention:  Improved    Participation Level:  Active Listener and Interactive    Participation Quality: Appropriate, Attentive and Sharing      Speech:  normal      Thought Process/Content: Logical  Linear      Affective Functioning: Congruent      Mood: depressed      Level of consciousness:  Alert, Oriented x4 and Attentive      Response to Learning: Able to verbalize current knowledge/experience      Endings: None Reported    Modes of Intervention: Support, Socialization and Exploration      Discipline Responsible: /Counselor      Signature:  BECCA Grant, CHENCHO

## 2022-04-14 PROCEDURE — 6370000000 HC RX 637 (ALT 250 FOR IP): Performed by: PSYCHIATRY & NEUROLOGY

## 2022-04-14 PROCEDURE — 1240000000 HC EMOTIONAL WELLNESS R&B

## 2022-04-14 PROCEDURE — APPSS30 APP SPLIT SHARED TIME 16-30 MINUTES

## 2022-04-14 PROCEDURE — 99232 SBSQ HOSP IP/OBS MODERATE 35: CPT | Performed by: PSYCHIATRY & NEUROLOGY

## 2022-04-14 RX ORDER — ESCITALOPRAM OXALATE 20 MG/1
20 TABLET ORAL DAILY
Status: DISCONTINUED | OUTPATIENT
Start: 2022-04-15 | End: 2022-04-15 | Stop reason: HOSPADM

## 2022-04-14 RX ADMIN — ASPIRIN 81 MG: 81 TABLET, COATED ORAL at 08:00

## 2022-04-14 RX ADMIN — ATORVASTATIN CALCIUM 80 MG: 20 TABLET, FILM COATED ORAL at 21:37

## 2022-04-14 RX ADMIN — CLOPIDOGREL BISULFATE 75 MG: 75 TABLET ORAL at 08:00

## 2022-04-14 RX ADMIN — NICOTINE POLACRILEX 2 MG: 2 GUM, CHEWING BUCCAL at 19:00

## 2022-04-14 RX ADMIN — HYDROXYZINE HYDROCHLORIDE 50 MG: 50 TABLET, FILM COATED ORAL at 21:37

## 2022-04-14 RX ADMIN — ESCITALOPRAM OXALATE 15 MG: 10 TABLET ORAL at 08:00

## 2022-04-14 RX ADMIN — MIRTAZAPINE 15 MG: 15 TABLET, FILM COATED ORAL at 21:37

## 2022-04-14 RX ADMIN — TRAZODONE HYDROCHLORIDE 50 MG: 50 TABLET ORAL at 21:37

## 2022-04-14 NOTE — PLAN OF CARE
Problem: Altered Mood, Depressive Behavior:  Goal: Absence of self-harm  Description: Absence of self-harm  4/13/2022 2020 by Linnette Farooq RN  Outcome: Ongoing   Denies wanting to harm self. Problem: Tobacco Use:  Goal: Inpatient tobacco use cessation counseling participation  Description: Inpatient tobacco use cessation counseling participation  4/13/2022 2020 by Linnette Farooq RN  Outcome: Ongoing   Declines.

## 2022-04-14 NOTE — GROUP NOTE
Group Therapy Note    Date: 4/14/2022    Group Start Time: 1330  Group End Time: 3131  Group Topic: Recreational    SHANNAN ARIAS    Manjula Allen        Group Therapy Note    Attendees: 6/14         Patient's Goal:  Patients engaged in trivia-type game groups to increase socialization, provide distraction, and normalization of the environment    Notes:  Patinet attended and participated in group having positive interactions with peers and staff throughout. Patient was pleasant and engaing    Status After Intervention:  Improved    Participation Level:  Active Listener and Interactive    Participation Quality: Appropriate, Attentive and Sharing      Speech:  normal      Thought Process/Content: Logical  Linear      Affective Functioning: Congruent      Mood: euthymic      Level of consciousness:  Alert and Attentive      Response to Learning: Able to verbalize current knowledge/experience and Progressing to goal      Endings: None Reported    Modes of Intervention: Socialization, Activity and Reality-testing      Discipline Responsible: Psychoeducational Specialist      Signature:  Manjula Allen

## 2022-04-14 NOTE — PROGRESS NOTES
anxious and on edge  Attitude toward examiner: Cooperative, attentive, good eye contact. Speech: Normal rate, normal volume, depressed tone   Mood:  Patient reports \"I feel like shit\". Affect: Anxious, Depressed  Thought processes: Linear and coherent. Thought content: Denies homicidal ideation. Suicidal Ideation: Fleeting suicidal ideations  Delusions: No evidence of delusions. Denies paranoia. Perceptual Disturbance: Patient does not appear to be responding to internal stimuli. Denies auditory hallucinations. Denies visual hallucinations. Cognition: Oriented to self, location, time, and situation. Memory: Intact. Insight & Judgement: Poor. Data   height is 5' 11\" (1.803 m) and weight is 172 lb (78 kg). His oral temperature is 97.7 °F (36.5 °C). His blood pressure is 131/73 and his pulse is 61. His respiration is 14 and oxygen saturation is 98%. Labs:   Admission on 04/09/2022   Component Date Value Ref Range Status    Hepatitis C Ab 04/11/2022 NONREACTIVE  NONREACTIVE Final    Comment:       The hepatitis C procedure used in our laboratory is a Chemiluminescent test specific for   three recombinant HCV antigens. A negative anti-HCV result indicates that the antibodies to   hepatitis C virus are not present at this time. Individuals with reactive anti-HCV should be considered infected and infectious until proven   otherwise. Confirmation of all equivocal or reactive results is recommended by ordering   HCV RNA by PCR.       Albumin 04/11/2022 3.6  3.5 - 5.2 g/dL Final    Alkaline Phosphatase 04/11/2022 51  40 - 129 U/L Final    ALT 04/11/2022 34  5 - 41 U/L Final    AST 04/11/2022 32  <40 U/L Final    Total Bilirubin 04/11/2022 0.30  0.3 - 1.2 mg/dL Final    Bilirubin, Direct 04/11/2022 0.10  <0.31 mg/dL Final    Bilirubin, Indirect 04/11/2022 0.20  0.00 - 1.00 mg/dL Final    BUN 04/11/2022 11  6 - 20 mg/dL Final    Calcium 04/11/2022 8.7  8.6 - 10.4 mg/dL Final    CREATININE 04/11/2022 0.55* 0.70 - 1.20 mg/dL Final    Glucose 04/11/2022 86  70 - 99 mg/dL Final    Total Protein 04/11/2022 6.0* 6.4 - 8.3 g/dL Final    Sodium 04/11/2022 138  135 - 144 mmol/L Final    Potassium 04/11/2022 4.6  3.7 - 5.3 mmol/L Final    Chloride 04/11/2022 102  98 - 107 mmol/L Final    CO2 04/11/2022 28  20 - 31 mmol/L Final    Anion Gap 04/11/2022 8* 9 - 17 mmol/L Final    GFR Non- 04/11/2022 >60  >60 mL/min Final    GFR  04/11/2022 >60  >60 mL/min Final    GFR Comment 04/11/2022        Final    Comment: Average GFR for 52-63 years old:   80 mL/min/1.73sq m  Chronic Kidney Disease:   <60 mL/min/1.73sq m  Kidney failure:   <15 mL/min/1.73sq m              eGFR calculated using average adult body mass. Additional eGFR calculator available at:        [employer]                 Reviewed patient's current plan of care and vital signs with nursing staff.     Labs reviewed: [x] Yes  Last EKG in EMR reviewed: [x] Yes  QTc: 442    Medications  Current Facility-Administered Medications: [START ON 4/15/2022] escitalopram (LEXAPRO) tablet 20 mg, 20 mg, Oral, Daily  mirtazapine (REMERON) tablet 15 mg, 15 mg, Oral, Nightly  acetaminophen (TYLENOL) tablet 650 mg, 650 mg, Oral, Q4H PRN  aluminum & magnesium hydroxide-simethicone (MAALOX) 200-200-20 MG/5ML suspension 30 mL, 30 mL, Oral, Q6H PRN  hydrOXYzine (ATARAX) tablet 50 mg, 50 mg, Oral, TID PRN  ibuprofen (ADVIL;MOTRIN) tablet 400 mg, 400 mg, Oral, Q6H PRN  polyethylene glycol (GLYCOLAX) packet 17 g, 17 g, Oral, Daily PRN  traZODone (DESYREL) tablet 50 mg, 50 mg, Oral, Nightly PRN  aspirin EC tablet 81 mg, 81 mg, Oral, Daily  atorvastatin (LIPITOR) tablet 80 mg, 80 mg, Oral, Nightly  clopidogrel (PLAVIX) tablet 75 mg, 75 mg, Oral, Daily  nicotine polacrilex (NICORETTE) gum 2 mg, 2 mg, Oral, Q2H PRN    ASSESSMENT  Major depressive disorder, recurrent severe without psychotic features [facility])         HANDOFF  Patient symptoms are: Remains Unstable. Medications as discussed with attending physician  Titrate Lexapro to 20 mg nightly  Monitor need and frequency of PRN medications. Encourage participation in groups and milieu. Probable discharge is to be determined by MD.     Electronically signed by SHARAN Capone CNP on 4/14/2022 at 2:39 PM    **This report has been created using voice recognition software. It may contain minor errors which are inherent in voice recognition technology. **  I independently saw and evaluated the patient. I reviewed the nurse practioner's documentation above. Any additional comments or changes to the    documentation are stated below otherwise agree with assessment. The patient was seen face-to-face. [facility] states that his mood is slightly better compared to yesterday but still remains very low.  He continues to have suicidal thoughts. [facility] has noticed a little improvement with his Lexapro.  We will increase the dose of his Lexapro to 20 mg daily. PLAN  Medications as noted above  Attempt to develop insight  Psycho-education conducted. Supportive Therapy conducted.   Probable discharge is 1-4 days  Follow-up daily while on inpatient unit    Electronically signed by William Elaine MD on 4/14/22 at 9:41 PM EDT

## 2022-04-14 NOTE — GROUP NOTE
Group Therapy Note    Date: 4/14/2022    Group Start Time: 1100  Group End Time: 2534  Group Topic: Music Therapy    [facility]    Ulysses Lion        Group Therapy Note    Pt did not attend music therapy skills group d/t resting in room despite staff invitation to attend. 1:1 talk time offered as alternative to group session, pt declined.

## 2022-04-14 NOTE — PLAN OF CARE
Problem: Altered Mood, Depressive Behavior:  Goal: Able to verbalize and/or display a decrease in depressive symptoms  Description: Able to verbalize and/or display a decrease in depressive symptoms  Outcome: Ongoing   Patient is calm, controlled and medication provided. Patient denies suicidal ideations but reports feelings of depression and anxiety. Patient is polite, attends select groups and reports eating and sleeping adequately with safety checks Q15min and at irregular intervals,  Problem: Altered Mood, Depressive Behavior:  Goal: Ability to disclose and discuss suicidal ideas will improve  Description: Ability to disclose and discuss suicidal ideas will improve  Outcome: Ongoing    Patient is calm, controlled and medication provided. Patient denies suicidal ideations but reports feelings of depression and anxiety. Patient is polite, attends select groups and reports eating and sleeping adequately with safety checks Q15min and at irregular intervals,  Problem: Altered Mood, Depressive Behavior:  Goal: Absence of self-harm  Description: Absence of self-harm  Outcome: Ongoing    Patient is calm, controlled and medication provided. Patient denies suicidal ideations but reports feelings of depression and anxiety. Patient is polite, attends select groups and reports eating and sleeping adequately with safety checks Q15min and at irregular intervals,  Problem: Tobacco Use:  Goal: Inpatient tobacco use cessation counseling participation  Description: Inpatient tobacco use cessation counseling participation  Outcome: Ongoing   Smoking education provided.

## 2022-04-15 VITALS
TEMPERATURE: 97.3 F | BODY MASS INDEX: 24.08 KG/M2 | SYSTOLIC BLOOD PRESSURE: 106 MMHG | HEIGHT: 71 IN | OXYGEN SATURATION: 98 % | RESPIRATION RATE: 14 BRPM | WEIGHT: 172 LBS | DIASTOLIC BLOOD PRESSURE: 60 MMHG | HEART RATE: 65 BPM

## 2022-04-15 PROCEDURE — 6370000000 HC RX 637 (ALT 250 FOR IP): Performed by: PSYCHIATRY & NEUROLOGY

## 2022-04-15 PROCEDURE — 99239 HOSP IP/OBS DSCHRG MGMT >30: CPT | Performed by: PSYCHIATRY & NEUROLOGY

## 2022-04-15 RX ORDER — ESCITALOPRAM OXALATE 20 MG/1
20 TABLET ORAL DAILY
Qty: 30 TABLET | Refills: 0 | Status: ON HOLD | OUTPATIENT
Start: 2022-04-15 | End: 2022-07-14 | Stop reason: HOSPADM

## 2022-04-15 RX ADMIN — HYDROXYZINE HYDROCHLORIDE 50 MG: 50 TABLET, FILM COATED ORAL at 10:09

## 2022-04-15 RX ADMIN — CLOPIDOGREL BISULFATE 75 MG: 75 TABLET ORAL at 08:41

## 2022-04-15 RX ADMIN — ESCITALOPRAM OXALATE 20 MG: 20 TABLET ORAL at 08:41

## 2022-04-15 RX ADMIN — ASPIRIN 81 MG: 81 TABLET, COATED ORAL at 08:41

## 2022-04-15 NOTE — GROUP NOTE
Group Therapy Note    Date: 4/15/2022    Group Start Time: 0900  Group End Time: 0930  Group Topic: Group Documentation    ROHAN OROURKE    Suzan Calero LPN        Group Therapy Note    Attendees: 4/15         Patient's Goal:  Breaking pattern of depression    Status After Intervention:  Improved    Participation Level: Interactive    Participation Quality: Appropriate      Speech:  normal      Thought Process/Content: Logical      Affective Functioning: Congruent      Mood: euthymic      Level of consciousness:  Alert      Response to Learning: Able to verbalize current knowledge/experience      Endings: None Reported    Modes of Intervention: Education      Discipline Responsible: Licensed Practical Nurse      Signature:  Suzan Calero LPN

## 2022-04-15 NOTE — DISCHARGE INSTR - OTHER ORDERS
Make sure to take all medications prescribed by your Dr. Allyson Schuster not double up on doses. If you miss or skip a dose make sure to take the next scheduled dose. Make sure to follow up with all appointments. Make sure to attend all follow up appointments.

## 2022-04-15 NOTE — PLAN OF CARE
Problem: Altered Mood, Depressive Behavior:  Goal: Able to verbalize and/or display a decrease in depressive symptoms  Description: Able to verbalize and/or display a decrease in depressive symptoms  4/14/2022 2344 by Stefani Meraz LPN  Outcome: Ongoing  Note: Patient was able to verbalize a decrease in depression this shift. Patient stated he is still depressed but its better than it has been today. Patient out and selectively social this shift. Patient educated and agrees to come to staff if needed. Patient monitored every 15 minutes with environmental safety checks. Problem: Altered Mood, Depressive Behavior:  Goal: Ability to disclose and discuss suicidal ideas will improve  Description: Ability to disclose and discuss suicidal ideas will improve  4/14/2022 2344 by Stefani Meraz LPN  Outcome: Ongoing  Note:  Patient denies suicidal ideations at this time. Patient agreed to seek staff at anytime he felt like any urges to harm self would arise. Safety checks maintained [id number]. Problem: Altered Mood, Depressive Behavior:  Goal: Absence of self-harm  Description: Absence of self-harm  4/14/2022 2344 by Stefani Meraz LPN  Outcome: Ongoing  Note: Patient is free of self harm at this time. Patient agrees to seek out staff if thoughts to harm self arise. Staff will provide support and reassurance as needed. Safety checks maintained every 15 minutes.       Problem: Tobacco Use:  Goal: Inpatient tobacco use cessation counseling participation  Description: Inpatient tobacco use cessation counseling participation  4/14/2022 2344 by Stefani Meraz LPN  Outcome: Ongoing

## 2022-04-15 NOTE — BH NOTE
Patient is being discharged today. 4/15/2022. Writer called [employer] to schedule transportation, spoke with [employer]. Reservation # [id number]  ETA: Between 3-4 hours  [employer] stated \"if you do not hear from us by 2pm, give us a call back. \"

## 2022-04-15 NOTE — GROUP NOTE
Group Therapy Note    Date: 4/15/2022    Group Start Time: 1030  Group End Time: 1300  Group Topic: Music Therapy    [facility] Claudette Alcon        Group Therapy Note    Pt did not attend music therapy group d/t resting in room despite staff invitation to attend. 1:1 talk time offered as alternative to group session, pt declined.

## 2022-04-15 NOTE — DISCHARGE SUMMARY
DISCHARGE SUMMARY      Patient ID:  Jennifer Wright  [id number]  17 y.o.  1965    Admit date: 4/9/2022    Discharge date and time: 4/15/2022    Disposition: Home    Admitting Physician: Alexi Judd MD     Discharge Physician: Dr Gunner Mast MD    Admission Diagnoses: Depression with suicidal ideation [F32. A, R45.851]    Admission Condition: poor    Discharged Condition: stable    Admission Circumstance: Jennifer Wright is a 65 y.o. male who has a past medical history of arthritis, hypertension, liver disease, psoriasis, seizure during alcohol withdrawal, depression, and substance abuse. Patient presented to the [facility] ED after an argument with his girlfriend where they broke up, she told him to leave, and he put all of his belongings in his car. He left ED to go back to his car and put a boxcutter to his throat. Patient is known to this facility and was last discharged on 1/28/2021. This is his 4th admission to [facility].     Patient was seen for initial evaluation in his room today. He was observed to be resting in bed, but awake and reading a bible. He was calm and not in any acute distress. He presented as depressed and tearful. He reports \"low\" mood, and that the break up is \"still fresh,\". Patient endorses an increase in symptoms of depression lately due to ongoing struggles with his relationship. For more than 2 weeks and all day every day he experiences poor sleep, anhedonia, low motivation, feelings of worthlessness, low energy and decreased concentration, and hopelessness. He has noticed an increase in thoughts of harming himself lately. Patient is tearful when discussing break up. Patient states he does not feel he could ensure his safety in the community at this time. Patient reports feelings of anxiety including excessive worry, restlessness, edginess and nervousness. Patient reports he has experienced panic attacks in the past. Patient is not currently linked in the community for mental health services.  He also reports a recent history of unresolved grief related to the deaths of his mother, father, and two older brothers all over the last 15 years. Patient has a history of prior suicide attempt in 2005. He reports at that time he sat on a bridge over [address] [facility] with an intent to jump and get hit by a truck. He stated that at that time he was going through a divorce and lost his job. He also reports he has gotten intoxicated and went to lie down on the railroad tracks with intent to get run over.     Patient denies symptoms of mariam, OCD, or PTSD. He denies a history of trauma, psychosis, or  symptoms of a cluster B personality disorder. He reports he \"dabbles\" in cocaine, he will snort or smoke it. Patient endorses he recently smoked crack cocaine and drank 3 tall boys of beer prior to going to the ED. Tox screen from [facility] was positive for cocaine with blood alcohol level of 0.215. Patient states that he used to be heavy drinker but now he does not drink every day. Patient reports he has experienced a seizure while withdrawaling from alcohol, he denies current withdrawal symptoms.      Patient continues to endorse suicidal ideation today. Patient is unable to contract for safety in the community and requires inpatient hospitalization for stabilization, medication management, and therapeutic milieu. PAST MEDICAL/PSYCHIATRIC HISTORY:   Past Medical History:   Diagnosis Date    Arthritis     Hypertension     Liver disease     Psoriasis     Psychiatric problem     Seizures ([address]        FAMILY/SOCIAL HISTORY:  No family history on file.   Social History     Socioeconomic History    Marital status: Unknown     Spouse name: Not on file    Number of children: Not on file    Years of education: Not on file    Highest education level: Not on file   Occupational History    Not on file   Tobacco Use    Smoking status: Current Every Day Smoker     Packs/day: 0.25     Years: 30.00     Pack years: 7.50 Types: Cigarettes     Start date: 8/18/1990    Smokeless tobacco: Never Used   Vaping Use    Vaping Use: Never used   Substance and Sexual Activity    Alcohol use: Yes     Comment: weekends    Drug use: Not Currently     Types: Cocaine     Comment: occasionally    Sexual activity: Not on file   Other Topics Concern    Not on file   Social History Narrative    Not on file     Social Determinants of Health     Financial Resource Strain:     Difficulty of Paying Living Expenses: Not on file   Food Insecurity:     Worried About Running Out of Food in the Last Year: Not on file    Aniyah of Food in the Last Year: Not on file   Transportation Needs:     Lack of Transportation (Medical): Not on file    Lack of Transportation (Non-Medical):  Not on file   Physical Activity:     Days of Exercise per Week: Not on file    Minutes of Exercise per Session: Not on file   Stress:     Feeling of Stress : Not on file   Social Connections:     Frequency of Communication with Friends and Family: Not on file    Frequency of Social Gatherings with Friends and Family: Not on file    Attends Congregational Services: Not on file    Active Member of [address] [place] [address] or Organizations: Not on file    Attends Club or Organization Meetings: Not on file    Marital Status: Not on file   Intimate Partner Violence:     Fear of Current or Ex-Partner: Not on file    Emotionally Abused: Not on file    Physically Abused: Not on file    Sexually Abused: Not on file   Housing Stability:     Unable to Pay for Housing in the Last Year: Not on file    Number of [place] in the Last Year: Not on file    Unstable Housing in the Last Year: Not on file       MEDICATIONS:    Current Facility-Administered Medications:     escitalopram (LEXAPRO) tablet 20 mg, 20 mg, Oral, Daily, Tyler Parikh MD, 20 mg at 04/15/22 0841    mirtazapine (REMERON) tablet 15 mg, 15 mg, Oral, Nightly, Tyler Parikh MD, 15 mg at 04/14/22 2137    acetaminophen (TYLENOL) tablet 650 mg, 650 mg, Oral, Q4H PRN, Ada Cordova MD    aluminum & magnesium hydroxide-simethicone (MAALOX) 200-200-20 MG/5ML suspension 30 mL, 30 mL, Oral, Q6H PRN, Ada Cordova MD    hydrOXYzine (ATARAX) tablet 50 mg, 50 mg, Oral, TID PRN, Ada Cordova MD, 50 mg at 04/14/22 2137    ibuprofen (ADVIL;MOTRIN) tablet 400 mg, 400 mg, Oral, Q6H PRN, Ada Cordova MD    polyethylene glycol (GLYCOLAX) packet 17 g, 17 g, Oral, Daily PRN, Ada Cordova MD    traZODone (DESYREL) tablet 50 mg, 50 mg, Oral, Nightly PRN, Ada Cordova MD, 50 mg at 04/14/22 2137    aspirin EC tablet 81 mg, 81 mg, Oral, Daily, Baylee Aguilar MD, 81 mg at 04/15/22 0841    atorvastatin (LIPITOR) tablet 80 mg, 80 mg, Oral, Nightly, Baylee Aguilar MD, 80 mg at 04/14/22 2137    clopidogrel (PLAVIX) tablet 75 mg, 75 mg, Oral, Daily, Baylee Aguilar MD, 75 mg at 04/15/22 0841    nicotine polacrilex (NICORETTE) gum 2 mg, 2 mg, Oral, Q2H PRN, Ada Cordova MD, 2 mg at 04/14/22 1900    Examination:  /60   Pulse 65   Temp 97.3 °F (36.3 °C) (Oral)   Resp 14   Ht 5' 11\" (1.803 m)   Wt 172 lb (78 kg)   SpO2 98%   BMI 23.99 kg/m²   Gait - steady    HOSPITAL COURSE[de-identified]  Following admission to the hospital, patient had a complete physical exam and blood work up. The patient was referred to Internal Medicine. Patient was monitored closely with suicide precaution  Patient was started on Lexapro and dose was titrated up to 20mg daily  Was encouraged to participate in group and other milieu activity  Patient started to feel better with this combination of treatment. Significant progress in the symptoms since admission.     Mood is improved  The patient denies AVH or paranoid thoughts  The patient denies any hopelessness or worthlessness  No active SI/HI  Appetite:  [x] Normal  [] Increased  [] Decreased    Sleep:       [x] Normal  [] Fair       [] Poor            Energy:    [x] Normal  [] Increased  [] Decreased     SI [] Present  [x] Absent  HI  []Present  [x] Absent   Aggression:  [] yes  [] no  Patient is [x] able  [] unable to CONTRACT FOR SAFETY   Medication side effects(SE):  [x] None(Psych. Meds.) [] Other      Mental Status Examination on discharge:    Level of consciousness:  within normal limits   Appearance:  well-appearing  Behavior/Motor:  no abnormalities noted  Attitude toward examiner:  attentive and good eye contact  Speech:  spontaneous, normal rate and normal volume   Mood: depressed  Affect:  constricted  Thought processes:  linear, goal directed and coherent   Thought content:  Suicidal Ideation:  denies suicidal ideation  Delusions:  no evidence of delusions  Perceptual Disturbance:  denies any perceptual disturbance  Cognition:  oriented to person, place, and time   Concentration intact  Memory intact  Insight good   Judgement fair   Fund of Knowledge adequate      ASSESSMENT:  Patient symptoms are:  [x] Well controlled  [x] Improving  [] Worsening  [] No change      Diagnosis:  Principal Problem:    Major depressive disorder, recurrent severe without psychotic features ([facility]  Active Problems:    TIA (transient ischemic attack)    Cocaine abuse, episodic ([facility]    Alcohol abuse, episodic  Resolved Problems:    * No resolved hospital problems. *      LABS:    No results for input(s): WBC, HGB, PLT in the last 72 hours. No results for input(s): NA, K, CL, CO2, BUN, CREATININE, GLUCOSE in the last 72 hours. No results for input(s): BILITOT, ALKPHOS, AST, ALT in the last 72 hours. Lab Results   Component Value Date    BARBSCNU NEGATIVE 01/23/2021    LABBENZ NEGATIVE 01/23/2021    LABMETH NEGATIVE 01/23/2021    PPXUR NOT REPORTED 01/23/2021     Lab Results   Component Value Date    TSH 1.65 08/19/2020     No results found for: LITHIUM  No results found for: VALPROATE, CBMZ    RISK ASSESSMENT AT DISCHARGE: Low risk for suicide and homicide. Treatment Plan:  Reviewed current Medications with the patient. Education provided on the complaince with treatment. Risks, benefits, side effects, drug-to-drug interactions and alternatives to treatment were discussed. Encourage patient to attend outpatient follow up appointment and therapy. Patient was advised to call the outpatient provider, visit the nearest ED or call 911 if symptoms are not manageable.         Medication List      CONTINUE taking these medications    aspirin 81 MG EC tablet  Take 1 tablet by mouth daily  Notes to patient: Help prevent blood clots     atorvastatin 80 MG tablet  Commonly known as: LIPITOR  Take 1 tablet by mouth nightly  Notes to patient: Help with cholesterol     clopidogrel 75 MG tablet  Commonly known as: PLAVIX  Take 1 tablet by mouth daily for 21 doses  Notes to patient: Help prevent blood clots     escitalopram 20 MG tablet  Commonly known as: LEXAPRO  Take 1 tablet by mouth daily     mirtazapine 15 MG tablet  Commonly known as: REMERON  Take 1 tablet by mouth nightly  Notes to patient: Help improve mood     traZODone 50 MG tablet  Commonly known as: DESYREL  Take 1 tablet by mouth nightly as needed for Sleep  Notes to patient: Help with sleep        STOP taking these medications    lisinopril 5 MG tablet  Commonly known as: PRINIVIL;ZESTRIL           Where to Get Your Medications      These medications were sent to [facility]  [address], [address] [facility] [address]    Phone: [phone]   · aspirin 81 MG EC tablet  · atorvastatin 80 MG tablet  · clopidogrel 75 MG tablet  · escitalopram 20 MG tablet  · mirtazapine 15 MG tablet  · traZODone 50 MG tablet               Core Measures statement:   Not applicable      TIME SPENT - 28 MINUTES TO COMPLETE THE EVALUATION, DISCHARGE SUMMARY, MEDICATION RECONCILIATION AND FOLLOW UP CARE                                         Jarret Aponte is a 65 y.o. male being evaluated [place] Andrew Caraballo MD on 4/15/2022 at 9:16 AM    An electronic signature was used to authenticate this note. **This report has been created using voice recognition software. It may contain minor errors which are inherent in voice recognition technology. **  \

## 2022-04-15 NOTE — BH NOTE
[facility]  Discharge Note    Pt discharged with followings belongings:   Dental Appliances: None  Vision - Corrective Lenses: Glasses  Hearing Aid: None  Jewelry: None  Clothing: [place] / coat,Pants,Shirt,Socks  Were All Patient Medications Collected?: Yes  Other Valuables: Wallet   Valuables sent home with patient or returned to patient. Patient education on aftercare instructions: Yes  Information faxed to Pedro Pablo Salazar by RN  at 12:26 PM .Patient verbalize understanding of AVS:  Yes. Status EXAM upon discharge:  Status and Exam  Normal: No  Facial Expression: Worried,Sad  Affect: Appropriate,Blunt  Level of Consciousness: Alert  Mood:Normal: No  Mood: Depressed,Anxious,Sad  Motor Activity:Normal: No  Motor Activity: Increased  Interview Behavior: Cooperative  Preception: [place] to Person,[place] to Time,[place] to Place,[place] to Situation  Attention:Normal: No  Attention: Distractible,Unable to Concentrate  Thought Processes: Circumstantial  Thought Content:Normal: No  Thought Content: Preoccupations  Hallucinations: None  Delusions: No  Memory:Normal: Yes  Insight and Judgment: No  Insight and Judgment: Poor Judgment,Poor Insight  Present Suicidal Ideation: No  Present Homicidal Ideation: No      Metabolic Screening:    Lab Results   Component Value Date    LABA1C 5.7 09/26/2021       Lab Results   Component Value Date    CHOL 157 09/26/2021    CHOL 179 08/19/2020     Lab Results   Component Value Date    TRIG 43 09/26/2021    TRIG 118 08/19/2020     Lab Results   Component Value Date    HDL 80 09/26/2021    HDL 66 08/19/2020     No components found for: LDLCAL  No results found for: LABVLDL    Corrie Deal RN     Patient discharged to home on facesheet via insurance cab at 1220. Patient ambulatory. All belongings including discharge paperwork and filled medications sent home with patient.

## 2022-07-08 ENCOUNTER — HOSPITAL ENCOUNTER (INPATIENT)
Age: 57
LOS: 5 days | Discharge: HOME | DRG: 751 | End: 2022-07-14
Attending: EMERGENCY MEDICINE | Admitting: PSYCHIATRY & NEUROLOGY
Payer: MEDICAID

## 2022-07-08 DIAGNOSIS — F10.929 ACUTE ALCOHOLIC INTOXICATION WITH COMPLICATION (HCC): Primary | ICD-10-CM

## 2022-07-08 DIAGNOSIS — R45.851 SUICIDAL IDEATION: ICD-10-CM

## 2022-07-08 LAB
ABSOLUTE EOS #: 0.19 K/UL (ref 0–0.4)
ABSOLUTE LYMPH #: 2.44 K/UL (ref 1–4.8)
ABSOLUTE MONO #: 0.61 K/UL (ref 0.1–1.3)
ACETAMINOPHEN LEVEL: <5 UG/ML (ref 10–30)
ALBUMIN SERPL-MCNC: 3.7 G/DL (ref 3.5–5.2)
ALP BLD-CCNC: 55 U/L (ref 40–129)
ALT SERPL-CCNC: 42 U/L (ref 5–41)
AMPHETAMINE SCREEN URINE: NEGATIVE
ANION GAP SERPL CALCULATED.3IONS-SCNC: 12 MMOL/L (ref 9–17)
AST SERPL-CCNC: 66 U/L
BARBITURATE SCREEN URINE: NEGATIVE
BASOPHILS # BLD: 0 % (ref 0–2)
BASOPHILS ABSOLUTE: 0 K/UL (ref 0–0.2)
BENZODIAZEPINE SCREEN, URINE: NEGATIVE
BILIRUB SERPL-MCNC: 0.22 MG/DL (ref 0.3–1.2)
BUN BLDV-MCNC: 7 MG/DL (ref 6–20)
CALCIUM SERPL-MCNC: 8.2 MG/DL (ref 8.6–10.4)
CANNABINOID SCREEN URINE: NEGATIVE
CHLORIDE BLD-SCNC: 106 MMOL/L (ref 98–107)
CO2: 22 MMOL/L (ref 20–31)
COCAINE METABOLITE, URINE: NEGATIVE
CREAT SERPL-MCNC: 0.61 MG/DL (ref 0.7–1.2)
EOSINOPHILS RELATIVE PERCENT: 4 % (ref 0–4)
ETHANOL PERCENT: 0.37 %
ETHANOL: 368 MG/DL
GFR AFRICAN AMERICAN: >60 ML/MIN
GFR NON-AFRICAN AMERICAN: >60 ML/MIN
GFR SERPL CREATININE-BSD FRML MDRD: ABNORMAL ML/MIN/{1.73_M2}
GLUCOSE BLD-MCNC: 101 MG/DL (ref 70–99)
HCT VFR BLD CALC: 42.2 % (ref 41–53)
HEMOGLOBIN: 14.3 G/DL (ref 13.5–17.5)
LYMPHOCYTES # BLD: 52 % (ref 24–44)
MCH RBC QN AUTO: 31.2 PG (ref 26–34)
MCHC RBC AUTO-ENTMCNC: 33.9 G/DL (ref 31–37)
MCV RBC AUTO: 91.9 FL (ref 80–100)
METHADONE SCREEN, URINE: NEGATIVE
MONOCYTES # BLD: 13 % (ref 1–7)
MORPHOLOGY: ABNORMAL
OPIATES, URINE: NEGATIVE
OXYCODONE SCREEN URINE: NEGATIVE
PDW BLD-RTO: 16.2 % (ref 11.5–14.9)
PHENCYCLIDINE, URINE: NEGATIVE
PLATELET # BLD: 186 K/UL (ref 150–450)
PMV BLD AUTO: 6.9 FL (ref 6–12)
POTASSIUM SERPL-SCNC: 3.8 MMOL/L (ref 3.7–5.3)
RBC # BLD: 4.59 M/UL (ref 4.5–5.9)
SALICYLATE LEVEL: <1 MG/DL (ref 3–10)
SEG NEUTROPHILS: 31 % (ref 36–66)
SEGMENTED NEUTROPHILS ABSOLUTE COUNT: 1.46 K/UL (ref 1.3–9.1)
SODIUM BLD-SCNC: 140 MMOL/L (ref 135–144)
TEST INFORMATION: NORMAL
TOTAL PROTEIN: 6.3 G/DL (ref 6.4–8.3)
WBC # BLD: 4.7 K/UL (ref 3.5–11)

## 2022-07-08 PROCEDURE — 85025 COMPLETE CBC W/AUTO DIFF WBC: CPT

## 2022-07-08 PROCEDURE — G0480 DRUG TEST DEF 1-7 CLASSES: HCPCS

## 2022-07-08 PROCEDURE — 99285 EMERGENCY DEPT VISIT HI MDM: CPT

## 2022-07-08 PROCEDURE — 80143 DRUG ASSAY ACETAMINOPHEN: CPT

## 2022-07-08 PROCEDURE — 36415 COLL VENOUS BLD VENIPUNCTURE: CPT

## 2022-07-08 PROCEDURE — 80307 DRUG TEST PRSMV CHEM ANLYZR: CPT

## 2022-07-08 PROCEDURE — 80053 COMPREHEN METABOLIC PANEL: CPT

## 2022-07-08 PROCEDURE — 80179 DRUG ASSAY SALICYLATE: CPT

## 2022-07-08 ASSESSMENT — ENCOUNTER SYMPTOMS
BACK PAIN: 0
COUGH: 0
ABDOMINAL PAIN: 0
VOMITING: 0
SHORTNESS OF BREATH: 0
DIARRHEA: 1

## 2022-07-09 PROCEDURE — 1240000000 HC EMOTIONAL WELLNESS R&B

## 2022-07-09 PROCEDURE — 99222 1ST HOSP IP/OBS MODERATE 55: CPT | Performed by: INTERNAL MEDICINE

## 2022-07-09 PROCEDURE — 99252 IP/OBS CONSLTJ NEW/EST SF 35: CPT | Performed by: INTERNAL MEDICINE

## 2022-07-09 PROCEDURE — 6370000000 HC RX 637 (ALT 250 FOR IP): Performed by: INTERNAL MEDICINE

## 2022-07-09 PROCEDURE — 6370000000 HC RX 637 (ALT 250 FOR IP): Performed by: PSYCHIATRY & NEUROLOGY

## 2022-07-09 PROCEDURE — APPSS60 APP SPLIT SHARED TIME 46-60 MINUTES: Performed by: PSYCHIATRY & NEUROLOGY

## 2022-07-09 PROCEDURE — 90792 PSYCH DIAG EVAL W/MED SRVCS: CPT | Performed by: PSYCHIATRY & NEUROLOGY

## 2022-07-09 RX ORDER — GAUZE BANDAGE 2" X 2"
100 BANDAGE TOPICAL DAILY
Status: DISCONTINUED | OUTPATIENT
Start: 2022-07-09 | End: 2022-07-14 | Stop reason: HOSPADM

## 2022-07-09 RX ORDER — ESCITALOPRAM OXALATE 10 MG/1
5 TABLET ORAL DAILY
Status: DISCONTINUED | OUTPATIENT
Start: 2022-07-09 | End: 2022-07-10

## 2022-07-09 RX ORDER — M-VIT,TX,IRON,MINS/CALC/FOLIC 27MG-0.4MG
1 TABLET ORAL DAILY
Status: DISCONTINUED | OUTPATIENT
Start: 2022-07-09 | End: 2022-07-14 | Stop reason: HOSPADM

## 2022-07-09 RX ORDER — TRAZODONE HYDROCHLORIDE 50 MG/1
50 TABLET ORAL NIGHTLY PRN
Status: DISCONTINUED | OUTPATIENT
Start: 2022-07-09 | End: 2022-07-14 | Stop reason: HOSPADM

## 2022-07-09 RX ORDER — HYDROXYZINE 50 MG/1
50 TABLET, FILM COATED ORAL 3 TIMES DAILY PRN
Status: DISCONTINUED | OUTPATIENT
Start: 2022-07-09 | End: 2022-07-14 | Stop reason: HOSPADM

## 2022-07-09 RX ORDER — IBUPROFEN 400 MG/1
400 TABLET ORAL EVERY 6 HOURS PRN
Status: DISCONTINUED | OUTPATIENT
Start: 2022-07-09 | End: 2022-07-14 | Stop reason: HOSPADM

## 2022-07-09 RX ORDER — FOLIC ACID 1 MG/1
1 TABLET ORAL DAILY
Status: DISCONTINUED | OUTPATIENT
Start: 2022-07-09 | End: 2022-07-14 | Stop reason: HOSPADM

## 2022-07-09 RX ORDER — ACETAMINOPHEN 325 MG/1
650 TABLET ORAL EVERY 6 HOURS PRN
Status: DISCONTINUED | OUTPATIENT
Start: 2022-07-09 | End: 2022-07-14 | Stop reason: HOSPADM

## 2022-07-09 RX ORDER — POLYETHYLENE GLYCOL 3350 17 G/17G
17 POWDER, FOR SOLUTION ORAL DAILY PRN
Status: DISCONTINUED | OUTPATIENT
Start: 2022-07-09 | End: 2022-07-14 | Stop reason: HOSPADM

## 2022-07-09 RX ORDER — CHLORDIAZEPOXIDE HYDROCHLORIDE 10 MG/1
10 CAPSULE, GELATIN COATED ORAL 3 TIMES DAILY
Status: DISCONTINUED | OUTPATIENT
Start: 2022-07-09 | End: 2022-07-11

## 2022-07-09 RX ORDER — ATORVASTATIN CALCIUM 20 MG/1
80 TABLET, FILM COATED ORAL NIGHTLY
Status: DISCONTINUED | OUTPATIENT
Start: 2022-07-09 | End: 2022-07-14 | Stop reason: HOSPADM

## 2022-07-09 RX ORDER — ASPIRIN 81 MG/1
81 TABLET ORAL DAILY
Status: DISCONTINUED | OUTPATIENT
Start: 2022-07-09 | End: 2022-07-14 | Stop reason: HOSPADM

## 2022-07-09 RX ORDER — MAGNESIUM HYDROXIDE/ALUMINUM HYDROXICE/SIMETHICONE 120; 1200; 1200 MG/30ML; MG/30ML; MG/30ML
30 SUSPENSION ORAL EVERY 6 HOURS PRN
Status: DISCONTINUED | OUTPATIENT
Start: 2022-07-09 | End: 2022-07-14 | Stop reason: HOSPADM

## 2022-07-09 RX ADMIN — ESCITALOPRAM OXALATE 5 MG: 10 TABLET ORAL at 17:55

## 2022-07-09 RX ADMIN — CHLORDIAZEPOXIDE HYDROCHLORIDE 10 MG: 10 CAPSULE ORAL at 21:41

## 2022-07-09 RX ADMIN — MULTIPLE VITAMINS W/ MINERALS TAB 1 TABLET: TAB at 17:55

## 2022-07-09 RX ADMIN — THIAMINE HCL TAB 100 MG 100 MG: 100 TAB at 17:55

## 2022-07-09 RX ADMIN — HYDROXYZINE HYDROCHLORIDE 50 MG: 50 TABLET, FILM COATED ORAL at 21:41

## 2022-07-09 RX ADMIN — IBUPROFEN 400 MG: 400 TABLET ORAL at 14:14

## 2022-07-09 RX ADMIN — FOLIC ACID 1 MG: 1 TABLET ORAL at 17:55

## 2022-07-09 RX ADMIN — ASPIRIN 81 MG: 81 TABLET, COATED ORAL at 14:15

## 2022-07-09 RX ADMIN — TRAZODONE HYDROCHLORIDE 50 MG: 50 TABLET ORAL at 21:41

## 2022-07-09 RX ADMIN — CHLORDIAZEPOXIDE HYDROCHLORIDE 10 MG: 10 CAPSULE ORAL at 14:14

## 2022-07-09 RX ADMIN — ATORVASTATIN CALCIUM 80 MG: 20 TABLET, FILM COATED ORAL at 21:41

## 2022-07-09 ASSESSMENT — SLEEP AND FATIGUE QUESTIONNAIRES
DO YOU USE A SLEEP AID: NO
AVERAGE NUMBER OF SLEEP HOURS: 6
DO YOU HAVE DIFFICULTY SLEEPING: YES

## 2022-07-09 ASSESSMENT — LIFESTYLE VARIABLES
HOW OFTEN DO YOU HAVE A DRINK CONTAINING ALCOHOL: 4 OR MORE TIMES A WEEK
HOW MANY STANDARD DRINKS CONTAINING ALCOHOL DO YOU HAVE ON A TYPICAL DAY: 7 TO 9

## 2022-07-09 NOTE — BH NOTE
RT unable to complete assessment during this shift. RT will seek out pt to complete assessment during next shift on 7/10/2022.

## 2022-07-09 NOTE — H&P
[facility]    CONSULTATION / HISTORY AND PHYSICAL EXAMINATION            Date:   7/9/2022  Patient name:  Julio Lemons  Date of admission:  7/8/2022 10:01 PM  MRN:   [id number]  Account:  [de-identified]  YOB: 1965  PCP:    No primary care provider on file. Room:   [address] [place] [address]  Code Status:    Full Code    Physician Requesting Consult: Erick Campuzano, *    Reason for Consult:  medical management    Chief Complaint:     Chief Complaint   Patient presents with    Suicidal    Alcohol Intoxication       History Obtained From:     Patient medical record nursing staff    History of Present Illness:   Patient, admitted to [facility] floor with major depression, patient has chronic alcoholism, major depression, history of TIA  No complaints of chest pain, shortness of breath, abdominal pain      Past Medical History:     Past Medical History:   Diagnosis Date    Arthritis     Hypertension     Liver disease     Psoriasis     Psychiatric problem     Seizures ([address]         Past Surgical History:     Past Surgical History:   Procedure Laterality Date    TONSILLECTOMY          Medications Prior to Admission:     Prior to Admission medications    Medication Sig Start Date End Date Taking?  Authorizing Provider   escitalopram (LEXAPRO) 20 MG tablet Take 1 tablet by mouth daily 4/15/22   Kevin Bautista MD   mirtazapine (REMERON) 15 MG tablet Take 1 tablet by mouth nightly 4/12/22   Kevin Bautista MD   traZODone (DESYREL) 50 MG tablet Take 1 tablet by mouth nightly as needed for Sleep 4/12/22   Kevin Bautista MD   clopidogrel (PLAVIX) 75 MG tablet Take 1 tablet by mouth daily for 21 doses 4/12/22 5/3/22  Kevin Bautista MD   atorvastatin (LIPITOR) 80 MG tablet Take 1 tablet by mouth nightly 4/12/22 7/11/22  Kevin Bautista MD   aspirin 81 MG EC tablet Take 1 tablet by mouth daily 4/12/22 7/11/22  Kevin Bautista MD        Allergies:     Patient has no known allergies. Social History:     Tobacco:    reports that he has been smoking cigarettes. He started smoking about 31 years ago. He has a 7.50 pack-year smoking history. He has never used smokeless tobacco.  Alcohol:      reports current alcohol use. Drug Use:  reports previous drug use. Drug: Cocaine. Family History:     No family history on file. Review of Systems:     Positive and Negative as described in HPI. CONSTITUTIONAL: Patient with generalized shaking  HEENT:  negative for vision, hearing changes, runny nose, throat pain  RESPIRATORY:  negative for shortness of breath, cough, congestion, wheezing. CARDIOVASCULAR:  negative for chest pain, palpitations. GASTROINTESTINAL:  negative for nausea, vomiting, diarrhea, constipation, change in bowel habits, abdominal pain   GENITOURINARY:  negative for difficulty of urination, burning with urination, frequency   INTEGUMENT:  negative for rash, skin lesions, easy bruising   HEMATOLOGIC/LYMPHATIC:  negative for swelling/edema   ALLERGIC/IMMUNOLOGIC:  negative for urticaria , itching  ENDOCRINE:  negative increase in drinking, increase in urination, hot or cold intolerance  MUSCULOSKELETAL:  negative joint pains, muscle aches, swelling of joints  NEUROLOGICAL:  negative for headaches, dizziness, lightheadedness, numbness, pain, tingling extremities  BEHAVIOR/PSYCH:      Physical Exam:     BP (!) 155/87   Pulse (!) 117   Temp 97.7 °F (36.5 °C) (Oral)   Resp 14   Ht 5' 11\" (1.803 m)   Wt 180 lb (81.6 kg)   SpO2 98%   BMI 25.10 kg/m²   Temp (24hrs), Av.9 °F (36.6 °C), Min:97.7 °F (36.5 °C), Max:98.1 °F (36.7 °C)    No results for input(s): POCGLU in the last 72 hours. No intake or output data in the 24 hours ending 22 3137    General Appearance:  alert, well appearing, and in no acute distress  Mental status: oriented to person, place, and time with normal affect  Head:  normocephalic, atraumatic.   Eye: no icterus, redness, pupils equal and reactive, extraocular eye movements intact, conjunctiva clear  Ear: normal external ear, no discharge, hearing intact  Nose:  no drainage noted  Mouth: mucous membranes moist  Neck: supple, no carotid bruits, thyroid not palpable  Lungs: Bilateral equal air entry, clear to ausculation, no wheezing, rales or rhonchi, normal effort  Cardiovascular: normal rate, regular rhythm, no murmur, gallop, rub. Abdomen: Soft, nontender, nondistended, normal bowel sounds, no hepatomegaly or splenomegaly  Neurologic: There are no new focal motor or sensory deficits, normal muscle tone and bulk, no abnormal sensation, normal speech, cranial nerves II through XII grossly intact  Skin: No gross lesions, rashes, bruising or bleeding on exposed skin area  Extremities:  peripheral pulses palpable, no pedal edema or calf pain with palpation  Psych:      Investigations:      Laboratory Testing:  Recent Results (from the past 24 hour(s))   CBC with Auto Differential    Collection Time: 07/08/22 10:31 PM   Result Value Ref Range    WBC 4.7 3.5 - 11.0 k/uL    RBC 4.59 4.5 - 5.9 m/uL    Hemoglobin 14.3 13.5 - 17.5 g/dL    Hematocrit 42.2 41 - 53 %    MCV 91.9 80 - 100 fL    MCH 31.2 26 - 34 pg    MCHC 33.9 31 - 37 g/dL    RDW 16.2 (H) 11.5 - 14.9 %    Platelets 422 874 - 318 k/uL    MPV 6.9 6.0 - 12.0 fL    Seg Neutrophils 31 (L) 36 - 66 %    Lymphocytes 52 (H) 24 - 44 %    Monocytes 13 (H) 1 - 7 %    Eosinophils % 4 0 - 4 %    Basophils 0 0 - 2 %    Segs Absolute 1.46 1.3 - 9.1 k/uL    Absolute Lymph # 2.44 1.0 - 4.8 k/uL    Absolute Mono # 0.61 0.1 - 1.3 k/uL    Absolute Eos # 0.19 0.0 - 0.4 k/uL    Basophils Absolute 0.00 0.0 - 0.2 k/uL    Morphology ANISOCYTOSIS PRESENT    Comprehensive Metabolic Panel    Collection Time: 07/08/22 10:31 PM   Result Value Ref Range    Glucose 101 (H) 70 - 99 mg/dL    BUN 7 6 - 20 mg/dL    CREATININE 0.61 (L) 0.70 - 1.20 mg/dL    Calcium 8.2 (L) 8.6 - 10.4 mg/dL    Sodium 140 135 - 144 mmol/L    Potassium 3.8 3.7 - 5.3 mmol/L    Chloride 106 98 - 107 mmol/L    CO2 22 20 - 31 mmol/L    Anion Gap 12 9 - 17 mmol/L    Alkaline Phosphatase 55 40 - 129 U/L    ALT 42 (H) 5 - 41 U/L    AST 66 (H) <40 U/L    Total Bilirubin 0.22 (L) 0.3 - 1.2 mg/dL    Total Protein 6.3 (L) 6.4 - 8.3 g/dL    Albumin 3.7 3.5 - 5.2 g/dL    GFR Non-African American >60 >60 mL/min    GFR African American >60 >60 mL/min    GFR Comment         Ethanol    Collection Time: 07/08/22 10:31 PM   Result Value Ref Range    Ethanol 368 (HH) <10 mg/dL    Ethanol percent 0.368 %   Drug screen multi urine    Collection Time: 07/08/22 10:31 PM   Result Value Ref Range    Amphetamine Screen, Ur NEGATIVE NEGATIVE    Barbiturate Screen, Ur NEGATIVE NEGATIVE    Benzodiazepine Screen, Urine NEGATIVE NEGATIVE    Cocaine Metabolite, Urine NEGATIVE NEGATIVE    Methadone Screen, Urine NEGATIVE NEGATIVE    Opiates, Urine NEGATIVE NEGATIVE    Phencyclidine, Urine NEGATIVE NEGATIVE    Cannabinoid Scrn, Ur NEGATIVE NEGATIVE    Oxycodone Screen, Ur NEGATIVE NEGATIVE    Test Information       Assay provides medical screening only. The absence of expected drug(s) and/or metabolite(s) may indicate diluted or adulterated urine, limitations of testing or timing of collection. Acetaminophen level    Collection Time: 07/08/22 10:31 PM   Result Value Ref Range    Acetaminophen Level <5 (L) 10 - 30 ug/mL   Salicylate    Collection Time: 07/08/22 10:31 PM   Result Value Ref Range    Salicylate Lvl <1 (L) 3 - 10 mg/dL           Consultations:   IP CONSULT TO INTERNAL MEDICINE  IP CONSULT TO SOCIAL WORK  Assessment :      Primary Problem  Major depressive disorder, recurrent severe without psychotic features [facility])    Active Hospital Problems    Diagnosis Date Noted    Depression with suicidal ideation [F32. A, R45.851] 04/09/2022    Alcohol abuse, episodic [F10.10] 04/09/2022    TIA (transient ischemic attack) [G45.9] 09/26/2021    Major depressive disorder, recurrent severe without psychotic features ([facility] [F33.2] 01/24/2021       Plan:     1. Chronic alcoholism, starting patient on Librium, thiamine, folic acid and multivitamin  2. History of TIA, restarted home dose of aspirin, Lipitor, no focal neurological deficit  3. Major depression, managed with psychiatrist          William Brandt MD  7/9/2022  5:47 PM    Copy sent to Dr. Bui primary care provider on file. Please note that this chart was generated using voice recognition Dragon dictation software. Although every effort was made to ensure the accuracy of this automated transcription, some errors in transcription may have occurred.

## 2022-07-09 NOTE — GROUP NOTE
Group Therapy Note    Date: 7/9/2022    Group Start Time: 1400  Group End Time: 9129  Group Topic: Recreational    [facility]    Tonya Padilla, [address]        Group Therapy Note    Attendees: 8/19       Recreation Group Note    Date: 7/9/2022          Start Time: 2pm  End Time: 3:05pm    Number of Participants in Group & Unit Census:  8/19    Topic: Benefits of leisure r/t coping, and improving communication skills    Goal of Group: To increase social interaction and to practice leisure skills and explore benefits of leisure r/t coping, and practice communication skills through tasks and discussion with peers and RT. Comments: Patient did not participate in Recreation group, despite staff encouragement and explanation of benefits. Patient remains seclusive to self. Q15 minute safety checks maintained for patient safety and will continue to encourage patient to attend unit programming.

## 2022-07-09 NOTE — PROGRESS NOTES
Behavioral Services  Medicare Certification Upon Admission    I certify that this patient's inpatient psychiatric hospital admission is medically necessary for:    [x] (1) Treatment which could reasonably be expected to improve this patient's condition,       [x] (2) Or for diagnostic study;     AND     [x](2) The inpatient psychiatric services are provided while the individual is under the care of a physician and are included in the individualized plan of care.     Estimated length of stay/service 3-5 days    Plan for post-hospital care [facility]    Electronically signed by Tanmay Bradford MD on 7/9/2022 at 12:43 PM

## 2022-07-09 NOTE — BH NOTE
Patient given tobacco quitline number 84226306894 at this time, refusing to call at this time, states \" I just dont want to quit now\"- patient given information as to the dangers of long term tobacco use. Continue to reinforce the importance of tobacco cessation.

## 2022-07-09 NOTE — ED NOTES
Pt resting in recliner, watching tv. No further needs at this time.       Cheyenne Morrow RN  07/09/22 0816

## 2022-07-09 NOTE — BH NOTE
[facility]  Admission Note     Admission Type:   Admission Type: Voluntary    Reason for admission:  Reason for Admission: Increased depression with SI; plan to jump from bridge      Addictive Behavior:   Addictive Behavior  In the Past 3 Months, Have You Felt or Has Someone Told You That You Have a Problem With  : None    Medical Problems:   Past Medical History:   Diagnosis Date    Arthritis     Hypertension     Liver disease     Psoriasis     Psychiatric problem     Seizures (HCC)        Status EXAM:  Mental Status and Behavioral Exam  Normal: No  Level of Assistance: Independent/Self  Facial Expression: Sad  Affect: Appropriate  Level of Consciousness: Alert  Frequency of Checks: 4 times per hour, close  Mood:Normal: No  Mood: Anxious,Depressed  Motor Activity:Normal: No  Motor Activity: Decreased  Eye Contact: Good  Observed Behavior: Friendly  Sexual Misconduct History: Current - no  Preception: [place] to person,[place] to time,[place] to place,[place] to situation  Attention:Normal: No  Thought Content:Normal: Yes  Depression Symptoms: No problems reported or observed. Anxiety Symptoms: No problems reported or observed. [place] Symptoms: No problems reported or observed.   Hallucinations: None  Delusions: No  Memory:Normal: Yes  Insight and Judgment: No  Insight and Judgment: Poor judgment    Tobacco Screening:  Practical Counseling, on admission, david X, if applicable and completed (first 3 are required if patient doesn't refuse):            ( ) Recognizing danger situations (included triggers and roadblocks)                    ( ) Coping skills (new ways to manage stress,relaxation techniques, changing routine, distraction)                                                           ( ) Basic information about quitting (benefits of quitting, techniques in how to quit, available resources  ( ) Referral for counseling faxed to [facility] ( x) Patient refused counseling  ( ) Patient has not smoked in the last 30 days    Metabolic Screening:    Lab Results   Component Value Date    LABA1C 5.7 09/26/2021       Lab Results   Component Value Date    CHOL 157 09/26/2021    CHOL 179 08/19/2020     Lab Results   Component Value Date    TRIG 43 09/26/2021    TRIG 118 08/19/2020     Lab Results   Component Value Date    HDL 80 09/26/2021    HDL 66 08/19/2020     No components found for: LDLCAL  No results found for: LABVLDL      Body mass index is 25.1 kg/m². BP Readings from Last 2 Encounters:   07/09/22 (!) 155/87   04/15/22 106/60     Mr. Vineet Floyd was admitted to the [facility] unit for depression with suicidal ideation and a plan to jump from a bridge. Mr. Vineet Floyd recently had a break up with his girlfriend of 8 years and is currently homeless. He was terminated from his job. Mr. Vineet Floyd reports increased drinking of alcohol the last week or so. Reports feeling hopeless and helpless. He was tearful during our interaction. Mr. Vineet Floyd denies suicidal ideation at this time.         Adria Olivo RN

## 2022-07-09 NOTE — ED PROVIDER NOTES
[address]      Pt Name: Orlando Lilly  MRN: [id number]  [place] 1965  Date of evaluation: 7/8/22      CHIEF COMPLAINT       Chief Complaint   Patient presents with    Suicidal    Alcohol Intoxication     HISTORY OF PRESENT ILLNESS   HPI 65 y.o. male presents with c/o suicidal thoughts. The patient reports feeling depressed and having thought of suicide for for the last month since he and his girlfriend broke up. The patient has been thinking of jumping off a bridge. The patient reports a h/o of previous suicide attempt. The patient does have a h/o of previous psychiatric admission most recently in April of 2022. The patient reports daily alcohol use. No attempts at self harm to this point. The patient no acute medical complaints at this time. REVIEW OF SYSTEMS     Review of Systems   Constitutional: Negative for chills and fever. HENT: Negative for congestion. Eyes: Negative for visual disturbance. Respiratory: Negative for cough and shortness of breath. Cardiovascular: Negative for chest pain. Gastrointestinal: Positive for diarrhea (about 3 times a day for the last month or two. no recent antiobitic use. ). Negative for abdominal pain and vomiting. Genitourinary: Negative for difficulty urinating. Musculoskeletal: Negative for back pain. Skin: Negative for rash. Neurological: Negative for headaches. Psychiatric/Behavioral: Positive for decreased concentration, dysphoric mood, sleep disturbance and suicidal ideas.          PAST MEDICAL HISTORY     Past Medical History:   Diagnosis Date    Arthritis     Hypertension     Liver disease     Psoriasis     Psychiatric problem     Seizures ([facility] [address]        SURGICAL HISTORY       Past Surgical History:   Procedure Laterality Date    TONSILLECTOMY         CURRENT MEDICATIONS       Current Discharge Medication List      CONTINUE these medications which have NOT CHANGED    Details   escitalopram (Irasema Whiting) 20 MG tablet Take 1 tablet by mouth daily  Qty: 30 tablet, Refills: 0      mirtazapine (REMERON) 15 MG tablet Take 1 tablet by mouth nightly  Qty: 30 tablet, Refills: 0      traZODone (DESYREL) 50 MG tablet Take 1 tablet by mouth nightly as needed for Sleep  Qty: 30 tablet, Refills: 0      clopidogrel (PLAVIX) 75 MG tablet Take 1 tablet by mouth daily for 21 doses  Qty: 21 tablet, Refills: 0      atorvastatin (LIPITOR) 80 MG tablet Take 1 tablet by mouth nightly  Qty: 90 tablet, Refills: 0      aspirin 81 MG EC tablet Take 1 tablet by mouth daily  Qty: 90 tablet, Refills: 0             ALLERGIES     has No Known Allergies. FAMILY HISTORY     has no family status information on file. SOCIAL HISTORY      reports that he has been smoking cigarettes. He started smoking about 31 years ago. He has a 7.50 pack-year smoking history. He has never used smokeless tobacco. He reports current alcohol use. He reports previous drug use. Drug: Cocaine. PHYSICAL EXAM     INITIAL VITALS: BP (!) 161/86 Comment: Josue RN notified  Pulse 81   Temp 97.6 °F (36.4 °C) (Oral)   Resp 14   Ht 5' 11\" (1.803 m)   Wt 180 lb (81.6 kg)   SpO2 98%   BMI 25.10 kg/m²   Gen: NAD  Head: Normocephalic, atraumatic  Eye: Pupils equal round reactive to light, no conjunctivitis  Heart: Regular rate and rhythm no murmurs  Lungs: Clear to auscultation bilaterally, no respiratory distress  Chest wall: No crepitus, no tenderness palpation  Abdomen: Soft, nontender, nondistended, with no peritoneal signs  Skin: No diaphoresis. no lacerations. Neurologic: Patient is alert and oriented x3, motor and sensation is intact in all 4 extremities, speech is slurred  Extremities: Full range of motion, no cyanosis, no edema, no signs of trauma, no tenderness to palpation    MEDICAL DECISION MAKING:     MDM  65 y.o. male with depression, suicidal thoughts, suicidal plan. previous suicide attempt. The patient does appear intoxicated.    The patient is showing no signs of any acute active toxidrome. The patient denies any overdose attempt or  medical complaints at this time. We'll screen for any acetaminophen or salicylate ingestion, we'll check baseline renal function, liver function, a drug screen, cbc and reassess. ED Course as of 07/10/22 0618   Sat Jul 09, 2022   1038 Patient reevaluated at 10:00 AM.  He is still suicidal.  He denies any plan right now however told physician earlier that he wanted to jump off a bridge. RN spoke with on-call psychiatrist who accepted patient for admission. He is medically cleared at this time. [COLLIN]      ED Course User Index  [COLLIN] Thelma Isabel DO     Emergency Department course:    Alcohol level is 368 - estimated sober at 9:30am.  Will reassess suicidal ideation and monitor for any sign of alcohol withdrawal.         DIAGNOSTIC RESULTS     LABS: All lab results were reviewed by myself, and all abnormals are listed below.   Labs Reviewed   CBC WITH AUTO DIFFERENTIAL - Abnormal; Notable for the following components:       Result Value    RDW 16.2 (*)     Seg Neutrophils 31 (*)     Lymphocytes 52 (*)     Monocytes 13 (*)     All other components within normal limits   COMPREHENSIVE METABOLIC PANEL - Abnormal; Notable for the following components:    Glucose 101 (*)     CREATININE 0.61 (*)     Calcium 8.2 (*)     ALT 42 (*)     AST 66 (*)     Total Bilirubin 0.22 (*)     Total Protein 6.3 (*)     All other components within normal limits   ETHANOL - Abnormal; Notable for the following components:    Ethanol 368 (*)     All other components within normal limits   ACETAMINOPHEN LEVEL - Abnormal; Notable for the following components:    Acetaminophen Level <5 (*)     All other components within normal limits   SALICYLATE LEVEL - Abnormal; Notable for the following components:    Salicylate Lvl <1 (*)     All other components within normal limits   URINE DRUG SCREEN       EMERGENCY DEPARTMENT COURSE:   Vitals:    Vitals: 07/08/22 2232 07/09/22 1200 07/09/22 2015   BP: 121/78 (!) 155/87 (!) 161/86   Pulse: 79 (!) 117 81   Resp: 16 14 14   Temp: 98.1 °F (36.7 °C) 97.7 °F (36.5 °C) 97.6 °F (36.4 °C)   TempSrc: Oral Oral Oral   SpO2: 98%     Weight: 180 lb (81.6 kg) 180 lb (81.6 kg)    Height: 5' 11\" (1.803 m) 5' 11\" (1.803 m)        The patient was given the following medications while in the emergency department:  Orders Placed This Encounter   Medications    acetaminophen (TYLENOL) tablet 650 mg    ibuprofen (ADVIL;MOTRIN) tablet 400 mg    hydrOXYzine HCl (ATARAX) tablet 50 mg    traZODone (DESYREL) tablet 50 mg    polyethylene glycol (GLYCOLAX) packet 17 g    aluminum & magnesium hydroxide-simethicone (MAALOX) 200-200-20 MG/5ML suspension 30 mL    nicotine polacrilex (NICORETTE) gum 2 mg    chlordiazePOXIDE (LIBRIUM) capsule 10 mg    aspirin EC tablet 81 mg    atorvastatin (LIPITOR) tablet 80 mg    thiamine mononitrate tablet 100 mg    escitalopram (LEXAPRO) tablet 5 mg    folic acid (FOLVITE) tablet 1 mg    therapeutic multivitamin-minerals 1 tablet     -------------------------  CRITICAL CARE:   CONSULTS: IP CONSULT TO INTERNAL MEDICINE  IP CONSULT TO SOCIAL WORK  PROCEDURES: Procedures     FINAL IMPRESSION      1. Acute alcoholic intoxication with complication ([facility] [address]    2. Suicidal ideation          DISPOSITION/PLAN   DISPOSITION        PATIENT REFERRED TO:  No follow-up provider specified.     DISCHARGE MEDICATIONS:  Current Discharge Medication List            Jessica Marina MD  Attending Emergency Physician                      Jessica Marina MD  07/10/22 1290

## 2022-07-09 NOTE — H&P
Department of Psychiatry  Attending Physician Psychiatric Assessment     Reason for Admission to Psychiatric Unit:  A mental disorder causing major disability in social, interpersonal, occupational, and/or educational functioning that is leading to dangerous or life-threatening functioning, and that can only be addressed in an acute inpatient setting   Concerns about patient's safety in the community    CHIEF COMPLAINT: Depression with suicidal ideation    History obtained from: Patient, electronic medical record          HISTORY OF PRESENT ILLNESS:    Jordan Thibodeaux is a 65 y.o. male who has a past medical history of depression, anxiety, hypertension, transient ischemic attack, psoriasis, polysubstance use and alcohol use with seizures during withdrawal. Patient presented to the ED escorted by police due to feeling helpless and hopeless with plans to end his life by jumping off a bridge. Per Emergency Department documentation:  HPI 65 y.o. male presents with c/o suicidal thoughts. The patient reports feeling depressed and having thought of suicide for for the last month since he and his girlfriend broke up. The patient has been thinking of jumping off a bridge. The patient reports a h/o of previous suicide attempt. The patient does have a h/o of previous psychiatric admission most recently in April of 2022. The patient reports daily alcohol use. No attempts at self harm to this point. The patient no acute medical complaints at this time. At diagnostic assessment patient is tearful. He shares that he has been experiencing helplessness and hopelessness for greater than a 2-week timeframe. Offers that he had a break-up with his girlfriend approximately 1 month ago and he has been struggling since that time as she is \"taunting\" him and calling his workplace leaving derogatory messages about what a bad person he is.   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 Social History:     Born in: [facility], raised in the [facility]. Family: Parents , 2 brothers , 1 alive brother Denies family relations and has been estranged for a long time. .  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 Patient Assets/Supportive Factors: Willingness to ask for help. DRUG USE HISTORY  Social History     Tobacco Use   Smoking Status Current Every Day Smoker    Packs/day: 0.25    Years: 30.00    Pack years: 7.50    Types: Cigarettes    Start date: 1990   Smokeless Tobacco Never Used     Social History     Substance and Sexual Activity   Alcohol Use Yes    Comment: weekends     Social History     Substance and Sexual Activity   Drug Use Not Currently    Types: Cocaine    Comment: occasionally       Endorses alcohol use including 3-4 \"tall boy\" beers daily last 10 days. Occasional  cocaine use. Urine toxicology unremarkable on admission  Blood alcohol 0.368 @ 2231 on 2022         LEGAL HISTORY:   HISTORY OF INCARCERATION: [] Yes [x] No    Family History:   No family history on file.     Psychiatric Family History  Patient denies psychiatric family history. Suicides in family: [] Yes [x] No    Substance use in family: [] Yes [x] No         PHYSICAL EXAM:  Vitals:  BP (!) 155/87   Pulse (!) 117   Temp 97.7 °F (36.5 °C) (Oral)   Resp 16   Ht 5' 11\" (1.803 m)   Wt 180 lb (81.6 kg)   SpO2 98%   BMI 25.10 kg/m²   Pain Level: Denies pain. Some noted discomfort to toenail left foot, discolored internal medicine involved. LABS:  Labs reviewed: [x] Yes  Last EKG in EMR reviewed: [x] Yes   QTc 442       Review of Systems   Constitutional: Negative for chills and weight loss. HENT: Negative for ear pain and nosebleeds. Eyes: Negative for blurred vision and photophobia. Respiratory: Negative for cough, shortness of breath and wheezing. Cardiovascular: Negative for chest pain and palpitations. Gastrointestinal: Negative for abdominal pain, diarrhea and vomiting. Genitourinary: Negative for dysuria and urgency. Musculoskeletal: Negative for falls and joint pain. Skin: Positive for itching due to psoriasis. Some scabbing noted bilateral forearms and elbows  Neurological: Positive for slight tremors, denies weakness. Seizures per history with alcohol withdrawal  Endo/Heme/Allergies: Does not bruise/bleed easily. Physical Exam:   Constitutional:  Appears well-developed and well-nourished, no acute distress. HENT:   Head: Normocephalic and atraumatic. Eyes: Conjunctivae are normal. Right eye exhibits no discharge. Left eye exhibits no discharge. No scleral icterus. Neck: Normal range of motion. Neck supple. Pulmonary/Chest:  No respiratory distress or accessory muscle use, no wheezing. Cardiac: Regular rate and rhythm. Abdominal: Soft. Non-tender. Exhibits no distension. Musculoskeletal: Normal range of motion. Exhibits no edema. Neurological: cranial nerves II-XII grossly in tact, normal gait and station. Skin: Skin is warm and dry. Patient is not diaphoretic.   Scabbing is noted right and left elbows       Mental Status Examination:    Level of consciousness: Awake and alert  Appearance:  Appropriate attire,  sitting bed reading the newspaper, poor grooming. Hair is unkempt, unshaven with significant dirt under his nails  Behavior/Motor: Approachable, calm, tearful at times  Attitude toward examiner: Cooperative, attentive, good eye contact  Speech: Normal rate, volume, and depressed tone. Mood: Depressed  Affect: Blunted  Thought processes: Goal directed, linear  Thought content: Endorses suicidal ideation with plan, unable to contract for safety in the community              Denies homicidal ideations               Denies hallucinations              Denies delusions              Denies paranoia  Cognition: Oriented to self, location, time, situation  Concentration: Clinically adequate  Memory: Intact  Insight & Judgment: Poor         DSM-5 Diagnosis    Principal Problem: Major depressive disorder, recurrent severe without psychotic features ([facility] [address]      Versus substance induced mood disorder  Alcohol use disorder, and active withdrawal  PABLITO       Psychosocial and Contextual factors:  Financial   Occupational   Relationship   Legal   Living situation   Educational     Past Medical History:   Diagnosis Date    Arthritis     Hypertension     Liver disease     Psoriasis     Psychiatric problem     Seizures ([facility] [address]         TREATMENT CONSIDERATIONS    Continue inpatient psychiatric treatment. Home medications reviewed. Medications per attending physician:  Librium 10 mg 3 times daily has been started  Consider restarting Lexapro 10 mg daily and titrate as tolerated  CIWA protocol  Monitor need and frequency of PRN medications. Attempt to develop insight. Follow-up daily while inpatient. Reviewed risks and benefits as well as potential side effects with patient.     CONSULT:  [x] Yes [] No  Internal medicine for medical management/medical H&P    Risk Management: close watch per standard protocol      Psychotherapy: participation in milieu and group and individual sessions with Attending Physician,  and Physician Assistant/CNP      Estimated length of stay:  2-14 days      GENERAL PATIENT/FAMILY EDUCATION  Patient will understand basic signs and symptoms, patient will understand benefits/risks and potential side effects from proposed medications, and patient will understand their role in recovery. Family is not active in patient's care. Patient assets that may be helpful during treatment include: Intent to participate and engage in treatment, sufficient fund of knowledge and intellect to understand and utilize treatments. Goals:    1) Remission of suicidal ideation  2) Stabilization of symptoms prior to discharge. 3) Establish efficacy and tolerability of medications. Behavioral Services  Medicare Certification     Admission Day 1  I certify that this patient's inpatient psychiatric hospital admission is medically necessary for:    x (1) treatment which could reasonably be expected to improve this patient's condition, or    x (2) diagnostic study or its equivalent. Time Spent: 60 minutes    Cathy Paul is a 65 y.o. male being evaluated face to face    --SHARAN Haji CNP on 7/9/2022 at 3:57 PM    An electronic signature was used to authenticate this note. Psychiatry Attending Attestation     I independently saw and evaluated the patient. I reviewed the Advance Practice Provider's documentation above. Any additional comments or changes to the Advance Practice Provider's documentation are stated below otherwise agree with assessment. Patient is a 81-year-old male with history of depression and alcohol abuse admitted for worsening suicidal thoughts and a plan to consult. Reports that he has been drinking at least 3-5 tall cans of beer every day for last several weeks now. Reports possibilities of helplessness and hopelessness.   Mentions that he has been on and off with his medication but notes that Lexapro has not been significantly helpful. Willing to give it another try with a higher dose. Reports withdrawal symptoms from alcohol as severe restlessness and tremors today. Discussed with him about restarting Lexapro and adding Librium to help with mood and withdrawal symptoms. Patient is agreeable to the plan. PLAN  Will restart Lexapro and titrate to effect  Will start Librium for severe alcohol withdrawals  Attempt to develop insight  Psycho-education conducted. Supportive Therapy conducted.   Probable discharge is TBD  Follow-up TBD    Electronically signed by Imelda Das MD on 7/9/22 at 4:18 PM EDT

## 2022-07-09 NOTE — ED NOTES
Mode of arrival (squad #, walk in, police, etc) : [employer]        Chief complaint(s): alcohol intoxication, suicidal         Arrival Note (brief scenario, treatment PTA, etc). : pt presents to the ER c/o suicidal ideation. Pt reports he broke up with his gf of 8 years 1 month ago and he is unable to cope without her. Pt reports he has been drinking daily since the break up. Pt states he has been seen multiple times but does not believe his medications are working. When asked if pt had a plan, he reports \"that depends on the day\". When asked if he currently had a plan he said \"well, I guess jump off the bridge\". Hx of suicide attempt 1 year ago when he laid on railroad tracks. [employer]        C= \"Have you ever felt that you should Cut down on your drinking? \"  Yes  A= \"Have people Annoyed you by criticizing your drinking? \"  Yes  G= \"Have you ever felt bad or Guilty about your drinking? \"  Yes  E= \"Have you ever had a drink as an Eye-opener first thing in the morning to steady your nerves or to help a hangover? \"  Yes      Deferred []      Reason for deferring: N/A    *If yes to two or more: probable alcohol abuse. Nathan Negro RN  07/08/22 4961

## 2022-07-10 PROCEDURE — 6370000000 HC RX 637 (ALT 250 FOR IP): Performed by: PSYCHIATRY & NEUROLOGY

## 2022-07-10 PROCEDURE — 99232 SBSQ HOSP IP/OBS MODERATE 35: CPT | Performed by: PSYCHIATRY & NEUROLOGY

## 2022-07-10 PROCEDURE — 99231 SBSQ HOSP IP/OBS SF/LOW 25: CPT | Performed by: INTERNAL MEDICINE

## 2022-07-10 PROCEDURE — 6370000000 HC RX 637 (ALT 250 FOR IP): Performed by: INTERNAL MEDICINE

## 2022-07-10 PROCEDURE — 90833 PSYTX W PT W E/M 30 MIN: CPT | Performed by: PSYCHIATRY & NEUROLOGY

## 2022-07-10 PROCEDURE — 1240000000 HC EMOTIONAL WELLNESS R&B

## 2022-07-10 PROCEDURE — APPSS30 APP SPLIT SHARED TIME 16-30 MINUTES: Performed by: PSYCHIATRY & NEUROLOGY

## 2022-07-10 RX ORDER — ESCITALOPRAM OXALATE 10 MG/1
10 TABLET ORAL DAILY
Status: DISCONTINUED | OUTPATIENT
Start: 2022-07-11 | End: 2022-07-12

## 2022-07-10 RX ADMIN — ASPIRIN 81 MG: 81 TABLET, COATED ORAL at 08:30

## 2022-07-10 RX ADMIN — FOLIC ACID 1 MG: 1 TABLET ORAL at 08:29

## 2022-07-10 RX ADMIN — ATORVASTATIN CALCIUM 80 MG: 20 TABLET, FILM COATED ORAL at 21:18

## 2022-07-10 RX ADMIN — HYDROXYZINE HYDROCHLORIDE 50 MG: 50 TABLET, FILM COATED ORAL at 13:10

## 2022-07-10 RX ADMIN — ESCITALOPRAM OXALATE 5 MG: 10 TABLET ORAL at 08:29

## 2022-07-10 RX ADMIN — CHLORDIAZEPOXIDE HYDROCHLORIDE 10 MG: 10 CAPSULE ORAL at 14:33

## 2022-07-10 RX ADMIN — CHLORDIAZEPOXIDE HYDROCHLORIDE 10 MG: 10 CAPSULE ORAL at 08:29

## 2022-07-10 RX ADMIN — MULTIPLE VITAMINS W/ MINERALS TAB 1 TABLET: TAB at 08:29

## 2022-07-10 RX ADMIN — NICOTINE POLACRILEX 2 MG: 2 GUM, CHEWING BUCCAL at 13:10

## 2022-07-10 RX ADMIN — HYDROXYZINE HYDROCHLORIDE 50 MG: 50 TABLET, FILM COATED ORAL at 21:20

## 2022-07-10 RX ADMIN — TRAZODONE HYDROCHLORIDE 50 MG: 50 TABLET ORAL at 21:18

## 2022-07-10 RX ADMIN — CHLORDIAZEPOXIDE HYDROCHLORIDE 10 MG: 10 CAPSULE ORAL at 21:18

## 2022-07-10 RX ADMIN — THIAMINE HCL TAB 100 MG 100 MG: 100 TAB at 08:29

## 2022-07-10 ASSESSMENT — PAIN SCALES - GENERAL: PAINLEVEL_OUTOF10: 0

## 2022-07-10 NOTE — PROGRESS NOTES
Cooperative, attentive, good eye contact  Speech: Normal rate, volume, and depressed tone. Mood: Depressed  Affect: Blunted  Thought processes: Goal directed, linear  Thought content: Endorses suicidal ideation with plan, unable to contract for safety in the community              Denies homicidal ideations               Denies hallucinations              Denies delusions              Denies paranoia  Cognition: Oriented to self, location, time, situation  Concentration: Clinically adequate  Memory: Intact  Insight & Judgment: Poor    Data   height is 5' 11\" (1.803 m) and weight is 180 lb (81.6 kg). His temperature is 97.6 °F (36.4 °C). His blood pressure is 150/93 (abnormal) and his pulse is 100. His respiration is 14 and oxygen saturation is 98%.    Labs:   Admission on 07/08/2022   Component Date Value Ref Range Status    WBC 07/08/2022 4.7  3.5 - 11.0 k/uL Final    RBC 07/08/2022 4.59  4.5 - 5.9 m/uL Final    Hemoglobin 07/08/2022 14.3  13.5 - 17.5 g/dL Final    Hematocrit 07/08/2022 42.2  41 - 53 % Final    MCV 07/08/2022 91.9  80 - 100 fL Final    MCH 07/08/2022 31.2  26 - 34 pg Final    MCHC 07/08/2022 33.9  31 - 37 g/dL Final    RDW 07/08/2022 16.2* 11.5 - 14.9 % Final    Platelets 24/55/6220 186  150 - 450 k/uL Final    MPV 07/08/2022 6.9  6.0 - 12.0 fL Final    Seg Neutrophils 07/08/2022 31* 36 - 66 % Final    Lymphocytes 07/08/2022 52* 24 - 44 % Final    Monocytes 07/08/2022 13* 1 - 7 % Final    Eosinophils % 07/08/2022 4  0 - 4 % Final    Basophils 07/08/2022 0  0 - 2 % Final    Segs Absolute 07/08/2022 1.46  1.3 - 9.1 k/uL Final    Absolute Lymph # 07/08/2022 2.44  1.0 - 4.8 k/uL Final    Absolute Mono # 07/08/2022 0.61  0.1 - 1.3 k/uL Final    Absolute Eos # 07/08/2022 0.19  0.0 - 0.4 k/uL Final    Basophils Absolute 07/08/2022 0.00  0.0 - 0.2 k/uL Final    Morphology 07/08/2022 ANISOCYTOSIS PRESENT   Final    Glucose 07/08/2022 101* 70 - 99 mg/dL Final    BUN 07/08/2022 7  6 - 300 ng/mL)                  Phencyclidine, Urine 07/08/2022 NEGATIVE  NEGATIVE Final    Comment:       (Positive cutoff 25 ng/mL)                  Cannabinoid Scrn, Ur 07/08/2022 NEGATIVE  NEGATIVE Final    Comment:       (Positive cutoff 50 ng/mL)                  Oxycodone Screen, Ur 07/08/2022 NEGATIVE  NEGATIVE Final    Comment:       (Positive cutoff 100 ng/mL)                  Test Information 07/08/2022 Assay provides medical screening only. The absence of expected drug(s) and/or metabolite(s) may indicate diluted or adulterated urine, limitations of testing or timing of collection. Final    Comment: Testing for legal purposes should be confirmed by another method. To request confirmation   of test result, please call the lab within 7 days of sample submission.  Acetaminophen Level 07/08/2022 <5* 10 - 30 ug/mL Final    Salicylate Lvl 93/37/1617 <1* 3 - 10 mg/dL Final         Reviewed patient's current plan of care and vital signs with nursing staff.     Labs reviewed: [x] Yes  Last EKG in EMR reviewed: [x] Yes  QTc: 442    Medications  Current Facility-Administered Medications: [START ON 7/11/2022] escitalopram (LEXAPRO) tablet 10 mg, 10 mg, Oral, Daily  acetaminophen (TYLENOL) tablet 650 mg, 650 mg, Oral, Q6H PRN  ibuprofen (ADVIL;MOTRIN) tablet 400 mg, 400 mg, Oral, Q6H PRN  hydrOXYzine HCl (ATARAX) tablet 50 mg, 50 mg, Oral, TID PRN  traZODone (DESYREL) tablet 50 mg, 50 mg, Oral, Nightly PRN  polyethylene glycol (GLYCOLAX) packet 17 g, 17 g, Oral, Daily PRN  aluminum & magnesium hydroxide-simethicone (MAALOX) 200-200-20 MG/5ML suspension 30 mL, 30 mL, Oral, Q6H PRN  nicotine polacrilex (NICORETTE) gum 2 mg, 2 mg, Oral, Q2H PRN  chlordiazePOXIDE (LIBRIUM) capsule 10 mg, 10 mg, Oral, TID  aspirin EC tablet 81 mg, 81 mg, Oral, Daily  atorvastatin (LIPITOR) tablet 80 mg, 80 mg, Oral, Nightly  thiamine mononitrate tablet 100 mg, 100 mg, Oral, Daily  folic acid (FOLVITE) tablet 1 mg, 1 mg, Oral, Daily  therapeutic multivitamin-minerals 1 tablet, 1 tablet, Oral, Daily    ASSESSMENT  Major depressive disorder, recurrent severe without psychotic features ([facility]         HANDOFF  Patient symptoms remain unstable  Medications as determined by attending physician and after discussion continue to titrate Lexapro 10 mg daily starting tomorrow  Encourage participation in groups and milieu. Probable discharge is to be determined by MD and currently undetermined    Electronically signed by SHARAN Burch CNP on 7/10/2022 at 3:53 PM    **This report has been created using voice recognition software. It may contain minor errors which are inherent in voice recognition technology. **                                         Psychiatry Attending Attestation     I independently saw and evaluated the patient. I reviewed the Advance Practice Provider's documentation above. Any additional comments or changes to the Advance Practice Provider's documentation are stated below otherwise agree with assessment. Patient reports that his withdrawal symptoms are somewhat better today. Continues report feeling helpless and hopeless. Notes that suicidal thoughts are largely unchanged. Reports feeling sad and down today. Discussed with him about continue to cut down on Librium starting tomorrow and titrating Lexapro to help with his mood. He is agreeable to the plan. More than 16 mins of the session was spent doing supportive psychotherapy. Session lasted over 30 mins today. PLAN  Patient s symptoms show no change  Will continue to titrate Lexapro  Will taper down on Librium starting tomorrow  Attempt to develop insight  Psycho-education conducted. Supportive Therapy conducted.   Probable discharge is TBd  Follow-up TBD    Electronically signed by Flor Kaur MD on 7/10/22 at 4:43 PM EDT

## 2022-07-10 NOTE — BH NOTE
Wrap-Up Group Note        Date: July 9, 2022 Start Time: 2015  End Time: 8:45pm      Number of Participants in Group & Unit Census:  8/19    Topic: Evening Wrap-Up    Goal of Group: Discuss day's events and discuss goals for tomorrow      Comments:     Patient did not participate in Wrap-Up group, despite staff encouragement and explanation of benefits. Patient remain seclusive to self. Q15 minute safety checks maintained for patient safety and will continue to encourage patient to attend unit programming.

## 2022-07-10 NOTE — BH NOTE
Community Meeting Group Note        Date: 7-10-22 Start Time: 1430  End Time: 0843      Number of Participants in [address]:  8/19    Topic: educate    Goal of Group: inspire      Comments:     Patient did not participate in [employer] group, despite staff encouragement and explanation of benefits. Patient remain seclusive to self. Q15 minute safety checks maintained for patient safety and will continue to encourage patient to attend unit programming.

## 2022-07-10 NOTE — BH NOTE
Community Meeting Group Note        Date: 7-10-22 Start Time: 0900  End Time: 1518      Number of Participants in Group & Unit Census:  8/19    Topic: educate    Goal of Group: inspire      Comments:     Patient did not participate in [employer] group, despite staff encouragement and explanation of benefits. Patient remain seclusive to self. Q15 minute safety checks maintained for patient safety and will continue to encourage patient to attend unit programming.

## 2022-07-10 NOTE — PLAN OF CARE
Problem: Pain  Goal: Verbalizes/displays adequate comfort level or baseline comfort level  Outcome: Not Progressing  Note: Patient denies suicidal/homicidal ideations but reports high levels of anxiety and depression. He is currently attempting to cope with his health concern and withdraw symptoms. Patient is aloof among peers and isolates to room most of shift. Programming encouraged.      Problem: Self Harm/Suicidality  Goal: Will have no self-injury during hospital stay  Description: INTERVENTIONS:  1. Q 30 MINUTES: Routine safety checks  2. Q SHIFT & PRN: Assess risk to determine if routine checks are adequate to maintain patient safety  7/10/2022 1812 by Charlee Carter LPN  Outcome: Progressing

## 2022-07-10 NOTE — PROGRESS NOTES
[facility] Internal Medicine    CONSULTATION / HISTORY AND PHYSICAL EXAMINATION            Date:   7/10/2022  Patient name:  Case Robison  Date of admission:  7/8/2022 10:01 PM  MRN:   [id number]  Account:  [de-identified]  YOB: 1965  PCP:    No primary care provider on file. Room:   [address] [place] [address]  Code Status:    Full Code    Physician Requesting Consult: Syeda Reyes, *    Reason for Consult:  medical management    Chief Complaint:     Chief Complaint   Patient presents with    Suicidal    Alcohol Intoxication       History Obtained From:     Patient medical record nursing staff    History of Present Illness:   Patient, admitted to [facility] floor with major depression, patient has chronic alcoholism, major depression, history of TIA  No complaints of chest pain, shortness of breath, abdominal pain      Past Medical History:     Past Medical History:   Diagnosis Date    Arthritis     Hypertension     Liver disease     Psoriasis     Psychiatric problem     Seizures ([address]         Past Surgical History:     Past Surgical History:   Procedure Laterality Date    TONSILLECTOMY          Medications Prior to Admission:     Prior to Admission medications    Medication Sig Start Date End Date Taking?  Authorizing Provider   escitalopram (LEXAPRO) 20 MG tablet Take 1 tablet by mouth daily 4/15/22   Virgil Parrish MD   mirtazapine (REMERON) 15 MG tablet Take 1 tablet by mouth nightly 4/12/22   Virgil Parrish MD   traZODone (DESYREL) 50 MG tablet Take 1 tablet by mouth nightly as needed for Sleep 4/12/22   Virgil Parrish MD   clopidogrel (PLAVIX) 75 MG tablet Take 1 tablet by mouth daily for 21 doses 4/12/22 5/3/22  Virgil Parrish MD   atorvastatin (LIPITOR) 80 MG tablet Take 1 tablet by mouth nightly 4/12/22 7/11/22  Virgil Parrish MD   aspirin 81 MG EC tablet Take 1 tablet by mouth daily 4/12/22 7/11/22  Virgil Parrish MD        Allergies:     Patient has no known allergies. Social History:     Tobacco:    reports that he has been smoking cigarettes. He started smoking about 31 years ago. He has a 7.50 pack-year smoking history. He has never used smokeless tobacco.  Alcohol:      reports current alcohol use. Drug Use:  reports previous drug use. Drug: Cocaine. Family History:     No family history on file. Review of Systems:     Positive and Negative as described in HPI. CONSTITUTIONAL: Patient with generalized shaking  HEENT:  negative for vision, hearing changes, runny nose, throat pain  RESPIRATORY:  negative for shortness of breath, cough, congestion, wheezing. CARDIOVASCULAR:  negative for chest pain, palpitations. GASTROINTESTINAL:  negative for nausea, vomiting, diarrhea, constipation, change in bowel habits, abdominal pain   GENITOURINARY:  negative for difficulty of urination, burning with urination, frequency   INTEGUMENT:  negative for rash, skin lesions, easy bruising   HEMATOLOGIC/LYMPHATIC:  negative for swelling/edema   ALLERGIC/IMMUNOLOGIC:  negative for urticaria , itching  ENDOCRINE:  negative increase in drinking, increase in urination, hot or cold intolerance  MUSCULOSKELETAL:  negative joint pains, muscle aches, swelling of joints  NEUROLOGICAL:  negative for headaches, dizziness, lightheadedness, numbness, pain, tingling extremities  BEHAVIOR/PSYCH:      Physical Exam:     BP (!) 150/93   Pulse 100   Temp 97.6 °F (36.4 °C)   Resp 14   Ht 5' 11\" (1.803 m)   Wt 180 lb (81.6 kg)   SpO2 98%   BMI 25.10 kg/m²   Temp (24hrs), Av.6 °F (36.4 °C), Min:97.6 °F (36.4 °C), Max:97.6 °F (36.4 °C)    No results for input(s): POCGLU in the last 72 hours. No intake or output data in the 24 hours ending 07/10/22 1433    General Appearance:  alert, well appearing, and in no acute distress  Mental status: oriented to person, place, and time with normal affect  Head:  normocephalic, atraumatic.   Eye: no icterus, redness, pupils equal and reactive, extraocular eye movements intact, conjunctiva clear  Ear: normal external ear, no discharge, hearing intact  Nose:  no drainage noted  Mouth: mucous membranes moist  Neck: supple, no carotid bruits, thyroid not palpable  Lungs: Bilateral equal air entry, clear to ausculation, no wheezing, rales or rhonchi, normal effort  Cardiovascular: normal rate, regular rhythm, no murmur, gallop, rub. Abdomen: Soft, nontender, nondistended, normal bowel sounds, no hepatomegaly or splenomegaly  Neurologic: There are no new focal motor or sensory deficits, normal muscle tone and bulk, no abnormal sensation, normal speech, cranial nerves II through XII grossly intact  Skin: No gross lesions, rashes, bruising or bleeding on exposed skin area  Extremities:  peripheral pulses palpable, no pedal edema or calf pain with palpation  Psych: Investigations:      Laboratory Testing:  No results found for this or any previous visit (from the past 24 hour(s)). Consultations:   IP CONSULT TO INTERNAL MEDICINE  IP CONSULT TO SOCIAL WORK  Assessment :      Primary Problem  Major depressive disorder, recurrent severe without psychotic features [facility])    Active Hospital Problems    Diagnosis Date Noted    Acute alcoholic intoxication with complication ([facility] [V31.261]      Priority: Medium    Depression with suicidal ideation [F32. A, R45.851] 04/09/2022    Alcohol abuse, episodic [F10.10] 04/09/2022    TIA (transient ischemic attack) [G45.9] 09/26/2021    Major depressive disorder, recurrent severe without psychotic features [facility] [F33.2] 01/24/2021       Plan:     1. Chronic alcoholism, starting patient on Librium, thiamine, folic acid and multivitamin  2. History of TIA, restarted home dose of aspirin, Lipitor, no focal neurological deficit  3.  Major depression, managed with psychiatrist    7/10   Readings of high BP  WILL OBSERVE , IF BP STAYS HIGH WILL START MEDS Mustapha Latif MD  7/10/2022  2:33 PM    Copy sent to Dr. Perez Situ primary care provider on file. Please note that this chart was generated using voice recognition Dragon dictation software. Although every effort was made to ensure the accuracy of this automated transcription, some errors in transcription may have occurred.

## 2022-07-10 NOTE — CARE COORDINATION
BHI Biopsychosocial Assessment    Current Level of Psychosocial Functioning     Independent X  Dependent    Minimal Assist     Comments:    Psychosocial High Risk Factors (check all that apply)    Unable to obtain meds   Chronic illness/pain    Substance abuse X  Lack of Family Support X  Financial stress   Isolation   Inadequate Community Resources  Suicide attempt(s)  Not taking medications   Victim of crime   Developmental Delay  Unable to manage personal needs    Age 72 or older   Homeless X  No transportation   Readmission within 30 days  Unemployment  Traumatic Event    Comments:   Psychiatric Advanced Directives: n/a    Family to [place] in Treatment: none    Sexual Orientation:  N/a    Patient Strengths: connected to outpatient provider, insurance, income    Patient Barriers:  Alcohol use, homeless, no family support, poor judgement      Opiate Education Provided:  No- patient does not use opiates. CMHC/mental health history: [facility]    Plan of Care   medication management, group/individual therapies, family meetings, psycho -education, treatment team meetings to assist with stabilization    Initial Discharge Plan:  Patient plans to return to [address] [place] [address] Summary:    Carmella Rooney is a 64 y/o male admitted to [place] [address] for suicidal thoughts with a plan to jump from a bridge. Patient states he had been very intoxicated before his arrival to the hospital and still having a hard time dealing with the break up of his girlfriend. He is homeless and living at the [facility] where he is employed also. Patient plans to return there at discharge. He denies drug use and indicates he is still feeling suicidal but would never act on it while here in the hospital.  Patient described his depression as worsening over time but it never fully goes away. He denies homicidal ideations and visual/auditory hallucinations. Will continue to offer patient support as symptoms improve.

## 2022-07-10 NOTE — PLAN OF CARE
[facility][1]  Initial Interdisciplinary Treatment Plan NOTE      Original treatment plan Date & Time: 7/10/2022            850am  Admission Type:  Admission Type: Voluntary    Reason for admission:   Reason for Admission: Increased depression with SI; plan to jump from bridge    Estimated Length of Stay:  5-7days  Estimated Discharge Date: to be determined by physician    PATIENT STRENGTHS:  Patient Strengths:   Patient Strengths and Limitations:   Addictive Behavior: Addictive Behavior  In the Past 3 Months, Have You Felt or Has Someone Told You That You Have a Problem With  : None  Medical Problems:  Past Medical History:   Diagnosis Date    Arthritis     Hypertension     Liver disease     Psoriasis     Psychiatric problem     Seizures ([facility]      Status EXAM:Mental Status and Behavioral Exam  Normal: No  Level of Assistance: Independent/Self  Facial Expression: Flat,Sad  Affect: Appropriate  Level of Consciousness: Alert  Frequency of Checks: 4 times per hour, close  Mood:Normal: No  Mood: Anxious,Depressed,Helpless  Motor Activity:Normal: No  Motor Activity: Decreased  Eye Contact: Good  Observed Behavior: Friendly,Guarded,Withdrawn,Preoccupied  Sexual Misconduct History: Current - no  Preception: [place] to person,[place] to time,[place] to place,[place] to situation  Attention:Normal: No  Attention: Distractible  Thought Processes: Other (comment)  Thought Content:Normal: No  Thought Content: Preoccupations  Depression Symptoms: Loss of interest,Isolative,Feelings of helplessness,Feelings of hopelessess  Anxiety Symptoms: Generalized  [place] Symptoms: No problems reported or observed.   Hallucinations: None  Delusions: No  Memory:Normal: Yes  Insight and Judgment: No  Insight and Judgment: Poor judgment    EDUCATION:   Learner Progress Toward Treatment Goals: reviewed group plans and strategies for care    Method:group therapy, medication compliance, individualized assessments and care planning    Outcome: needs reinforcement    PATIENT GOALS: to be discussed with patient within 72 hours    PLAN/TREATMENT RECOMMENDATIONS:     continue group therapy , medications compliance, goal setting, individualized assessments and care, continue to monitor pt on unit      SHORT-TERM GOALS:   Time frame for Short-Term Goals: 5-7 days    LONG-TERM GOALS:  Time frame for Long-Term Goals: 6 months  Members Present in Team Meeting: See Signature Sheet    Khloe Osorio

## 2022-07-10 NOTE — PLAN OF CARE
Problem: Self Harm/Suicidality  Goal: Will have no self-injury during hospital stay  Description: INTERVENTIONS:  1. Q 30 MINUTES: Routine safety checks  2. Q SHIFT & PRN: Assess risk to determine if routine checks are adequate to maintain patient safety  Outcome: Progressing  Patient has made no attempt to harm self at this time. Problem: Coping  Goal: Pt/Family able to verbalize concerns and demonstrate effective coping strategies  Description: INTERVENTIONS:  1. Assist patient/family to identify coping skills, available support systems and cultural and spiritual values  2. Provide emotional support, including active listening and acknowledgement of concerns of patient and caregivers  3. Reduce environmental stimuli, as able  4. Instruct patient/family in relaxation techniques, as appropriate  5. Assess for spiritual pain/suffering and initiate Spiritual Care, Psychosocial Clinical Specialist consults as needed  Outcome: Progressing  Patient is isolative to his room but cooperative with staff. He occupies himself with reading. Problem: Depression/Self Harm  Goal: Effect of psychiatric condition will be minimized and patient will be protected from self harm  Description: INTERVENTIONS:  1. Assess impact of patient's symptoms on level of functioning, self care needs and offer support as indicated  2. Assess patient/family knowledge of depression, impact on illness and need for teaching  3. Provide emotional support, presence and reassurance  4. Assess for possible suicidal thoughts or ideation. If patient expresses suicidal thoughts or statements do not leave alone, initiate Suicide Precautions, move to a room close to the nursing station and obtain sitter  5.  Initiate consults as appropriate with Mental Health Professional, Spiritual Care, Psychosocial CNS, and consider a recommendation to the LIP for a Psychiatric Consultation  Outcome: Progressing  Safety plan reviewed with patient, agrees to approach staff when feeling upset. 15 minute and random checks maintained for safety. No violent or escalating behaviors noted during this shift. Patient is currently calm, controlled and medication-compliant. He reports thoughts to hurt himself but denies any plan or intent. He feels safe while here.

## 2022-07-11 PROCEDURE — 6370000000 HC RX 637 (ALT 250 FOR IP): Performed by: INTERNAL MEDICINE

## 2022-07-11 PROCEDURE — 6370000000 HC RX 637 (ALT 250 FOR IP): Performed by: PSYCHIATRY & NEUROLOGY

## 2022-07-11 PROCEDURE — 99232 SBSQ HOSP IP/OBS MODERATE 35: CPT | Performed by: PSYCHIATRY & NEUROLOGY

## 2022-07-11 PROCEDURE — 1240000000 HC EMOTIONAL WELLNESS R&B

## 2022-07-11 PROCEDURE — APPSS30 APP SPLIT SHARED TIME 16-30 MINUTES: Performed by: PSYCHIATRY & NEUROLOGY

## 2022-07-11 RX ORDER — CHLORDIAZEPOXIDE HYDROCHLORIDE 5 MG/1
5 CAPSULE, GELATIN COATED ORAL 3 TIMES DAILY
Status: DISCONTINUED | OUTPATIENT
Start: 2022-07-11 | End: 2022-07-12

## 2022-07-11 RX ADMIN — CHLORDIAZEPOXIDE HYDROCHLORIDE 10 MG: 10 CAPSULE ORAL at 08:49

## 2022-07-11 RX ADMIN — MULTIPLE VITAMINS W/ MINERALS TAB 1 TABLET: TAB at 08:49

## 2022-07-11 RX ADMIN — CHLORDIAZEPOXIDE HYDROCHLORIDE 5 MG: 5 CAPSULE ORAL at 21:23

## 2022-07-11 RX ADMIN — TRAZODONE HYDROCHLORIDE 50 MG: 50 TABLET ORAL at 21:14

## 2022-07-11 RX ADMIN — FOLIC ACID 1 MG: 1 TABLET ORAL at 08:49

## 2022-07-11 RX ADMIN — ASPIRIN 81 MG: 81 TABLET, COATED ORAL at 08:49

## 2022-07-11 RX ADMIN — ESCITALOPRAM OXALATE 10 MG: 10 TABLET ORAL at 08:49

## 2022-07-11 RX ADMIN — HYDROXYZINE HYDROCHLORIDE 50 MG: 50 TABLET, FILM COATED ORAL at 21:13

## 2022-07-11 RX ADMIN — HYDROXYZINE HYDROCHLORIDE 50 MG: 50 TABLET, FILM COATED ORAL at 12:52

## 2022-07-11 RX ADMIN — THIAMINE HCL TAB 100 MG 100 MG: 100 TAB at 08:49

## 2022-07-11 RX ADMIN — CHLORDIAZEPOXIDE HYDROCHLORIDE 10 MG: 10 CAPSULE ORAL at 13:31

## 2022-07-11 RX ADMIN — ATORVASTATIN CALCIUM 80 MG: 20 TABLET, FILM COATED ORAL at 21:23

## 2022-07-11 ASSESSMENT — PAIN SCALES - GENERAL: PAINLEVEL_OUTOF10: 0

## 2022-07-11 NOTE — GROUP NOTE
Group Therapy Note    Date: 7/11/2022    Group Start Time: 0900  Group End Time: 0930  Group Topic: Group Documentation    [facility]    Julisa Viveros        Group Therapy Note    Attendees: 10/24         Patient's Goal:  Go back to work    Notes:  Goal setting group    Status After Intervention:  Improved    Participation Level:  Active Listener and Interactive    Participation Quality: Appropriate and Attentive      Speech:  normal      Thought Process/Content: Logical      Affective Functioning: Congruent      Mood: anxious      Level of consciousness:  Alert and Oriented x4      Response to Learning: Able to verbalize current knowledge/experience and Able to verbalize/acknowledge new learning      Endings: None Reported    Modes of Intervention: Support      Discipline Responsible: [address] [facility]      Signature:  Julisa Viveros

## 2022-07-11 NOTE — GROUP NOTE
Group Therapy Note    Date: 7/11/2022    Group Start Time: 1430  Group End Time: 7010  Group Topic: Relaxation    [facility] D Darlynn Opitz, ABEBES    Recreation Group Note        Date: July 11, 2022 Start Time: 2:30pm  End Time: 3:15pm      Number of Participants in Group & Unit Census:  5/20    Topic: Recreation     Goal of Group: improve concentration skills       Comments:     Patient did not participate in recreation group, despite staff encouragement and explanation of benefits. Patient remain seclusive to self. Q15 minute safety checks maintained for patient safety and will continue to encourage patient to attend unit programming.         Signature:  Kenneth Osorio

## 2022-07-11 NOTE — PLAN OF CARE
[facility]  Day 3 Interdisciplinary Treatment Plan NOTE    Review Date & Time: 7/11/2022   1306    Admission Type:   Admission Type: Voluntary    Reason for admission:  Reason for Admission: Increased depression with SI; plan to jump from bridge  Estimated Length of Stay: 5-7 days  Estimated Discharge Date Update: to be determined by physician    PATIENT STRENGTHS:  Patient Strengths    Patient Strengths and Limitations:Limitations: Multiple barriers to leisure interests,Tendency to isolate self,General negative or hopeless attitude about future/recovery,Difficult relationships / poor social skills,Inappropriate/potentially harmful leisure interests,Difficulty problem solving/relies on others to help solve problems  Addictive Behavior:Addictive Behavior  In the Past 3 Months, Have You Felt or Has Someone Told You That You Have a Problem With  : None  Medical Problems:  Past Medical History:   Diagnosis Date    Arthritis     Hypertension     Liver disease     Psoriasis     Psychiatric problem     Seizures ([facility] [address]        Risk:  Fall Risk   Landon Scale Landon Scale Score: 22  BVC    Change in scores no Changes to plan of Care no    Status EXAM:   Mental Status and Behavioral Exam  Normal: Yes  Level of Assistance: Independent/Self  Facial Expression: Elevated  Affect: Appropriate  Level of Consciousness: Alert  Frequency of Checks: 4 times per hour, close  Mood:Normal: Yes  Mood: Anxious  Motor Activity:Normal: Yes  Motor Activity: Decreased  Eye Contact: Good  Observed Behavior: Friendly  Sexual Misconduct History: Current - no  Preception: [place] to person,[place] to time,[place] to place,[place] to situation  Attention:Normal: Yes  Attention: Distractible  Thought Processes: Circumstantial  Thought Content:Normal: Yes  Thought Content: Preoccupations  Depression Symptoms: No problems reported or observed. Anxiety Symptoms: No problems reported or observed.   [place] Symptoms: No problems reported or observed. Hallucinations: None  Delusions: No  Memory:Normal: Yes  Insight and Judgment: Yes  Insight and Judgment: Poor insight    Daily Assessment Last Entry:   Daily Sleep (WDL): Within Defined Limits            Daily Nutrition (WDL): Within Defined Limits  Level of Assistance: Independent/Self    Patient Monitoring:  Frequency of Checks: 4 times per hour, close    Psychiatric Symptoms:   Depression Symptoms  Depression Symptoms: No problems reported or observed. Anxiety Symptoms  Anxiety Symptoms: No problems reported or observed. [place] Symptoms  [place] Symptoms: No problems reported or observed. Suicide Risk CSSR-S:  1) Within the past month, have you wished you were dead or wished you could go to sleep and not wake up? : Yes  2) Have you actually had any thoughts of killing yourself? : Yes  3) Have you been thinking about how you might kill yourself? : Yes  5) Have you started to work out or worked out the details of how to kill yourself?  Do you intend to carry out this plan? : Yes  6) Have you ever done anything, started to do anything, or prepared to do anything to end your life?: Yes  Change in Result no Change in Plan of care no      EDUCATION:   EDUCATION:   Learner Progress Toward Treatment Goals: Reviewed results and recommendations of this team, Reviewed group plan and strategies, Reviewed signs, symptoms and risk of self harm and violent behavior, Reviewed goals and plan of care    Method:small group, individual verbal education    Outcome:verbalized by patient, but needs reinforcement to obtain goals    PATIENT GOALS:  Short term: med stabilization   Long term: follow up with outpatient provider, find a job     PLAN/TREATMENT RECOMMENDATIONS UPDATE: continue with group therapies, increased socialization, continue planning for after discharge goals, continue with medication compliance    SHORT-TERM GOALS UPDATE:   Time frame for Short-Term Goals: 5-7 days    LONG-TERM GOALS UPDATE:   Time frame for Long-Term Goals: 6 months  Members Present in Team Meeting: See Signature Sheet    CRISTAL Nj

## 2022-07-11 NOTE — GROUP NOTE
Group Therapy Note    Date: 7/11/2022    Group Start Time: 1100  Group End Time: 9113  Group Topic: Cognitive Skills    [facility]    [place], [place]    Attendees: 9/24     Patient's Goal:  Improve communication skills     Notes:  Pt was unable to tolerate the duration of the group. Status After Intervention:  Improved    Participation Level:  Active Listener and Interactive    Participation Quality: Appropriate      Speech:  normal      Thought Process/Content: Logical      Affective Functioning: Constricted/Restricted      Mood: depressed      Level of consciousness:  Preoccupied      Response to Learning: Able to verbalize current knowledge/experience and Progressing to goal      Endings: None Reported    Modes of Intervention: Socialization and Activity      Discipline Responsible: Psychoeducational Specialist      Signature:  Sandra Zarate

## 2022-07-11 NOTE — PLAN OF CARE
Problem: Self Harm/Suicidality  Goal: Will have no self-injury during hospital stay  Description: INTERVENTIONS:  1. Q 30 MINUTES: Routine safety checks  2. Q SHIFT & PRN: Assess risk to determine if routine checks are adequate to maintain patient safety  7/11/2022 1057 by Clemente June  Outcome: Progressing    Patient denied wanting to hurt himself at this time, he was observed on the unit interacting with peers. Problem: Coping  Goal: Pt/Family able to verbalize concerns and demonstrate effective coping strategies  Description: INTERVENTIONS:  1. Assist patient/family to identify coping skills, available support systems and cultural and spiritual values  2. Provide emotional support, including active listening and acknowledgement of concerns of patient and caregivers  3. Reduce environmental stimuli, as able  4. Instruct patient/family in relaxation techniques, as appropriate  5. Assess for spiritual pain/suffering and initiate Spiritual Care, Psychosocial Clinical Specialist consults as needed  7/11/2022 1057 by Clemente June  Outcome: Progressing    Patient stated that he wants to go back to work and keep himself focused. Problem: Depression/Self Harm  Goal: Effect of psychiatric condition will be minimized and patient will be protected from self harm  Description: INTERVENTIONS:  1. Assess impact of patient's symptoms on level of functioning, self care needs and offer support as indicated  2. Assess patient/family knowledge of depression, impact on illness and need for teaching  3. Provide emotional support, presence and reassurance  4. Assess for possible suicidal thoughts or ideation. If patient expresses suicidal thoughts or statements do not leave alone, initiate Suicide Precautions, move to a room close to the nursing station and obtain sitter  5.  Initiate consults as appropriate with Mental Health Professional, Spiritual Care, Psychosocial CNS, and consider a recommendation to the LIP for a Psychiatric Consultation  7/11/2022 1057 by Ksenia Rivera  Outcome: Progressing    Patient was calm and cooperative, he attended groups and interacted with peers.

## 2022-07-11 NOTE — PLAN OF CARE
Problem: Self Harm/Suicidality  Goal: Will have no self-injury during hospital stay  Description: INTERVENTIONS:  1. Q 30 MINUTES: Routine safety checks  2. Q SHIFT & PRN: Assess risk to determine if routine checks are adequate to maintain patient safety  7/10/2022 2306 by Prudence Goetz RN  Outcome: Progressing     Problem: Coping  Goal: Pt/Family able to verbalize concerns and demonstrate effective coping strategies  Description: INTERVENTIONS:  1. Assist patient/family to identify coping skills, available support systems and cultural and spiritual values  2. Provide emotional support, including active listening and acknowledgement of concerns of patient and caregivers  3. Reduce environmental stimuli, as able  4. Instruct patient/family in relaxation techniques, as appropriate  5. Assess for spiritual pain/suffering and initiate Spiritual Care, Psychosocial Clinical Specialist consults as needed  Outcome: Progressing     Problem: Depression/Self Harm  Goal: Effect of psychiatric condition will be minimized and patient will be protected from self harm  Description: INTERVENTIONS:  1. Assess impact of patient's symptoms on level of functioning, self care needs and offer support as indicated  2. Assess patient/family knowledge of depression, impact on illness and need for teaching  3. Provide emotional support, presence and reassurance  4. Assess for possible suicidal thoughts or ideation. If patient expresses suicidal thoughts or statements do not leave alone, initiate Suicide Precautions, move to a room close to the nursing station and obtain sitter  5.  Initiate consults as appropriate with Mental Health Professional, Spiritual Care, Psychosocial CNS, and consider a recommendation to the LIP for a Psychiatric Consultation  Outcome: Progressing     Problem: Pain  Goal: Verbalizes/displays adequate comfort level or baseline comfort level  7/10/2022 2306 by Prudence Goetz RN  Outcome: Progressing     Problem:

## 2022-07-11 NOTE — PROGRESS NOTES
Daily Progress Note  7/11/2022    Patient Name: Goyo Stringer    CHIEF COMPLAINT: Depression with suicidal ideation         SUBJECTIVE:    Nursing staff report the patient has maintained medication adherence. He has not exhibited acute behavioral changes or required emergency medications since his admission. Mr. Osiris Levi is agreeable to assessment at bedside where he is reading. He confirms that the Librium has been managing his alcohol withdrawal symptoms and there is a significant improvement in the slight tremor noted yesterday. He reports that he continues to feel helpless and hopeless and is experiencing significant depressive symptoms rating 8-9/10 on a 0-10 scale with 10 being the worst.  We discussed that as his body deals with the withdrawal from alcohol, it will take time for the depressive symptoms to begin to lift. Patient is grateful for the safety of the unit as he continues to endorse suicidal ideation and knows that he would have ended his own life in the community. Cassy Powell is encouraged to become more active on the milieu and explore the possibility of attending a group as a goal today. At this time, the patient is not appropriate for a lower level of care. There is risk of decompensation and patient warrants further hospitalization for safety and stabilization. Appetite:  [x] Normal/Adequate/Unchanged  [] Increased  [] Decreased      Sleep:       [] Normal/Adequate/Unchanged  [x] Fair  [] Poor      Group Attendance on Unit:   [] Yes  [] Selectively    [x] No    Medication Side Effects: Patient denies any medication side effects at the time of assessment. Mental Status Exam  Level of consciousness: Awake and alert  Appearance:  Appropriate attire,  sitting bed reading, poor grooming.   Behavior/Motor: Approachable, calm, no psychomotor abnormalities noted  Attitude toward examiner: Cooperative, attentive, good eye contact  Speech: Normal rate, volume, 07/08/2022 8.2* 8.6 - 10.4 mg/dL Final    Sodium 07/08/2022 140  135 - 144 mmol/L Final    Potassium 07/08/2022 3.8  3.7 - 5.3 mmol/L Final    Chloride 07/08/2022 106  98 - 107 mmol/L Final    CO2 07/08/2022 22  20 - 31 mmol/L Final    Anion Gap 07/08/2022 12  9 - 17 mmol/L Final    Alkaline Phosphatase 07/08/2022 55  40 - 129 U/L Final    ALT 07/08/2022 42* 5 - 41 U/L Final    AST 07/08/2022 66* <40 U/L Final    Total Bilirubin 07/08/2022 0.22* 0.3 - 1.2 mg/dL Final    Total Protein 07/08/2022 6.3* 6.4 - 8.3 g/dL Final    Albumin 07/08/2022 3.7  3.5 - 5.2 g/dL Final    GFR Non- 07/08/2022 >60  >60 mL/min Final    GFR  07/08/2022 >60  >60 mL/min Final    GFR Comment 07/08/2022        Final    Comment: Average GFR for 52-63 years old:   80 mL/min/1.73sq m  Chronic Kidney Disease:   <60 mL/min/1.73sq m  Kidney failure:   <15 mL/min/1.73sq m              eGFR calculated using average adult body mass.  Additional eGFR calculator available at:        [email or web address]            Ethanol 07/08/2022 368* <10 mg/dL Final    Ethanol percent 07/08/2022 0.368  % Final    Amphetamine Screen, Ur 07/08/2022 NEGATIVE  NEGATIVE Final    Comment:       (Positive cutoff 1000 ng/mL)                  Barbiturate Screen, Ur 07/08/2022 NEGATIVE  NEGATIVE Final    Comment:       (Positive cutoff 200 ng/mL)                  Benzodiazepine Screen, Urine 07/08/2022 NEGATIVE  NEGATIVE Final    Comment:       (Positive cutoff 200 ng/mL)                  Cocaine Metabolite, Urine 07/08/2022 NEGATIVE  NEGATIVE Final    Comment:       (Positive cutoff 300 ng/mL)                  Methadone Screen, Urine 07/08/2022 NEGATIVE  NEGATIVE Final    Comment:       (Positive cutoff 300 ng/mL)                  Opiates, Urine 07/08/2022 NEGATIVE  NEGATIVE Final    Comment:       (Positive cutoff 300 ng/mL)                  Phencyclidine, Urine 07/08/2022 NEGATIVE  NEGATIVE Final    Comment:       (Positive cutoff 25 ng/mL)                  Cannabinoid Scrn, Ur 07/08/2022 NEGATIVE  NEGATIVE Final    Comment:       (Positive cutoff 50 ng/mL)                  Oxycodone Screen, Ur 07/08/2022 NEGATIVE  NEGATIVE Final    Comment:       (Positive cutoff 100 ng/mL)                  Test Information 07/08/2022 Assay provides medical screening only. The absence of expected drug(s) and/or metabolite(s) may indicate diluted or adulterated urine, limitations of testing or timing of collection. Final    Comment: Testing for legal purposes should be confirmed by another method. To request confirmation   of test result, please call the lab within 7 days of sample submission.  Acetaminophen Level 07/08/2022 <5* 10 - 30 ug/mL Final    Salicylate Lvl 40/11/6603 <1* 3 - 10 mg/dL Final         Reviewed patient's current plan of care and vital signs with nursing staff.     Labs reviewed: [x] Yes  Last EKG in EMR reviewed: [x] Yes  QTc: 442    Medications  Current Facility-Administered Medications: escitalopram (LEXAPRO) tablet 10 mg, 10 mg, Oral, Daily  acetaminophen (TYLENOL) tablet 650 mg, 650 mg, Oral, Q6H PRN  ibuprofen (ADVIL;MOTRIN) tablet 400 mg, 400 mg, Oral, Q6H PRN  hydrOXYzine HCl (ATARAX) tablet 50 mg, 50 mg, Oral, TID PRN  traZODone (DESYREL) tablet 50 mg, 50 mg, Oral, Nightly PRN  polyethylene glycol (GLYCOLAX) packet 17 g, 17 g, Oral, Daily PRN  aluminum & magnesium hydroxide-simethicone (MAALOX) 200-200-20 MG/5ML suspension 30 mL, 30 mL, Oral, Q6H PRN  nicotine polacrilex (NICORETTE) gum 2 mg, 2 mg, Oral, Q2H PRN  chlordiazePOXIDE (LIBRIUM) capsule 10 mg, 10 mg, Oral, TID  aspirin EC tablet 81 mg, 81 mg, Oral, Daily  atorvastatin (LIPITOR) tablet 80 mg, 80 mg, Oral, Nightly  thiamine mononitrate tablet 100 mg, 100 mg, Oral, Daily  folic acid (FOLVITE) tablet 1 mg, 1 mg, Oral, Daily  therapeutic multivitamin-minerals 1 tablet, 1 tablet, Oral, Daily    ASSESSMENT  Major depressive disorder, recurrent severe without psychotic features [facility])         HANDOFF  Patient symptoms remain unstable  Medications as determined by attending physician  Encourage participation in groups and milieu. Probable discharge is to be determined by MD and currently undetermined    Electronically signed by SHARAN Tinoco CNP on 7/11/2022 at 3:09 PM    **This report has been created using voice recognition software. It may contain minor errors which are inherent in voice recognition technology. **                                             Psychiatry Attending Attestation     I independently saw and evaluated the patient. I reviewed the Advance Practice Provider's documentation above. Any additional comments or changes to the Advance Practice Provider's documentation are stated below otherwise agree with assessment. Patient reports that his withdrawal symptoms are significantly better. No longer observe any tremors. Discussed with him about continue to cut down on Librium and he is agreeable. Notes that his depression continues to be worse. Discussed with him about continue to titrate Lexapro and he is agreeable. PLAN  Patient s symptoms show some improvement  Will cut down on Libirum, continue to titrate Lexapro  Attempt to develop insight  Psycho-education conducted. Supportive Therapy conducted.   Probable discharge is Thursday  Follow-up TBD    Electronically signed by Myriam Beltran MD on 7/11/22 at 5:00 PM EDT

## 2022-07-11 NOTE — GROUP NOTE
Group Therapy Note    Date: 7/10/2022    Group Start Time: 2030  Group End Time: 2055  Group Topic: Wrap-Up    [facility]    Josep Mckinley, MARY ANNE        Group Therapy Note    Attendees: 14/20         Patient's Goal: \"Getting himself straight      Status After Intervention:  Improved    Participation Level:  Active Listener    Participation Quality: Appropriate      Speech:  normal      Thought Process/Content: Logical      Affective Functioning: Congruent    Level of consciousness:  Alert      Response to Learning: Able to verbalize current knowledge/experience      Endings: None Reported    Modes of Intervention: Education      Discipline Responsible: Behavorial Health Tech      Signature:  Josep Mckinley RN

## 2022-07-12 PROCEDURE — 90833 PSYTX W PT W E/M 30 MIN: CPT | Performed by: PSYCHIATRY & NEUROLOGY

## 2022-07-12 PROCEDURE — 1240000000 HC EMOTIONAL WELLNESS R&B

## 2022-07-12 PROCEDURE — 6370000000 HC RX 637 (ALT 250 FOR IP): Performed by: PSYCHIATRY & NEUROLOGY

## 2022-07-12 PROCEDURE — 99232 SBSQ HOSP IP/OBS MODERATE 35: CPT | Performed by: PSYCHIATRY & NEUROLOGY

## 2022-07-12 PROCEDURE — APPSS30 APP SPLIT SHARED TIME 16-30 MINUTES: Performed by: PSYCHIATRY & NEUROLOGY

## 2022-07-12 PROCEDURE — 6370000000 HC RX 637 (ALT 250 FOR IP): Performed by: INTERNAL MEDICINE

## 2022-07-12 RX ORDER — ESCITALOPRAM OXALATE 10 MG/1
15 TABLET ORAL DAILY
Status: DISCONTINUED | OUTPATIENT
Start: 2022-07-13 | End: 2022-07-14 | Stop reason: HOSPADM

## 2022-07-12 RX ORDER — CHLORDIAZEPOXIDE HYDROCHLORIDE 5 MG/1
5 CAPSULE, GELATIN COATED ORAL 2 TIMES DAILY
Status: DISCONTINUED | OUTPATIENT
Start: 2022-07-13 | End: 2022-07-14 | Stop reason: HOSPADM

## 2022-07-12 RX ADMIN — TRAZODONE HYDROCHLORIDE 50 MG: 50 TABLET ORAL at 21:00

## 2022-07-12 RX ADMIN — FOLIC ACID 1 MG: 1 TABLET ORAL at 08:28

## 2022-07-12 RX ADMIN — ASPIRIN 81 MG: 81 TABLET, COATED ORAL at 08:28

## 2022-07-12 RX ADMIN — THIAMINE HCL TAB 100 MG 100 MG: 100 TAB at 08:27

## 2022-07-12 RX ADMIN — HYDROXYZINE HYDROCHLORIDE 50 MG: 50 TABLET, FILM COATED ORAL at 21:00

## 2022-07-12 RX ADMIN — ESCITALOPRAM OXALATE 10 MG: 10 TABLET ORAL at 08:28

## 2022-07-12 RX ADMIN — HYDROXYZINE HYDROCHLORIDE 50 MG: 50 TABLET, FILM COATED ORAL at 10:46

## 2022-07-12 RX ADMIN — MULTIPLE VITAMINS W/ MINERALS TAB 1 TABLET: TAB at 08:27

## 2022-07-12 RX ADMIN — CHLORDIAZEPOXIDE HYDROCHLORIDE 5 MG: 5 CAPSULE ORAL at 08:28

## 2022-07-12 RX ADMIN — ATORVASTATIN CALCIUM 80 MG: 20 TABLET, FILM COATED ORAL at 21:00

## 2022-07-12 RX ADMIN — CHLORDIAZEPOXIDE HYDROCHLORIDE 5 MG: 5 CAPSULE ORAL at 13:15

## 2022-07-12 ASSESSMENT — PAIN SCALES - GENERAL: PAINLEVEL_OUTOF10: 0

## 2022-07-12 NOTE — GROUP NOTE
Group Therapy Note    Date: 7/12/2022    Group Start Time: 1330  Group End Time: 3851  Group Topic: Relaxation    [facility]    Ile[facility]    Relaxation Group Note        Date: July 12, 2022 Start Time: 1:30pm  End Time: 2:15pm      Number of Participants in Group & Unit Census:  5/19    Topic: relaxation    Goal of Group: improve relaxation skills using art       Comments:     Patient did not participate in Relaxation group, despite staff encouragement and explanation of benefits. Patient remain seclusive to self. Q15 minute safety checks maintained for patient safety and will continue to encourage patient to attend unit programming.           Signature:  Camila Meyer

## 2022-07-12 NOTE — PROGRESS NOTES
Daily Progress Note  7/12/2022    Patient Name: Claudia Mendez    CHIEF COMPLAINT: Depression with suicidal ideation         SUBJECTIVE:    Nursing staff report the patient has maintained medication adherence and has not exhibited acute behavioral changes in the last 24 hours. He has remained isolative overall to his room coming to the unit for personal and nutritional needs only. Mr. Oswald Ramirez is agreeable to assessment at bedside where he is reading and verbalizes some frustration as he was moved to another room again when he was sleeping last night. He reports his mood as \"up and down\". He denies side effects related to his medications. He has tolerated the titration of Librium well and confirms that his withdrawal symptoms are well managed. He continues to endorse fleeting suicidal ideation and is not able to contract for safety if he were in the community. He is  encouraged to become more active on the milieu and explore the possibility of attending a group as a goal today. At this time, the patient is not appropriate for a lower level of care. There is risk of decompensation and patient warrants further hospitalization for safety and stabilization. Appetite:  [x] Normal/Adequate/Unchanged  [] Increased  [] Decreased      Sleep:       [] Normal/Adequate/Unchanged  [x] Fair  [] Poor      Group Attendance on Unit:   [] Yes  [] Selectively    [x] No    Medication Side Effects: Patient denies any medication side effects at the time of assessment. Mental Status Exam  Level of consciousness: Awake and alert  Appearance:  Appropriate attire,  sitting bed reading, poor grooming. Behavior/Motor: Approachable, calm, no psychomotor abnormalities noted  Attitude toward examiner: Cooperative, attentive, good eye contact  Speech: Normal rate, volume, and tone.   Mood: Depressed  Affect: Blunted  Thought processes: Goal directed, linear  Thought content: Endorses fleeting suicidal ideation with plan, unable to contract for safety in the community              Denies homicidal ideations               Denies hallucinations              Denies delusions              Denies paranoia  Cognition: Oriented to self, location, time, situation  Concentration: Clinically adequate  Memory: Intact  Insight & Judgment: Poor    Data   height is 5' 11\" (1.803 m) and weight is 180 lb (81.6 kg). His oral temperature is 98.2 °F (36.8 °C). His blood pressure is 115/73 and his pulse is 51. His respiration is 14 and oxygen saturation is 98%.    Labs:   Admission on 07/08/2022   Component Date Value Ref Range Status    WBC 07/08/2022 4.7  3.5 - 11.0 k/uL Final    RBC 07/08/2022 4.59  4.5 - 5.9 m/uL Final    Hemoglobin 07/08/2022 14.3  13.5 - 17.5 g/dL Final    Hematocrit 07/08/2022 42.2  41 - 53 % Final    MCV 07/08/2022 91.9  80 - 100 fL Final    MCH 07/08/2022 31.2  26 - 34 pg Final    MCHC 07/08/2022 33.9  31 - 37 g/dL Final    RDW 07/08/2022 16.2* 11.5 - 14.9 % Final    Platelets 44/66/9402 186  150 - 450 k/uL Final    MPV 07/08/2022 6.9  6.0 - 12.0 fL Final    Seg Neutrophils 07/08/2022 31* 36 - 66 % Final    Lymphocytes 07/08/2022 52* 24 - 44 % Final    Monocytes 07/08/2022 13* 1 - 7 % Final    Eosinophils % 07/08/2022 4  0 - 4 % Final    Basophils 07/08/2022 0  0 - 2 % Final    Segs Absolute 07/08/2022 1.46  1.3 - 9.1 k/uL Final    Absolute Lymph # 07/08/2022 2.44  1.0 - 4.8 k/uL Final    Absolute Mono # 07/08/2022 0.61  0.1 - 1.3 k/uL Final    Absolute Eos # 07/08/2022 0.19  0.0 - 0.4 k/uL Final    Basophils Absolute 07/08/2022 0.00  0.0 - 0.2 k/uL Final    Morphology 07/08/2022 ANISOCYTOSIS PRESENT   Final    Glucose 07/08/2022 101* 70 - 99 mg/dL Final    BUN 07/08/2022 7  6 - 20 mg/dL Final    CREATININE 07/08/2022 0.61* 0.70 - 1.20 mg/dL Final    Calcium 07/08/2022 8.2* 8.6 - 10.4 mg/dL Final    Sodium 07/08/2022 140  135 - 144 mmol/L Final    Potassium 07/08/2022 3.8  3.7 - 5.3 mmol/L Final    Chloride 07/08/2022 106  98 - 107 mmol/L Final    CO2 07/08/2022 22  20 - 31 mmol/L Final    Anion Gap 07/08/2022 12  9 - 17 mmol/L Final    Alkaline Phosphatase 07/08/2022 55  40 - 129 U/L Final    ALT 07/08/2022 42* 5 - 41 U/L Final    AST 07/08/2022 66* <40 U/L Final    Total Bilirubin 07/08/2022 0.22* 0.3 - 1.2 mg/dL Final    Total Protein 07/08/2022 6.3* 6.4 - 8.3 g/dL Final    Albumin 07/08/2022 3.7  3.5 - 5.2 g/dL Final    GFR Non- 07/08/2022 >60  >60 mL/min Final    GFR  07/08/2022 >60  >60 mL/min Final    GFR Comment 07/08/2022        Final    Comment: Average GFR for 52-63 years old:   80 mL/min/1.73sq m  Chronic Kidney Disease:   <60 mL/min/1.73sq m  Kidney failure:   <15 mL/min/1.73sq m              eGFR calculated using average adult body mass.  Additional eGFR calculator available at:        [email or web address]            Ethanol 07/08/2022 368* <10 mg/dL Final    Ethanol percent 07/08/2022 0.368  % Final    Amphetamine Screen, Ur 07/08/2022 NEGATIVE  NEGATIVE Final    Comment:       (Positive cutoff 1000 ng/mL)                  Barbiturate Screen, Ur 07/08/2022 NEGATIVE  NEGATIVE Final    Comment:       (Positive cutoff 200 ng/mL)                  Benzodiazepine Screen, Urine 07/08/2022 NEGATIVE  NEGATIVE Final    Comment:       (Positive cutoff 200 ng/mL)                  Cocaine Metabolite, Urine 07/08/2022 NEGATIVE  NEGATIVE Final    Comment:       (Positive cutoff 300 ng/mL)                  Methadone Screen, Urine 07/08/2022 NEGATIVE  NEGATIVE Final    Comment:       (Positive cutoff 300 ng/mL)                  Opiates, Urine 07/08/2022 NEGATIVE  NEGATIVE Final    Comment:       (Positive cutoff 300 ng/mL)                  Phencyclidine, Urine 07/08/2022 NEGATIVE  NEGATIVE Final    Comment:       (Positive cutoff 25 ng/mL)                  Cannabinoid Scrn, Ur 07/08/2022 NEGATIVE  NEGATIVE Final    Comment:       (Positive cutoff 50 ng/mL)                  Oxycodone Screen, Ur 07/08/2022 NEGATIVE  NEGATIVE Final    Comment:       (Positive cutoff 100 ng/mL)                  Test Information 07/08/2022 Assay provides medical screening only. The absence of expected drug(s) and/or metabolite(s) may indicate diluted or adulterated urine, limitations of testing or timing of collection. Final    Comment: Testing for legal purposes should be confirmed by another method. To request confirmation   of test result, please call the lab within 7 days of sample submission.  Acetaminophen Level 07/08/2022 <5* 10 - 30 ug/mL Final    Salicylate Lvl 16/33/3890 <1* 3 - 10 mg/dL Final         Reviewed patient's current plan of care and vital signs with nursing staff.     Labs reviewed: [x] Yes  Last EKG in EMR reviewed: [x] Yes  QTc: 442    Medications  Current Facility-Administered Medications: chlordiazePOXIDE (LIBRIUM) capsule 5 mg, 5 mg, Oral, TID  escitalopram (LEXAPRO) tablet 10 mg, 10 mg, Oral, Daily  acetaminophen (TYLENOL) tablet 650 mg, 650 mg, Oral, Q6H PRN  ibuprofen (ADVIL;MOTRIN) tablet 400 mg, 400 mg, Oral, Q6H PRN  hydrOXYzine HCl (ATARAX) tablet 50 mg, 50 mg, Oral, TID PRN  traZODone (DESYREL) tablet 50 mg, 50 mg, Oral, Nightly PRN  polyethylene glycol (GLYCOLAX) packet 17 g, 17 g, Oral, Daily PRN  aluminum & magnesium hydroxide-simethicone (MAALOX) 200-200-20 MG/5ML suspension 30 mL, 30 mL, Oral, Q6H PRN  nicotine polacrilex (NICORETTE) gum 2 mg, 2 mg, Oral, Q2H PRN  aspirin EC tablet 81 mg, 81 mg, Oral, Daily  atorvastatin (LIPITOR) tablet 80 mg, 80 mg, Oral, Nightly  thiamine mononitrate tablet 100 mg, 100 mg, Oral, Daily  folic acid (FOLVITE) tablet 1 mg, 1 mg, Oral, Daily  therapeutic multivitamin-minerals 1 tablet, 1 tablet, Oral, Daily    ASSESSMENT  Major depressive disorder, recurrent severe without psychotic features ([facility] [address]         HANDOFF  Patient symptoms show modest improvement  Medications as determined by attending physician  Encourage participation in groups and milieu. Probable discharge is to be determined by MD and currently undetermined    Electronically signed by SHARAN Lomas CNP on 7/12/2022 at 2:23 PM    **This report has been created using voice recognition software. It may contain minor errors which are inherent in voice recognition technology. **                                             Psychiatry Attending Attestation     I independently saw and evaluated the patient. I reviewed the Advance Practice Provider's documentation above. Any additional comments or changes to the Advance Practice Provider's documentation are stated below otherwise agree with assessment. Has been largely isolated to the room. Mentions that withdrawal symptoms are significantly better. Reports her suicidal thoughts are across his mind. Discussed with him about continue to titrate Lexapro and he is agreeable to the plan. We will cut down on Librium. Plan to discharge on Thursday if he continues to improve. More than 16 mins of the session was spent doing supportive psychotherapy. Session lasted over 30 mins today. PLAN  Patient s symptoms are improving  Will cut down on Libirum and continue to titrate Lexapro  Attempt to develop insight  Psycho-education conducted. Supportive Therapy conducted.   Probable discharge is Thursday  Follow-up TBD    Electronically signed by Kelvin Tello MD on 7/12/22 at 3:33 PM EDT

## 2022-07-12 NOTE — GROUP NOTE
Group Therapy Note    Date: 7/12/2022    Group Start Time: 1000  Group End Time: 4487  Group Topic: Psychotherapy    [facility]    Jennie Contreras        Group Therapy Note    Attendees: 7/23         Patient's Goal: PT will demonstrate increased interpersonal interaction and a clear understanding on multiple types of coping skills relating to the here-and-now therapeutic practice. Notes: Pt was an active listener during grouop discussion on this date. Status After Intervention:  Improved    Participation Level: Active Listener and Interactive    Participation Quality: Appropriate, Attentive and Sharing      Speech:  normal      Thought Process/Content: Logical      Affective Functioning: Flat      Mood: depressed      Level of consciousness:  Alert, Oriented x4 and Attentive      Response to Learning: Able to verbalize/acknowledge new learning and Progressing to goal      Endings: None Reported    Modes of Intervention: Support, Socialization, Exploration, Clarifying and Problem-solving      Discipline Responsible: /Counselor      Signature:   Jennie Contreras

## 2022-07-12 NOTE — PLAN OF CARE
Problem: Self Harm/Suicidality  Goal: Will have no self-injury during hospital stay  Description: INTERVENTIONS:  1. Q 30 MINUTES: Routine safety checks  2. Q SHIFT & PRN: Assess risk to determine if routine checks are adequate to maintain patient safety  7/12/2022 1050 by Savannah Wilburn LPN  Outcome: Progressing  Note: No self harm noted this shift. Patient agrees to seek staff out if negative thoughts arise. Will continue to monitor Q15 minute and intermittently. Problem: Coping  Goal: Pt/Family able to verbalize concerns and demonstrate effective coping strategies  Description: INTERVENTIONS:  1. Assist patient/family to identify coping skills, available support systems and cultural and spiritual values  2. Provide emotional support, including active listening and acknowledgement of concerns of patient and caregivers  3. Reduce environmental stimuli, as able  4. Instruct patient/family in relaxation techniques, as appropriate  5. Assess for spiritual pain/suffering and initiate Spiritual Care, Psychosocial Clinical Specialist consults as needed  7/12/2022 1050 by Savannah Wilburn LPN  Outcome: Progressing  Flowsheets (Taken 7/12/2022 1050)  Patient/family able to verbalize anxieties, fears, and concerns, and demonstrate effective coping:   Assist patient/family to identify coping skills, available support systems and cultural and spiritual values   Reduce environmental stimuli, as able   Instruct patient/family in relaxation techniques, as appropriate     Problem: Depression/Self Harm  Goal: Effect of psychiatric condition will be minimized and patient will be protected from self harm  Description: INTERVENTIONS:  1. Assess impact of patient's symptoms on level of functioning, self care needs and offer support as indicated  2. Assess patient/family knowledge of depression, impact on illness and need for teaching  3. Provide emotional support, presence and reassurance  4.  Assess for possible suicidal thoughts or ideation. If patient expresses suicidal thoughts or statements do not leave alone, initiate Suicide Precautions, move to a room close to the nursing station and obtain sitter  5.  Initiate consults as appropriate with Mental Health Professional, Spiritual Care, Psychosocial CNS, and consider a recommendation to the LIP for a Psychiatric Consultation  7/12/2022 1050 by Savannah Wilburn LPN  Outcome: Progressing  Flowsheets (Taken 7/12/2022 1050)  Effect of psychiatric condition will be minimized and patient will be protected from self harm:   Assess impact of patients symptoms on level of functioning, self care needs and offer support as indicated   Assess patient/family knowledge of depression, impact on illness and need for teaching   Provide emotional support, presence and reassurance

## 2022-07-12 NOTE — GROUP NOTE
Group Therapy Note    Date: 7/12/2022    Group Start Time: 1100  Group End Time: 7277  Group Topic: Cognitive Skills    SHANNAN BHVIVIANA Welsh    Cognitive Skills Group Note        Date: July 12, 2022 Start Time: 11am  End Time:  1135am      Number of Participants in Group & Unit Census:  7/23    Topic: cognitive skills     Goal of Group: improve concentration skills       Comments:     Patient did not participate in Cognitive Skills group, despite staff encouragement and explanation of benefits. Patient remain seclusive to self. Q15 minute safety checks maintained for patient safety and will continue to encourage patient to attend unit programming.           Signature:  Radha Snowden

## 2022-07-12 NOTE — GROUP NOTE
Group Therapy Note    Date: 7/11/2022    Group Start Time: 2030  Group End Time: 2050  Group Topic: Wrap-Up    [facility]    Mellissa Torres RN        Group Therapy Note    Attendees: 11/20         Patient's Goal:  Improve outlook, continue with new medication    Status After Intervention:  Improved    Participation Level:  Active Listener    Participation Quality: Appropriate      Speech:  normal      Thought Process/Content: Logical      Affective Functioning: Blunted    Level of consciousness:  Alert      Response to Learning: Able to verbalize current knowledge/experience      Endings: None Reported    Modes of Intervention: Education      Discipline Responsible: Behavorial Health Tech      Signature:  Mellissa Torres RN

## 2022-07-12 NOTE — GROUP NOTE
Group Therapy Note    Date: 7/12/2022    Group Start Time: 1430  Group End Time: 1500  Group Topic: Group Documentation    Maye Ewing RN        Group Therapy Note    Attendees:          Patient's Goal:  Learning to face anxiety    Notes:  Wellness group    Status After Intervention:  Unchanged    Participation Level:  Active Listener    Participation Quality: Attentive      Speech:  normal      Thought Process/Content: Logical      Affective Functioning: Congruent      Mood: depressed      Level of consciousness:  Alert      Response to Learning: Able to retain information      Endings: None Reported    Modes of Intervention: Education      Discipline Responsible: Registered Nurse      Signature:  Guzman Lewis RN

## 2022-07-12 NOTE — PLAN OF CARE
Problem: Self Harm/Suicidality  Goal: Will have no self-injury during hospital stay  Description: INTERVENTIONS:  1. Q 30 MINUTES: Routine safety checks  2. Q SHIFT & PRN: Assess risk to determine if routine checks are adequate to maintain patient safety  7/12/2022 0054 by Guillermo Collet, LPN  Outcome: Progressing     Problem: Coping  Goal: Pt/Family able to verbalize concerns and demonstrate effective coping strategies  Description: INTERVENTIONS:  1. Assist patient/family to identify coping skills, available support systems and cultural and spiritual values  2. Provide emotional support, including active listening and acknowledgement of concerns of patient and caregivers  3. Reduce environmental stimuli, as able  4. Instruct patient/family in relaxation techniques, as appropriate  5. Assess for spiritual pain/suffering and initiate Spiritual Care, Psychosocial Clinical Specialist consults as needed  7/12/2022 0054 by Guillermo Collet, LPN  Outcome: Progressing     Problem: Depression/Self Harm  Goal: Effect of psychiatric condition will be minimized and patient will be protected from self harm  Description: INTERVENTIONS:  1. Assess impact of patient's symptoms on level of functioning, self care needs and offer support as indicated  2. Assess patient/family knowledge of depression, impact on illness and need for teaching  3. Provide emotional support, presence and reassurance  4. Assess for possible suicidal thoughts or ideation. If patient expresses suicidal thoughts or statements do not leave alone, initiate Suicide Precautions, move to a room close to the nursing station and obtain sitter  5.  Initiate consults as appropriate with Mental Health Professional, Spiritual Care, Psychosocial CNS, and consider a recommendation to the LIP for a Psychiatric Consultation  7/12/2022 0054 by Guillermo Collet, LPN  Outcome: Progressing     Problem: Pain  Goal: Verbalizes/displays adequate comfort level or baseline comfort level  7/12/2022 0054 by Racquel Elizondo LPN  Outcome: Progressing  Flowsheets (Taken 7/12/2022 0054)  Verbalizes/displays adequate comfort level or baseline comfort level: Encourage patient to monitor pain and request assistance  Note: The patient verbalized baseline comfort level. The patient was told to notify the nurse with any complaints of pain and if medication is needed. Problem: ABCDS Injury Assessment  Goal: Absence of physical injury  7/12/2022 0054 by Racquel Elizondo LPN  Outcome: Progressing     Problem: Depression  Goal: Will be euthymic at discharge  Description: INTERVENTIONS:  1. Administer medication as ordered  2. Provide emotional support via 1:1 interaction with staff  3. Encourage involvement in milieu/groups/activities  4. Monitor for social isolation  7/12/2022 0054 by Racquel Elizondo LPN  Outcome: Progressing     Problem: Drug Abuse/Detox  Goal: Will have no detox symptoms and will verbalize plan for changing drug-related behavior  Description: INTERVENTIONS:  1. Administer medication as ordered  2. Monitor physical status  3. Provide emotional support with 1:1 interaction with staff  4.  Encourage  recovery focused treatment   7/12/2022 0054 by Racquel Elizondo LPN  Outcome: Progressing

## 2022-07-13 PROCEDURE — 6370000000 HC RX 637 (ALT 250 FOR IP): Performed by: INTERNAL MEDICINE

## 2022-07-13 PROCEDURE — 1240000000 HC EMOTIONAL WELLNESS R&B

## 2022-07-13 PROCEDURE — 90833 PSYTX W PT W E/M 30 MIN: CPT | Performed by: PSYCHIATRY & NEUROLOGY

## 2022-07-13 PROCEDURE — 99232 SBSQ HOSP IP/OBS MODERATE 35: CPT | Performed by: PSYCHIATRY & NEUROLOGY

## 2022-07-13 PROCEDURE — APPSS30 APP SPLIT SHARED TIME 16-30 MINUTES

## 2022-07-13 PROCEDURE — 6370000000 HC RX 637 (ALT 250 FOR IP): Performed by: PSYCHIATRY & NEUROLOGY

## 2022-07-13 RX ADMIN — TRAZODONE HYDROCHLORIDE 50 MG: 50 TABLET ORAL at 21:16

## 2022-07-13 RX ADMIN — MULTIPLE VITAMINS W/ MINERALS TAB 1 TABLET: TAB at 08:56

## 2022-07-13 RX ADMIN — THIAMINE HCL TAB 100 MG 100 MG: 100 TAB at 08:56

## 2022-07-13 RX ADMIN — CHLORDIAZEPOXIDE HYDROCHLORIDE 5 MG: 5 CAPSULE ORAL at 08:57

## 2022-07-13 RX ADMIN — FOLIC ACID 1 MG: 1 TABLET ORAL at 08:56

## 2022-07-13 RX ADMIN — CHLORDIAZEPOXIDE HYDROCHLORIDE 5 MG: 5 CAPSULE ORAL at 17:51

## 2022-07-13 RX ADMIN — HYDROXYZINE HYDROCHLORIDE 50 MG: 50 TABLET, FILM COATED ORAL at 15:20

## 2022-07-13 RX ADMIN — ESCITALOPRAM OXALATE 15 MG: 10 TABLET ORAL at 08:56

## 2022-07-13 RX ADMIN — ATORVASTATIN CALCIUM 80 MG: 20 TABLET, FILM COATED ORAL at 21:16

## 2022-07-13 RX ADMIN — HYDROXYZINE HYDROCHLORIDE 50 MG: 50 TABLET, FILM COATED ORAL at 21:16

## 2022-07-13 RX ADMIN — ASPIRIN 81 MG: 81 TABLET, COATED ORAL at 08:56

## 2022-07-13 ASSESSMENT — PAIN SCALES - GENERAL: PAINLEVEL_OUTOF10: 0

## 2022-07-13 NOTE — GROUP NOTE
Group Therapy Note    Date: 7/13/2022    Group Start Time: 0900  Group End Time: 0920  Group Topic: Community Meeting    SHANNAN Echevarria        Group Therapy Note    Attendees: 6/22         Patient's Goal:  Prepare his mind for discharge    Notes:  Goals group    Status After Intervention:  Unchanged    Participation Level: Active Listener    Participation Quality: Appropriate      Speech:  normal      Thought Process/Content: Logical      Affective Functioning: Congruent      Mood: euthymic      Level of consciousness:  Alert      Response to Learning: Progressing to goal      Endings: None Reported    Modes of Intervention: Support, Socialization and Exploration      Discipline Responsible: Behavorial Health Tech      Signature:   Bernadette Echevarria

## 2022-07-13 NOTE — GROUP NOTE
Group Therapy Note    Date: 7/13/2022    Group Start Time: 1100  Group End Time: 1588  Group Topic: Recreational    [facility]    Recreation  Group Note        Date: July 13, 2022 Start Time: 11am  End Time:  1135am      Number of Participants in Group & Unit Census:  3/21    Topic: recreation     Goal of Group: improve decision making and turn taking skills by participating in group activity       Comments:     Patient did not participate in  recreation  group, despite staff encouragement and explanation of benefits. Patient remain seclusive to self. Q15 minute safety checks maintained for patient safety and will continue to encourage patient to attend unit programming.            Signature:  Jose Santos [place]

## 2022-07-13 NOTE — PROGRESS NOTES
Pharmacy Med Education Group Note    Date: 07/13/22  Start Time: 1435  End Time: 2377    Number Participants in Group:  8    Goal:  Patient will demonstrate an understanding of the medications intended purpose and possible adverse effects  Topic: [place] for Pharmacy Med Ed Group    Discipline Responsible:     OT  AT  [facility].  RT     X Other       Participation Level:     None  Minimal      X Active Listener    X Interactive    Monopolizing         Participation Quality:    X Appropriate  Inappropriate     X       Attentive        Intrusive          Sharing        Resistant          Supportive        Lethargic       Affective:     X Congruent  Incongruent  Blunted  Flat    Constricted  Anxious  Elated  Angry    Labile  Depressed  Other         Cognitive:    X Alert  Oriented PPTP     Concentration   X G  F  P   Attention Span   X G  F  P   Short-Term Memory   X G  F  P   Long-Term Memory  G  F  P   ProblemSolving/  Decision Making  G  F  P   Ability to Process  Information   X G  F  P      Contributing Factors             Delusional             Hallucinating             Flight of Ideas             Other:       Modes of Intervention:    X Education   X Support  Exploration    Clarifying  Problem Solving  Confrontation    Socialization  Limit Setting  Reality Testing    Activity  Movement  Media    Other:            Response to Learning:    X Able to verbalize current knowledge/experience    Able to verbalize/acknowledge new learning    Able to retain information    Capable of insight    Able to change behavior    Progressing to goal    Other:            Gianfranco Maldonado, Pharmacy Student

## 2022-07-13 NOTE — PLAN OF CARE
Problem: Self Harm/Suicidality  Goal: Will have no self-injury during hospital stay  Description: INTERVENTIONS:  1. Q 30 MINUTES: Routine safety checks  2. Q SHIFT & PRN: Assess risk to determine if routine checks are adequate to maintain patient safety  Outcome: Progressing     Problem: Coping  Goal: Pt/Family able to verbalize concerns and demonstrate effective coping strategies  Description: INTERVENTIONS:  1. Assist patient/family to identify coping skills, available support systems and cultural and spiritual values  2. Provide emotional support, including active listening and acknowledgement of concerns of patient and caregivers  3. Reduce environmental stimuli, as able  4. Instruct patient/family in relaxation techniques, as appropriate  5. Assess for spiritual pain/suffering and initiate Spiritual Care, Psychosocial Clinical Specialist consults as needed  Outcome: Progressing   Mr. Oswald Ramirez is seen in his room affect is flat, he reports that  his feelings of depression and anxiety have improved. And withdrawal symptoms have decreased. Attends groups and takes all medications as ordered   Problem: Depression/Self Harm  Goal: Effect of psychiatric condition will be minimized and patient will be protected from self harm  Description: INTERVENTIONS:  1. Assess impact of patient's symptoms on level of functioning, self care needs and offer support as indicated  2. Assess patient/family knowledge of depression, impact on illness and need for teaching  3. Provide emotional support, presence and reassurance  4. Assess for possible suicidal thoughts or ideation. If patient expresses suicidal thoughts or statements do not leave alone, initiate Suicide Precautions, move to a room close to the nursing station and obtain sitter  5.  Initiate consults as appropriate with Mental Health Professional, Spiritual Care, Psychosocial CNS, and consider a recommendation to the LIP for a Psychiatric Consultation  Outcome: Progressing

## 2022-07-13 NOTE — PROGRESS NOTES
kg). His oral temperature is 98.1 °F (36.7 °C). His blood pressure is 110/70 and his pulse is 66. His respiration is 16 and oxygen saturation is 98%.    Labs:   Admission on 07/08/2022   Component Date Value Ref Range Status    WBC 07/08/2022 4.7  3.5 - 11.0 k/uL Final    RBC 07/08/2022 4.59  4.5 - 5.9 m/uL Final    Hemoglobin 07/08/2022 14.3  13.5 - 17.5 g/dL Final    Hematocrit 07/08/2022 42.2  41 - 53 % Final    MCV 07/08/2022 91.9  80 - 100 fL Final    MCH 07/08/2022 31.2  26 - 34 pg Final    MCHC 07/08/2022 33.9  31 - 37 g/dL Final    RDW 07/08/2022 16.2* 11.5 - 14.9 % Final    Platelets 67/12/7664 186  150 - 450 k/uL Final    MPV 07/08/2022 6.9  6.0 - 12.0 fL Final    Seg Neutrophils 07/08/2022 31* 36 - 66 % Final    Lymphocytes 07/08/2022 52* 24 - 44 % Final    Monocytes 07/08/2022 13* 1 - 7 % Final    Eosinophils % 07/08/2022 4  0 - 4 % Final    Basophils 07/08/2022 0  0 - 2 % Final    Segs Absolute 07/08/2022 1.46  1.3 - 9.1 k/uL Final    Absolute Lymph # 07/08/2022 2.44  1.0 - 4.8 k/uL Final    Absolute Mono # 07/08/2022 0.61  0.1 - 1.3 k/uL Final    Absolute Eos # 07/08/2022 0.19  0.0 - 0.4 k/uL Final    Basophils Absolute 07/08/2022 0.00  0.0 - 0.2 k/uL Final    Morphology 07/08/2022 ANISOCYTOSIS PRESENT   Final    Glucose 07/08/2022 101* 70 - 99 mg/dL Final    BUN 07/08/2022 7  6 - 20 mg/dL Final    CREATININE 07/08/2022 0.61* 0.70 - 1.20 mg/dL Final    Calcium 07/08/2022 8.2* 8.6 - 10.4 mg/dL Final    Sodium 07/08/2022 140  135 - 144 mmol/L Final    Potassium 07/08/2022 3.8  3.7 - 5.3 mmol/L Final    Chloride 07/08/2022 106  98 - 107 mmol/L Final    CO2 07/08/2022 22  20 - 31 mmol/L Final    Anion Gap 07/08/2022 12  9 - 17 mmol/L Final    Alkaline Phosphatase 07/08/2022 55  40 - 129 U/L Final    ALT 07/08/2022 42* 5 - 41 U/L Final    AST 07/08/2022 66* <40 U/L Final    Total Bilirubin 07/08/2022 0.22* 0.3 - 1.2 mg/dL Final    Total Protein 07/08/2022 6.3* 6.4 - 8.3 g/dL Final    Albumin 07/08/2022 3.7  3.5 - 5.2 g/dL Final    GFR Non- 07/08/2022 >60  >60 mL/min Final    GFR  07/08/2022 >60  >60 mL/min Final    GFR Comment 07/08/2022        Final    Comment: Average GFR for 52-63 years old:   80 mL/min/1.73sq m  Chronic Kidney Disease:   <60 mL/min/1.73sq m  Kidney failure:   <15 mL/min/1.73sq m              eGFR calculated using average adult body mass. Additional eGFR calculator available at:        [employer]            Ethanol 07/08/2022 368* <10 mg/dL Final    Ethanol percent 07/08/2022 0.368  % Final    Amphetamine Screen, Ur 07/08/2022 NEGATIVE  NEGATIVE Final    Comment:       (Positive cutoff 1000 ng/mL)                  Barbiturate Screen, Ur 07/08/2022 NEGATIVE  NEGATIVE Final    Comment:       (Positive cutoff 200 ng/mL)                  Benzodiazepine Screen, Urine 07/08/2022 NEGATIVE  NEGATIVE Final    Comment:       (Positive cutoff 200 ng/mL)                  Cocaine Metabolite, Urine 07/08/2022 NEGATIVE  NEGATIVE Final    Comment:       (Positive cutoff 300 ng/mL)                  Methadone Screen, Urine 07/08/2022 NEGATIVE  NEGATIVE Final    Comment:       (Positive cutoff 300 ng/mL)                  Opiates, Urine 07/08/2022 NEGATIVE  NEGATIVE Final    Comment:       (Positive cutoff 300 ng/mL)                  Phencyclidine, Urine 07/08/2022 NEGATIVE  NEGATIVE Final    Comment:       (Positive cutoff 25 ng/mL)                  Cannabinoid Scrn, Ur 07/08/2022 NEGATIVE  NEGATIVE Final    Comment:       (Positive cutoff 50 ng/mL)                  Oxycodone Screen, Ur 07/08/2022 NEGATIVE  NEGATIVE Final    Comment:       (Positive cutoff 100 ng/mL)                  Test Information 07/08/2022 Assay provides medical screening only. The absence of expected drug(s) and/or metabolite(s) may indicate diluted or adulterated urine, limitations of testing or timing of collection. Final    Comment: Testing for legal purposes should be confirmed by another method. To request confirmation   of test result, please call the lab within 7 days of sample submission.  Acetaminophen Level 07/08/2022 <5* 10 - 30 ug/mL Final    Salicylate Lvl 23/13/6713 <1* 3 - 10 mg/dL Final         Reviewed patient's current plan of care and vital signs with nursing staff. Labs reviewed: [x] Yes  Last EKG in EMR reviewed: [x] Yes  QTc: 442    Medications  Current Facility-Administered Medications: escitalopram (LEXAPRO) tablet 15 mg, 15 mg, Oral, Daily  chlordiazePOXIDE (LIBRIUM) capsule 5 mg, 5 mg, Oral, BID  acetaminophen (TYLENOL) tablet 650 mg, 650 mg, Oral, Q6H PRN  ibuprofen (ADVIL;MOTRIN) tablet 400 mg, 400 mg, Oral, Q6H PRN  hydrOXYzine HCl (ATARAX) tablet 50 mg, 50 mg, Oral, TID PRN  traZODone (DESYREL) tablet 50 mg, 50 mg, Oral, Nightly PRN  polyethylene glycol (GLYCOLAX) packet 17 g, 17 g, Oral, Daily PRN  aluminum & magnesium hydroxide-simethicone (MAALOX) 200-200-20 MG/5ML suspension 30 mL, 30 mL, Oral, Q6H PRN  nicotine polacrilex (NICORETTE) gum 2 mg, 2 mg, Oral, Q2H PRN  aspirin EC tablet 81 mg, 81 mg, Oral, Daily  atorvastatin (LIPITOR) tablet 80 mg, 80 mg, Oral, Nightly  thiamine mononitrate tablet 100 mg, 100 mg, Oral, Daily  folic acid (FOLVITE) tablet 1 mg, 1 mg, Oral, Daily  therapeutic multivitamin-minerals 1 tablet, 1 tablet, Oral, Daily    ASSESSMENT  Major depressive disorder, recurrent severe without psychotic features ([facility] [address]         HANDOFF  Patient symptoms: Showing modest improvement  Consultation with attending physician for medication  Encourage participation in groups and milieu. Probable discharge is to be determined by MD    Electronically signed by [address] [place] [address],[address], APRN - CNP on 7/13/2022 at 5:53 PM    **This report has been created using voice recognition software. It may contain minor errors which are inherent in voice recognition technology. ** Psychiatry Attending Attestation     I independently saw and evaluated the patient. I reviewed the Advance Practice Provider's documentation above. Any additional comments or changes to the Advance Practice Provider's documentation are stated below otherwise agree with assessment. Patient feels better than before. Mood and affect are better. Patient reports fleeting suicidal thoughts with no intent or plan. Patient notes that these thoughts are occurring less frequently. Denies any homicidal thoughts, that was explored with the patient. Oriented to time place and person. Recent and remote memory is intact. Patient feels hopeful. Sleep and appetite is good. No side effect from medication reported. Side-effect of medication were discussed with the patient . Patient is responding to current treatment. Discharge soon, if patient continues to show improvement. Case discussed with the staff. More than 16 mins of the session was spent doing supportive psychotherapy. Session lasted over 30 mins today     PLAN  Patient s symptoms are improving  Will continue same medication today and observe  Attempt to develop insight  Psycho-education conducted. Supportive Therapy conducted.   Probable discharge is Tomorrow  Follow-up TBD    Electronically signed by Sarina Dangelo MD on 7/13/22 at 10:04 PM EDT

## 2022-07-14 VITALS
HEART RATE: 66 BPM | SYSTOLIC BLOOD PRESSURE: 116 MMHG | OXYGEN SATURATION: 98 % | DIASTOLIC BLOOD PRESSURE: 75 MMHG | BODY MASS INDEX: 25.2 KG/M2 | HEIGHT: 71 IN | WEIGHT: 180 LBS | TEMPERATURE: 97.6 F | RESPIRATION RATE: 14 BRPM

## 2022-07-14 PROCEDURE — 6370000000 HC RX 637 (ALT 250 FOR IP): Performed by: PSYCHIATRY & NEUROLOGY

## 2022-07-14 PROCEDURE — 6370000000 HC RX 637 (ALT 250 FOR IP): Performed by: INTERNAL MEDICINE

## 2022-07-14 PROCEDURE — 99239 HOSP IP/OBS DSCHRG MGMT >30: CPT | Performed by: PSYCHIATRY & NEUROLOGY

## 2022-07-14 RX ORDER — THIAMINE MONONITRATE (VIT B1) 100 MG
100 TABLET ORAL DAILY
Qty: 30 TABLET | Refills: 0 | Status: ON HOLD | OUTPATIENT
Start: 2022-07-14 | End: 2022-07-29 | Stop reason: SDUPTHER

## 2022-07-14 RX ORDER — TRAZODONE HYDROCHLORIDE 50 MG/1
50 TABLET ORAL NIGHTLY PRN
Qty: 30 TABLET | Refills: 0 | Status: ON HOLD | OUTPATIENT
Start: 2022-07-14 | End: 2022-07-29 | Stop reason: SDUPTHER

## 2022-07-14 RX ORDER — HYDROXYZINE 50 MG/1
50 TABLET, FILM COATED ORAL 3 TIMES DAILY PRN
Qty: 45 TABLET | Refills: 0 | Status: ON HOLD | OUTPATIENT
Start: 2022-07-14 | End: 2022-07-29 | Stop reason: SDUPTHER

## 2022-07-14 RX ORDER — ESCITALOPRAM OXALATE 10 MG/1
15 TABLET ORAL DAILY
Qty: 45 TABLET | Refills: 0 | Status: ON HOLD | OUTPATIENT
Start: 2022-07-14 | End: 2022-07-29 | Stop reason: HOSPADM

## 2022-07-14 RX ORDER — FOLIC ACID 1 MG/1
1 TABLET ORAL DAILY
Qty: 30 TABLET | Refills: 0 | Status: ON HOLD | OUTPATIENT
Start: 2022-07-14 | End: 2022-07-29 | Stop reason: SDUPTHER

## 2022-07-14 RX ORDER — ASPIRIN 81 MG/1
81 TABLET ORAL DAILY
Qty: 30 TABLET | Refills: 0 | Status: ON HOLD | OUTPATIENT
Start: 2022-07-14 | End: 2022-07-29 | Stop reason: SDUPTHER

## 2022-07-14 RX ORDER — ATORVASTATIN CALCIUM 80 MG/1
80 TABLET, FILM COATED ORAL NIGHTLY
Qty: 30 TABLET | Refills: 0 | Status: ON HOLD | OUTPATIENT
Start: 2022-07-14 | End: 2022-07-29 | Stop reason: SDUPTHER

## 2022-07-14 RX ADMIN — THIAMINE HCL TAB 100 MG 100 MG: 100 TAB at 08:51

## 2022-07-14 RX ADMIN — ESCITALOPRAM OXALATE 15 MG: 10 TABLET ORAL at 08:52

## 2022-07-14 RX ADMIN — CHLORDIAZEPOXIDE HYDROCHLORIDE 5 MG: 5 CAPSULE ORAL at 08:52

## 2022-07-14 RX ADMIN — MULTIPLE VITAMINS W/ MINERALS TAB 1 TABLET: TAB at 08:52

## 2022-07-14 RX ADMIN — HYDROXYZINE HYDROCHLORIDE 50 MG: 50 TABLET, FILM COATED ORAL at 13:55

## 2022-07-14 RX ADMIN — ASPIRIN 81 MG: 81 TABLET, COATED ORAL at 08:52

## 2022-07-14 RX ADMIN — FOLIC ACID 1 MG: 1 TABLET ORAL at 08:52

## 2022-07-14 NOTE — GROUP NOTE
Group Therapy Note    Date: 7/14/2022    Group Start Time: 1330  Group End Time: 1740  Group Topic: Recreational    VIVIANA Mcneill    Recreation Group Note        Date: July 14, 2022 Start Time: 1:30pm  End Time:  1410      Number of Participants in Group & Unit Census:  3/21    Topic: recreation     Goal of Group: improve concentration skills       Comments:     Patient did not participate in Recreation group, despite staff encouragement and explanation of benefits. Patient remain seclusive to self. Q15 minute safety checks maintained for patient safety and will continue to encourage patient to attend unit programming.           Signature:  Eliana Olmos

## 2022-07-14 NOTE — PLAN OF CARE
Problem: Self Harm/Suicidality  Goal: Will have no self-injury during hospital stay  Description: INTERVENTIONS:  1. Q 30 MINUTES: Routine safety checks  2. Q SHIFT & PRN: Assess risk to determine if routine checks are adequate to maintain patient safety  Outcome: Progressing     Problem: Coping  Goal: Pt/Family able to verbalize concerns and demonstrate effective coping strategies  Description: INTERVENTIONS:  1. Assist patient/family to identify coping skills, available support systems and cultural and spiritual values  2. Provide emotional support, including active listening and acknowledgement of concerns of patient and caregivers  3. Reduce environmental stimuli, as able  4. Instruct patient/family in relaxation techniques, as appropriate  5. Assess for spiritual pain/suffering and initiate Spiritual Care, Psychosocial Clinical Specialist consults as needed  Outcome: Progressing     Problem: Depression/Self Harm  Goal: Effect of psychiatric condition will be minimized and patient will be protected from self harm  Description: INTERVENTIONS:  1. Assess impact of patient's symptoms on level of functioning, self care needs and offer support as indicated  2. Assess patient/family knowledge of depression, impact on illness and need for teaching  3. Provide emotional support, presence and reassurance  4. Assess for possible suicidal thoughts or ideation. If patient expresses suicidal thoughts or statements do not leave alone, initiate Suicide Precautions, move to a room close to the nursing station and obtain sitter  5.  Initiate consults as appropriate with Mental Health Professional, Spiritual Care, Psychosocial CNS, and consider a recommendation to the [facility]IP for a Psychiatric Consultation  Outcome: Progressing     Problem: Pain  Goal: Verbalizes/displays adequate comfort level or baseline comfort level  Outcome: Progressing   Patient denies suicidal homicidal ideation and hallucinations, reports improvement in mood and sleep and is excited for discharge.

## 2022-07-14 NOTE — CARE COORDINATION
Discharge Arrangements:  Patient has a follow up appointment that was previously scheduled prior to admission with [place] [facility] at the [facility] location since he is living [place] at the shelter.      Guardian notified:   Discharge destination/address:  Transported by:

## 2022-07-14 NOTE — BH NOTE
Patient given tobacco quitline number 77236578169 at this time, refusing to call at this time, states \" I just dont want to quit now\"- patient given information as to the dangers of long term tobacco use. Continue to reinforce the importance of tobacco cessation.

## 2022-07-14 NOTE — GROUP NOTE
Group Therapy Note    Date: 7/14/2022    Group Start Time: 1100  Group End Time: 6732  Group Topic: Cognitive Skills    VIVIANA Hernandes    Cognitive Skills Group Note        Date: July 14, 2022 Start Time: 11am  End Time:  1135am      Number of Participants in Group & Unit Census:  2/21    Topic:  cognitive skills     Goal of Group: improve decision making skills       Comments:     Patient did not participate in Cognitive Skills group, despite staff encouragement and explanation of benefits. Patient remain seclusive to self. Q15 minute safety checks maintained for patient safety and will continue to encourage patient to attend unit programming.           Signature:  Pelon Drew

## 2022-07-14 NOTE — GROUP NOTE
Group Therapy Note    Date: 7/13/2022    Group Start Time: 2030  Group End Time: 2100  Group Topic: Wrap-Up    [facility]    Polina Rowe        Group Therapy Note    Attendees: 6/19    Patients completed evening wrap-up and relaxation group. Organic coping skills to cope with hospitalization and other stressors were discussed such as television, coloring, puzzles, and reaching out to supportive friends and family members. Status After Intervention:  Unchanged    Participation Level:  Active Listener    Participation Quality: Appropriate      Speech:  normal      Thought Process/Content: Logical      Affective Functioning: Congruent      Mood: euthymic      Level of consciousness:  Alert and Oriented x4      Response to Learning: Capable of insight and Progressing to goal      Endings: None Reported    Modes of Intervention: Education, Socialization and Activity      Discipline Responsible: Behavorial Health Tech      Signature:  Polina Rowe

## 2022-07-14 NOTE — GROUP NOTE
Group Therapy Note    Date: 7/14/2022    Group Start Time: 1000  Group End Time: 1300  Group Topic: Psychoeducation    SHANNAN Kim       Brief 1:1      [facility]    [id number] and [address]18    PT declined to participate in 1:1 individual brief therapy. Pt refuses to engage in conversation regarding discharge and safety planning, and short- and long-term goals. Patient's Goal:  To actively participate in 1:1 meeting and to start discharge and safety planning.      Discipline Responsible: /Counselor      Signature:  Radu Steiner, MSW, LSW

## 2022-07-14 NOTE — PLAN OF CARE
Patient meets criteria per provider. Problem: Self Harm/Suicidality  Goal: Will have no self-injury during hospital stay  Description: INTERVENTIONS:  1. Q 30 MINUTES: Routine safety checks  2. Q SHIFT & PRN: Assess risk to determine if routine checks are adequate to maintain patient safety  7/14/2022 1515 by Esperanza Aceves RN  Outcome: Completed  7/14/2022 0627 by Con Dinero RN  Outcome: Progressing     Problem: Coping  Goal: Pt/Family able to verbalize concerns and demonstrate effective coping strategies  Description: INTERVENTIONS:  1. Assist patient/family to identify coping skills, available support systems and cultural and spiritual values  2. Provide emotional support, including active listening and acknowledgement of concerns of patient and caregivers  3. Reduce environmental stimuli, as able  4. Instruct patient/family in relaxation techniques, as appropriate  5.  Assess for spiritual pain/suffering and initiate Spiritual Care, Psychosocial Clinical Specialist consults as needed  7/14/2022 1515 by Esperanza Aceves RN  Outcome: Completed  Flowsheets  Taken 7/14/2022 8769  Patient/family able to verbalize anxieties, fears, and concerns, and demonstrate effective coping:   Assist patient/family to identify coping skills, available support systems and cultural and spiritual values   Provide emotional support, including active listening and acknowledgement of concerns of patient and caregivers   Reduce environmental stimuli, as able   Instruct patient/family in relaxation techniques, as appropriate  Taken 7/14/2022 0935  Patient/family able to verbalize anxieties, fears, and concerns, and demonstrate effective coping:   Assist patient/family to identify coping skills, available support systems and cultural and spiritual values   Provide emotional support, including active listening and acknowledgement of concerns of patient and caregivers   Instruct patient/family in relaxation techniques, as appropriate  7/14/2022 0627 by Melodie Craig RN  Outcome: Progressing     Problem: Pain  Goal: Verbalizes/displays adequate comfort level or baseline comfort level  7/14/2022 1515 by Richard Andrea RN  Outcome: Completed  7/14/2022 0627 by Melodie Craig RN  Outcome: Progressing     Problem: ABCDS Injury Assessment  Goal: Absence of physical injury  7/14/2022 1515 by Richard Andrea RN  Outcome: Completed  7/14/2022 0627 by Melodie Craig RN  Outcome: Progressing     Problem: Depression  Goal: Will be euthymic at discharge  Description: INTERVENTIONS:  1. Administer medication as ordered  2. Provide emotional support via 1:1 interaction with staff  3. Encourage involvement in milieu/groups/activities  4. Monitor for social isolation  7/14/2022 1515 by Richard Andrea RN  Outcome: Completed  7/14/2022 0627 by Melodie Craig RN  Outcome: Progressing     Problem: Drug Abuse/Detox  Goal: Will have no detox symptoms and will verbalize plan for changing drug-related behavior  Description: INTERVENTIONS:  1. Administer medication as ordered  2. Monitor physical status  3. Provide emotional support with 1:1 interaction with staff  4.  Encourage  recovery focused treatment   7/14/2022 1515 by Richard Andrea RN  Outcome: Completed  Flowsheets  Taken 7/14/2022 3137  Will have no detox symptoms and will verbalize plan for changing drug-related behavior:   Monitor physical status   Administer medication as ordered  Taken 7/14/2022 0941  Will have no detox symptoms and will verbalize plan for changing drug-related behavior:   Monitor physical status   Administer medication as ordered   Provide emotional support with 1:1 interaction with staff  Taken 7/14/2022 0935  Will have no detox symptoms and will verbalize plan for changing drug-related behavior: Monitor physical status  7/14/2022 0627 by Melodie Craig RN  Outcome: Progressing

## 2022-07-14 NOTE — DISCHARGE SUMMARY
of previous psychiatric admission most recently in April of 2022.  The patient reports daily alcohol use. No attempts at self harm to this point. The patient no acute medical complaints at this time.     At diagnostic assessment patient is tearful. He shares that he has been experiencing helplessness and hopelessness for greater than a 2-week timeframe. Offers that he had a break-up with his girlfriend approximately 1 month ago and he has been struggling since that time as she is \"taunting\" him and calling his workplace leaving derogatory messages about what a bad person he is. 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 Patient reports that he has been drinking for the last 10 days due to these struggles. He endorses 3-4 \"tall boys \"daily. He does confirm seizures with previous alcohol withdrawal but denies that he has been drinking extensively prior to these 10 days. He denies other illicit drug use. He does endorse significant symptoms of depression and rates 10+ on a 0-10 scale with 10 being the worst.. He reports low mood, difficulty with sleep, significant anhedonia with feelings of worthlessness. He has little motivation, difficulty with concentration and poor appetite. He reports these symptoms have been present every day almost all day for more than 2 weeks. He shares that he has been utilizing Lexapro since 2005 and took a dose on \"Thursday \". He explains that overall he has been consistent with his medications however due to his feelings of suicidal ideation and alcohol use he discontinued use since that day.     Patient does endorse symptoms of anxiety offering that he worries excessively \"about everything and anything\". He reports this leads to restlessness, muscle tension and fatigue.   He rates his symptoms 10/10 utilizing the same scale as noted above.  He denies experiencing panic attacks recently however does state \"happened many years ago \". He denies symptoms of mariam including increased goal-directed activity with decreased need for sleep, elevated mood or rapid speech. He denies auditory or visual hallucinations and there is no evidence of delusions or so psychosis currently.     Patient denies intrusive or persistent thoughts that are relieved by repetitive behaviors. He denies any history of trauma and shares \"had a pretty decent life growing up \". He denies phobias, fear of abandonment, poor self-esteem or utilizing self damaging behaviors. He denies forensic history or having feelings of aggressiveness or violent behaviors towards others. He denies childhood conduct disorder or that he had difficulty in school. He denies binging purging or utilizing other caloric restrictive means.     Patient continues to endorse significant suicidal ideation and confirms that he would have ended his own life if he had remained in the community. He shares that he has been staying \"[place]town\" and resides at a local shelter. Explored his interest in alcohol and other drug treatment facilities and he declines stating \"just having a tough time now. ... don't need to do that\". Discussed his medications and he has difficulty recalling dosages but confirms that he has been taking Lexapro, Remeron and has previously found trazodone to be beneficial when hospitalized stating \"this place makes me really nervous and I have a tough time sleeping here\". He agrees to reach out to support team as needed and denies having any additional questions or concerns at this time.  [facility] Course:   Upon admission, Ranjana Dye was provided a safe secure environment, introduced to unit milieu. Patient participated in groups and individual therapies. Meds were adjusted as noted below. After few days of hospital care, patient began to feel improvement.  Depression lifted, thoughts to harm self ceased. Sleep improved, appetite was good. On morning rounds 7/14/2022, Elyssa Ballard endorses feeling ready for discharge. Patient denies suicidal or homicidal ideations, denies hallucinations or delusions. Denies SE's from meds. It was decided that maximum benefit from hospital care had been achieved and patient can be discharged. Consults:   none    Significant Diagnostic Studies: Routine labs and diagnostics    Treatments: Psychotropic medications, therapy with group, milieu, and 1:1 with nurses, social workers, PALarryC/CNP, and Attending physician.       Discharge Medications:  Discharge Medication List as of 7/14/2022  9:44 AM      START taking these medications    Details   hydrOXYzine HCl (ATARAX) 50 MG tablet Take 1 tablet by mouth 3 times daily as needed for Anxiety, Disp-45 tablet, [id number]      folic acid (FOLVITE) 1 MG tablet Take 1 tablet by mouth daily, Disp-30 tablet, R-0Normal      thiamine mononitrate (THIAMINE) 100 MG tablet Take 1 tablet by mouth daily, Disp-30 tablet, R-0Normal         CONTINUE these medications which have CHANGED    Details   aspirin 81 MG EC tablet Take 1 tablet by mouth daily, Disp-30 tablet, R-0Normal      escitalopram (LEXAPRO) 10 MG tablet Take 1.5 tablets by mouth daily, Disp-45 tablet, R-0Normal      traZODone (DESYREL) 50 MG tablet Take 1 tablet by mouth nightly as needed for Sleep, Disp-30 tablet, R-0Normal      atorvastatin (LIPITOR) 80 MG tablet Take 1 tablet by mouth nightly, Disp-30 tablet, R-0Normal         STOP taking these medications       mirtazapine (REMERON) 15 MG tablet Comments:   Reason for Stopping:         clopidogrel (PLAVIX) 75 MG tablet Comments:   Reason for Stopping:                Core Measures statement:   Not applicable    Discharge Exam:  Level of consciousness:  Within normal limits  Appearance: Street clothes, seated, with good grooming  Behavior/Motor: No abnormalities noted  Attitude toward examiner: Cooperative, attentive, good eye contact  Speech:  spontaneous, normal rate, normal volume and well articulated  Mood:  euthymic  Affect:  Full range  Thought processes:  linear, goal directed and coherent  Thought content:  denies homicidal ideation  Suicidal Ideation:  denies suicidal ideation  Delusions:  no evidence of delusions  Perceptual Disturbance:  denies any perceptual disturbance  Cognition:  Intact  Memory: age appropriate  Insight & Judgement: fair  Medication side effects: denies     Disposition: home    Patient Instructions: Activity: activity as tolerated  1. Patient instructed to take medications regularly and follow up with outpatient appointments. Follow-up as scheduled with [facility]       Signed:    Electronically signed by Lakeshia Tafoya MD on 7/14/22 at 5:01 PM EDT    Time Spent on discharge is more than 35 minutes in the examination, evaluation, counseling and review of medications and discharge plan.

## 2022-07-14 NOTE — BH NOTE
[facility]  Discharge Note    Pt discharged with followings belongings:   Dental Appliances: None  Vision - Corrective Lenses: None  Hearing Aid: None  Jewelry: None  Body Piercings Removed: N/A  Clothing: Footwear,Hat,Shirt,Pants  Other Valuables: Cigarettes,Lighter/Matches,Money,Wallet,Personal Toiletries ($17.79, 6 bottles of pills,)   Valuables sent home with patient or returned to patient. Patient education on aftercare instructions: Yes  Information faxed to [facility] by Nurse  at 3:13 PM .Patient verbalize understanding of AVS:  Yes. Status EXAM upon discharge:  Mental Status and Behavioral Exam  Normal: Yes  Level of Assistance: Independent/Self  Facial Expression: Brightened  Affect: Appropriate  Level of Consciousness: Alert  Frequency of Checks: 4 times per hour, close  Mood:Normal: Yes  Mood: Anxious  Motor Activity:Normal: Yes  Motor Activity: Decreased  Eye Contact: Good  Observed Behavior: Cooperative,Friendly  Sexual Misconduct History: Current - no  Preception: [place] to person,[place] to time,[place] to place,[place] to situation  Attention:Normal: Yes  Attention: Distractible  Thought Processes: Circumstantial  Thought Content:Normal: Yes  Thought Content: Preoccupations  Depression Symptoms: No problems reported or observed. Anxiety Symptoms: Generalized  [place] Symptoms: No problems reported or observed.   Hallucinations: None  Delusions: No  Memory:Normal: No  Memory: Poor recent  Insight and Judgment: Yes  Insight and Judgment: Poor insight    Tobacco Screening:  Practical Counseling, on admission, david X, if applicable and completed (first 3 are required if patient doesn't refuse) Patient refused:            ( ) Recognizing danger situations (included triggers and roadblocks)                    ( ) Coping skills (new ways to manage stress,relaxation techniques, changing routine, distraction)                                                           ( ) Basic information about quitting (benefits of quitting, techniques in how to quit, available resources  ( ) Referral for counseling faxed to [facility]                                                                                                                   ( X) Patient refused counseling  ( ) Patient refused referral  ( ) Patient refused prescription upon discharge  ( ) Patient has not smoked in the last 30 days    Metabolic Screening:    Lab Results   Component Value Date    LABA1C 5.7 09/26/2021       Lab Results   Component Value Date    CHOL 157 09/26/2021    CHOL 179 08/19/2020     Lab Results   Component Value Date    TRIG 43 09/26/2021    TRIG 118 08/19/2020     Lab Results   Component Value Date    HDL 80 09/26/2021    HDL 66 08/19/2020     No components found for: LDLCAL  No results found for: LABVLDL     Patient discharged to home. Patient alert and oriented X4. Patient denies thoughts of harm to self or others. Patient discharged with all belongings.      Edvin Simon RN

## 2022-07-14 NOTE — PROGRESS NOTES
CLINICAL PHARMACY NOTE: MEDS TO BEDS    Total # of Prescriptions Filled: 6   The following medications were delivered to the patient:  · Aspirin Low Dose 81mg  · Hydroxyzine HCL 50mg  · Escitalopram Oxalate 10mg  · Atorvastatin Calcium 21EX  · Folic Acid 1mg  · Thiamine Mononitrate 100mg    Additional Documentation:  Delivered Medication to Nurses Station     Refill(s) Too Soon + Profiled Rx(s)   - Trazodone: 7/17

## 2022-07-14 NOTE — GROUP NOTE
Group Therapy Note    Date: 7/14/2022    Group Start Time: 0900  Group End Time: 0930  Group Topic: Community Meeting    SHANNAN GARCIA    Juvencio Og LPN        Group Therapy Note    Attendees: 3/10         Patient's Goal:  Goals group    Notes:  Goal is to be discharged and try to get job back today    Status After Intervention:  Improved    Participation Level:  Active Listener and Interactive    Participation Quality: Appropriate, Attentive and Sharing      Speech:  normal      Thought Process/Content: Logical      Affective Functioning: Congruent      Mood: WNL      Level of consciousness:  Alert, Oriented x4 and Attentive      Response to Learning: Able to verbalize current knowledge/experience and Able to retain information      Endings: None Reported    Modes of Intervention: Education, Support and Socialization      Discipline Responsible: Licensed Practical Nurse      Signature:  Juvencio Og LPN

## 2022-07-22 ENCOUNTER — HOSPITAL ENCOUNTER (INPATIENT)
Age: 57
LOS: 8 days | Discharge: HOME | DRG: 751 | End: 2022-07-30
Attending: STUDENT IN AN ORGANIZED HEALTH CARE EDUCATION/TRAINING PROGRAM | Admitting: PSYCHIATRY & NEUROLOGY
Payer: MEDICAID

## 2022-07-22 DIAGNOSIS — R45.851 SUICIDAL IDEATION: Primary | ICD-10-CM

## 2022-07-22 PROBLEM — F18.10 ABUSE OF SMOKED SUBSTANCE (HCC): Status: ACTIVE | Noted: 2022-07-22

## 2022-07-22 LAB
ABSOLUTE EOS #: 0.1 K/UL (ref 0–0.4)
ABSOLUTE LYMPH #: 1.7 K/UL (ref 1–4.8)
ABSOLUTE MONO #: 0.4 K/UL (ref 0.1–1.3)
ALBUMIN SERPL-MCNC: 4 G/DL (ref 3.5–5.2)
ALP BLD-CCNC: 56 U/L (ref 40–129)
ALT SERPL-CCNC: 37 U/L (ref 5–41)
ANION GAP SERPL CALCULATED.3IONS-SCNC: 12 MMOL/L (ref 9–17)
AST SERPL-CCNC: 42 U/L
BASOPHILS # BLD: 1 % (ref 0–2)
BASOPHILS ABSOLUTE: 0 K/UL (ref 0–0.2)
BILIRUB SERPL-MCNC: 0.22 MG/DL (ref 0.3–1.2)
BUN BLDV-MCNC: 8 MG/DL (ref 6–20)
CALCIUM SERPL-MCNC: 8.7 MG/DL (ref 8.6–10.4)
CHLORIDE BLD-SCNC: 105 MMOL/L (ref 98–107)
CO2: 22 MMOL/L (ref 20–31)
CREAT SERPL-MCNC: 0.55 MG/DL (ref 0.7–1.2)
EOSINOPHILS RELATIVE PERCENT: 1 % (ref 0–4)
ETHANOL PERCENT: 0.07 %
ETHANOL: 72 MG/DL
GFR AFRICAN AMERICAN: >60 ML/MIN
GFR NON-AFRICAN AMERICAN: >60 ML/MIN
GFR SERPL CREATININE-BSD FRML MDRD: ABNORMAL ML/MIN/{1.73_M2}
GLUCOSE BLD-MCNC: 95 MG/DL (ref 70–99)
HCT VFR BLD CALC: 42.4 % (ref 41–53)
HEMOGLOBIN: 14 G/DL (ref 13.5–17.5)
LYMPHOCYTES # BLD: 41 % (ref 24–44)
MCH RBC QN AUTO: 30.6 PG (ref 26–34)
MCHC RBC AUTO-ENTMCNC: 33.1 G/DL (ref 31–37)
MCV RBC AUTO: 92.4 FL (ref 80–100)
MONOCYTES # BLD: 10 % (ref 1–7)
PDW BLD-RTO: 15.2 % (ref 11.5–14.9)
PLATELET # BLD: 288 K/UL (ref 150–450)
PMV BLD AUTO: 6.5 FL (ref 6–12)
POTASSIUM SERPL-SCNC: 3.8 MMOL/L (ref 3.7–5.3)
RBC # BLD: 4.59 M/UL (ref 4.5–5.9)
SEG NEUTROPHILS: 47 % (ref 36–66)
SEGMENTED NEUTROPHILS ABSOLUTE COUNT: 2 K/UL (ref 1.3–9.1)
SODIUM BLD-SCNC: 139 MMOL/L (ref 135–144)
TOTAL PROTEIN: 6.6 G/DL (ref 6.4–8.3)
WBC # BLD: 4.2 K/UL (ref 3.5–11)

## 2022-07-22 PROCEDURE — 6370000000 HC RX 637 (ALT 250 FOR IP): Performed by: PSYCHIATRY & NEUROLOGY

## 2022-07-22 PROCEDURE — 36415 COLL VENOUS BLD VENIPUNCTURE: CPT

## 2022-07-22 PROCEDURE — 99285 EMERGENCY DEPT VISIT HI MDM: CPT

## 2022-07-22 PROCEDURE — 85025 COMPLETE CBC W/AUTO DIFF WBC: CPT

## 2022-07-22 PROCEDURE — 80053 COMPREHEN METABOLIC PANEL: CPT

## 2022-07-22 PROCEDURE — 1240000000 HC EMOTIONAL WELLNESS R&B

## 2022-07-22 PROCEDURE — APPSS60 APP SPLIT SHARED TIME 46-60 MINUTES: Performed by: PSYCHIATRY & NEUROLOGY

## 2022-07-22 PROCEDURE — 99223 1ST HOSP IP/OBS HIGH 75: CPT | Performed by: INTERNAL MEDICINE

## 2022-07-22 PROCEDURE — G0480 DRUG TEST DEF 1-7 CLASSES: HCPCS

## 2022-07-22 RX ORDER — POLYETHYLENE GLYCOL 3350 17 G/17G
17 POWDER, FOR SOLUTION ORAL DAILY PRN
Status: DISCONTINUED | OUTPATIENT
Start: 2022-07-22 | End: 2022-07-30 | Stop reason: HOSPADM

## 2022-07-22 RX ORDER — BUPROPION HYDROCHLORIDE 150 MG/1
150 TABLET ORAL EVERY MORNING
Status: ON HOLD | COMMUNITY
End: 2022-07-29 | Stop reason: HOSPADM

## 2022-07-22 RX ORDER — HYDROXYZINE 50 MG/1
50 TABLET, FILM COATED ORAL 3 TIMES DAILY PRN
Status: DISCONTINUED | OUTPATIENT
Start: 2022-07-22 | End: 2022-07-30 | Stop reason: HOSPADM

## 2022-07-22 RX ORDER — ESCITALOPRAM OXALATE 10 MG/1
15 TABLET ORAL DAILY
Status: DISCONTINUED | OUTPATIENT
Start: 2022-07-22 | End: 2022-07-24

## 2022-07-22 RX ORDER — ATORVASTATIN CALCIUM 80 MG/1
80 TABLET, FILM COATED ORAL NIGHTLY
Status: DISCONTINUED | OUTPATIENT
Start: 2022-07-22 | End: 2022-07-30 | Stop reason: HOSPADM

## 2022-07-22 RX ORDER — ACETAMINOPHEN 325 MG/1
650 TABLET ORAL EVERY 4 HOURS PRN
Status: DISCONTINUED | OUTPATIENT
Start: 2022-07-22 | End: 2022-07-30 | Stop reason: HOSPADM

## 2022-07-22 RX ORDER — MAGNESIUM HYDROXIDE/ALUMINUM HYDROXICE/SIMETHICONE 120; 1200; 1200 MG/30ML; MG/30ML; MG/30ML
30 SUSPENSION ORAL EVERY 6 HOURS PRN
Status: DISCONTINUED | OUTPATIENT
Start: 2022-07-22 | End: 2022-07-30 | Stop reason: HOSPADM

## 2022-07-22 RX ORDER — IBUPROFEN 400 MG/1
400 TABLET ORAL EVERY 6 HOURS PRN
Status: DISCONTINUED | OUTPATIENT
Start: 2022-07-22 | End: 2022-07-30 | Stop reason: HOSPADM

## 2022-07-22 RX ORDER — GAUZE BANDAGE 2" X 2"
100 BANDAGE TOPICAL DAILY
Status: DISCONTINUED | OUTPATIENT
Start: 2022-07-22 | End: 2022-07-30 | Stop reason: HOSPADM

## 2022-07-22 RX ORDER — TRAZODONE HYDROCHLORIDE 50 MG/1
50 TABLET ORAL NIGHTLY PRN
Status: DISCONTINUED | OUTPATIENT
Start: 2022-07-22 | End: 2022-07-30 | Stop reason: HOSPADM

## 2022-07-22 RX ORDER — ASPIRIN 81 MG/1
81 TABLET ORAL DAILY
Status: DISCONTINUED | OUTPATIENT
Start: 2022-07-22 | End: 2022-07-30 | Stop reason: HOSPADM

## 2022-07-22 RX ORDER — FOLIC ACID 1 MG/1
1 TABLET ORAL DAILY
Status: DISCONTINUED | OUTPATIENT
Start: 2022-07-22 | End: 2022-07-30 | Stop reason: HOSPADM

## 2022-07-22 RX ADMIN — TRAZODONE HYDROCHLORIDE 50 MG: 50 TABLET ORAL at 21:16

## 2022-07-22 RX ADMIN — ATORVASTATIN CALCIUM 80 MG: 80 TABLET, FILM COATED ORAL at 21:16

## 2022-07-22 RX ADMIN — ESCITALOPRAM OXALATE 15 MG: 10 TABLET ORAL at 15:33

## 2022-07-22 RX ADMIN — HYDROXYZINE HYDROCHLORIDE 50 MG: 50 TABLET, FILM COATED ORAL at 21:16

## 2022-07-22 RX ADMIN — FOLIC ACID 1 MG: 1 TABLET ORAL at 15:33

## 2022-07-22 RX ADMIN — THIAMINE HCL TAB 100 MG 100 MG: 100 TAB at 15:33

## 2022-07-22 RX ADMIN — ASPIRIN 81 MG: 81 TABLET, COATED ORAL at 15:33

## 2022-07-22 ASSESSMENT — PAIN SCALES - GENERAL: PAINLEVEL_OUTOF10: 0

## 2022-07-22 ASSESSMENT — SLEEP AND FATIGUE QUESTIONNAIRES
DO YOU USE A SLEEP AID: NO
DO YOU HAVE DIFFICULTY SLEEPING: YES
AVERAGE NUMBER OF SLEEP HOURS: 6
SLEEP PATTERN: DIFFICULTY FALLING ASLEEP;RESTLESSNESS;INSOMNIA

## 2022-07-22 ASSESSMENT — LIFESTYLE VARIABLES
HOW MANY STANDARD DRINKS CONTAINING ALCOHOL DO YOU HAVE ON A TYPICAL DAY: PATIENT DECLINED
HOW OFTEN DO YOU HAVE A DRINK CONTAINING ALCOHOL: PATIENT DECLINED
HOW OFTEN DO YOU HAVE A DRINK CONTAINING ALCOHOL: PATIENT DECLINED
HOW MANY STANDARD DRINKS CONTAINING ALCOHOL DO YOU HAVE ON A TYPICAL DAY: PATIENT DECLINED

## 2022-07-22 ASSESSMENT — ENCOUNTER SYMPTOMS
EYE DISCHARGE: 0
VOMITING: 0
SHORTNESS OF BREATH: 0
EYE REDNESS: 0
DIARRHEA: 0
NAUSEA: 0
RHINORRHEA: 0
SORE THROAT: 0
CHEST TIGHTNESS: 0
ABDOMINAL PAIN: 0

## 2022-07-22 ASSESSMENT — PAIN - FUNCTIONAL ASSESSMENT: PAIN_FUNCTIONAL_ASSESSMENT: NONE - DENIES PAIN

## 2022-07-22 ASSESSMENT — PATIENT HEALTH QUESTIONNAIRE - PHQ9
SUM OF ALL RESPONSES TO PHQ QUESTIONS 1-9: 24
SUM OF ALL RESPONSES TO PHQ QUESTIONS 1-9: 18

## 2022-07-22 NOTE — PROGRESS NOTES
Behavioral Services  Medicare Certification Upon Admission    I certify that this patient's inpatient psychiatric hospital admission is medically necessary for:    [x] (1) Treatment which could reasonably be expected to improve this patient's condition,       [x] (2) Or for diagnostic study;     AND     [x](2) The inpatient psychiatric services are provided while the individual is under the care of a physician and are included in the individualized plan of care.     Estimated length of stay/service 3 to 5 days    Plan for post-hospital care Home with outpatient community mental health follow-up versus inpatient substance use rehab    Electronically signed by Frederick Farias MD on 7/22/2022 at 12:59 PM

## 2022-07-22 NOTE — ED NOTES
Patient changed out with assistance from [id number] [facility] JESSA Sumner Silence  07/22/22 5218

## 2022-07-22 NOTE — ED NOTES
Provisional Diagnosis:     Patient presented to ED via [employer] for a mental health evaluation. Patient has history of depression. Psychosocial and Contextual Factors:     Patient has substance abuse issues. C-SSRS Summary:    Patient reports current SI with a plan to \"drown myself\". Patient was at a park immediately prior to arrival in ED with intent to kill self by drowning. Patient: X  Family:   Agency:     Substance Abuse:  Patient is a daily drinker. Patient reports drinking 3-4 \"tall boys\" daily. Present Suicidal Behavior:    Patient reports current SI with a plan to \"drown myself\". Patient was at a park immediately prior to arrival in ED with intent to kill self by drowning. Verbal: X    Attempt:    Past Suicidal Behavior:   Patient has past SI with plans to overdose, jump off bridge and cut self. Verbal: X    Attempt:X    Self-Injurious/Self-Mutilation:  Patient denies    Violence Current or Past   None identified or documented. Trauma Identified:    Patient reports a recent break up with long time girlfriend. Protective Factors:    Patient has insurance. Patient has support. Patient linked. Patient has housing. Risk Factors:    Patient has substance abuse issues. Patient has poor coping skills. Patient has poor insight. Clinical Summary:    Patient is a 65year old  male who presented to ED via [address] for a mental health evaluation. Bystanders allegedly called 911 after they observed the patient sitting next to a body of water in the park, sobbing. Patient was found by police sitting by a body of water with the intention of ending his life by drowning. Patient reports he cannot deal with the recent break up between him and long time girlfriend. Patient was recently discharged from [facility] for same concerns. Patient extremely tearful upon entry into [facility].   Patient presented to ED with 3 box cutters in his belongings as well as his prescription medications. Patient reports daily alcohol use, roughly 3-4 \"tall boys\". Patient denies hallucinations. Patient denies HI. Patient states he \"can't go on\". Level of Care Disposition: This writer consulted with Dr Viktoriya Walden who recommended inpatient hospitalization for safety and stabilization. Patient signed application for voluntary admission to [facility].

## 2022-07-22 NOTE — ED PROVIDER NOTES
EMERGENCY DEPARTMENT ENCOUNTER    Pt Name: Saulo Vasquez  MRN: [id number]  [place] 1965  Date of evaluation: 7/22/22  CHIEF COMPLAINT       Chief Complaint   Patient presents with    Suicidal     HISTORY OF PRESENT ILLNESS   50-year-old presents with suicidal ideation    States he is feeling depressed does not want to go on anymore went to the park today with intention to drown himself    Admits to drinking beer but states his last drink was 6 hours ago    Denies any homicidal ideation. No auditory visual hallucinations. No medical complaints. REVIEW OF SYSTEMS     Review of Systems   Constitutional:  Negative for chills and fever. HENT:  Negative for rhinorrhea and sore throat. Eyes:  Negative for discharge and redness. Respiratory:  Negative for chest tightness and shortness of breath. Cardiovascular:  Negative for chest pain. Gastrointestinal:  Negative for abdominal pain, diarrhea, nausea and vomiting. Genitourinary:  Negative for dysuria and frequency. Musculoskeletal:  Negative for arthralgias and myalgias. Skin:  Negative for rash. Neurological:  Negative for weakness and numbness. Psychiatric/Behavioral:  Positive for suicidal ideas. All other systems reviewed and are negative. PASTMEDICAL HISTORY     Past Medical History:   Diagnosis Date    Arthritis     Hypertension     Liver disease     Psoriasis     Psychiatric problem     Seizures ([address]      Past Problem List  Patient Active Problem List   Diagnosis Code    Major depression, recurrent ([address] F33.9    Major depressive disorder, recurrent severe without psychotic features ([address] F33.2    TIA (transient ischemic attack) G45.9    Left leg weakness R29.898    Depression with suicidal ideation F32. A, R45.851    Cocaine abuse, episodic (HCC) F14.10    Alcohol abuse, episodic H45.28    Acute alcoholic intoxication with complication ([address] N03.494     SURGICAL HISTORY       Past Surgical History:   Procedure Laterality Date TONSILLECTOMY       CURRENT MEDICATIONS       Previous Medications    ASPIRIN 81 MG EC TABLET    Take 1 tablet by mouth daily    ATORVASTATIN (LIPITOR) 80 MG TABLET    Take 1 tablet by mouth nightly    ESCITALOPRAM (LEXAPRO) 10 MG TABLET    Take 1.5 tablets by mouth daily    FOLIC ACID (FOLVITE) 1 MG TABLET    Take 1 tablet by mouth daily    HYDROXYZINE HCL (ATARAX) 50 MG TABLET    Take 1 tablet by mouth 3 times daily as needed for Anxiety    THIAMINE MONONITRATE (THIAMINE) 100 MG TABLET    Take 1 tablet by mouth daily    TRAZODONE (DESYREL) 50 MG TABLET    Take 1 tablet by mouth nightly as needed for Sleep     ALLERGIES     has No Known Allergies. FAMILY HISTORY     has no family status information on file. SOCIAL HISTORY       Social History     Tobacco Use    Smoking status: Every Day     Packs/day: 0.25     Years: 30.00     Pack years: 7.50     Types: Cigarettes     Start date: 8/18/1990    Smokeless tobacco: Never   Vaping Use    Vaping Use: Never used   Substance Use Topics    Alcohol use: Yes     Comment: weekends    Drug use: Not Currently     Types: Cocaine     Comment: occasionally     PHYSICAL EXAM     INITIAL VITALS: BP (!) 141/90   Pulse 74   Temp 98.2 °F (36.8 °C) (Oral)   Resp 17   Ht 5' 11\" (1.803 m)   Wt 182 lb (82.6 kg)   SpO2 98%   BMI 25.38 kg/m²    Physical Exam  Vitals and nursing note reviewed. Constitutional:       Appearance: Normal appearance. HENT:      Head: Normocephalic and atraumatic. Nose: Nose normal.      Mouth/Throat:      Mouth: Mucous membranes are moist.   Eyes:      Conjunctiva/sclera: Conjunctivae normal.      Pupils: Pupils are equal, round, and reactive to light. Cardiovascular:      Rate and Rhythm: Normal rate and regular rhythm. Pulses: Normal pulses. Heart sounds: Normal heart sounds. Pulmonary:      Effort: Pulmonary effort is normal.      Breath sounds: Normal breath sounds. Abdominal:      Palpations: Abdomen is soft. Tenderness: There is no abdominal tenderness. Musculoskeletal:         General: No swelling or deformity. Cervical back: Normal range of motion. Skin:     General: Skin is warm. Findings: No rash. Neurological:      General: No focal deficit present. Mental Status: He is alert and oriented to person, place, and time. Psychiatric:      Comments: Crying, tearful       MEDICAL DECISION MAKIN yo M w/ suicidal ideation    Differential suicidal, depression, malingering    Labs and psych consult  ED Course as of 22 0243      022 CBC with Auto Differential(!):    WBC 4.2   RBC 4.59   Hemoglobin Quant 14.0   Hematocrit 42.4   MCV 92.4   MCH 30.6   MCHC 33.1   RDW 15.2(!)   Platelet Count 465   MPV 6.5   Seg Neutrophils 47   Lymphocytes 41   Monocytes 10(!)   Eosinophils % 1   Basophils 1   Segs Absolute 2.00   Absolute Lymph # 1.70   Absolute Mono # 0.40   Absolute Eos # 0.10   Basophils Absolute 0.00  Normal [GENA]   0240 CMPFrancia Slates ):    GLUCOSE, FASTING,GF 95   BUN,BUNPL 8   Creatinine 0.55(!)   CALCIUM, SERUM, 013847 8.7   Sodium 139   Potassium 3.8   Chloride 105   CO2 22   Anion Gap 12   Alk Phos 56   ALT 37   AST 42(!)   Bilirubin 0.22(!)   Total Protein 6.6   Albumin 4.0   GFR Non- >60   GFR  >60   GFR Comment       Normal [GENA]   0240 ETOH(!):    ETHANOL,ETHA 72(!)   Ethanol percent 0.072  Below legal limit [GENA]   0240 Medically cleared [GENA]      ED Course User Index  [GENA] Cheyanne Castillo MD       CRITICAL CARE:       PROCEDURES:    Procedures    DIAGNOSTIC RESULTS   EKG:All EKG's are interpreted by the Emergency Department Physician who either signs or Co-signs this chart in the absence of a cardiologist.        RADIOLOGY:All plain film, CT, MRI, and formal ultrasound images (except ED bedside ultrasound) are read by the radiologist, see reports below, unless otherwisenoted in MDM or here.   No orders to display     LABS: All lab results were reviewed by myself, and all abnormals are listed below. Labs Reviewed   CBC WITH AUTO DIFFERENTIAL - Abnormal; Notable for the following components:       Result Value    RDW 15.2 (*)     Monocytes 10 (*)     All other components within normal limits   COMPREHENSIVE METABOLIC PANEL - Abnormal; Notable for the following components:    Creatinine 0.55 (*)     AST 42 (*)     Total Bilirubin 0.22 (*)     All other components within normal limits   ETHANOL - Abnormal; Notable for the following components:    Ethanol 72 (*)     All other components within normal limits   URINE DRUG SCREEN   URINALYSIS       EMERGENCY DEPARTMENTCOURSE:     Seen for suicidal ideation. Medically cleared admit to psych     Vitals:    Vitals:    07/22/22 0155   BP: (!) 141/90   Pulse: 74   Resp: 17   Temp: 98.2 °F (36.8 °C)   TempSrc: Oral   SpO2: 98%   Weight: 182 lb (82.6 kg)   Height: 5' 11\" (1.803 m)       The patient was given the following medications while in the emergency department:  No orders of the defined types were placed in this encounter. CONSULTS:  None    FINAL IMPRESSION      1. Suicidal ideation          DISPOSITION/PLAN   DISPOSITION Decision To Admit 07/22/2022 02:42:40 AM      PATIENT REFERRED TO:  No follow-up provider specified. DISCHARGE MEDICATIONS:  New Prescriptions    No medications on file     The care is provided during an unprecedented national emergency due to the novel coronavirus, COVID 19.   MD Jhony Bedoya MD  07/22/22 5884

## 2022-07-22 NOTE — ED NOTES
Patient appears to be intoxicated as evidenced by hysterical emotions, blood shot eyes, and slurred speech. Patient also admit to drinking earlier this evening, he indicated he does not know how much, but stated he drank beer. Patient to be reevaluated upon sobriety.

## 2022-07-22 NOTE — GROUP NOTE
Group Therapy Note    Date: 7/22/2022    Group Start Time: 1000  Group End Time: 8644  Group Topic: Psychotherapy    [address] [facility], [place]        Group Therapy Note    Attendees: 4/18       Patient refused to attend psychotherapy group at 10:00 am after encouragement from staff. 1:1 talk time provided as alternative to group session.       Discipline Responsible: /Counselor      Signature:  BECCA Rankin, CHENCHO

## 2022-07-22 NOTE — H&P
[address] Internal Medicine  Yuval Perry MD; Kale Finn MD; Jill Florence MD; MD Adeline Handley MD; Trenton Sterling MD    [facility]   [address] / HISTORY AND PHYSICAL EXAMINATION            Date:   7/22/2022  Patient name:  Julio Lemons  Date of admission:  7/22/2022  1:54 AM  MRN:   [id number]  Account:  [de-identified]  YOB: 1965  PCP:    No primary care provider on file. Room:   [address] [place] [address]  Code Status:    Full Code    Physician Requesting Consult: Mayuri Le MD    Reason for Consult:  med management     Chief Complaint:     Chief Complaint   Patient presents with    Suicidal       History Obtained From:     patient    History of Present Illness:   52-year-old gentleman with underlying history of anxiety and depression, polysubstance abuse, smoking, alcohol abuse and alcohol intoxication, admitted to inpatient psych with suicidal ideations      Past Medical History:     Past Medical History:   Diagnosis Date    Arthritis     Hypertension     Liver disease     Psoriasis     Psychiatric problem     Seizures ([address]         Past Surgical History:     Past Surgical History:   Procedure Laterality Date    TONSILLECTOMY          Medications Prior to Admission:     Prior to Admission medications    Medication Sig Start Date End Date Taking?  Authorizing Provider   buPROPion (WELLBUTRIN XL) 150 MG extended release tablet Take 150 mg by mouth every morning   Yes Historical Provider, MD   aspirin 81 MG EC tablet Take 1 tablet by mouth daily 7/14/22   Erick Campuzano MD   hydrOXYzine HCl (ATARAX) 50 MG tablet Take 1 tablet by mouth 3 times daily as needed for Anxiety 7/14/22 7/29/22  Erick Campuzano MD   escitalopram (LEXAPRO) 10 MG tablet Take 1.5 tablets by mouth daily 7/14/22   Erick Campuzano MD   traZODone (DESYREL) 50 MG tablet Take 1 tablet by mouth nightly as needed for Sleep 7/14/22   Be Sullivan MD   atorvastatin (LIPITOR) 80 MG tablet Take 1 tablet by mouth nightly 7/14/22 10/12/22  Be Sullivan MD   folic acid (FOLVITE) 1 MG tablet Take 1 tablet by mouth daily 7/14/22   Be Sullivan MD   thiamine mononitrate (THIAMINE) 100 MG tablet Take 1 tablet by mouth daily 7/14/22   Be Sullivan MD        Allergies:     Patient has no known allergies. Social History:     Tobacco:    reports that he has been smoking cigarettes. He started smoking about 31 years ago. He has a 7.50 pack-year smoking history. He has never used smokeless tobacco.  Alcohol:      reports current alcohol use. Drug Use:  reports that he does not currently use drugs after having used the following drugs: Cocaine. Family History:     History reviewed. No pertinent family history. Review of Systems:     Positive and Negative as described in HPI. CONSTITUTIONAL:  negative for fevers, chills, sweats, fatigue, weight loss  HEENT:  negative for vision, hearing changes, runny nose, throat pain  RESPIRATORY:  negative for shortness of breath, cough, congestion, wheezing. CARDIOVASCULAR:  negative for chest pain, palpitations.   GASTROINTESTINAL:  negative for nausea, vomiting, diarrhea, constipation, change in bowel habits, abdominal pain   GENITOURINARY:  negative for difficulty of urination, burning with urination, frequency   INTEGUMENT:  negative for rash, skin lesions, easy bruising   HEMATOLOGIC/LYMPHATIC:  negative for swelling/edema   ALLERGIC/IMMUNOLOGIC:  negative for urticaria , itching  ENDOCRINE:  negative increase in drinking, increase in urination, hot or cold intolerance  MUSCULOSKELETAL:  negative joint pains, muscle aches, swelling of joints  NEUROLOGICAL:  negative for headaches, dizziness, lightheadedness, numbness, pain, tingling extremities    Physical Exam:     BP (!) 143/82   Pulse 83   Temp 97.8 °F (36.6 °C) (Oral) Resp 14   Ht 5' 11\" (1.803 m)   Wt 182 lb (82.6 kg)   SpO2 98%   BMI 25.38 kg/m²   Temp (24hrs), Av.9 °F (36.6 °C), Min:97.7 °F (36.5 °C), Max:98.2 °F (36.8 °C)    No results for input(s): POCGLU in the last 72 hours. No intake or output data in the 24 hours ending 22    General Appearance:  alert, well appearing, and in no acute distress  Mental status: oriented to person, place, and time with normal affect  Head:  normocephalic, atraumatic. Eye: no icterus, redness, pupils equal and reactive, extraocular eye movements intact, conjunctiva clear  Ear: normal external ear, no discharge, hearing intact  Nose:  no drainage noted  Mouth: mucous membranes moist  Neck: supple, no carotid bruits, thyroid not palpable  Lungs: Bilateral equal air entry, clear to ausculation, no wheezing, rales or rhonchi, normal effort  Cardiovascular: normal rate, regular rhythm, no murmur, gallop, rub.   Abdomen: Soft, nontender, nondistended, normal bowel sounds, no hepatomegaly or splenomegaly  Neurologic: There are no new focal motor or sensory deficits, normal muscle tone and bulk, no abnormal sensation, normal speech, cranial nerves II through XII grossly intact  Skin: No gross lesions, rashes, bruising or bleeding on exposed skin area  Extremities:  peripheral pulses palpable, no pedal edema or calf pain with palpation  Psych: normal affect    Investigations:      Laboratory Testing:  Recent Results (from the past 24 hour(s))   CBC with Auto Differential    Collection Time: 22  2:16 AM   Result Value Ref Range    WBC 4.2 3.5 - 11.0 k/uL    RBC 4.59 4.5 - 5.9 m/uL    Hemoglobin 14.0 13.5 - 17.5 g/dL    Hematocrit 42.4 41 - 53 %    MCV 92.4 80 - 100 fL    MCH 30.6 26 - 34 pg    MCHC 33.1 31 - 37 g/dL    RDW 15.2 (H) 11.5 - 14.9 %    Platelets 269 742 - 870 k/uL    MPV 6.5 6.0 - 12.0 fL    Seg Neutrophils 47 36 - 66 %    Lymphocytes 41 24 - 44 %    Monocytes 10 (H) 1 - 7 %    Eosinophils % 1 0 - 4 % primary care provider on file. Please note that this chart was generated using voice recognition Dragon dictation software. Although every effort was made to ensure the accuracy of this automated transcription, some errors in transcription may have occurred.

## 2022-07-22 NOTE — GROUP NOTE
Group Therapy Note    Date: 7/22/2022    Group Start Time: 1100  Group End Time: 1130  Group Topic: Cognitive Skills    VIVIANA Alvarez    Cognitive Skills Group Note        Date: July 22, 2022 Start Time: 11am  End Time: 11:30am      Number of Participants in Group & Unit Census:  4/18    Topic: cognitive skills     Goal of Group: improve communication skills among peers       Comments:     Patient did not participate in Cognitive Skills group, despite staff encouragement and explanation of benefits. Patient remain seclusive to self. Q15 minute safety checks maintained for patient safety and will continue to encourage patient to attend unit programming.          Signature:  Diamond Yates

## 2022-07-22 NOTE — GROUP NOTE
Goal  Group Note        Date: July 22, 2022 Start Time: 9am  End Time:  4840      Number of Participants in Group & Unit Census:  15      Comments:     Patient did not participate in  Goal  group, despite staff encouragement and explanation of benefits. Patient remain seclusive to self. Q15 minute safety checks maintained for patient safety and will continue to encourage patient to attend unit programming.

## 2022-07-22 NOTE — H&P
Department of Psychiatry  Attending Physician Psychiatric Assessment     Reason for Admission to Psychiatric Unit:  A mental disorder causing major disability in social, interpersonal, occupational, and/or educational functioning that is leading to dangerous or life-threatening functioning, and that can only be addressed in an acute inpatient setting   Concerns about patient's safety in the community    CHIEF COMPLAINT: Depression with suicidal ideation and plan to drown self    History obtained from: Patient, electronic medical record          HISTORY OF PRESENT ILLNESS:    Chris Rocha is a 65 y.o. male who has a past medical history of depression, anxiety, alcohol use disorder, hypertension, transient ischemic attack, and psoriasis. Patient presented to the ED escorted by police after he was contemplating [place] himself [place] the [place] and citizens were very concerned about his safety. Per emergency department documentation patient is a 65year old  male who presented to ED via [address] for a mental health evaluation. Bystanders allegedly called 911 after they observed the patient sitting next to a body of water in the park, sobbing. Patient was found by police sitting by a body of water with the intention of ending his life by drowning. Patient reports he cannot deal with the recent break up between him and long time girlfriend. Patient was recently discharged from [facility] for same concerns. Patient extremely tearful upon entry into [facility]. Patient presented to ED with 3 box cutters in his belongings as well as his prescription medications. At diagnostic assessment patient is extremely tearful he states \"I just cannot do this anymore \". He continues to struggle with a break-up from his girlfriend BODØ. He explains she has been in his life for more than 8 years and they love each other and yet their relationship has fallen apart. He denies that it is related to his alcohol use.   He does endorse intoxication yesterday after donating plasma stating \"I finally had some money\" I did \". He continues to endorse suicidal ideation and feels helpless and hopeless and explains there is no reason to go on living. He confirms that his plan was to drown himself. He explains that the police took him to [facility] due to his depression and desire to end his own life he states \"I felt like a dog \". He is very upset that he currently has a court date due to this incident. He does confirm that he has followed up with his appointment at [facility] and was recently prescribed a new medication that he is not able to remember the name. He denies that he has been drinking daily since his discharge from hospitalization last week. He is not concerned about alcohol withdrawal.  At this time he is contemplating transitioning to alcohol residential treatment program.  Today he reports his symptoms of depression are 10+ on a 0-10 scale with 10 being the worst.  He reports low mood, difficulty with sleep significant anhedonia with feelings of helplessness and hopelessness. He perseverates on his relationship that has recently dissolved. He has no motivation, difficulty with concentration and poor appetite. He reports the symptoms have been present every day almost all day for more than a 2-week timeframe and is unable to confirm that his medications are working. Per historical documentation he has utilized Lexapro since 2005 and consistently takes his medications. Patient does endorse symptoms of anxiety including restlessness, muscle tension and fatigue. He rates his anxiety at 9/10 utilizing the same scale noted above. He denies experiencing panic attacks recently however historically has had isolated symptoms. He denies symptoms of mariam including increased goal-directed activity with decreased need for sleep, elevated mood or rapid speech. He denies auditory or visual hallucinations.   He denies receiving messages from the media or having concerns that people are watching or talking about him. He denies having magical dowd. Patient denies intrusive or persistent thoughts that are relieved by repetitive behaviors. He denies a history of trauma reporting that he grew up in a traditional family and always felt supported. He denies having a relationship with family members any longer stating \"we just do not stay in touch \". He denies a history of incarceration prior to noted event above. He denies aggression or violence towards others. Mr. Joseph Guo continues to endorse significant suicidal ideation and has no reason to go on living. He is not able to contract for safety in the community. He reports he was staying at a shelter and was asked to leave and then went to his ex-girlfriend's home yesterday, leaving her a note that he could not live any longer without having her in his life. Patient is not able to articulate details regarding his medications however after examining inventory of pill bottles in his belongings he has been taking his vitamins as well as Lexapro 10 mg daily and a new prescription of Wellbutrin  mg daily that was prescribed on July 18, 2022 when he went to his appointment at [facility].        History of head trauma: [x] Yes [] NoFall with intoxication 7/2019    History of seizures: [x] Yes [] No    History of violence or aggression: [] Yes [x] No         PSYCHIATRIC HISTORY:  [x] Yes [] No    Currently follows with [facility]  1 lifetime suicide attempts  [facility]  7/8/2022 4/9/2022, 1/23/2021, 8/17/2020    Home Medication Compliance: [x] Yes [] No    Past psychiatric medications includes: Wellbutrin, Lexapro, Remeron, Trazodone, Atarax    Adverse reactions from psychotropic medications: [] Yes [x] No         Lifetime Psychiatric Review of Systems      Depression: Endorses     Anxiety: Endorses     Panic Attacks: Endorses in the past only estimating \"years ago\". Marietta or Hypomania: Denies     Phobias: Denies     Obsessions and Compulsions: Denies     Body or Vocal Tics: Denies     Visual Hallucinations: Denies     Auditory Hallucinations: Denies     Delusions/Paranoia: Denies     PTSD: Denies    Past Medical History:        Diagnosis Date    Arthritis     Hypertension     Liver disease     Psoriasis     Psychiatric problem     Seizures ([address]        Past Surgical History:        Procedure Laterality Date    TONSILLECTOMY         Allergies:  Patient has no known allergies. Social History:     Born in: [place], raised in the [facility]. Family: Parents , 2 brothers , 1 alive brother Denies family relations and has been estranged for a long time. .  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 Lack of finances, inability to return to work  Patient Assets/Supportive Factors: Willingness to ask for help. DRUG USE HISTORY  Social History     Tobacco Use   Smoking Status Every Day    Packs/day: 0.25    Years: 30.00    Pack years: 7.50    Types: Cigarettes    Start date: 1990   Smokeless Tobacco Never     Social History     Substance and Sexual Activity   Alcohol Use Yes    Comment: weekends     Social History     Substance and Sexual Activity   Drug Use Not Currently    Types: Cocaine    Comment: occasionally       Endorses alcohol use including 3-4 \"tall boy\" beers yesterday. Denies daily alcohol intake  Blood alcohol level 0.072  Urine toxicology unremarkable  Occasional  cocaine use.  Urine toxicology unremarkable on admission         LEGAL HISTORY:   HISTORY OF INCARCERATION: [x] Yes [] No was taken to long-term yesterday for first time    Family History:   History reviewed. No pertinent family history. Psychiatric Family History  Patient denies psychiatric family history. Suicides in family: [] Yes [x] No    Substance use in family: [] Yes [x] No         PHYSICAL EXAM:  Vitals:  BP (!) 143/82   Pulse 83   Temp 97.8 °F (36.6 °C) (Oral)   Resp 14   Ht 5' 11\" (1.803 m)   Wt 182 lb (82.6 kg)   SpO2 98%   BMI 25.38 kg/m²   Pain Level: Denies    LABS:  Labs reviewed: [x] Yes creatinine 0.55, bilirubin 0.22, AST 44 with ALT within normal limits, RDW 15.2, monocytes 10  Urine toxicology unremarkable  Blood alcohol level 0.070  Last EKG in EMR reviewed: [x] Yes   Qtc 442       Review of Systems   Constitutional: Negative for chills and weight loss. HENT: Negative for ear pain and nosebleeds. Eyes: Negative for blurred vision and photophobia. Respiratory: Negative for cough, shortness of breath and wheezing. Cardiovascular: Negative for chest pain and palpitations. Gastrointestinal: Negative for abdominal pain, diarrhea and vomiting. Genitourinary: Negative for dysuria and urgency. Musculoskeletal: Negative for falls and joint pain. Skin: Negative for itching and rash. Neurological: Negative for tremors, seizures and weakness. Endo/Heme/Allergies: Does not bruise/bleed easily. Physical Exam:   Constitutional:  Appears well-developed and well-nourished, no acute distress. HENT:   Head: Normocephalic and atraumatic. Eyes: Conjunctivae are normal. Right eye exhibits no discharge. Left eye exhibits no discharge. No scleral icterus. Neck: Normal range of motion. Neck supple. Pulmonary/Chest:  No respiratory distress or accessory muscle use, no wheezing. Cardiac: Regular rate and rhythm. Abdominal: Soft. Non-tender. Exhibits no distension. Musculoskeletal: Normal range of motion. Exhibits no edema.    Neurological: cranial nerves II-XII grossly in tact, normal gait and station. Skin: Skin is warm and dry. Patient is not diaphoretic. No erythema. Mental Status Examination:    Level of consciousness: Awake and alert  Appearance:  Appropriate attire,  sitting bed reading the newspaper, poor grooming. Hair is unkempt, unshaven with significant dirt under his nails  Behavior/Motor: Approachable, calm, tearful at times  Attitude toward examiner: Cooperative, attentive, good eye contact  Speech: Normal rate, volume, and depressed tone. Mood: Depressed  Affect: Blunted  Thought processes: Goal directed, linear  Thought content: Endorses suicidal ideation with plan, unable to contract for safety in the community              Denies homicidal ideations              Denies hallucinations              Denies delusions              Denies paranoia  Cognition: Oriented to self, location, time, situation  Concentration: Clinically adequate  Memory: Intact  Insight & Judgment: Poo         DSM-5 Diagnosis    Principal Problem: Major depressive disorder, recurrent severe without psychotic features (HCC)    Versus substance induced mood disorder  Generalized anxiety disorder  Alcohol use disorder        Psychosocial and Contextual factors:  Financial   Occupational   Relationship   Legal   Living situation   Educational     Past Medical History:   Diagnosis Date    Arthritis     Hypertension     Liver disease     Psoriasis     Psychiatric problem     Seizures ([facility] [address]         TREATMENT CONSIDERATIONS    Continue inpatient psychiatric treatment. Home medications reviewed. Medications per attending:    Monitor need and frequency of PRN medications. Attempt to develop insight. Follow-up daily while inpatient. Reviewed risks and benefits as well as potential side effects with patient.     CONSULT:  [x] Yes [] No  Internal medicine for medical management/medical H&P      Risk Management: close watch per standard protocol      Psychotherapy: participation in milieu and group and individual sessions with Attending Physician,  and Physician Assistant/CNP      Estimated length of stay:  2-14 days      GENERAL PATIENT/FAMILY EDUCATION  Patient will understand basic signs and symptoms, patient will understand benefits/risks and potential side effects from proposed medications, and patient will understand their role in recovery. Family is not active in patient's care. Patient assets that may be helpful during treatment include: Intent to participate and engage in treatment, sufficient fund of knowledge and intellect to understand and utilize treatments. Goals:    1) Remission of suicidal ideation. 2) Stabilization of symptoms prior to discharge. 3) Establish efficacy and tolerability of medications. Behavioral Services  Medicare Certification     Admission Day 1  I certify that this patient's inpatient psychiatric hospital admission is medically necessary for:    x (1) treatment which could reasonably be expected to improve this patient's condition, or    x (2) diagnostic study or its equivalent. Time Spent: 6 0 minutes    Antoine Henao is a 65 y.o. male being evaluated face to face    --SHARAN Marcos CNP on 7/22/2022 at 9:44 AM    An electronic signature was used to authenticate this note. I independently saw and evaluated the patient. I reviewed the nurse practitioners documentation above. Any additional comments or changes to the nurse practitioners documentation are stated below otherwise agree with assessment. Plan will be as follows:  Patient reports relapse on alcohol followed by depression with suicidal ideation exacerbated. Unable to contract for safety outside of the hospital.  Tearful. Discussed importance of substance use rehab. Discussed boundary setting and that we could not continue to admit when he is not addressing underlying alcohol problem. Patient considering substance use rehab for stabilization.   We will monitor on home meds for now and consider adjustments pending observation.   Time spent: 60 minutes in face-to-face, review of records, and discussion of treatment plan  Electronically signed by Jacki Pang MD on 7/22/2022 at 1:00 PM

## 2022-07-22 NOTE — BH NOTE
[facility]  Admission Note     Admission Type:   Admission Type: Voluntary    Reason for admission:  Reason for Admission: Patient reports current SI with a plan to \"drown myself\". Patient was at a park immediately prior to arrival in ED with intent to kill self by drowning. Addictive Behavior:   Addictive Behavior  In the Past 3 Months, Have You Felt or Has Someone Told You That You Have a Problem With  : None    Medical Problems:   Past Medical History:   Diagnosis Date    Arthritis     Hypertension     Liver disease     Psoriasis     Psychiatric problem     Seizures ([address]        Status EXAM:  Mental Status and Behavioral Exam  Normal: Yes  Level of Assistance: Independent/Self  Facial Expression: Sad, Worried  Affect: Appropriate  Level of Consciousness: Alert  Frequency of Checks: 4 times per hour, close  Mood:Normal: Yes  Mood: Depressed, Sad  Motor Activity:Normal: Yes  Eye Contact: Good  Observed Behavior: Cooperative, Friendly  Sexual Misconduct History: Current - no  Preception: Watson to person, Watson to time, Watson to place, Watson to situation  Attention:Normal: Yes  Attention: Distractible  Thought Processes: Circumstantial  Thought Content:Normal: Yes  Thought Content: Preoccupations  Depression Symptoms: No problems reported or observed. Anxiety Symptoms: Generalized  Marietta Symptoms: No problems reported or observed.   Hallucinations: None  Delusions: No  Memory:Normal: No  Memory: Poor remote  Insight and Judgment: Yes  Insight and Judgment: Poor insight    Tobacco Screening:  Practical Counseling, on admission, david X, if applicable and completed (first 3 are required if patient doesn't refuse)X:            ( ) Recognizing danger situations (included triggers and roadblocks)                    ( ) Coping skills (new ways to manage stress,relaxation techniques, changing routine, distraction)                                                           ( ) Basic information about quitting (benefits of quitting, techniques in how to quit, available resources  ( ) Referral for counseling faxed to [facility]                                                                                                                   ( ) Patient refused counseling  ( ) Patient has not smoked in the last 30 days    Metabolic Screening:    Lab Results   Component Value Date    LABA1C 5.7 09/26/2021       Lab Results   Component Value Date    CHOL 157 09/26/2021    CHOL 179 08/19/2020     Lab Results   Component Value Date    TRIG 43 09/26/2021    TRIG 118 08/19/2020     Lab Results   Component Value Date    HDL 80 09/26/2021    HDL 66 08/19/2020     No components found for: LDLCAL  No results found for: LABVLDL      Body mass index is 25.38 kg/m².     BP Readings from Last 2 Encounters:   07/22/22 130/77   07/14/22 116/75           Pt admitted with followings belongings:  Dental Appliances: None  Vision - Corrective Lenses: Eyeglasses  Hearing Aid: None  Jewelry: None  Body Piercings Removed: N/A  Clothing: Belt, Footwear, Pants, Shirt, Socks  Other Valuables: Glenny Papmagdaleno, Lighter/Matches, Personal Toiletries    Edwige Dos Santos RN

## 2022-07-22 NOTE — PROGRESS NOTES
Pharmacy Medication History Note      List of current medications patient is taking is complete. Patient was discharged from [facility] on 7/14/22. No change to medication list from [facility].    Please let me know if you have any questions about this encounter. Thank you!     Electronically signed by Kayleigh Cooper [facility] on 7/22/2022 at 9:04 AM

## 2022-07-22 NOTE — PLAN OF CARE
Problem: Pain  Goal: Verbalizes/displays adequate comfort level or baseline comfort level  7/22/2022 0754 by Jelani Roca, CTRS  Outcome: Progressing     Problem: Anxiety  Goal: Will report anxiety at manageable levels  Description: INTERVENTIONS:  1. Administer medication as ordered  2. Teach and rehearse alternative coping skills  3. Provide emotional support with 1:1 interaction with staff  7/22/2022 1446 by Wei Higgins LPN  Outcome: Progressing  Flowsheets (Taken 7/22/2022 1446)  Will report anxiety at manageable levels:   Administer medication as ordered   Teach and rehearse alternative coping skills     Problem: Coping  Goal: Pt/Family able to verbalize concerns and demonstrate effective coping strategies  Description: INTERVENTIONS:  1. Assist patient/family to identify coping skills, available support systems and cultural and spiritual values  2. Provide emotional support, including active listening and acknowledgement of concerns of patient and caregivers  3. Reduce environmental stimuli, as able  4. Instruct patient/family in relaxation techniques, as appropriate  5.  Assess for spiritual pain/suffering and initiate Spiritual Care, Psychosocial Clinical Specialist consults as needed  7/22/2022 1446 by Wei Higgins LPN  Outcome: Progressing  Flowsheets (Taken 7/22/2022 1446)  Patient/family able to verbalize anxieties, fears, and concerns, and demonstrate effective coping:   Assist patient/family to identify coping skills, available support systems and cultural and spiritual values   Provide emotional support, including active listening and acknowledgement of concerns of patient and caregivers   Instruct patient/family in relaxation techniques, as appropriate     Problem: Self Harm/Suicidality  Goal: Will have no self-injury during hospital stay  Description: INTERVENTIONS:  1. Q 30 MINUTES: Routine safety checks  2. Q SHIFT & PRN: Assess risk to determine if routine checks are adequate to maintain patient safety  7/22/2022 1446 by Lydia Rowan LPN  Outcome: Progressing  Note: No self harm noted this shift. Patient agrees to seek staff out if negative thoughts arise. Will continue to monitor Q15 minute and intermittently.

## 2022-07-22 NOTE — PLAN OF CARE
[facility]  Initial Interdisciplinary Treatment Plan NO      Original treatment plan Date & Time: 7/22/2022  0754    Admission Type:  Admission Type: Voluntary    Reason for admission:   Reason for Admission: Patient reports current SI with a plan to \"drown myself\". Patient was at a park immediately prior to arrival in ED with intent to kill self by drowning. Estimated Length of Stay:  5-7days  Estimated Discharge Date: to be determined by physician    PATIENT STRENGTHS:  Patient Strengths:   Patient Strengths and Limitations:   Addictive Behavior: Addictive Behavior  In the Past 3 Months, Have You Felt or Has Someone Told You That You Have a Problem With  : None  Medical Problems:  Past Medical History:   Diagnosis Date    Arthritis     Hypertension     Liver disease     Psoriasis     Psychiatric problem     Seizures ([facility] [address]      Status EXAM:Mental Status and Behavioral Exam  Normal: Yes  Level of Assistance: Independent/Self  Facial Expression: Sad, Worried  Affect: Appropriate  Level of Consciousness: Alert  Frequency of Checks: 4 times per hour, close  Mood:Normal: Yes  Mood: Depressed, Sad  Motor Activity:Normal: Yes  Eye Contact: Good  Observed Behavior: Cooperative, Friendly  Sexual Misconduct History: Current - no  Preception: Blackey to person, [place]ac[place] to time, [place] to place, [place] to situation  Attention:Normal: Yes  Attention: Distractible  Thought Processes: Circumstantial  Thought Content:Normal: Yes  Thought Content: Preoccupations  Depression Symptoms: No problems reported or observed. Anxiety Symptoms: Generalized  Marietta Symptoms: No problems reported or observed.   Hallucinations: None  Delusions: No  Memory:Normal: No  Memory: Poor remote  Insight and Judgment: Yes  Insight and Judgment: Poor insight    EDUCATION:   Learner Progress Toward Treatment Goals: reviewed group plans and strategies for care    Method:group therapy, medication compliance, individualized assessments and care planning    Outcome: needs reinforcement    PATIENT GOALS: to be discussed with patient within 72 hours    PLAN/TREATMENT RECOMMENDATIONS:     continue group therapy , medications compliance, goal setting, individualized assessments and care, continue to monitor pt on unit      SHORT-TERM GOALS:   Time frame for Short-Term Goals: 5-7 days    LONG-TERM GOALS:  Time frame for Long-Term Goals: 6 months  Members Present in Team Meeting: See Signature Sheet    CRISTAL Juares

## 2022-07-22 NOTE — GROUP NOTE
Group Therapy Note    Date: 7/22/2022    Group Start Time: 1330  Group End Time: 1400  Group Topic: Relaxation    [facility]    Fabio Gottron, CTRS    Relaxation Group Note        Date: July 22, 2022 Start Time: 1:30pm  End Time:  2:00pm      Number of Participants in Group & Unit Census:  2/15    Topic: relaxation     Goal of Group: improve relaxation skills using art       Comments:     Patient did not participate in Relaxation group, despite staff encouragement and explanation of benefits. Patient remain seclusive to self. Q15 minute safety checks maintained for patient safety and will continue to encourage patient to attend unit programming.          Signature:  Claudell Bran

## 2022-07-23 PROCEDURE — 1240000000 HC EMOTIONAL WELLNESS R&B

## 2022-07-23 PROCEDURE — 6370000000 HC RX 637 (ALT 250 FOR IP): Performed by: PSYCHIATRY & NEUROLOGY

## 2022-07-23 PROCEDURE — 99232 SBSQ HOSP IP/OBS MODERATE 35: CPT

## 2022-07-23 RX ADMIN — HYDROXYZINE HYDROCHLORIDE 50 MG: 50 TABLET, FILM COATED ORAL at 15:10

## 2022-07-23 RX ADMIN — ESCITALOPRAM OXALATE 15 MG: 10 TABLET ORAL at 09:09

## 2022-07-23 RX ADMIN — ATORVASTATIN CALCIUM 80 MG: 80 TABLET, FILM COATED ORAL at 21:19

## 2022-07-23 RX ADMIN — THIAMINE HCL TAB 100 MG 100 MG: 100 TAB at 09:09

## 2022-07-23 RX ADMIN — FOLIC ACID 1 MG: 1 TABLET ORAL at 09:09

## 2022-07-23 RX ADMIN — NICOTINE POLACRILEX 2 MG: 4 GUM, CHEWING BUCCAL at 19:26

## 2022-07-23 RX ADMIN — HYDROXYZINE HYDROCHLORIDE 50 MG: 50 TABLET, FILM COATED ORAL at 21:19

## 2022-07-23 RX ADMIN — TRAZODONE HYDROCHLORIDE 50 MG: 50 TABLET ORAL at 21:19

## 2022-07-23 RX ADMIN — ASPIRIN 81 MG: 81 TABLET, COATED ORAL at 09:09

## 2022-07-23 NOTE — GROUP NOTE
Group Therapy Note    Date: 7/23/2022    Group Start Time: 1030  Group End Time: 9316  Group Topic: Psychoeducation    [address], LSW    patient refused to attend psychoeducation group at [phone] after encouragement from staff.   1:1 talk time provided as alternative to group session

## 2022-07-23 NOTE — PLAN OF CARE
[address] [place] [address]  Day 3 Interdisciplinary Treatment Plan NOTE    Review Date & Time: 7/23/2022 1311    Admission Type:   Admission Type: Voluntary    Reason for admission:  Reason for Admission: Patient reports current SI with a plan to \"drown myself\". Patient was at a park immediately prior to arrival in ED with intent to kill self by drowning.   Estimated Length of Stay:  5-7 days  Estimated Discharge Date Update: to be determined by physician    PATIENT STRENGTHS:  Patient Strengths:   Patient Strengths and Limitations:Limitations: Demonstrates discomfort with /lack of social skills, Difficult relationships / poor social skills, Difficulty problem solving/relies on others to help solve problems, Multiple barriers to leisure interests, Tendency to isolate self  Addictive Behavior:Addictive Behavior  In the Past 3 Months, Have You Felt or Has Someone Told You That You Have a Problem With  : None  Medical Problems:  Past Medical History:   Diagnosis Date    Arthritis     Hypertension     Liver disease     Psoriasis     Psychiatric problem     Seizures ([facility] [address]        Risk:  Fall Risk   Landon Scale Landon Scale Score: 22  BVC    Change in scores:  No Changes to plan of Care:  No    Status EXAM:   Mental Status and Behavioral Exam  Normal: Yes  Level of Assistance: Independent/Self  Facial Expression: Sad, Worried  Affect: Appropriate, Normal  Level of Consciousness: Alert  Frequency of Checks: 4 times per hour, close  Mood:Normal: No  Mood: Depressed, Anxious  Motor Activity:Normal: No  Motor Activity: Decreased  Eye Contact: Fair  Observed Behavior: Friendly, Guarded  Sexual Misconduct History: Current - no  Preception: [place] to person, [place] to time, [place] to place, [place] to situation  Attention:Normal: No  Attention: Distractible  Thought Processes: Circumstantial  Thought Content:Normal: No  Thought Content: Preoccupations  Depression Symptoms: Isolative, Loss of interest, Change in energy level  Anxiety Symptoms: Generalized  [place] Symptoms: Poor judgment  Hallucinations: None  Delusions: No  Memory:Normal: No  Memory: Confabulation, Poor recent  Insight and Judgment: No  Insight and Judgment: Poor insight, Poor judgment    Daily Assessment Last Entry:   Daily Sleep (WDL): Within Defined Limits            Daily Nutrition (WDL): Within Defined Limits  Level of Assistance: Independent/Self    Patient Monitoring:  Frequency of Checks: 4 times per hour, close    Psychiatric Symptoms:   Depression Symptoms  Depression Symptoms: Isolative, Loss of interest, Change in energy level  Anxiety Symptoms  Anxiety Symptoms: Generalized  [place] Symptoms  [place] Symptoms: Poor judgment          Suicide Risk CSSR-S:  1) Within the past month, have you wished you were dead or wished you could go to sleep and not wake up? : Yes  2) Have you actually had any thoughts of killing yourself? : Yes  3) Have you been thinking about how you might kill yourself? : Yes  5) Have you started to work out or worked out the details of how to kill yourself?  Do you intend to carry out this plan? : No  6) Have you ever done anything, started to do anything, or prepared to do anything to end your life?: No  Change in Result: Change in Plan of care:      EDUCATION:   Learner Progress Toward Treatment Goals: Reviewed results and recommendations of this team, Reviewed group plan and strategies, Reviewed signs, symptoms and risk of self harm and violent behavior, Reviewed goals and plan of care    Method:  small group, individual verbal education    Outcome: Verbalized by patient but needs reinforcement to obtain goals    PATIENT GOALS:  Short term:  Patient declined to participate  Long term:  Patient declined to participate    PLAN/TREATMENT RECOMMENDATIONS UPDATE:  continue with group therapies, increased socialization, continue planning for after discharge goals, continue with medication compliance    SHORT-TERM GOALS UPDATE:   Time frame for Short-Term Goals:  5-7 days    LONG-TERM GOALS UPDATE:   Time frame for Long-Term Goals:  6 months    Members Present in Team Meeting:   See signature sheet  Reyna Bhagat

## 2022-07-23 NOTE — PROGRESS NOTES
Daily Progress Note  7/23/2022    Patient Name: Ezequiel Bermudez    CHIEF COMPLAINT: Suicidal ideation         SUBJECTIVE:      Patient is seen today for a follow up assessment. Patient has been compliant scheduled medication at this time does not require emergency medication in the past 24 hours. When approached for interview patient states that he is feeling depressed rating as a 10 on a 10 on a 1-10 scale (1 being low and 10 being high). Patient states that he is currently feeling suicidal and relates this to break-up with girlfriend. Patient states he does not want to live if he cannot get back with her. Patient is unable to contract for safety outside the hospital.  Throughout interview patient is unable to have a discussion about reasons for break-up stating \"everything was perfect\". Patient claims that the relationship was fine and he was blindsided with the break-up with no insight or awareness as to anything being wrong. Throughout interview patient continued to deflect and focus on feeling bad about his situation. Patient was encouraged to attend groups, share as much as he is comfortable and spend time considering details surrounding the break-up with goal of coping/learning from relationship ending. Patient was somewhat agreeable and showed slight improvement in insight and by end of interview. Patient is denying medication side effects at this time. Writer encouraged patient to attend groups on the unit. At this time, the patient is not appropriate for a lower level of care. There is risk of decompensation and patient warrants further hospitalization for safety and stabilization. Appetite:  [x] Adequate/Unchanged  [] Increased  [] Decreased      Sleep:       [x] Adequate/Unchanged  [] Fair  [] Poor      Group Attendance on Unit:   [] Yes   [] Selectively    [x] No    Medication Side Effects: Denies         Mental Status Exam  Level of consciousness: Alert and awake.    Appearance: Appropriate attire for setting, seated on bed, with fair  grooming and hygiene. Behavior/Motor: Approachable, compliant with interview  Attitude toward examiner: Cooperative, attentive, good eye contact. Speech: normal rate and normal volume   Mood:  Patient reports \" \"depressed\"\". Affect: Flat  Thought processes: coherent and illogical.   Thought content: Denies homicidal ideation. Suicidal Ideation: Active suicidal ideations, with a  current plan, denies intent to harm self on unit. Unable to contract for safety off unit. Delusions: No evidence of delusions. Denies paranoia. Perceptual Disturbance: Patient does not appear to be responding to internal stimuli. Denies auditory hallucinations. Denies visual hallucinations. Cognition: Oriented to self, location, time, and situation. Memory: Intact. Insight & Judgement: Poor. Data   height is 5' 11\" (1.803 m) and weight is 182 lb (82.6 kg). His oral temperature is 97.5 °F (36.4 °C). His blood pressure is 134/85 and his pulse is 60. His respiration is 14 and oxygen saturation is 98%.    Labs:   Admission on 07/22/2022   Component Date Value Ref Range Status    WBC 07/22/2022 4.2  3.5 - 11.0 k/uL Final    RBC 07/22/2022 4.59  4.5 - 5.9 m/uL Final    Hemoglobin 07/22/2022 14.0  13.5 - 17.5 g/dL Final    Hematocrit 07/22/2022 42.4  41 - 53 % Final    MCV 07/22/2022 92.4  80 - 100 fL Final    MCH 07/22/2022 30.6  26 - 34 pg Final    MCHC 07/22/2022 33.1  31 - 37 g/dL Final    RDW 07/22/2022 15.2 (A) 11.5 - 14.9 % Final    Platelets 42/84/0566 288  150 - 450 k/uL Final    MPV 07/22/2022 6.5  6.0 - 12.0 fL Final    Seg Neutrophils 07/22/2022 47  36 - 66 % Final    Lymphocytes 07/22/2022 41  24 - 44 % Final    Monocytes 07/22/2022 10 (A) 1 - 7 % Final    Eosinophils % 07/22/2022 1  0 - 4 % Final    Basophils 07/22/2022 1  0 - 2 % Final    Segs Absolute 07/22/2022 2.00  1.3 - 9.1 k/uL Final    Absolute Lymph # 07/22/2022 1.70  1.0 - 4.8 k/uL Final    Absolute Mono # gum 2 mg, 2 mg, Oral, PRN  polyethylene glycol (GLYCOLAX) packet 17 g, 17 g, Oral, Daily PRN  traZODone (DESYREL) tablet 50 mg, 50 mg, Oral, Nightly PRN  aspirin EC tablet 81 mg, 81 mg, Oral, Daily  atorvastatin (LIPITOR) tablet 80 mg, 80 mg, Oral, Nightly  escitalopram (LEXAPRO) tablet 15 mg, 15 mg, Oral, Daily  folic acid (FOLVITE) tablet 1 mg, 1 mg, Oral, Daily  thiamine mononitrate tablet 100 mg, 100 mg, Oral, Daily    ASSESSMENT  Major depressive disorder, recurrent severe without psychotic features ([facility] [address]         PLAN  Patient symptoms: Remains unstable  Continue current medication regimen. Monitor need and frequency of PRN medications. Encourage participation in groups and milieu. Attempt to develop insight. Psycho-education conducted. Supportive Therapy conducted. Probable discharge is per attending physician. Follow-up daily while inpatient. Patient continues to be monitored in the inpatient psychiatric facility at [facility] for safety and stabilization. Patient continues to need, on a daily basis, active treatment furnished directly by or requiring the supervision of inpatient psychiatric personnel. Electronically signed by SHARAN Abbott CNP on 7/23/2022 at 4:11 PM    **This report has been created using voice recognition software. It may contain minor errors which are inherent in voice recognition technology. **

## 2022-07-23 NOTE — PLAN OF CARE
Problem: Pain  Goal: Verbalizes/displays adequate comfort level or baseline comfort level  Outcome: Progressing     Problem: Anxiety  Goal: Will report anxiety at manageable levels  Description: INTERVENTIONS:  1. Administer medication as ordered  2. Teach and rehearse alternative coping skills  3. Provide emotional support with 1:1 interaction with staff  7/22/2022 2222 by Johan Tobar RN  Outcome: Progressing  7/22/2022 1446 by Swati Jones LPN  Outcome: Progressing  Flowsheets (Taken 7/22/2022 1446)  Will report anxiety at manageable levels:   Administer medication as ordered   Teach and rehearse alternative coping skills     Problem: Coping  Goal: Pt/Family able to verbalize concerns and demonstrate effective coping strategies  Description: INTERVENTIONS:  1. Assist patient/family to identify coping skills, available support systems and cultural and spiritual values  2. Provide emotional support, including active listening and acknowledgement of concerns of patient and caregivers  3. Reduce environmental stimuli, as able  4. Instruct patient/family in relaxation techniques, as appropriate  5.  Assess for spiritual pain/suffering and initiate Spiritual Care, Psychosocial Clinical Specialist consults as needed  7/22/2022 2222 by Johan Tobar RN  Outcome: Progressing  7/22/2022 1446 by Swati Jones LPN  Outcome: Progressing  Flowsheets (Taken 7/22/2022 1446)  Patient/family able to verbalize anxieties, fears, and concerns, and demonstrate effective coping:   Assist patient/family to identify coping skills, available support systems and cultural and spiritual values   Provide emotional support, including active listening and acknowledgement of concerns of patient and caregivers   Instruct patient/family in relaxation techniques, as appropriate     Problem: Self Harm/Suicidality  Goal: Will have no self-injury during hospital stay  Description: INTERVENTIONS:  1. Q 30 MINUTES: Routine safety

## 2022-07-23 NOTE — PLAN OF CARE
Problem: Anxiety  Goal: Will report anxiety at manageable levels  Description: INTERVENTIONS:  1. Administer medication as ordered  2. Teach and rehearse alternative coping skills  3. Provide emotional support with 1:1 interaction with staff  7/23/2022 1030 by Layton Valadez  Outcome: Progressing   Patient reports anxiety as mild at this time and generalized. Pt was provided with emotional support with staff. Patient is med compliant. Problem: Coping  Goal: Pt/Family able to verbalize concerns and demonstrate effective coping strategies  Description: INTERVENTIONS:  1. Assist patient/family to identify coping skills, available support systems and cultural and spiritual values  2. Provide emotional support, including active listening and acknowledgement of concerns of patient and caregivers  3. Reduce environmental stimuli, as able  4. Instruct patient/family in relaxation techniques, as appropriate  5. Assess for spiritual pain/suffering and initiate Spiritual Care, Psychosocial Clinical Specialist consults as needed  7/23/2022 1030 by Layton Valadez  Outcome: Progressing   Patient is able to verbalize effective coping skills and utilizes them. Pt retreats to room and asks staff for recreational activities to decrease stimuli. Problem: Self Harm/Suicidality  Goal: Will have no self-injury during hospital stay  Description: INTERVENTIONS:  1. Q 30 MINUTES: Routine safety checks  2. Q SHIFT & PRN: Assess risk to determine if routine checks are adequate to maintain patient safety  7/23/2022 1030 by Layton Valadez  Outcome: Progressing   Patient reports suicidal ideation at this time with no plan and agrees to contract for safety should thoughts of self harm arise. Q 15 minute checks.

## 2022-07-24 PROCEDURE — 99232 SBSQ HOSP IP/OBS MODERATE 35: CPT | Performed by: NURSE PRACTITIONER

## 2022-07-24 PROCEDURE — 6370000000 HC RX 637 (ALT 250 FOR IP): Performed by: PSYCHIATRY & NEUROLOGY

## 2022-07-24 PROCEDURE — 1240000000 HC EMOTIONAL WELLNESS R&B

## 2022-07-24 RX ORDER — ESCITALOPRAM OXALATE 20 MG/1
20 TABLET ORAL DAILY
Status: DISCONTINUED | OUTPATIENT
Start: 2022-07-25 | End: 2022-07-27

## 2022-07-24 RX ADMIN — FOLIC ACID 1 MG: 1 TABLET ORAL at 08:18

## 2022-07-24 RX ADMIN — HYDROXYZINE HYDROCHLORIDE 50 MG: 50 TABLET, FILM COATED ORAL at 20:56

## 2022-07-24 RX ADMIN — ESCITALOPRAM OXALATE 15 MG: 10 TABLET ORAL at 08:18

## 2022-07-24 RX ADMIN — IBUPROFEN 400 MG: 400 TABLET ORAL at 13:09

## 2022-07-24 RX ADMIN — THIAMINE HCL TAB 100 MG 100 MG: 100 TAB at 08:18

## 2022-07-24 RX ADMIN — ATORVASTATIN CALCIUM 80 MG: 80 TABLET, FILM COATED ORAL at 20:56

## 2022-07-24 RX ADMIN — TRAZODONE HYDROCHLORIDE 50 MG: 50 TABLET ORAL at 20:56

## 2022-07-24 RX ADMIN — ASPIRIN 81 MG: 81 TABLET, COATED ORAL at 08:18

## 2022-07-24 ASSESSMENT — PAIN DESCRIPTION - LOCATION: LOCATION: GENERALIZED

## 2022-07-24 ASSESSMENT — PAIN SCALES - GENERAL: PAINLEVEL_OUTOF10: 5

## 2022-07-24 ASSESSMENT — PAIN DESCRIPTION - DESCRIPTORS: DESCRIPTORS: ACHING

## 2022-07-24 NOTE — GROUP NOTE
Wrap-Up Group Note        Date: July 23, 2022 Start Time:  2015   End Time:  2100      Number of Participants in Group & Unit Census:5/17      Topic: wrap up    Goal of Group:educate and discuss goals      Comments:     Patient did not participate in Wrap-Up group, despite staff encouragement and explanation of benefits. Patient remain seclusive to self. Q15 minute safety checks maintained for patient safety and will continue to encourage patient to attend unit programming.

## 2022-07-24 NOTE — GROUP NOTE
Group Therapy Note    Date: 2022    Group Start Time:   Group End Time:   Group Topic: Wrap-Up    SHANNAN Coronado      Wrap-Up Group Note        Date: 2022 Start Time:     End Time:       Number of Participants in Group & Unit Census:      Topic: WRAPUP    Goal of Group:Educate and discuss goals       Comments:     Patient did not participate in Wrap-Up group, despite staff encouragement and explanation of benefits. Patient remain seclusive to self. Q15 minute safety checks maintained for patient safety and will continue to encourage patient to attend unit programming.           Patient's Goal:  ***    Notes:  ***    Status After Intervention:  {Status After Intervention:102473453}    Participation Level: {Participation Level:425862506}    Participation Quality: {[facility] PARTICIPATION QUALITY:745319614}      Speech:  {ED  CD_SPEECH:18492}      Thought Process/Content: {Thought Process/Content:227969019}      Affective Functioning: {Affective Functionin}      Mood: {Mood:690862382}      Level of consciousness:  {Level of consciousness:729512840}      Response [facility] Responses to Learnin}      Endings: {[facility] Endings:49809}    Modes of Intervention: {[facility] Modes of Intervention:616212678}      Discipline Responsible: Haja [facility] Multidisciplinary:[id number]}      Signature:  Zena Coronado

## 2022-07-24 NOTE — PLAN OF CARE
Problem: Anxiety  Goal: Will report anxiety at manageable levels  Description: INTERVENTIONS:  1. Administer medication as ordered  2. Teach and rehearse alternative coping skills  3. Provide emotional support with 1:1 interaction with staff  7/24/2022 1106 by Herber Callahan  Outcome: Progressing  Flowsheets (Taken 7/24/2022 1106)  Will report anxiety at manageable levels:   Teach and rehearse alternative coping skills   Provide emotional support with 1:1 interaction with staff  Note: Patient is accepting of 1:1 talk time with staff. Patient admits to increased depression and anxiety. Patient is eating and sleeping well. Patient is isolative to self and room, out for needs. Patient attends unit programming. Patient denies suicidal and homicidal ideation and auditory and visual hallucinations and verbally agrees to approach staff if thoughts of self harm arises. Reassurance and support provided. Q15 minute checks maintained. Patient remains safe at this time.

## 2022-07-24 NOTE — GROUP NOTE
Group Therapy Note    Date: 7/24/2022    Group Start Time: 1030  Group End Time: 8409  Group Topic: Psychoeducation    [place]        Group Therapy Note    Attendees: 5/18       Patient's Goal:  forgiveness    Notes:  therapeutic worksheet provided and discussed     Status After Intervention:  Improved    Participation Level:  Active Listener and Interactive    Participation Quality: Appropriate and Attentive      Speech:  normal      Thought Process/Content: Logical      Affective Functioning: Congruent      Mood: depressed      Level of consciousness:  Alert and Oriented x4      Response to Learning: Progressing to goal      Endings: None Reported    Modes of Intervention: Education and Support      Discipline Responsible: /Counselor      Signature:  CHENCHO Aparicio

## 2022-07-24 NOTE — GROUP NOTE
Group Therapy Note    Date: 7/24/2022    Group Start Time: 0900  Group End Time: 0915  Group Topic: Group Therapy    SHANNAN Fountain        Group Therapy Note    Attendees: 4/19       Patient's Goal:  To work on getting clothing back    Status After Intervention:  Improved    Participation Level:  Active Listener and Interactive    Participation Quality: Appropriate and Attentive      Speech:  normal      Thought Process/Content: Linear      Affective Functioning: Congruent      Mood: euthymic      Level of consciousness:  Alert and Oriented x4      Response to Learning: Able to verbalize current knowledge/experience and Able to verbalize/acknowledge new learning      Endings: None Reported    Modes of Intervention: Education and Support      Discipline Responsible: [address] [facility] [employer]      Signature:  Efraín Morris

## 2022-07-24 NOTE — PLAN OF CARE
Patient has been seclusive to his room most of the evening. Patient was compliant with scheduled medications this evening. Patient admits to feeling depressed and anxious. Patient admits to suicidal thoughts with no specific plan. Patient agrees to come talk with staff if having any thoughts to harm himself this shift. 15 min rounds continued for patient safety. Problem: Pain  Goal: Verbalizes/displays adequate comfort level or baseline comfort level  7/23/2022 2148 by Jose D Roberson RN  Outcome: Progressing  7/23/2022 1311 by VIVIANA Mcfadden  Outcome: Progressing     Problem: Anxiety  Goal: Will report anxiety at manageable levels  Description: INTERVENTIONS:  1. Administer medication as ordered  2. Teach and rehearse alternative coping skills  3. Provide emotional support with 1:1 interaction with staff  7/23/2022 2148 by Jose D Roberson RN  Outcome: Progressing  7/23/2022 1311 by VIVIANA Mcfadden  Outcome: Progressing  7/23/2022 1030 by Stephanie Monahan  Outcome: Progressing     Problem: Coping  Goal: Pt/Family able to verbalize concerns and demonstrate effective coping strategies  Description: INTERVENTIONS:  1. Assist patient/family to identify coping skills, available support systems and cultural and spiritual values  2. Provide emotional support, including active listening and acknowledgement of concerns of patient and caregivers  3. Reduce environmental stimuli, as able  4. Instruct patient/family in relaxation techniques, as appropriate  5.  Assess for spiritual pain/suffering and initiate Spiritual Care, Psychosocial Clinical Specialist consults as needed  7/23/2022 2148 by Jose D Roberson RN  Outcome: Progressing  7/23/2022 1311 by VIVIANA Mcfadden  Outcome: Progressing  7/23/2022 1030 by Stephanie Monahan  Outcome: Progressing     Problem: Self Harm/Suicidality  Goal: Will have no self-injury during hospital stay  Description: INTERVENTIONS:  1. Q 30 MINUTES: Routine safety checks  2. Q SHIFT & PRN: Assess risk to determine if routine checks are adequate to maintain patient safety  7/23/2022 2148 by Stevie Stewart RN  Outcome: Progressing  7/23/2022 1311 by VIVIANA Rebollar  Outcome: Progressing  7/23/2022 1030 by Fabián Mayorga  Outcome: Progressing

## 2022-07-24 NOTE — GROUP NOTE
Group Therapy Note    Date: 7/24/2022    Group Start Time: 1430  Group End Time: 1520  Group Topic: Recreational    VIVIANA Shaver    Recreation Group Note        Date: July 24, 2022 Start Time: 2:30pm  End Time: 3:20pm       Number of Participants in Group & Unit Census:  5    Topic: Open Recreation     Goal of Group:Patient will improve interpersonal skills. Comments:     Patient did not participate in Recreation group, despite staff encouragement and explanation of benefits. Patient remain seclusive to self. Q15 minute safety checks maintained for patient safety and will continue to encourage patient to attend unit programming.           Signature:  Dar Arguello, [address]

## 2022-07-24 NOTE — PROGRESS NOTES
Daily Progress Note  7/24/2022    Patient Name: Ruslan Pederson    CHIEF COMPLAINT: Suicidal ideation         SUBJECTIVE:      Patient seen face-to-face for follow-up assessment. He is emotionally distressed throughout our conversation and is tearful. Remains fixated on his ex-girlfriend and states that he is unable to live this way (without her). Provided emotional support. Patient unable to be redirected from this topic. Becomes agitated when attempts to redirect the conversation to him how he would be able to cope with this loss or change in situation. Patient endorses active suicidal thoughts and states that he feels hopeless and helpless. He also notes that he recently lost his job after needing to be admitted to psychiatric facilities for his depression. States that his job thought that he was not reliable. Patient has no self-confidence and endorses feelings of worthlessness. Patient has been compliant with his scheduled medications. Currently taking escitalopram 15 mg daily. Discussed titrating up to help with mood and the patient is agreeable. Staff note that patient has been isolative. He has not been attending group programming states that he feels too anxious and on edge to do so. Patient has yet to demonstrate stability and requires inpatient hospitalization for safety. Appetite:  [x] Adequate/Unchanged  [] Increased  [] Decreased      Sleep:       [x] Adequate/Unchanged  [] Fair  [] Poor      Group Attendance on Unit:   [] Yes   [] Selectively    [x] No    Medication Side Effects: Denies         Mental Status Exam  Level of consciousness: Alert and awake. Appearance: Appropriate attire for setting, seated on bed, with fair  grooming and hygiene. Behavior/Motor: Approachable, compliant with interview, tearful  Attitude toward examiner: Cooperative, attentive, good eye contact. Speech: normal rate and normal volume   Mood:  Patient reports \" \"depressed\"\".    Affect: Depressed  Thought processes: coherent and illogical.   Thought content: Denies homicidal ideation. Suicidal Ideation: Active suicidal ideations, with a  current plan, unable to contract for safety on unit  Delusions: No evidence of delusions. Denies paranoia. Perceptual Disturbance: Patient does not appear to be responding to internal stimuli. Denies auditory hallucinations. Denies visual hallucinations. Cognition: Oriented to self, location, time, and situation. Memory: Intact. Insight & Judgement: Poor. Data   height is 5' 11\" (1.803 m) and weight is 182 lb (82.6 kg). His oral temperature is 97.5 °F (36.4 °C). His blood pressure is 135/89 and his pulse is 58. His respiration is 14 and oxygen saturation is 98%.    Labs:   Admission on 07/22/2022   Component Date Value Ref Range Status    WBC 07/22/2022 4.2  3.5 - 11.0 k/uL Final    RBC 07/22/2022 4.59  4.5 - 5.9 m/uL Final    Hemoglobin 07/22/2022 14.0  13.5 - 17.5 g/dL Final    Hematocrit 07/22/2022 42.4  41 - 53 % Final    MCV 07/22/2022 92.4  80 - 100 fL Final    MCH 07/22/2022 30.6  26 - 34 pg Final    MCHC 07/22/2022 33.1  31 - 37 g/dL Final    RDW 07/22/2022 15.2 (A) 11.5 - 14.9 % Final    Platelets 42/23/5351 288  150 - 450 k/uL Final    MPV 07/22/2022 6.5  6.0 - 12.0 fL Final    Seg Neutrophils 07/22/2022 47  36 - 66 % Final    Lymphocytes 07/22/2022 41  24 - 44 % Final    Monocytes 07/22/2022 10 (A) 1 - 7 % Final    Eosinophils % 07/22/2022 1  0 - 4 % Final    Basophils 07/22/2022 1  0 - 2 % Final    Segs Absolute 07/22/2022 2.00  1.3 - 9.1 k/uL Final    Absolute Lymph # 07/22/2022 1.70  1.0 - 4.8 k/uL Final    Absolute Mono # 07/22/2022 0.40  0.1 - 1.3 k/uL Final    Absolute Eos # 07/22/2022 0.10  0.0 - 0.4 k/uL Final    Basophils Absolute 07/22/2022 0.00  0.0 - 0.2 k/uL Final    Glucose 07/22/2022 95  70 - 99 mg/dL Final    BUN 07/22/2022 8  6 - 20 mg/dL Final    Creatinine 07/22/2022 0.55 (A) 0.70 - 1.20 mg/dL Final    Calcium 07/22/2022 8.7 8.6 - 10.4 mg/dL Final    Sodium 07/22/2022 139  135 - 144 mmol/L Final    Potassium 07/22/2022 3.8  3.7 - 5.3 mmol/L Final    Chloride 07/22/2022 105  98 - 107 mmol/L Final    CO2 07/22/2022 22  20 - 31 mmol/L Final    Anion Gap 07/22/2022 12  9 - 17 mmol/L Final    Alkaline Phosphatase 07/22/2022 56  40 - 129 U/L Final    ALT 07/22/2022 37  5 - 41 U/L Final    AST 07/22/2022 42 (A) <40 U/L Final    Total Bilirubin 07/22/2022 0.22 (A) 0.3 - 1.2 mg/dL Final    Total Protein 07/22/2022 6.6  6.4 - 8.3 g/dL Final    Albumin 07/22/2022 4.0  3.5 - 5.2 g/dL Final    GFR Non- 07/22/2022 >60  >60 mL/min Final    GFR  07/22/2022 >60  >60 mL/min Final    GFR Comment 07/22/2022        Final    Comment: Average GFR for 52-63 years old:   80 mL/min/1.73sq m  Chronic Kidney Disease:   <60 mL/min/1.73sq m  Kidney failure:   <15 mL/min/1.73sq m              eGFR calculated using average adult body mass. Additional eGFR calculator available at:        [employer]            Ethanol 07/22/2022 72 (A) <10 mg/dL Final    Ethanol percent 07/22/2022 0.072  % Final         Reviewed patient's current plan of care and vital signs with nursing staff.     Labs reviewed: [x] Yes    Medications  Current Facility-Administered Medications: acetaminophen (TYLENOL) tablet 650 mg, 650 mg, Oral, Q4H PRN  aluminum & magnesium hydroxide-simethicone (MAALOX) 200-200-20 MG/5ML suspension 30 mL, 30 mL, Oral, Q6H PRN  hydrOXYzine HCl (ATARAX) tablet 50 mg, 50 mg, Oral, TID PRN  ibuprofen (ADVIL;MOTRIN) tablet 400 mg, 400 mg, Oral, Q6H PRN  nicotine polacrilex (NICORETTE) gum 2 mg, 2 mg, Oral, PRN  polyethylene glycol (GLYCOLAX) packet 17 g, 17 g, Oral, Daily PRN  traZODone (DESYREL) tablet 50 mg, 50 mg, Oral, Nightly PRN  aspirin EC tablet 81 mg, 81 mg, Oral, Daily  atorvastatin (LIPITOR) tablet 80 mg, 80 mg, Oral, Nightly  escitalopram (LEXAPRO) tablet 15 mg, 15 mg, Oral, Daily  folic acid (FOLVITE) tablet 1 mg, 1 mg, Oral, Daily  thiamine mononitrate tablet 100 mg, 100 mg, Oral, Daily    ASSESSMENT  Major depressive disorder, recurrent severe without psychotic features (HCC)    Versus substance induced mood disorder  Generalized anxiety disorder  Alcohol use disorder         PLAN  Patient symptoms: Remains unstable  Titrate escitalopram 20 mg p.o. daily starting tomorrow a.m. Monitor need and frequency of PRN medications. Encourage participation in groups and milieu. Attempt to develop insight. Psycho-education conducted. Supportive Therapy conducted. Probable discharge is undetermined at this time  Follow-up daily while inpatient. Patient continues to be monitored in the inpatient psychiatric facility at [facility] for safety and stabilization. Patient continues to need, on a daily basis, active treatment furnished directly by or requiring the supervision of inpatient psychiatric personnel. Electronically signed by SHARAN Mcclain CNP on 7/24/2022 at 4:07 PM    **This report has been created using voice recognition software. It may contain minor errors which are inherent in voice recognition technology. **

## 2022-07-25 PROCEDURE — 6370000000 HC RX 637 (ALT 250 FOR IP): Performed by: NURSE PRACTITIONER

## 2022-07-25 PROCEDURE — 6370000000 HC RX 637 (ALT 250 FOR IP): Performed by: PSYCHIATRY & NEUROLOGY

## 2022-07-25 PROCEDURE — 99232 SBSQ HOSP IP/OBS MODERATE 35: CPT | Performed by: PSYCHIATRY & NEUROLOGY

## 2022-07-25 PROCEDURE — 6370000000 HC RX 637 (ALT 250 FOR IP): Performed by: INTERNAL MEDICINE

## 2022-07-25 PROCEDURE — 1240000000 HC EMOTIONAL WELLNESS R&B

## 2022-07-25 PROCEDURE — 99232 SBSQ HOSP IP/OBS MODERATE 35: CPT | Performed by: INTERNAL MEDICINE

## 2022-07-25 RX ADMIN — NICOTINE POLACRILEX 2 MG: 4 GUM, CHEWING BUCCAL at 21:39

## 2022-07-25 RX ADMIN — ESCITALOPRAM OXALATE 20 MG: 20 TABLET ORAL at 08:00

## 2022-07-25 RX ADMIN — THIAMINE HCL TAB 100 MG 100 MG: 100 TAB at 08:00

## 2022-07-25 RX ADMIN — ATORVASTATIN CALCIUM 80 MG: 80 TABLET, FILM COATED ORAL at 23:08

## 2022-07-25 RX ADMIN — TRAZODONE HYDROCHLORIDE 50 MG: 50 TABLET ORAL at 23:08

## 2022-07-25 RX ADMIN — FOLIC ACID 1 MG: 1 TABLET ORAL at 08:00

## 2022-07-25 RX ADMIN — Medication 5 DROP: at 23:08

## 2022-07-25 RX ADMIN — HYDROXYZINE HYDROCHLORIDE 50 MG: 50 TABLET, FILM COATED ORAL at 10:39

## 2022-07-25 RX ADMIN — HYDROXYZINE HYDROCHLORIDE 50 MG: 50 TABLET, FILM COATED ORAL at 23:08

## 2022-07-25 RX ADMIN — Medication 5 DROP: at 17:49

## 2022-07-25 RX ADMIN — ASPIRIN 81 MG: 81 TABLET, COATED ORAL at 08:00

## 2022-07-25 ASSESSMENT — PAIN SCALES - GENERAL: PAINLEVEL_OUTOF10: 0

## 2022-07-25 NOTE — GROUP NOTE
Group Therapy Note    Date: 7/24/2022    Group Start Time: 2015  Group End Time: 2050  Group Topic: Wrap-Up    SHANNAN Almaraz        Group Therapy Note    Attendees: 8/17         Status After Intervention:  Unchanged    Participation Level:  Active Listener    Participation Quality: Appropriate      Speech:  normal      Thought Process/Content: Logical      Affective Functioning: Congruent      Mood: elevated      Level of consciousness:  Alert      Response to Learning: Able to verbalize current knowledge/experience      Endings: None Reported    Modes of Intervention: Socialization      Discipline Responsible: Behavorial [employer]      Signature:  Layla Wright

## 2022-07-25 NOTE — GROUP NOTE
Group Therapy Note    Date: 7/25/2022    Group Start Time: 1100  Group End Time: 3939  Group Topic: Music Therapy    SHANNAN ARIAS    Radha Belcher        Group Therapy Note    Attendees: 5/16     Patient's Goal:  Patients shared music and analyzed lyrics in their songs for themes. Patients then offered peers general advice based on their interpretations of their songs. Goals to engage in peer support; Increase socialization; Increase sense of community; Normalization of the environment    Notes:  Patient attended and participated in group having positive interactions with peers and staff. Patient shared music but expressed that he was unsure of the advice to be found in the song. Expressed it was a sentimental song and \"I wanted to try and listen to it without crying. \" Appropriate and attentive throughout. Acknowledged peers advice shared during group    Status After Intervention:  Improved    Participation Level:  Active Listener and Interactive    Participation Quality: Appropriate, Attentive, Supportive      Speech:  normal      Thought Process/Content: Logical  Linear      Affective Functioning: Congruent      Mood: euthymic      Level of consciousness:  Alert and Attentive      Response to Learning: Able to verbalize current knowledge/experience, and Progressing to goal      Endings: None Reported    Modes of Intervention: Support, Socialization, Exploration, Activity, Media, and Reality-testing      Discipline Responsible: Psychoeducational Specialist      Signature:  Radha Belcher

## 2022-07-25 NOTE — GROUP NOTE
Group Therapy Note    Date: 7/25/2022    Group Start Time: 1000  Group End Time: 6057  Group Topic: Psychotherapy    [facility]    BECCA Menchaca LSW        Group Therapy Note    Attendees: 7/16       Patient's Goal: Strengths and how strengths can help with achieving goals. Status After Intervention:  Unchanged    Participation Level:  Active Listener and Interactive    Participation Quality: Appropriate, Attentive, and Sharing      Speech:  normal      Thought Process/Content: Logical      Affective Functioning: Congruent      Mood: euthymic      Level of consciousness:  Alert and Attentive      Response to Learning: Able to verbalize current knowledge/experience and Able to verbalize/acknowledge new learning      Endings: None Reported    Modes of Intervention: Education, Support, and Socialization      Discipline Responsible: /Counselor      Signature:  BECCA Menchaca LSW

## 2022-07-25 NOTE — FLOWSHEET NOTE
07/25/22 1536   Encounter Summary   Encounter Overview/Reason  Spiritual/Emotional Needs   Service Provided For: Patient   Referral/Consult From: Elizabeth   Last Encounter  07/25/22   Complexity of Encounter Moderate   Begin Time 0230   End Time  0300   Total Time Calculated 30 min   Spiritual/Emotional needs   Type Spiritual Support   Behavioral Health    Type  Spirituality Group   Assessment/Intervention/Outcome   Assessment Calm   Intervention Active listening   Outcome Receptive

## 2022-07-25 NOTE — BH NOTE
Patient stated he has an ear ache in the right ear. No fever noted, patient described as dull ache and has had it since admission. Called internal med spoke with Rob Velazquez said he will contact Fatemeh Foy he is on rounds today.

## 2022-07-25 NOTE — PROGRESS NOTES
Daily Progress Note  7/25/2022    Patient Name: Darrell Mustafa    CHIEF COMPLAINT: Suicidal ideation         SUBJECTIVE:      Patient seen face-to-face for follow-up assessment. He has been compliant with his scheduled medications. Escitalopram was titrated to 20 mg this morning. Reviewed side effects and patient denies all. He did note slightly elevated anxiety earlier this morning, but states that his hydroxyzine was helpful. Patient notes emotional ups and downs today and states that he has been struggling with thoughts regarding his ex-girlfriend. States that he cannot live without her and thinks about her almost constantly throughout the day. Says that this is a significant source of stress for him. Patient not as tearful today, but does state that his depression is 9 out of 10 (with 10 indicating the most severe). Patient also voices concern regarding an upcoming court date and states that he feels on edge. Does report improving suicidal thoughts, but does not yet feel safe from self and is unable to contract for safety in the community. Patient denies any problems with sleep overnight or appetite. Staff report that he has been attending group programming and is active in his treatment. He does isolate to self throughout the day, but verbalizes that this is a coping skill that he utilizes to help him feel calm. Patient has yet to demonstrate stability and requires inpatient hospitalization for safety at this time. Appetite:  [x] Adequate/Unchanged  [] Increased  [] Decreased      Sleep:       [x] Adequate/Unchanged  [] Fair  [] Poor      Group Attendance on Unit:   [x] Yes   [] Selectively    [] No    Medication Side Effects: Denies         Mental Status Exam  Level of consciousness: Alert and awake. Appearance: Appropriate attire for setting, seated on bed, with fair  grooming and hygiene.    Behavior/Motor: Approachable, compliant with interview  Attitude toward examiner: Cooperative, attentive, good eye contact. Speech: normal rate and normal volume   Mood:  Patient reports \"on edge\". Affect: Depressed  Thought processes: coherent and illogical.   Thought content: Denies homicidal ideation. Suicidal Ideation: Improving suicidal ideation, unable to contract for safety off unit  Delusions: No evidence of delusions. Denies paranoia. Perceptual Disturbance: Patient does not appear to be responding to internal stimuli. Denies auditory hallucinations. Denies visual hallucinations. Cognition: Oriented to self, location, time, and situation. Memory: Intact. Insight & Judgement: Poor. Data   height is 5' 11\" (1.803 m) and weight is 182 lb (82.6 kg). His oral temperature is 97.2 °F (36.2 °C). His blood pressure is 120/80 and his pulse is 55. His respiration is 14 and oxygen saturation is 98%.    Labs:   Admission on 07/22/2022   Component Date Value Ref Range Status    WBC 07/22/2022 4.2  3.5 - 11.0 k/uL Final    RBC 07/22/2022 4.59  4.5 - 5.9 m/uL Final    Hemoglobin 07/22/2022 14.0  13.5 - 17.5 g/dL Final    Hematocrit 07/22/2022 42.4  41 - 53 % Final    MCV 07/22/2022 92.4  80 - 100 fL Final    MCH 07/22/2022 30.6  26 - 34 pg Final    MCHC 07/22/2022 33.1  31 - 37 g/dL Final    RDW 07/22/2022 15.2 (A) 11.5 - 14.9 % Final    Platelets 02/54/8888 288  150 - 450 k/uL Final    MPV 07/22/2022 6.5  6.0 - 12.0 fL Final    Seg Neutrophils 07/22/2022 47  36 - 66 % Final    Lymphocytes 07/22/2022 41  24 - 44 % Final    Monocytes 07/22/2022 10 (A) 1 - 7 % Final    Eosinophils % 07/22/2022 1  0 - 4 % Final    Basophils 07/22/2022 1  0 - 2 % Final    Segs Absolute 07/22/2022 2.00  1.3 - 9.1 k/uL Final    Absolute Lymph # 07/22/2022 1.70  1.0 - 4.8 k/uL Final    Absolute Mono # 07/22/2022 0.40  0.1 - 1.3 k/uL Final    Absolute Eos # 07/22/2022 0.10  0.0 - 0.4 k/uL Final    Basophils Absolute 07/22/2022 0.00  0.0 - 0.2 k/uL Final    Glucose 07/22/2022 95  70 - 99 mg/dL Final    BUN 07/22/2022 8  6 - 20 mg/dL Final    Creatinine 07/22/2022 0.55 (A) 0.70 - 1.20 mg/dL Final    Calcium 07/22/2022 8.7  8.6 - 10.4 mg/dL Final    Sodium 07/22/2022 139  135 - 144 mmol/L Final    Potassium 07/22/2022 3.8  3.7 - 5.3 mmol/L Final    Chloride 07/22/2022 105  98 - 107 mmol/L Final    CO2 07/22/2022 22  20 - 31 mmol/L Final    Anion Gap 07/22/2022 12  9 - 17 mmol/L Final    Alkaline Phosphatase 07/22/2022 56  40 - 129 U/L Final    ALT 07/22/2022 37  5 - 41 U/L Final    AST 07/22/2022 42 (A) <40 U/L Final    Total Bilirubin 07/22/2022 0.22 (A) 0.3 - 1.2 mg/dL Final    Total Protein 07/22/2022 6.6  6.4 - 8.3 g/dL Final    Albumin 07/22/2022 4.0  3.5 - 5.2 g/dL Final    GFR Non- 07/22/2022 >60  >60 mL/min Final    GFR  07/22/2022 >60  >60 mL/min Final    GFR Comment 07/22/2022        Final    Comment: Average GFR for 52-63 years old:   80 mL/min/1.73sq m  Chronic Kidney Disease:   <60 mL/min/1.73sq m  Kidney failure:   <15 mL/min/1.73sq m              eGFR calculated using average adult body mass. Additional eGFR calculator available at:        [employer]            Ethanol 07/22/2022 72 (A) <10 mg/dL Final    Ethanol percent 07/22/2022 0.072  % Final         Reviewed patient's current plan of care and vital signs with nursing staff.     Labs reviewed: [x] Yes    Medications  Current Facility-Administered Medications: carbamide peroxide (DEBROX) 6.5 % otic solution 5 drop, 5 drop, Both Ears, BID  escitalopram (LEXAPRO) tablet 20 mg, 20 mg, Oral, Daily  acetaminophen (TYLENOL) tablet 650 mg, 650 mg, Oral, Q4H PRN  aluminum & magnesium hydroxide-simethicone (MAALOX) 200-200-20 MG/5ML suspension 30 mL, 30 mL, Oral, Q6H PRN  hydrOXYzine HCl (ATARAX) tablet 50 mg, 50 mg, Oral, TID PRN  ibuprofen (ADVIL;MOTRIN) tablet 400 mg, 400 mg, Oral, Q6H PRN  nicotine polacrilex (NICORETTE) gum 2 mg, 2 mg, Oral, PRN  polyethylene glycol (GLYCOLAX) packet 17 g, 17 g, Oral, Daily PRN  traZODone (DESYREL) tablet 50 mg, 50 mg, Oral, Nightly PRN  aspirin EC tablet 81 mg, 81 mg, Oral, Daily  atorvastatin (LIPITOR) tablet 80 mg, 80 mg, Oral, Nightly  folic acid (FOLVITE) tablet 1 mg, 1 mg, Oral, Daily  thiamine mononitrate tablet 100 mg, 100 mg, Oral, Daily    ASSESSMENT  Major depressive disorder, recurrent severe without psychotic features (HCC)    Versus substance induced mood disorder  Generalized anxiety disorder  Alcohol use disorder         HANDOFF  Patient symptoms: Remains unstable  Medication changes: per attending MD  Monitor need and frequency of PRN medications. Encourage participation in groups and milieu. Attempt to develop insight. Psycho-education conducted. Supportive Therapy conducted. Probable discharge is undetermined at this time  Follow-up daily while inpatient. Patient continues to be monitored in the inpatient psychiatric facility at [facility] for safety and stabilization. Patient continues to need, on a daily basis, active treatment furnished directly by or requiring the supervision of inpatient psychiatric personnel. Electronically signed by SHARAN Mcclain CNP on 7/25/2022 at 5:46 PM    **This report has been created using voice recognition software. It may contain minor errors which are inherent in voice recognition technology. **      I independently saw and evaluated the patient. I reviewed the nurse practitioners documentation above. Any additional comments or changes to the nurse practitioners documentation are stated below otherwise agree with assessment. Plan will be as follows:  Patient denying side effects to meds. Just increase Lexapro back to 20 mg this morning. Will monitor  PLAN  Patient s symptoms   show modest signs of improvement  Monitor on current medication for now  Attempt to develop insight  Psycho-education conducted. Supportive Therapy conducted.   Probable discharge is undetermined at this time  Follow-up daily while on inpatient unit

## 2022-07-25 NOTE — PLAN OF CARE
Problem: Pain  Goal: Verbalizes/displays adequate comfort level or baseline comfort level  Outcome: Progressing  Note: Patient denies pain at this time. Patient educated and agrees to come to staff if pain occurs this shift. Patient monitored every 15 minutes with environmental safety checks. Problem: Anxiety  Goal: Will report anxiety at manageable levels  Description: INTERVENTIONS:  1. Administer medication as ordered  2. Teach and rehearse alternative coping skills  3. Provide emotional support with 1:1 interaction with staff  7/24/2022 2343 by Jyothi Kovacs LPN  Outcome: Progressing  Note: Patient admits to depression and anxiety this shift. Patient isolative to room, to read this shift. Patient denies suicidal/homicidal ideations and hallucinations this shift. Patient educated and agrees to come to staff if needed this shift. Patient monitored every 15 minutes with environmental safety checks. Problem: Coping  Goal: Pt/Family able to verbalize concerns and demonstrate effective coping strategies  Description: INTERVENTIONS:  1. Assist patient/family to identify coping skills, available support systems and cultural and spiritual values  2. Provide emotional support, including active listening and acknowledgement of concerns of patient and caregivers  3. Reduce environmental stimuli, as able  4. Instruct patient/family in relaxation techniques, as appropriate  5. Assess for spiritual pain/suffering and initiate Spiritual Care, Psychosocial Clinical Specialist consults as needed  Outcome: Progressing     Problem: Self Harm/Suicidality  Goal: Will have no self-injury during hospital stay  Description: INTERVENTIONS:  1. Q 30 MINUTES: Routine safety checks  2. Q SHIFT & PRN: Assess risk to determine if routine checks are adequate to maintain patient safety  Outcome: Progressing  Note: Patient is free of self harm at this time. Patient agrees to seek out staff if thoughts to harm self arise.   Staff will provide support and reassurance as needed. Safety checks maintained every 15 minutes.

## 2022-07-25 NOTE — PLAN OF CARE
Problem: Pain  Goal: Verbalizes/displays adequate comfort level or baseline comfort level  Outcome: Progressing  Flowsheets (Taken 7/25/2022 1445)  Verbalizes/displays adequate comfort level or baseline comfort level:   Assess pain using appropriate pain scale   Implement non-pharmacological measures as appropriate and evaluate response     Problem: Anxiety  Goal: Will report anxiety at manageable levels  Description: INTERVENTIONS:  1. Administer medication as ordered  2. Teach and rehearse alternative coping skills  3. Provide emotional support with 1:1 interaction with staff  Outcome: Progressing  Flowsheets  Taken 7/25/2022 1445 by Flakita Churchill LPN  Will report anxiety at manageable levels:   Administer medication as ordered   Provide emotional support with 1:1 interaction with staff   Teach and rehearse alternative coping skills  Taken 7/25/2022 1005 by Mellissa Galeano RN  Will report anxiety at manageable levels:   Administer medication as ordered   Provide emotional support with 1:1 interaction with staff   Teach and rehearse alternative coping skills     Problem: Coping  Goal: Pt/Family able to verbalize concerns and demonstrate effective coping strategies  Description: INTERVENTIONS:  1. Assist patient/family to identify coping skills, available support systems and cultural and spiritual values  2. Provide emotional support, including active listening and acknowledgement of concerns of patient and caregivers  3. Reduce environmental stimuli, as able  4. Instruct patient/family in relaxation techniques, as appropriate  5.  Assess for spiritual pain/suffering and initiate Spiritual Care, Psychosocial Clinical Specialist consults as needed  Outcome: Progressing  Flowsheets  Taken 7/25/2022 1445 by Flakita Churchill LPN  Patient/family able to verbalize anxieties, fears, and concerns, and demonstrate effective coping: Assist patient/family to identify coping skills, available support systems and cultural and spiritual values  Taken 7/25/2022 1005 by Edvin Simon RN  Patient/family able to verbalize anxieties, fears, and concerns, and demonstrate effective coping:   Assist patient/family to identify coping skills, available support systems and cultural and spiritual values   Provide emotional support, including active listening and acknowledgement of concerns of patient and caregivers   Reduce environmental stimuli, as able   Instruct patient/family in relaxation techniques, as appropriate     Problem: Self Harm/Suicidality  Goal: Will have no self-injury during hospital stay  Description: INTERVENTIONS:  1. Q 30 MINUTES: Routine safety checks  2. Q SHIFT & PRN: Assess risk to determine if routine checks are adequate to maintain patient safety  Outcome: Progressing  Note: No self harm noted this shift. Patient agrees to seek staff out if negative thoughts arise. Will continue to monitor Q15 minute and intermittently.

## 2022-07-26 PROCEDURE — 6370000000 HC RX 637 (ALT 250 FOR IP): Performed by: NURSE PRACTITIONER

## 2022-07-26 PROCEDURE — APPSS30 APP SPLIT SHARED TIME 16-30 MINUTES: Performed by: NURSE PRACTITIONER

## 2022-07-26 PROCEDURE — 90833 PSYTX W PT W E/M 30 MIN: CPT | Performed by: PSYCHIATRY & NEUROLOGY

## 2022-07-26 PROCEDURE — 6370000000 HC RX 637 (ALT 250 FOR IP): Performed by: PSYCHIATRY & NEUROLOGY

## 2022-07-26 PROCEDURE — 99232 SBSQ HOSP IP/OBS MODERATE 35: CPT | Performed by: PSYCHIATRY & NEUROLOGY

## 2022-07-26 PROCEDURE — 1240000000 HC EMOTIONAL WELLNESS R&B

## 2022-07-26 RX ORDER — ARIPIPRAZOLE 5 MG/1
2.5 TABLET ORAL DAILY
Status: DISCONTINUED | OUTPATIENT
Start: 2022-07-27 | End: 2022-07-27

## 2022-07-26 RX ADMIN — ATORVASTATIN CALCIUM 80 MG: 80 TABLET, FILM COATED ORAL at 21:58

## 2022-07-26 RX ADMIN — HYDROXYZINE HYDROCHLORIDE 50 MG: 50 TABLET, FILM COATED ORAL at 21:56

## 2022-07-26 RX ADMIN — HYDROXYZINE HYDROCHLORIDE 50 MG: 50 TABLET, FILM COATED ORAL at 10:51

## 2022-07-26 RX ADMIN — ESCITALOPRAM OXALATE 20 MG: 20 TABLET ORAL at 08:37

## 2022-07-26 RX ADMIN — NICOTINE POLACRILEX 2 MG: 4 GUM, CHEWING BUCCAL at 21:57

## 2022-07-26 RX ADMIN — THIAMINE HCL TAB 100 MG 100 MG: 100 TAB at 08:37

## 2022-07-26 RX ADMIN — Medication 5 DROP: at 21:57

## 2022-07-26 RX ADMIN — TRAZODONE HYDROCHLORIDE 50 MG: 50 TABLET ORAL at 21:56

## 2022-07-26 RX ADMIN — Medication 5 DROP: at 13:43

## 2022-07-26 RX ADMIN — FOLIC ACID 1 MG: 1 TABLET ORAL at 08:37

## 2022-07-26 RX ADMIN — ASPIRIN 81 MG: 81 TABLET, COATED ORAL at 08:37

## 2022-07-26 NOTE — PLAN OF CARE
Problem: Pain  Goal: Verbalizes/displays adequate comfort level or baseline comfort level  7/26/2022 1424 by Jeff Mccann LPN  Outcome: Progressing Towards Goal  Flowsheets (Taken 7/26/2022 1424)  Verbalizes/displays adequate comfort level or baseline comfort level:   Encourage patient to monitor pain and request assistance   Assess pain using appropriate pain scale     Problem: Anxiety  Goal: Will report anxiety at manageable levels  Description: INTERVENTIONS:  1. Administer medication as ordered  2. Teach and rehearse alternative coping skills  3. Provide emotional support with 1:1 interaction with staff  7/26/2022 1424 by Jeff Mccann LPN  Outcome: Progressing Towards Goal  Flowsheets (Taken 7/26/2022 1424)  Will report anxiety at manageable levels:   Administer medication as ordered   Provide emotional support with 1:1 interaction with staff   Teach and rehearse alternative coping skills     Problem: Coping  Goal: Pt/Family able to verbalize concerns and demonstrate effective coping strategies  Description: INTERVENTIONS:  1. Assist patient/family to identify coping skills, available support systems and cultural and spiritual values  2. Provide emotional support, including active listening and acknowledgement of concerns of patient and caregivers  3. Reduce environmental stimuli, as able  4. Instruct patient/family in relaxation techniques, as appropriate  5.  Assess for spiritual pain/suffering and initiate Spiritual Care, Psychosocial Clinical Specialist consults as needed  7/26/2022 1424 by Jeff Mccann LPN  Outcome: Progressing Towards Goal  Flowsheets (Taken 7/26/2022 1424)  Patient/family able to verbalize anxieties, fears, and concerns, and demonstrate effective coping:   Assist patient/family to identify coping skills, available support systems and cultural and spiritual values   Reduce environmental stimuli, as able     Problem: Self Harm/Suicidality  Goal: Will have no self-injury during hospital stay  Description: INTERVENTIONS:  1. Q 30 MINUTES: Routine safety checks  2. Q SHIFT & PRN: Assess risk to determine if routine checks are adequate to maintain patient safety  7/26/2022 1424 by Nettie Finn LPN  Outcome: Progressing Towards Goal  Note: No self harm noted this shift. Patient agrees to seek staff out if negative thoughts arise. Will continue to monitor Q15 minute and intermittently.

## 2022-07-26 NOTE — PLAN OF CARE
Problem: Pain  Goal: Verbalizes/displays adequate comfort level or baseline comfort level  7/25/2022 2149 by Kavin Kirby LPN  Outcome: Progressing Towards Goal     Problem: Anxiety  Goal: Will report anxiety at manageable levels  Description: INTERVENTIONS:  1. Administer medication as ordered  2. Teach and rehearse alternative coping skills  3. Provide emotional support with 1:1 interaction with staff  7/25/2022 2149 by Kavin Kirby LPN  Outcome: Progressing Towards Goal     Problem: Coping  Goal: Pt/Family able to verbalize concerns and demonstrate effective coping strategies  Description: INTERVENTIONS:  1. Assist patient/family to identify coping skills, available support systems and cultural and spiritual values  2. Provide emotional support, including active listening and acknowledgement of concerns of patient and caregivers  3. Reduce environmental stimuli, as able  4. Instruct patient/family in relaxation techniques, as appropriate  5.  Assess for spiritual pain/suffering and initiate Spiritual Care, Psychosocial Clinical Specialist consults as needed  7/25/2022 2149 by Kavin Kirby LPN  Outcome: Progressing Towards Goal     Problem: Self Harm/Suicidality  Goal: Will have no self-injury during hospital stay  Description: INTERVENTIONS:  1. Q 30 MINUTES: Routine safety checks  2. Q SHIFT & PRN: Assess risk to determine if routine checks are adequate to maintain patient safety  7/25/2022 2149 by Kavin Kirby LPN  Outcome: Progressing Towards Goal

## 2022-07-26 NOTE — GROUP NOTE
Group Therapy Note    Date: 7/26/2022    Group Start Time: 1430  Group End Time: 1662  Group Topic: Cognitive Skills    [facility]    [place], [place]        Group Therapy Note    Attendees: 4/11     Cognitive Skills Group Note     Date: 7/26/2022          Start Time: 14:30              End Time: 15:30     Number of Participants in Group & Unit Census:  4/11        Topic: Expressing feelings, explore relaxation techniques, resources, and ways to utilize/soothe senses to manage stress, and communication skills. Goal of Group: To increase social interaction and to practice expressing feelings, explore relaxation techniques, resources, and ways to utilize/soothe senses to manage stress, and communication skills through creative expression and music. Comments: Patient did not participate in Cognitive Skills group, despite staff encouragement and explanation of benefits. Patient remains seclusive to self. Q15 minute safety checks maintained for patient safety and will continue to encourage patient to attend unit programming.            Discipline Responsible: Psychoeducational Specialist        Signature:  Sumeet Rivera

## 2022-07-26 NOTE — PROGRESS NOTES
Daily Progress Note  7/26/2022    Patient Name: Chilo Roque    CHIEF COMPLAINT: Suicidal ideation         SUBJECTIVE:      Patient seen face-to-face for follow-up assessment. Patient is tearful. Reports that his mood is \"awful\". Focused on negative thoughts. States that he feels he is a mess. Endorses hopelessness and helplessness. Patient states that he is extremely depressed today, rating his current depression as 9 out of 10 (with 10 indicating the most severe). States his anxiety with elevated earlier in the morning, and after taking hydroxyzine 50 mg tablet feels more relaxed. Patient endorses fleeting suicidal ideation. Unable to contract for safety and states that he does not feel safe from himself in the community. He has been compliant with his scheduled psychotropic medications. Escitalopram titrated to 20 mg p.o. daily, and he has now had 2 doses. We reviewed side effects and patient denies all. Patient states that he has been attending group programming and is actively working on coping skills to help with his depression and anxiety. He denies any problems with sleep overnight and does report good appetite. Discussed disposition planning with patient. Patient uncertain where he will go. Social work has been discussing possibility of AOD treatment and encouraging patient to start making phone calls today. Patient presents as a significant risk to self secondary to continued suicidal thoughts. He has yet to demonstrate stability and requires inpatient hospitalization for safety. Appetite:  [x] Adequate/Unchanged  [] Increased  [] Decreased      Sleep:       [x] Adequate/Unchanged  [] Fair  [] Poor      Group Attendance on Unit:   [x] Yes   [] Selectively    [] No    Medication Side Effects: Denies         Mental Status Exam  Level of consciousness: Alert and awake. Appearance: Appropriate attire for setting, seated on chair, with fair  grooming and hygiene.    Behavior/Motor: Approachable, compliant with interview, tearful  Attitude toward examiner: Cooperative, attentive, good eye contact. Speech: normal rate and normal volume   Mood:  Patient reports \"awful\". Affect: Depressed  Thought processes: coherent and illogical.   Thought content: Denies homicidal ideation. Suicidal Ideation: Fleeting suicidal ideation, unable to contract for safety off unit  Delusions: No evidence of delusions. Denies paranoia. Perceptual Disturbance: Patient does not appear to be responding to internal stimuli. Denies auditory hallucinations. Denies visual hallucinations. Cognition: Oriented to self, location, time, and situation. Memory: Intact. Insight & Judgement: Poor. Data   height is 5' 11\" (1.803 m) and weight is 182 lb (82.6 kg). His oral temperature is 97.8 °F (36.6 °C). His blood pressure is 110/70 and his pulse is 60. His respiration is 14 and oxygen saturation is 98%.    Labs:   Admission on 07/22/2022   Component Date Value Ref Range Status    WBC 07/22/2022 4.2  3.5 - 11.0 k/uL Final    RBC 07/22/2022 4.59  4.5 - 5.9 m/uL Final    Hemoglobin 07/22/2022 14.0  13.5 - 17.5 g/dL Final    Hematocrit 07/22/2022 42.4  41 - 53 % Final    MCV 07/22/2022 92.4  80 - 100 fL Final    MCH 07/22/2022 30.6  26 - 34 pg Final    MCHC 07/22/2022 33.1  31 - 37 g/dL Final    RDW 07/22/2022 15.2 (A) 11.5 - 14.9 % Final    Platelets 02/65/7781 288  150 - 450 k/uL Final    MPV 07/22/2022 6.5  6.0 - 12.0 fL Final    Seg Neutrophils 07/22/2022 47  36 - 66 % Final    Lymphocytes 07/22/2022 41  24 - 44 % Final    Monocytes 07/22/2022 10 (A) 1 - 7 % Final    Eosinophils % 07/22/2022 1  0 - 4 % Final    Basophils 07/22/2022 1  0 - 2 % Final    Segs Absolute 07/22/2022 2.00  1.3 - 9.1 k/uL Final    Absolute Lymph # 07/22/2022 1.70  1.0 - 4.8 k/uL Final    Absolute Mono # 07/22/2022 0.40  0.1 - 1.3 k/uL Final    Absolute Eos # 07/22/2022 0.10  0.0 - 0.4 k/uL Final    Basophils Absolute 07/22/2022 0.00  0.0 - 0.2 k/uL Final    Glucose 07/22/2022 95  70 - 99 mg/dL Final    BUN 07/22/2022 8  6 - 20 mg/dL Final    Creatinine 07/22/2022 0.55 (A) 0.70 - 1.20 mg/dL Final    Calcium 07/22/2022 8.7  8.6 - 10.4 mg/dL Final    Sodium 07/22/2022 139  135 - 144 mmol/L Final    Potassium 07/22/2022 3.8  3.7 - 5.3 mmol/L Final    Chloride 07/22/2022 105  98 - 107 mmol/L Final    CO2 07/22/2022 22  20 - 31 mmol/L Final    Anion Gap 07/22/2022 12  9 - 17 mmol/L Final    Alkaline Phosphatase 07/22/2022 56  40 - 129 U/L Final    ALT 07/22/2022 37  5 - 41 U/L Final    AST 07/22/2022 42 (A) <40 U/L Final    Total Bilirubin 07/22/2022 0.22 (A) 0.3 - 1.2 mg/dL Final    Total Protein 07/22/2022 6.6  6.4 - 8.3 g/dL Final    Albumin 07/22/2022 4.0  3.5 - 5.2 g/dL Final    GFR Non- 07/22/2022 >60  >60 mL/min Final    GFR  07/22/2022 >60  >60 mL/min Final    GFR Comment 07/22/2022        Final    Comment: Average GFR for 52-63 years old:   80 mL/min/1.73sq m  Chronic Kidney Disease:   <60 mL/min/1.73sq m  Kidney failure:   <15 mL/min/1.73sq m              eGFR calculated using average adult body mass. Additional eGFR calculator available at:        aaTag.br            Ethanol 07/22/2022 72 (A) <10 mg/dL Final    Ethanol percent 07/22/2022 0.072  % Final         Reviewed patient's current plan of care and vital signs with nursing staff.     Labs reviewed: [x] Yes    Medications  Current Facility-Administered Medications: carbamide peroxide (DEBROX) 6.5 % otic solution 5 drop, 5 drop, Both Ears, BID  escitalopram (LEXAPRO) tablet 20 mg, 20 mg, Oral, Daily  acetaminophen (TYLENOL) tablet 650 mg, 650 mg, Oral, Q4H PRN  aluminum & magnesium hydroxide-simethicone (MAALOX) 200-200-20 MG/5ML suspension 30 mL, 30 mL, Oral, Q6H PRN  hydrOXYzine HCl (ATARAX) tablet 50 mg, 50 mg, Oral, TID PRN  ibuprofen (ADVIL;MOTRIN) tablet 400 mg, 400 mg, Oral, Q6H PRN  nicotine polacrilex (NICORETTE) gum 2 ways to end his life. He is not able to contract for safety outside of the hospital.  Remains able to be safe here in the hospital.  We did really engage in supportive psychotherapy with regards to patterns of behaviors and identifying substance use and his alcohol use. Patient for the first time states he is interested in [address] and did approach nurses and  and became active and some discharge planning towards AOD. We will augment and discussed augmentation of his depression medications the patient is in agreement  PLAN  Patient s symptoms   show some signs of improvement with the patient indicating he is taking action towards AOD treatment  Augment with 2.5 mg of Abilify daily  Attempt to develop insight  Psycho-education conducted. Supportive Therapy conducted.   Probable discharge is undetermined at this time  Follow-up daily while on inpatient unit

## 2022-07-26 NOTE — CARE COORDINATION
At the request of the pt ARIADNE faxed clinicals to [facility] [phone] for possible placement.

## 2022-07-26 NOTE — GROUP NOTE
Group Therapy Note    Date: 7/26/2022    Group Start Time: 1000  Group End Time: 1050  Group Topic: Psychoeducation    [facility]    CHENCHO Shore        Group Therapy Note    Attendees: 5/18       Patient's Goal:  person centered expression of feeling     Notes:  therapeutic worksheet provided and discussed     Status After Intervention:  Improved    Participation Level:  Active Listener and Interactive    Participation Quality: Appropriate and Attentive      Speech:  normal      Thought Process/Content: Logical      Affective Functioning: Congruent      Mood: depressed      Level of consciousness:  Alert and Oriented x4      Response to Learning: Able to verbalize current knowledge/experience and Able to verbalize/acknowledge new learning      Endings: None Reported    Modes of Intervention: Education and Support      Discipline Responsible: /Counselor      Signature:  CHENCHO Shore

## 2022-07-27 PROCEDURE — APPSS30 APP SPLIT SHARED TIME 16-30 MINUTES: Performed by: NURSE PRACTITIONER

## 2022-07-27 PROCEDURE — 6370000000 HC RX 637 (ALT 250 FOR IP): Performed by: NURSE PRACTITIONER

## 2022-07-27 PROCEDURE — 99232 SBSQ HOSP IP/OBS MODERATE 35: CPT | Performed by: PSYCHIATRY & NEUROLOGY

## 2022-07-27 PROCEDURE — 1240000000 HC EMOTIONAL WELLNESS R&B

## 2022-07-27 PROCEDURE — 6370000000 HC RX 637 (ALT 250 FOR IP): Performed by: PSYCHIATRY & NEUROLOGY

## 2022-07-27 PROCEDURE — 90833 PSYTX W PT W E/M 30 MIN: CPT | Performed by: PSYCHIATRY & NEUROLOGY

## 2022-07-27 RX ORDER — MIRTAZAPINE 15 MG/1
15 TABLET, FILM COATED ORAL NIGHTLY
Status: DISCONTINUED | OUTPATIENT
Start: 2022-07-27 | End: 2022-07-30 | Stop reason: HOSPADM

## 2022-07-27 RX ORDER — ESCITALOPRAM OXALATE 10 MG/1
10 TABLET ORAL DAILY
Status: DISCONTINUED | OUTPATIENT
Start: 2022-07-28 | End: 2022-07-28

## 2022-07-27 RX ORDER — VENLAFAXINE HYDROCHLORIDE 75 MG/1
75 CAPSULE, EXTENDED RELEASE ORAL
Status: DISCONTINUED | OUTPATIENT
Start: 2022-07-28 | End: 2022-07-28

## 2022-07-27 RX ADMIN — HYDROXYZINE HYDROCHLORIDE 50 MG: 50 TABLET, FILM COATED ORAL at 11:22

## 2022-07-27 RX ADMIN — ESCITALOPRAM OXALATE 20 MG: 20 TABLET ORAL at 08:38

## 2022-07-27 RX ADMIN — TRAZODONE HYDROCHLORIDE 50 MG: 50 TABLET ORAL at 21:50

## 2022-07-27 RX ADMIN — MIRTAZAPINE 15 MG: 15 TABLET, FILM COATED ORAL at 21:50

## 2022-07-27 RX ADMIN — ARIPIPRAZOLE 2.5 MG: 5 TABLET ORAL at 08:38

## 2022-07-27 RX ADMIN — ASPIRIN 81 MG: 81 TABLET, COATED ORAL at 08:38

## 2022-07-27 RX ADMIN — THIAMINE HCL TAB 100 MG 100 MG: 100 TAB at 08:38

## 2022-07-27 RX ADMIN — ATORVASTATIN CALCIUM 80 MG: 80 TABLET, FILM COATED ORAL at 21:49

## 2022-07-27 RX ADMIN — FOLIC ACID 1 MG: 1 TABLET ORAL at 08:38

## 2022-07-27 RX ADMIN — Medication 5 DROP: at 08:38

## 2022-07-27 ASSESSMENT — LIFESTYLE VARIABLES
HOW MANY STANDARD DRINKS CONTAINING ALCOHOL DO YOU HAVE ON A TYPICAL DAY: PATIENT DECLINED
HOW OFTEN DO YOU HAVE A DRINK CONTAINING ALCOHOL: PATIENT DECLINED

## 2022-07-27 NOTE — PLAN OF CARE
Problem: Anxiety  Goal: Will report anxiety at manageable levels  Description: INTERVENTIONS:  1. Administer medication as ordered  2. Teach and rehearse alternative coping skills  3. Provide emotional support with 1:1 interaction with staff  Outcome: Progressing Towards Goal     Problem: Coping  Goal: Pt/Family able to verbalize concerns and demonstrate effective coping strategies  Description: INTERVENTIONS:  1. Assist patient/family to identify coping skills, available support systems and cultural and spiritual values  2. Provide emotional support, including active listening and acknowledgement of concerns of patient and caregivers  3. Reduce environmental stimuli, as able  4. Instruct patient/family in relaxation techniques, as appropriate  5. Assess for spiritual pain/suffering and initiate Spiritual Care, Psychosocial Clinical Specialist consults as needed  Outcome: Progressing Towards Goal     Problem: Self Harm/Suicidality  Goal: Will have no self-injury during hospital stay  Description: INTERVENTIONS:  1. Q 30 MINUTES: Routine safety checks  2. Q SHIFT & PRN: Assess risk to determine if routine checks are adequate to maintain patient safety  Outcome: Progressing Towards Goal  Pt currently denies any self harming thoughts denies any hallucination isolative aloof mostly reading books reports normal sleep and appetite.

## 2022-07-27 NOTE — PROGRESS NOTES
Pharmacy Med Education Group Note    Date: 07/27/2022  Start Time: 7040  End Time: 8037    Number Participants in Group:  6    Goal:  Patient will demonstrate an understanding of the medications intended purpose and possible adverse effects  Topic: [place] for Pharmacy Med Ed Group    Discipline Responsible:     OT  AT  [facility].  RT     X Other       Participation Level:     None  Minimal      X Active Listener    X Interactive    Monopolizing         Participation Quality:    X Appropriate  Inappropriate     X       Attentive        Intrusive          Sharing        Resistant          Supportive        Lethargic       Affective:     X Congruent  Incongruent  Blunted  Flat    Constricted  Anxious  Elated  Angry    Labile  Depressed  Other         Cognitive:    X Alert  Oriented PPTP     Concentration   X G  F  P   Attention Span   X G  F  P   Short-Term Memory   X G  F  P   Long-Term Memory  G  F  P   ProblemSolving/  Decision Making  G  F  P   Ability to Process  Information   X G  F  P      Contributing Factors             Delusional             Hallucinating             Flight of Ideas             Other:       Modes of Intervention:    X Education   X Support  Exploration    Clarifying  Problem Solving  Confrontation    Socialization  Limit Setting  Reality Testing    Activity  Movement  Media    Other:            Response to Learning:    X Able to verbalize current knowledge/experience    Able to verbalize/acknowledge new learning    Able to retain information    Capable of insight    Able to change behavior    Progressing to goal    Other:        Comments:       Thank you,    Suraj Douglas, PharmD Candidate [id number]

## 2022-07-27 NOTE — GROUP NOTE
Group Therapy Note    Date: 7/27/2022    Group Start Time: 1330  Group End Time: 1875  Group Topic: Psychoeducation    SHANNAN BHVIVIANA Davidson    Psych-Ed/Relapse Prevention Group Note        Date: July 27, 2022 Start Time: 1:30pm  End Time: 2:10 pm      Number of Participants in Group & Unit Census:  4    Topic: Card Questions    Goal of Group:Patient will improve interpersonal communication skills. Comments:     Patient did not participate in Psych-Ed/Relapse Prevention group, despite staff encouragement and explanation of benefits. Patient remain seclusive to self. Q15 minute safety checks maintained for patient safety and will continue to encourage patient to attend unit programming.           Signature:  Lorenza Chanel, [address]

## 2022-07-27 NOTE — PROGRESS NOTES
Daily Progress Note  7/27/2022    Patient Name: Owen Burden    CHIEF COMPLAINT: Suicidal ideation         SUBJECTIVE:      Patient seen face-to-face for follow-up assessment. He is cooperative with discussion, although does appear to be more on edge today. He appears to be in emotional distress. Crying through almost the entirety of our discussion. Patient states that he does not want to feel this way, and expresses sadness that he is still alive and that he was not successful at any previous suicide attempts. He does endorse some hope for the future and even identifies that there are good things in his life, but these thoughts occur for mere moments and are quickly taken over by negativity. He has been compliant with his psychotropic medications, but have not felt that they have been helpful. Discussed transitioning to a more potent antidepressant (venlafaxine) and tapering his escitalopram.  Patient is agreeable but worries about the time in which she will take for these medications to become effective. Patient identifies both his depression and anxiety as 10 out of 10 today (with 10 indicating the most severe). He has been utilizing hydroxyzine with some benefit. Social work have been assisting patient with disposition planning. Patient is unable to contract for safety at lower level of care at this time. Expressing suicidal thoughts and would not feel safe from self without structure of unit. Requires inpatient hospitalization for stability. Appetite:  [x] Adequate/Unchanged  [] Increased  [] Decreased      Sleep:       [x] Adequate/Unchanged  [] Fair  [] Poor      Group Attendance on Unit:   [x] Yes   [] Selectively    [] No    Medication Side Effects: Denies         Mental Status Exam  Level of consciousness: Alert and awake. Appearance: Appropriate attire for setting, seated on chair, with fair  grooming and hygiene.    Behavior/Motor: Approachable, compliant with interview, Final    Glucose 07/22/2022 95  70 - 99 mg/dL Final    BUN 07/22/2022 8  6 - 20 mg/dL Final    Creatinine 07/22/2022 0.55 (A) 0.70 - 1.20 mg/dL Final    Calcium 07/22/2022 8.7  8.6 - 10.4 mg/dL Final    Sodium 07/22/2022 139  135 - 144 mmol/L Final    Potassium 07/22/2022 3.8  3.7 - 5.3 mmol/L Final    Chloride 07/22/2022 105  98 - 107 mmol/L Final    CO2 07/22/2022 22  20 - 31 mmol/L Final    Anion Gap 07/22/2022 12  9 - 17 mmol/L Final    Alkaline Phosphatase 07/22/2022 56  40 - 129 U/L Final    ALT 07/22/2022 37  5 - 41 U/L Final    AST 07/22/2022 42 (A) <40 U/L Final    Total Bilirubin 07/22/2022 0.22 (A) 0.3 - 1.2 mg/dL Final    Total Protein 07/22/2022 6.6  6.4 - 8.3 g/dL Final    Albumin 07/22/2022 4.0  3.5 - 5.2 g/dL Final    GFR Non- 07/22/2022 >60  >60 mL/min Final    GFR  07/22/2022 >60  >60 mL/min Final    GFR Comment 07/22/2022        Final    Comment: Average GFR for 52-63 years old:   80 mL/min/1.73sq m  Chronic Kidney Disease:   <60 mL/min/1.73sq m  Kidney failure:   <15 mL/min/1.73sq m              eGFR calculated using average adult body mass. Additional eGFR calculator available at:        Goomeo.br            Ethanol 07/22/2022 72 (A) <10 mg/dL Final    Ethanol percent 07/22/2022 0.072  % Final         Reviewed patient's current plan of care and vital signs with nursing staff.     Labs reviewed: [x] Yes    Medications  Current Facility-Administered Medications: [START ON 7/28/2022] venlafaxine (EFFEXOR XR) extended release capsule 75 mg, 75 mg, Oral, Daily with breakfast  [START ON 7/28/2022] escitalopram (LEXAPRO) tablet 10 mg, 10 mg, Oral, Daily  mirtazapine (REMERON) tablet 15 mg, 15 mg, Oral, Nightly  carbamide peroxide (DEBROX) 6.5 % otic solution 5 drop, 5 drop, Both Ears, BID  acetaminophen (TYLENOL) tablet 650 mg, 650 mg, Oral, Q4H PRN  aluminum & magnesium hydroxide-simethicone (MAALOX) 200-200-20 MG/5ML suspension 30 mL, 30 mL, Oral, Q6H PRN  hydrOXYzine HCl (ATARAX) tablet 50 mg, 50 mg, Oral, TID PRN  ibuprofen (ADVIL;MOTRIN) tablet 400 mg, 400 mg, Oral, Q6H PRN  nicotine polacrilex (NICORETTE) gum 2 mg, 2 mg, Oral, PRN  polyethylene glycol (GLYCOLAX) packet 17 g, 17 g, Oral, Daily PRN  traZODone (DESYREL) tablet 50 mg, 50 mg, Oral, Nightly PRN  aspirin EC tablet 81 mg, 81 mg, Oral, Daily  atorvastatin (LIPITOR) tablet 80 mg, 80 mg, Oral, Nightly  folic acid (FOLVITE) tablet 1 mg, 1 mg, Oral, Daily  thiamine mononitrate tablet 100 mg, 100 mg, Oral, Daily    ASSESSMENT  Major depressive disorder, recurrent severe without psychotic features (HCC)    Versus substance induced mood disorder  Generalized anxiety disorder  Alcohol use disorder          HANDOFF  Patient symptoms: Remains unstable  Medication changes: per attending MD  Start venlafaxine 75 mg p.o. daily starting tomorrow  Start mirtazapine 15 mg p.o. starting this evening  Taper escitalopram 10 mg p.o. Discontinue aripiprazole  Encourage participation in groups and milieu. Attempt to develop insight. Psycho-education conducted. Supportive Therapy conducted. Probable discharge is undetermined at this time  Follow-up daily while inpatient. Patient continues to be monitored in the inpatient psychiatric facility at [facility] for safety and stabilization. Patient continues to need, on a daily basis, active treatment furnished directly by or requiring the supervision of inpatient psychiatric personnel. Electronically signed by SHARAN Eric CNP on 7/27/2022 at 5:22 PM    **This report has been created using voice recognition software. It may contain minor errors which are inherent in voice recognition technology. **      I independently saw and evaluated the patient. I reviewed the nurse practitioners documentation above. Any additional comments or changes to the nurse practitioners documentation are stated below otherwise agree with assessment.   Plan will be as follows:  Spent 30 minutes with the patient, of that greater than 16 minutes was spent in supportive psychotherapy. Patient thought his medications really are not working for him. Feels more on edge after starting Abilify. Agreeable to transition to combination of Effexor and Remeron. Will monitor  PLAN  Patient s symptoms   somewhat worsening with Abilify  Discontinue Abilify  Cross-taper onto Effexor off of Lexapro  Add Remeron at bedtime  Attempt to develop insight  Psycho-education conducted. Supportive Therapy conducted.   Probable discharge is undetermined at this time  Follow-up daily while on inpatient unit

## 2022-07-27 NOTE — PLAN OF CARE
Problem: Self Harm/Suicidality  Goal: Will have no self-injury during hospital stay  Description: INTERVENTIONS:  1. Q 30 MINUTES: Routine safety checks  2. Q SHIFT & PRN: Assess risk to determine if routine checks are adequate to maintain patient safety  Outcome: Not Progressing Towards Goal    Problem: Anxiety  Goal: Will report anxiety at manageable levels  Description: INTERVENTIONS:  1. Administer medication as ordered  2. Teach and rehearse alternative coping skills  3. Provide emotional support with 1:1 interaction with staff  Outcome: Progressing Towards Goal    Pt expresses great feelings of anxiety and depression during the shift but denies any visual/auditory hallucinations. When asked if the pt has thoughts of hurting himself he states \"well yeah, but I would never do it here. \" When asked if he has thoughts of hurting himself once he leaves the unit pt stated \"yes all the time\" but will not discuss or disclose his plan once he leaves the unit.

## 2022-07-27 NOTE — GROUP NOTE
Group Therapy Note    Date: 7/27/2022    Group Start Time: 1000  Group End Time: 1050  Group Topic: Psychotherapy    [facility] CHEN[facility] King        Group Therapy Note    Attendees: 6/15       Patient's Goal:  self-assessment/inventory     Notes:  therapeutic worksheet provided and discussed     Status After Intervention:  Improved    Participation Level:  Active Listener and Interactive    Participation Quality: Appropriate and Attentive      Speech:  normal      Thought Process/Content: Logical      Affective Functioning: Congruent      Mood: euthymic      Level of consciousness:  Alert and Oriented x4      Response to Learning: Able to verbalize current knowledge/experience and Able to verbalize/acknowledge new learning      Endings: None Reported    Modes of Intervention: Education and Support      Discipline Responsible: /Counselor      Signature:  CHENCHO Bedolla

## 2022-07-27 NOTE — CARE COORDINATION
ARIADNE placed call to Kiesha at [facility] in regards to pt being declined at the \"call center\" level. Rachelle asked for updated progress showing improvement in pts mental health symptoms and as long as pt has shown improvement they are willing to accept pt on Friday 7/29.

## 2022-07-27 NOTE — CARE COORDINATION
SW spoke with staff at [facility], they are currently denying pt due to the pt not being able to demonstrate stability, they are unable to provide services while the pt is unstable.

## 2022-07-28 PROCEDURE — 1240000000 HC EMOTIONAL WELLNESS R&B

## 2022-07-28 PROCEDURE — APPSS30 APP SPLIT SHARED TIME 16-30 MINUTES

## 2022-07-28 PROCEDURE — 99232 SBSQ HOSP IP/OBS MODERATE 35: CPT | Performed by: PSYCHIATRY & NEUROLOGY

## 2022-07-28 PROCEDURE — 6370000000 HC RX 637 (ALT 250 FOR IP): Performed by: PSYCHIATRY & NEUROLOGY

## 2022-07-28 RX ORDER — VENLAFAXINE HYDROCHLORIDE 150 MG/1
137.5 CAPSULE, EXTENDED RELEASE ORAL
Status: DISCONTINUED | OUTPATIENT
Start: 2022-07-29 | End: 2022-07-30 | Stop reason: HOSPADM

## 2022-07-28 RX ADMIN — HYDROXYZINE HYDROCHLORIDE 50 MG: 50 TABLET, FILM COATED ORAL at 22:07

## 2022-07-28 RX ADMIN — ASPIRIN 81 MG: 81 TABLET, COATED ORAL at 09:16

## 2022-07-28 RX ADMIN — Medication 5 DROP: at 09:17

## 2022-07-28 RX ADMIN — THIAMINE HCL TAB 100 MG 100 MG: 100 TAB at 09:16

## 2022-07-28 RX ADMIN — MIRTAZAPINE 15 MG: 15 TABLET, FILM COATED ORAL at 22:07

## 2022-07-28 RX ADMIN — ESCITALOPRAM OXALATE 10 MG: 10 TABLET ORAL at 09:16

## 2022-07-28 RX ADMIN — TRAZODONE HYDROCHLORIDE 50 MG: 50 TABLET ORAL at 22:07

## 2022-07-28 RX ADMIN — ATORVASTATIN CALCIUM 80 MG: 80 TABLET, FILM COATED ORAL at 22:47

## 2022-07-28 RX ADMIN — HYDROXYZINE HYDROCHLORIDE 50 MG: 50 TABLET, FILM COATED ORAL at 14:54

## 2022-07-28 RX ADMIN — VENLAFAXINE HYDROCHLORIDE 75 MG: 75 CAPSULE, EXTENDED RELEASE ORAL at 09:16

## 2022-07-28 RX ADMIN — FOLIC ACID 1 MG: 1 TABLET ORAL at 09:16

## 2022-07-28 ASSESSMENT — LIFESTYLE VARIABLES
HOW MANY STANDARD DRINKS CONTAINING ALCOHOL DO YOU HAVE ON A TYPICAL DAY: PATIENT DECLINED
HOW OFTEN DO YOU HAVE A DRINK CONTAINING ALCOHOL: NEVER

## 2022-07-28 NOTE — GROUP NOTE
Group Therapy Note    Date: 7/27/2022    Group Start Time: 2030  Group End Time: 2105  Group Topic: Wrap-Up    SHANNAN Sales        Group Therapy Note    Attendees: 8       Patient's Goal:  depressed mood    Notes:  looking at coping & need to get out of my head    Status After Intervention:  Improved    Participation Level:  Active Listener and Interactive    Participation Quality: Appropriate, Attentive, Sharing, and Supportive      Speech:  normal      Thought Process/Content: Logical      Affective Functioning: Congruent      Mood: anxious      Level of consciousness:  Alert, Oriented x4, and Attentive      Response to Learning: Capable of insight      Endings: None Reported    Modes of Intervention: Problem-solving      Discipline Responsible: Behavorial Health Tech      Signature:  Suzan Holly

## 2022-07-28 NOTE — GROUP NOTE
Psych-Ed/Relapse Prevention Group Note        Date: July 28, 2022 Start Time: 11am  End Time: 11:45am      Number of Participants in Group & Unit Census:  5    Topic: Health education    Goal of Group:Patient will identify ways to improve physical and mental health      Comments:     Patient did not participate in Psych-Ed/Relapse Prevention group, despite staff encouragement and explanation of benefits. Patient remain seclusive to self. Q15 minute safety checks maintained for patient safety and will continue to encourage patient to attend unit programming.          Signature:  Kayla Wyatt [place]

## 2022-07-28 NOTE — PROGRESS NOTES
Daily Progress Note  7/28/2022    Patient Name: Faiza Matthews    CHIEF COMPLAINT: Suicidal ideation         SUBJECTIVE:      Patient is seen today for a follow up assessment. Patient is compliant with scheduled medications. Patient has not required emergency medications in the past 24 hours. Zachery Molina is agreeable to assessment at bedside today. He continues to endorse significant depression and anxiety and rates his depression as a 9 (0-10 scale 0 being none and 10 being worst). He states that his anxiety is somewhat better today and that he has been utilizing Atarax when needed. He reports that he slept well last night and feels well rested today. He denies any issues with his appetite. Zachery Molina continues to endorse fleeting suicidal ideation stating that the thoughts continue to come and go throughout the day. He denies homicidal ideation. He continues to feel that he would be extremely unsafe out of the hospital at this time. He continues to endorse significant feelings of hopelessness and helplessness. He denies auditory and visual hallucinations. He denies paranoia. When asked about patient's plans for discharge patient states he will probably \"have to go to a shelter. \"  He does not believe he needs to go to substance use rehabilitation and states he does not believe his alcohol drinking is a problem. He denies any medication side effects or other medical concerns at this time. Appetite:  [x] Normal/Adequate/Unchanged  [] Increased  [] Decreased      Sleep:       [x] Normal/Adequate/Unchanged  [] Fair  [] Poor      Group Attendance on Unit:   [x] Yes  [] Selectively    [] No    Medication Side Effects:  Patient denies any medication side effects at the time of assessment. Mental Status Exam  Level of consciousness: Alert and awake. Appearance: Appropriate attire for setting, seated on bed, with fair  grooming and hygiene.    Behavior/Motor: Approachable, calm ,reading a book  Attitude toward examiner: Cooperative, attentive, good eye contact. Speech: Normal rate, normal volume, normal tone. Mood:  Patient reports \"better than yesterday\". Affect: Depressed  Thought processes: Linear and coherent. Thought content: Denies homicidal ideation. Suicidal Ideation: Fleeting suicidal ideations  Delusions: No evidence of delusions. Denies paranoia. Perceptual Disturbance: Patient does not appear to be responding to internal stimuli. Denies auditory hallucinations. Denies visual hallucinations. Cognition: Oriented to self, location, time, and situation. Memory: Intact. Insight & Judgement: Poor. Data   height is 5' 11\" (1.803 m) and weight is 182 lb (82.6 kg). His temperature is 97.3 °F (36.3 °C). His blood pressure is 116/68 and his pulse is 68. His respiration is 14 and oxygen saturation is 98%.    Labs:   Admission on 07/22/2022   Component Date Value Ref Range Status    WBC 07/22/2022 4.2  3.5 - 11.0 k/uL Final    RBC 07/22/2022 4.59  4.5 - 5.9 m/uL Final    Hemoglobin 07/22/2022 14.0  13.5 - 17.5 g/dL Final    Hematocrit 07/22/2022 42.4  41 - 53 % Final    MCV 07/22/2022 92.4  80 - 100 fL Final    MCH 07/22/2022 30.6  26 - 34 pg Final    MCHC 07/22/2022 33.1  31 - 37 g/dL Final    RDW 07/22/2022 15.2 (A) 11.5 - 14.9 % Final    Platelets 20/85/2458 288  150 - 450 k/uL Final    MPV 07/22/2022 6.5  6.0 - 12.0 fL Final    Seg Neutrophils 07/22/2022 47  36 - 66 % Final    Lymphocytes 07/22/2022 41  24 - 44 % Final    Monocytes 07/22/2022 10 (A) 1 - 7 % Final    Eosinophils % 07/22/2022 1  0 - 4 % Final    Basophils 07/22/2022 1  0 - 2 % Final    Segs Absolute 07/22/2022 2.00  1.3 - 9.1 k/uL Final    Absolute Lymph # 07/22/2022 1.70  1.0 - 4.8 k/uL Final    Absolute Mono # 07/22/2022 0.40  0.1 - 1.3 k/uL Final    Absolute Eos # 07/22/2022 0.10  0.0 - 0.4 k/uL Final    Basophils Absolute 07/22/2022 0.00  0.0 - 0.2 k/uL Final    Glucose 07/22/2022 95  70 - 99 mg/dL Final    BUN 50 mg, 50 mg, Oral, TID PRN  ibuprofen (ADVIL;MOTRIN) tablet 400 mg, 400 mg, Oral, Q6H PRN  nicotine polacrilex (NICORETTE) gum 2 mg, 2 mg, Oral, PRN  polyethylene glycol (GLYCOLAX) packet 17 g, 17 g, Oral, Daily PRN  traZODone (DESYREL) tablet 50 mg, 50 mg, Oral, Nightly PRN  aspirin EC tablet 81 mg, 81 mg, Oral, Daily  atorvastatin (LIPITOR) tablet 80 mg, 80 mg, Oral, Nightly  folic acid (FOLVITE) tablet 1 mg, 1 mg, Oral, Daily  thiamine mononitrate tablet 100 mg, 100 mg, Oral, Daily    ASSESSMENT  Major depressive disorder, recurrent severe without psychotic features ([facility] [address]         HANDOFF  Patient symptoms are: Remains Unstable. Medications as determined by attending physician  Consider discontinuing Lexapro   Monitor need and frequency of PRN medications. Encourage participation in groups and milieu. Probable discharge is to be determined by MD.     Electronically signed by SHARAN Zuniga CNP on 7/28/2022 at 4:15 PM    **This report has been created using voice recognition software. It may contain minor errors which are inherent in voice recognition technology. **    I independently saw and evaluated the patient. I reviewed the nurse practitioners documentation above. Any additional comments or changes to the nurse practitioners documentation are stated below otherwise agree with assessment. Plan will be as follows:  Patient denying side effects to cross titration of medications. Reporting improvement today in his mood. He is ambivalent about discharge to substance use programming. He is starting to talk about discharging home. He is reporting some improvement in his suicidal ideation. Will reassess tomorrow and if stable we discussed starting Saturday for discharge and patient is in agreement  PLAN  Patient s symptoms   are improving  Discontinue Lexapro  Increase Effexor  Attempt to develop insight  Psycho-education conducted. Supportive Therapy conducted.   Probable discharge is Saturday  Follow-up daily while on inpatient unit

## 2022-07-28 NOTE — PLAN OF CARE
Problem: Anxiety  Goal: Will report anxiety at manageable levels  Description: INTERVENTIONS:  1. Administer medication as ordered  2. Teach and rehearse alternative coping skills  3. Provide emotional support with 1:1 interaction with staff  Outcome: Progressing, patient denied anxiety at present time. Problem: Coping  Goal: Pt/Family able to verbalize concerns and demonstrate effective coping strategies  Description: INTERVENTIONS:  1. Assist patient/family to identify coping skills, available support systems and cultural and spiritual values  2. Provide emotional support, including active listening and acknowledgement of concerns of patient and caregivers  3. Reduce environmental stimuli, as able  4. Instruct patient/family in relaxation techniques, as appropriate  5. Assess for spiritual pain/suffering and initiate Spiritual Care, Psychosocial Clinical Specialist consults as needed  Outcome: Progressing, Patient voiced enjoy reading,which helps when he's feeling anxious. Problem: Self Harm/Suicidality  Goal: Will have no self-injury during hospital stay  Description: INTERVENTIONS:  1. Q 30 MINUTES: Routine safety checks  2. Q SHIFT & PRN: Assess risk to determine if routine checks are adequate to maintain patient safety  Outcome: Progressing, Patient denied suicidal and homicidal ideations.  15 MIN safety checks

## 2022-07-28 NOTE — PLAN OF CARE
Problem: Pain  Goal: Verbalizes/displays adequate comfort level or baseline comfort level  Outcome: Progressing  Note: Patient likes to stay in room to read books. Problem: Anxiety  Goal: Will report anxiety at manageable levels  Description: INTERVENTIONS:  1. Administer medication as ordered  2. Teach and rehearse alternative coping skills  3. Provide emotional support with 1:1 interaction with staff  7/27/2022 2059 by Jeri Stage, LPN  Outcome: Progressing  Note: Pt encouraged to explore coping skills for reducing anxiety. None verbalized at this time. Problem: Coping  Goal: Pt/Family able to verbalize concerns and demonstrate effective coping strategies  Description: INTERVENTIONS:  1. Assist patient/family to identify coping skills, available support systems and cultural and spiritual values  2. Provide emotional support, including active listening and acknowledgement of concerns of patient and caregivers  3. Reduce environmental stimuli, as able  4. Instruct patient/family in relaxation techniques, as appropriate  5. Assess for spiritual pain/suffering and initiate Spiritual Care, Psychosocial Clinical Specialist consults as needed  7/27/2022 2059 by Jeri Stage, LPN  Outcome: Progressing  Note: Patient has been calm, controlled and med complaint. Accepting of 1:1 talk time with staff. Problem: Self Harm/Suicidality  Goal: Will have no self-injury during hospital stay  Description: INTERVENTIONS:  1. Q 30 MINUTES: Routine safety checks  2. Q SHIFT & PRN: Assess risk to determine if routine checks are adequate to maintain patient safety  7/27/2022 2059 by Jeri Stage, LPN  Outcome: Progressing  Note: Patient denies thoughts of self-injury during hospital stay.

## 2022-07-28 NOTE — GROUP NOTE
Group Therapy Note    Date: 7/28/2022    Group Start Time: 1400  Group End Time: 1500  Group Topic: Relaxation    [facility]    Candice Roberts, ABEBES        Group Therapy Note    Attendees: 6         Patient's Goal:  Patient will utilize music for positive coping. Notes:  Patient attended group and participated. Patient was unable to provide detailed insight of song choice and needed encouragement from writer to provide a song. However, patient was still cooperative and had brighter affect moments throughout group. Patient was pleasant and friendly with peers and writer. Status After Intervention:  Improved    Participation Level:  Active Listener and Interactive    Participation Quality: Appropriate, Attentive, and Supportive      Speech:  normal and hesitant      Thought Process/Content: Logical  Linear      Affective Functioning: Congruent and Blunted      Mood: euthymic      Level of consciousness:  Attentive      Response to Learning: Able to verbalize current knowledge/experience, Able to verbalize/acknowledge new learning, Capable of insight, Able to change behavior, and Progressing to goal      Endings: None Reported    Modes of Intervention: Education, Support, and Exploration and Relaxation       Discipline Responsible: Psychoeducational Specialist      Signature:  Jose Goldstein

## 2022-07-29 PROCEDURE — 90833 PSYTX W PT W E/M 30 MIN: CPT | Performed by: PSYCHIATRY & NEUROLOGY

## 2022-07-29 PROCEDURE — 6370000000 HC RX 637 (ALT 250 FOR IP): Performed by: PSYCHIATRY & NEUROLOGY

## 2022-07-29 PROCEDURE — 99232 SBSQ HOSP IP/OBS MODERATE 35: CPT | Performed by: PSYCHIATRY & NEUROLOGY

## 2022-07-29 PROCEDURE — 6370000000 HC RX 637 (ALT 250 FOR IP)

## 2022-07-29 PROCEDURE — APPSS30 APP SPLIT SHARED TIME 16-30 MINUTES

## 2022-07-29 PROCEDURE — 1240000000 HC EMOTIONAL WELLNESS R&B

## 2022-07-29 RX ORDER — MIRTAZAPINE 15 MG/1
15 TABLET, FILM COATED ORAL NIGHTLY
Qty: 30 TABLET | Refills: 3 | Status: SHIPPED | OUTPATIENT
Start: 2022-07-29

## 2022-07-29 RX ORDER — TRAZODONE HYDROCHLORIDE 50 MG/1
50 TABLET ORAL NIGHTLY PRN
Qty: 30 TABLET | Refills: 0 | Status: SHIPPED | OUTPATIENT
Start: 2022-07-29

## 2022-07-29 RX ORDER — THIAMINE MONONITRATE (VIT B1) 100 MG
100 TABLET ORAL DAILY
Qty: 30 TABLET | Refills: 0 | Status: SHIPPED | OUTPATIENT
Start: 2022-07-29

## 2022-07-29 RX ORDER — ASPIRIN 81 MG/1
81 TABLET ORAL DAILY
Qty: 30 TABLET | Refills: 0 | Status: SHIPPED | OUTPATIENT
Start: 2022-07-29

## 2022-07-29 RX ORDER — FOLIC ACID 1 MG/1
1 TABLET ORAL DAILY
Qty: 30 TABLET | Refills: 0 | Status: SHIPPED | OUTPATIENT
Start: 2022-07-29

## 2022-07-29 RX ORDER — HYDROXYZINE 50 MG/1
50 TABLET, FILM COATED ORAL 3 TIMES DAILY PRN
Qty: 30 TABLET | Refills: 0 | Status: SHIPPED | OUTPATIENT
Start: 2022-07-29 | End: 2022-08-13

## 2022-07-29 RX ORDER — ATORVASTATIN CALCIUM 80 MG/1
80 TABLET, FILM COATED ORAL NIGHTLY
Qty: 30 TABLET | Refills: 0 | Status: SHIPPED | OUTPATIENT
Start: 2022-07-29 | End: 2022-08-28

## 2022-07-29 RX ORDER — VENLAFAXINE HYDROCHLORIDE 150 MG/1
137.5 CAPSULE, EXTENDED RELEASE ORAL
Qty: 30 CAPSULE | Refills: 0 | Status: SHIPPED | OUTPATIENT
Start: 2022-07-30

## 2022-07-29 RX ADMIN — TRAZODONE HYDROCHLORIDE 50 MG: 50 TABLET ORAL at 22:00

## 2022-07-29 RX ADMIN — ASPIRIN 81 MG: 81 TABLET, COATED ORAL at 08:14

## 2022-07-29 RX ADMIN — HYDROXYZINE HYDROCHLORIDE 50 MG: 50 TABLET, FILM COATED ORAL at 22:00

## 2022-07-29 RX ADMIN — IBUPROFEN 400 MG: 400 TABLET ORAL at 22:00

## 2022-07-29 RX ADMIN — THIAMINE HCL TAB 100 MG 100 MG: 100 TAB at 08:14

## 2022-07-29 RX ADMIN — MIRTAZAPINE 15 MG: 15 TABLET, FILM COATED ORAL at 22:00

## 2022-07-29 RX ADMIN — ATORVASTATIN CALCIUM 80 MG: 80 TABLET, FILM COATED ORAL at 22:00

## 2022-07-29 RX ADMIN — FOLIC ACID 1 MG: 1 TABLET ORAL at 08:14

## 2022-07-29 RX ADMIN — NICOTINE POLACRILEX 2 MG: 4 GUM, CHEWING BUCCAL at 14:52

## 2022-07-29 RX ADMIN — VENLAFAXINE HYDROCHLORIDE 150 MG: 150 CAPSULE, EXTENDED RELEASE ORAL at 08:14

## 2022-07-29 ASSESSMENT — PAIN SCALES - GENERAL: PAINLEVEL_OUTOF10: 0

## 2022-07-29 NOTE — PLAN OF CARE
Problem: Pain  Goal: Verbalizes/displays adequate comfort level or baseline comfort level  Outcome: Progressing  Flowsheets (Taken 7/29/2022 1416)  Verbalizes/displays adequate comfort level or baseline comfort level:   Encourage patient to monitor pain and request assistance   Implement non-pharmacological measures as appropriate and evaluate response     Problem: Anxiety  Goal: Will report anxiety at manageable levels  Description: INTERVENTIONS:  1. Administer medication as ordered  2. Teach and rehearse alternative coping skills  3. Provide emotional support with 1:1 interaction with staff  Outcome: Progressing  Flowsheets (Taken 7/29/2022 1416)  Will report anxiety at manageable levels:   Administer medication as ordered   Teach and rehearse alternative coping skills     Problem: Coping  Goal: Pt/Family able to verbalize concerns and demonstrate effective coping strategies  Description: INTERVENTIONS:  1. Assist patient/family to identify coping skills, available support systems and cultural and spiritual values  2. Provide emotional support, including active listening and acknowledgement of concerns of patient and caregivers  3. Reduce environmental stimuli, as able  4. Instruct patient/family in relaxation techniques, as appropriate  5.  Assess for spiritual pain/suffering and initiate Spiritual Care, Psychosocial Clinical Specialist consults as needed  Outcome: Progressing  Flowsheets (Taken 7/29/2022 1416)  Patient/family able to verbalize anxieties, fears, and concerns, and demonstrate effective coping:   Assist patient/family to identify coping skills, available support systems and cultural and spiritual values   Provide emotional support, including active listening and acknowledgement of concerns of patient and caregivers

## 2022-07-29 NOTE — GROUP NOTE
Group Therapy Note    Date: 7/29/2022    Group Start Time: 1100  Group End Time: 1140  Group Topic: Psychoeducation    VIVIANA Lozada    Psych-Ed/Relapse Prevention Group Note        Date: July 29, 2022 Start Time: 11am  End Time: 11:30 am      Number of Participants in Group & Unit Census:  9    Topic: Discussion     Goal of Group:Patient will improve interpersonal interaction and communication. Comments:     Patient did not participate in Psych-Ed/Relapse Prevention group, despite staff encouragement and explanation of benefits. Patient remain seclusive to self. Q15 minute safety checks maintained for patient safety and will continue to encourage patient to attend unit programming.             Signature:  Ruma Argueta [place]

## 2022-07-29 NOTE — PLAN OF CARE
Problem: Anxiety  Goal: Will report anxiety at manageable levels  Description: INTERVENTIONS:  1. Administer medication as ordered  2. Teach and rehearse alternative coping skills  3. Provide emotional support with 1:1 interaction with staff  7/28/2022 2340 by [address]  Outcome: Progressing  7/28/2022 1246 by Adrianne Kevin LPN  Outcome: Progressing   Patient has had a decrease in anxiety and reports that reading helps. Problem: Self Harm/Suicidality  Goal: Will have no self-injury during hospital stay  Description: INTERVENTIONS:  1. Q 30 MINUTES: Routine safety checks  2. Q SHIFT & PRN: Assess risk to determine if routine checks are adequate to maintain patient safety  7/28/2022 2340 by [address]  Outcome: Progressing  7/28/2022 1246 by Adrianne Kevin LPN  Outcome: Progressing   Patient has had no self-harm thoughts or actions  Problem: Coping  Goal: Pt/Family able to verbalize concerns and demonstrate effective coping strategies  Description: INTERVENTIONS:  1. Assist patient/family to identify coping skills, available support systems and cultural and spiritual values  2. Provide emotional support, including active listening and acknowledgement of concerns of patient and caregivers  3. Reduce environmental stimuli, as able  4. Instruct patient/family in relaxation techniques, as appropriate  5.  Assess for spiritual pain/suffering and initiate Spiritual Care, Psychosocial Clinical Specialist consults as needed  7/28/2022 2340 by [address]  Outcome: Progressing  7/28/2022 1246 by Adrianne Kevin LPN  Outcome: Progressing   Patient has been reading and attending programming

## 2022-07-29 NOTE — DISCHARGE INSTRUCTIONS
Information:  Medications:   Medication summary provided   I understand that I should take only the medications on my list.     -why and when I need to take each medicine.     -which side effects to watch for.     -that I should carry my medication information at all times in case of     Emergency situations. I will take all of my medicines to follow up appointments.     -check with my physician or pharmacist before taking any new    Medication, over the counter product or drink alcohol.    -Ask about food, drug or dietary supplement interactions.    -discard old lists and update records with medication providers. Notify Physician:  Notify physician if you notice:   Always call 911 if you feel your life is in danger  In case of an emergency call 911 immediately! If 911 is not available call your local emergency medical system for help    Behavioral Health Follow Up:  Original Referral Source:[facility]  Discharge Diagnosis: Suicidal ideation [R45.851]  Depression with suicidal ideation [F32. A, R45.851]  Recommendations for Level of Care: Follow up with [facility], take medications as prescribed   Patient status at discharge: Stable  My hospital  was: [facility]  Aftercare plan faxed: Yes   -faxed by: Staff   -date: 7/30/2022   -time: 1400  Prescriptions: [facility]    Smoking: Quit Smoking. Call the [facility]'s smoking quitline at 0-203-12C-QUIT  Know the signs of a heart attack   If you have any of the following symptoms call 911 immediately, do not wait more    Than five minutes. 1. Pressure, fullness and/ or squeezing in the center of the chest spreading to    The jaw, neck or shoulder. 2. Chest discomfort with light headedness, fainting, sweating, nausea or    Shortness of breath. 3. Upper abdominal pressure or discomfort. 4. Lower chest pain, back pain, unusual fatigue, shortness of breath, nausea   Or dizziness.      General Information:   Questions regarding your bill: Call HELP program [phone]     Suicide Hotline (Rescue Crisis) (642) 869-3034     Recovery Help line- 340.488.7940      To obtain results of pending studies call Medical Records at: [phone]     For emergencies and 24 hour/7 days a week contact information:  Yesenia Danicapenny los síntomas de COVID-19 y la gripe  Learning About COVID-19 and Flu Symptoms  ¿Cómo se puede diferenciar COVID-19 de la gripe? COVID-19 y la gripe tienen síntomas similares. Pueden ser difíciles de diferenciar. La única manera de saber con certeza qué enfermedad tiene es hacerse lio prueba. Si tiene [employer] de detección de COVID-19, pregúntele al médico o visite cdc.gov para usar la herramienta dedetección viral de COVID-19. Dado que los síntomas son tan parecidos, tiene sentido actuar sujata si tuviera COVID-19 hasta que [employer] de la prueba. [place] significa quedarse en casa y limitar el contacto con otras personas en parrish hogar. Deberá lavarse las yoel con frecuencia y desinfectar las superficies que toque. Y asegúrese de usar lio mascarilla cuando esté cerca de otras personas. Estos también sonbuenos consejos si poncho que tiene gripe. COVID-19 y la gripe tienen estos síntomas en común: [place]  Tos  Falta de aire  Fatiga (cansancio)  Dolor de garganta  Congestión o goteo nasal  Francisca musculares y corporales  Dolor de cece  Vómito y diarrea (más común en niños que en adultos)  COVID-19 tiene otro síntoma que también puede presentarse:  Nueva pérdida del gusto o el olfato  Los síntomas de COVID-19 pueden manifestarse entre 2 y 15 lizzy después de [facility]. Los síntomas de la gripe suelen presentarse entre 1 y 3 días después de lainfección. ¿Por qué debería vacunarse contra la gripe troy la pandemia de COVID-19? Es importante ponerse la vacuna anual contra la gripe. Tanto la gripe sujata COVID-19 pueden estar activos al [facility].  Ambas infecciones pueden enfermarlo

## 2022-07-29 NOTE — PROGRESS NOTES
CLINICAL PHARMACY NOTE: MEDS TO BEDS    Total # of Prescriptions Filled: 3   The following medications were delivered to the patient:  Venlafaxine HCL ER 150mg  Trazodone HCL 50mg  Mirtazapine 15mg    Additional Documentation:  Delivered medications to nurses station

## 2022-07-29 NOTE — PROGRESS NOTES
Daily Progress Note  7/29/2022    Patient Name: Addie Woody    CHIEF COMPLAINT: Suicidal ideation         SUBJECTIVE:      Patient is seen today for a follow up assessment. Marilyn Ramos is compliant with scheduled Remeron and Effexor. He has not required emergency medications in the past 24 hours. Marilyn Ramos is agreeable to assessment at bedside today where he is reading. Prior to this he was engaging in group. He continues to endorse depression and anxiety however reports both are improved today. He reports that he slept well last night and continues to feel well rested today. He denies any issues with his appetite. Marilyn Ramos reports improvement in suicidal ideation. He denies homicidal ideation. He reports improvement in feelings of hopelessness and helplessness. He denies auditory and visual hallucinations. He denies paranoia. He denies any medication side effects or other medical concerns at this time. Marilyn Ramos reports that he plans to go to his friend's house upon discharge. He has no interest in going to substance use rehabilitation for his alcohol use. Appetite:  [x] Normal/Adequate/Unchanged  [] Increased  [] Decreased      Sleep:       [x] Normal/Adequate/Unchanged  [] Fair  [] Poor      Group Attendance on Unit:   [x] Yes  [] Selectively    [] No    Medication Side Effects:  Patient denies any medication side effects at the time of assessment. Mental Status Exam  Level of consciousness: Alert and awake. Appearance: Appropriate attire for setting, seated on bed, with fair  grooming and hygiene. Behavior/Motor: Approachable, calm  Attitude toward examiner: Cooperative, attentive, good eye contact. Speech: Normal rate, normal volume, normal tone. Mood:  Patient reports \"getting better everyday\". Affect: Anxious  Thought processes: Linear and coherent. Thought content: Denies homicidal ideation.   Suicidal Ideation: Reports improvement in suicidal ideations  Delusions: No evidence of delusions. Denies paranoia. Perceptual Disturbance: Patient does not appear to be responding to internal stimuli. Denies auditory hallucinations. Denies visual hallucinations. Cognition: Oriented to self, location, time, and situation. Memory: Intact. Insight & Judgement: Poor. Data   height is 5' 11\" (1.803 m) and weight is 182 lb (82.6 kg). His oral temperature is 97.3 °F (36.3 °C). His blood pressure is 107/67 and his pulse is 61. His respiration is 14 and oxygen saturation is 98%.    Labs:   Admission on 07/22/2022   Component Date Value Ref Range Status    WBC 07/22/2022 4.2  3.5 - 11.0 k/uL Final    RBC 07/22/2022 4.59  4.5 - 5.9 m/uL Final    Hemoglobin 07/22/2022 14.0  13.5 - 17.5 g/dL Final    Hematocrit 07/22/2022 42.4  41 - 53 % Final    MCV 07/22/2022 92.4  80 - 100 fL Final    MCH 07/22/2022 30.6  26 - 34 pg Final    MCHC 07/22/2022 33.1  31 - 37 g/dL Final    RDW 07/22/2022 15.2 (A) 11.5 - 14.9 % Final    Platelets 82/06/5202 288  150 - 450 k/uL Final    MPV 07/22/2022 6.5  6.0 - 12.0 fL Final    Seg Neutrophils 07/22/2022 47  36 - 66 % Final    Lymphocytes 07/22/2022 41  24 - 44 % Final    Monocytes 07/22/2022 10 (A) 1 - 7 % Final    Eosinophils % 07/22/2022 1  0 - 4 % Final    Basophils 07/22/2022 1  0 - 2 % Final    Segs Absolute 07/22/2022 2.00  1.3 - 9.1 k/uL Final    Absolute Lymph # 07/22/2022 1.70  1.0 - 4.8 k/uL Final    Absolute Mono # 07/22/2022 0.40  0.1 - 1.3 k/uL Final    Absolute Eos # 07/22/2022 0.10  0.0 - 0.4 k/uL Final    Basophils Absolute 07/22/2022 0.00  0.0 - 0.2 k/uL Final    Glucose 07/22/2022 95  70 - 99 mg/dL Final    BUN 07/22/2022 8  6 - 20 mg/dL Final    Creatinine 07/22/2022 0.55 (A) 0.70 - 1.20 mg/dL Final    Calcium 07/22/2022 8.7  8.6 - 10.4 mg/dL Final    Sodium 07/22/2022 139  135 - 144 mmol/L Final    Potassium 07/22/2022 3.8  3.7 - 5.3 mmol/L Final    Chloride 07/22/2022 105  98 - 107 mmol/L Final    CO2 07/22/2022 22  20 - 31 mmol/L Final    Anion Gap 07/22/2022 12  9 - 17 mmol/L Final    Alkaline Phosphatase 07/22/2022 56  40 - 129 U/L Final    ALT 07/22/2022 37  5 - 41 U/L Final    AST 07/22/2022 42 (A) <40 U/L Final    Total Bilirubin 07/22/2022 0.22 (A) 0.3 - 1.2 mg/dL Final    Total Protein 07/22/2022 6.6  6.4 - 8.3 g/dL Final    Albumin 07/22/2022 4.0  3.5 - 5.2 g/dL Final    GFR Non- 07/22/2022 >60  >60 mL/min Final    GFR  07/22/2022 >60  >60 mL/min Final    GFR Comment 07/22/2022        Final    Comment: Average GFR for 52-63 years old:   80 mL/min/1.73sq m  Chronic Kidney Disease:   <60 mL/min/1.73sq m  Kidney failure:   <15 mL/min/1.73sq m              eGFR calculated using average adult body mass. Additional eGFR calculator available at:        [employer]            Ethanol 07/22/2022 72 (A) <10 mg/dL Final    Ethanol percent 07/22/2022 0.072  % Final         Reviewed patient's current plan of care and vital signs with nursing staff.     Labs reviewed: [x] Yes  Last EKG in EMR reviewed: [x] Yes  Qtc: 442    Medications  Current Facility-Administered Medications: venlafaxine (EFFEXOR XR) extended release capsule 150 mg, 150 mg, Oral, Daily with breakfast  mirtazapine (REMERON) tablet 15 mg, 15 mg, Oral, Nightly  acetaminophen (TYLENOL) tablet 650 mg, 650 mg, Oral, Q4H PRN  aluminum & magnesium hydroxide-simethicone (MAALOX) 200-200-20 MG/5ML suspension 30 mL, 30 mL, Oral, Q6H PRN  hydrOXYzine HCl (ATARAX) tablet 50 mg, 50 mg, Oral, TID PRN  ibuprofen (ADVIL;MOTRIN) tablet 400 mg, 400 mg, Oral, Q6H PRN  nicotine polacrilex (NICORETTE) gum 2 mg, 2 mg, Oral, PRN  polyethylene glycol (GLYCOLAX) packet 17 g, 17 g, Oral, Daily PRN  traZODone (DESYREL) tablet 50 mg, 50 mg, Oral, Nightly PRN  aspirin EC tablet 81 mg, 81 mg, Oral, Daily  atorvastatin (LIPITOR) tablet 80 mg, 80 mg, Oral, Nightly  folic acid (FOLVITE) tablet 1 mg, 1 mg, Oral, Daily  thiamine mononitrate tablet 100 mg, 100 mg,

## 2022-07-29 NOTE — GROUP NOTE
Group Therapy Note    Date: 7/29/2022    Group Start Time: 1400  Group End Time: 1500  Group Topic: Psychoeducation    VIVIANA Iqbal        Group Therapy Note    Attendees: 8       Patient's Goal:  Patient will identify benefits of music for stress management and positive coping. Notes:  Patient attended group and participated. Patient was pleasant and friendly with peers. Patient was engaged and active with activity and interaction with peers. Patient was appreciative of group and peers. Patient had brighter affect. Status After Intervention:  Improved    Participation Level:  Active Listener and Interactive    Participation Quality: Appropriate, Attentive, Sharing, and Supportive      Speech:  normal      Thought Process/Content: Logical  Linear      Affective Functioning: Congruent      Mood: euthymic      Level of consciousness:  Attentive      Response to Learning: Able to verbalize current knowledge/experience, Able to verbalize/acknowledge new learning, Capable of insight, and Progressing to goal      Endings: None Reported    Modes of Intervention: Support, Socialization, Clarifying, and Activity      Discipline Responsible: Psychoeducational Specialist      Signature:  VIVIANA Devlin

## 2022-07-29 NOTE — GROUP NOTE
Group Therapy Note    Date: 7/28/2022    Group Start Time: 2050  Group End Time: 2135  Group Topic: Wrap-Up    [facility]al        Group Therapy Note    Attendees 10/15 patients for positive focused sharing/goals group           Notes:  good participation    Status After Intervention:  Unchanged    Participation Level: Interactive    Participation Quality: Appropriate      Speech:  normal      Thought Process/Content: Logical      Affective Functioning: Blunted      Mood: depressed      Level of consciousness:  Alert      Response to Learning: Able to verbalize current knowledge/experience      Endings: None Reported    Modes of Intervention: Support      Discipline Responsible: Behavorial Health Tech      Signature:  Brandi Guerrier

## 2022-07-30 VITALS
DIASTOLIC BLOOD PRESSURE: 82 MMHG | WEIGHT: 182 LBS | HEIGHT: 71 IN | RESPIRATION RATE: 14 BRPM | OXYGEN SATURATION: 98 % | BODY MASS INDEX: 25.48 KG/M2 | HEART RATE: 74 BPM | TEMPERATURE: 97.9 F | SYSTOLIC BLOOD PRESSURE: 123 MMHG

## 2022-07-30 PROCEDURE — 6370000000 HC RX 637 (ALT 250 FOR IP): Performed by: PSYCHIATRY & NEUROLOGY

## 2022-07-30 PROCEDURE — 6370000000 HC RX 637 (ALT 250 FOR IP)

## 2022-07-30 PROCEDURE — 99239 HOSP IP/OBS DSCHRG MGMT >30: CPT | Performed by: PSYCHIATRY & NEUROLOGY

## 2022-07-30 RX ADMIN — HYDROXYZINE HYDROCHLORIDE 50 MG: 50 TABLET, FILM COATED ORAL at 12:08

## 2022-07-30 RX ADMIN — VENLAFAXINE HYDROCHLORIDE 150 MG: 150 CAPSULE, EXTENDED RELEASE ORAL at 07:58

## 2022-07-30 RX ADMIN — ASPIRIN 81 MG: 81 TABLET, COATED ORAL at 07:58

## 2022-07-30 RX ADMIN — FOLIC ACID 1 MG: 1 TABLET ORAL at 07:58

## 2022-07-30 RX ADMIN — THIAMINE HCL TAB 100 MG 100 MG: 100 TAB at 07:58

## 2022-07-30 ASSESSMENT — LIFESTYLE VARIABLES
HOW OFTEN DO YOU HAVE A DRINK CONTAINING ALCOHOL: NEVER
HOW MANY STANDARD DRINKS CONTAINING ALCOHOL DO YOU HAVE ON A TYPICAL DAY: PATIENT DECLINED

## 2022-07-30 NOTE — BH NOTE
required if patient doesn't refuse):            ( ) Recognizing danger situations (included triggers and roadblocks)                    ( ) Coping skills (new ways to manage stress,relaxation techniques, changing routine, distraction)                                                           ( ) Basic information about quitting (benefits of quitting, techniques in how to quit, available resources  ( ) Referral for counseling faxed to [facility]                                                                                                                   ( ) Patient refused counseling  ( ) Patient refused referral  ( ) Patient refused prescription upon discharge  ( ) Patient has not smoked in the last 30 days    Metabolic Screening:    Lab Results   Component Value Date    LABA1C 5.7 09/26/2021       Lab Results   Component Value Date    CHOL 157 09/26/2021    CHOL 179 08/19/2020     Lab Results   Component Value Date    TRIG 43 09/26/2021    TRIG 118 08/19/2020     Lab Results   Component Value Date    HDL 80 09/26/2021    HDL 66 08/19/2020     No components found for: LDLCAL  No results found for: Calvin Wilson LPN

## 2022-07-30 NOTE — BH NOTE
Patient given tobacco quitline number [phone] at this time, refusing to call at this time, states \" I just dont want to quit now\"- patient given information as to the dangers of long term tobacco use. Continue to reinforce the importance of tobacco cessation.

## 2022-07-30 NOTE — GROUP NOTE
Group Therapy Note    Date: 7/29/2022    Group Start Time: 2010  Group End Time: 2040  Group Topic: Wrap-Up    SHANNAN Almaraz        Group Therapy Note    Attendees: 7/15           Status After Intervention:  Improved    Participation Level:  Active Listener    Participation Quality: Appropriate      Speech:  normal      Thought Process/Content: Logical      Affective Functioning: Congruent      Mood: elevated      Level of consciousness:  Alert      Response to Learning: Able to verbalize current knowledge/experience      Endings: None Reported    Modes of Intervention: Socialization      Discipline Responsible: Behavorial Health Tech      Signature:  Layla Gentile

## 2022-07-30 NOTE — DISCHARGE INSTR - DIET

## 2022-07-30 NOTE — GROUP NOTE
Group Therapy Note    Date: 7/30/2022    Group Start Time: 0900  Group End Time: 0915  Group Topic: Group Documentation    [facility]    Aníbal Higgins LPN        Group Therapy Note    Attendees: 3/14       Patient's Goal:  Continue with treatment plan    Notes:  Morning Goal Group    Status After Intervention:  Improved    Participation Level:  Active Listener and Interactive    Participation Quality: Appropriate, Attentive, and Sharing      Speech:  normal      Thought Process/Content: Logical      Affective Functioning: Congruent      Mood: elevated      Level of consciousness:  Alert and Oriented x4      Response to Learning: Able to verbalize current knowledge/experience, Able to verbalize/acknowledge new learning, Able to retain information, Capable of insight, Able to change behavior, and Progressing to goal      Endings: None Reported    Modes of Intervention: Socialization      Discipline Responsible: Licensed Practical Nurse      Signature:  Aníbal Higgins LPN

## 2022-07-30 NOTE — DISCHARGE SUMMARY
Provider Discharge Summary     Patient ID:  Julio Lemons  [id number]  36 y.o.  1965    Admit date: 7/22/2022    Discharge date and time: 7/30/2022  10:27 AM     Admitting Physician: Mayuri Le MD     Discharge Physician: Erick Campuzano MD    Admission Diagnoses: Suicidal ideation [R45.851]  Depression with suicidal ideation [F32. A, R45.851]    Discharge Diagnoses:      Major depressive disorder, recurrent severe without psychotic features [facility])     Patient Active Problem List   Diagnosis Code    Major depression, recurrent ([facility] F33.9    Major depressive disorder, recurrent severe without psychotic features [facility] F33.2    TIA (transient ischemic attack) G45.9    Left leg weakness R29.898    Depression with suicidal ideation F32. A, R45.851    Cocaine abuse, episodic ([facility]) F14.10    Alcohol abuse, episodic E56.52    Acute alcoholic intoxication with complication ([facility] Y40.584    Abuse of smoked substance ([facility] F18.10    Suicidal ideation R45.851        Admission Condition: poor    Discharged Condition: stable    Indication for Admission: threat to self    History of Present Illnes (present tense wording is of findings from admission exam and are not necessarily indicative of current findings):   Julio Lemons is a 65 y.o. male who has a past medical history of depression, anxiety, alcohol use disorder, hypertension, transient ischemic attack, and psoriasis. Patient presented to the ED escorted by police after he was [place] and citizens were very concerned about his safety. Per emergency department documentation patient is a 65year old  male who presented to ED via [address] for a mental health evaluation. Bystanders allegedly called 911 after they observed the patient sitting next to a body of water in the park, sobbing. Patient was found by police sitting by a body of water with the intention of ending his life by drowning.   Patient reports he cannot deal with the recent break up between him and long time girlfriend. Patient was recently discharged from [facility] for same concerns. Patient extremely tearful upon entry into [facility]. Patient presented to ED with 3 box cutters in his belongings as well as his prescription medications. At diagnostic assessment patient is extremely tearful he states \"I just cannot do this anymore \". He continues to struggle with a break-up from his girlfriend Gonzalo Palafox. He explains she has been in his life for more than 8 years and they love each other and yet their relationship has fallen apart. He denies that it is related to his alcohol use. He does endorse intoxication yesterday after donating plasma stating \"I finally had some money\" I did \". He continues to endorse suicidal ideation and feels helpless and hopeless and explains there is no reason to go on living. He confirms that his plan was to drown himself. He explains that the police took him to alf due to his depression and desire to end his own life he states \"I felt like a dog \". He is very upset that he currently has a court date due to this incident. He does confirm that he has followed up with his appointment at [facility] and was recently prescribed a new medication that he is not able to remember the name. He denies that he has been drinking daily since his discharge from hospitalization last week. He is not concerned about alcohol withdrawal.  At this time he is contemplating transitioning to alcohol residential treatment program.  Today he reports his symptoms of depression are 10+ on a 0-10 scale with 10 being the worst.  He reports low mood, difficulty with sleep significant anhedonia with feelings of helplessness and hopelessness. He perseverates on his relationship that has recently dissolved. He has no motivation, difficulty with concentration and poor appetite.   He reports the symptoms have been present every day almost all day for more than a 2-week timeframe and is unable to confirm that his medications are working. Per historical documentation he has utilized Lexapro since 2005 and consistently takes his medications. Patient does endorse symptoms of anxiety including restlessness, muscle tension and fatigue. He rates his anxiety at 9/10 utilizing the same scale noted above. He denies experiencing panic attacks recently however historically has had isolated symptoms. He denies symptoms of mariam including increased goal-directed activity with decreased need for sleep, elevated mood or rapid speech. He denies auditory or visual hallucinations. He denies receiving messages from the media or having concerns that people are watching or talking about him. He denies having magical dowd. Patient denies intrusive or persistent thoughts that are relieved by repetitive behaviors. He denies a history of trauma reporting that he grew up in a traditional family and always felt supported. He denies having a relationship with family members any longer stating \"we just do not stay in touch \". He denies a history of incarceration prior to noted event above. He denies aggression or violence towards others. Mr. Joseph Guo continues to endorse significant suicidal ideation and has no reason to go on living. He is not able to contract for safety in the community. He reports he was staying at a shelter and was asked to leave and then went to his ex-girlfriend's home yesterday, leaving her a note that he could not live any longer without having her in his life. Patient is not able to articulate details regarding his medications however after examining inventory of pill bottles in his belongings he has been taking his vitamins as well as Lexapro 10 mg daily and a new prescription of Wellbutrin  mg daily that was prescribed on July 18, 2022 when he went to his appointment at [facility].   Hospital Course:   Upon admission, Elma Yang was provided a safe (DESYREL) 50 MG tablet Take 1 tablet by mouth nightly as needed for Sleep  Qty: 30 tablet, Refills: 0           STOP taking these medications       buPROPion (WELLBUTRIN XL) 150 MG extended release tablet Comments:   Reason for Stopping:         escitalopram (LEXAPRO) 10 MG tablet Comments:   Reason for Stopping:                Core Measures statement:   Not applicable    Discharge Exam:  Level of consciousness:  Within normal limits  Appearance: Street clothes, seated, with good grooming  Behavior/Motor: No abnormalities noted  Attitude toward examiner:  Cooperative, attentive, good eye contact  Speech:  spontaneous, normal rate, normal volume and well articulated  Mood:  euthymic  Affect:  Full range  Thought processes:  linear, goal directed and coherent  Thought content:  denies homicidal ideation  Suicidal Ideation:  denies suicidal ideation  Delusions:  no evidence of delusions  Perceptual Disturbance:  denies any perceptual disturbance  Cognition:  Intact  Memory: age appropriate  Insight & Judgement: fair  Medication side effects: denies     Disposition: home    Patient Instructions: Activity: activity as tolerated  1. Patient instructed to take medications regularly and follow up with outpatient appointments. Follow-up as scheduled with [facility]       Signed:    Electronically signed by Marilynn Daniels MD on 7/30/22 at 10:27 AM EDT    Time Spent on discharge is more than 35 minutes in the examination, evaluation, counseling and review of medications and discharge plan.

## 2022-11-08 NOTE — ED PROVIDER NOTES
"SUBJECTIVE:   Austin is a 70 year old who presents for Preventive Visit.      Patient has been advised of split billing requirements and indicates understanding: Yes  Are you in the first 12 months of your Medicare coverage?  No    Healthy Habits:     In general, how would you rate your overall health?  Good    Frequency of exercise:  1 day/week    Duration of exercise:  Less than 15 minutes    Do you usually eat at least 4 servings of fruit and vegetables a day, include whole grains    & fiber and avoid regularly eating high fat or \"junk\" foods?  No    Taking medications regularly:  Yes    Medication side effects:  None    Ability to successfully perform activities of daily living:  No assistance needed    Home Safety:  No safety concerns identified    Hearing Impairment:  Difficulty following a conversation in a noisy restaurant or crowded room, need to ask people to speak up or repeat themselves and difficulty understanding soft or whispered speech    In the past 6 months, have you been bothered by leaking of urine?  No    In general, how would you rate your overall mental or emotional health?  Fair      PHQ-2 Total Score: 2    Additional concerns today:  No    Traveling to [place] with , one way ticket to [place].    Mother who is 92 just got out of the hospital, lives in [place].    Is planning to have conversation with mother about long term care.     Last filled Lorazepam in September, has #30 tablets remaining.    Effexor  mg daily.      Daughter, son in law and grand daughter live in the upstairs for the past 12 years.      Left neck swelling for years, no change in size or pain.        Do you feel safe in your environment? Yes    Have you ever done Advance Care Planning? (For example, a Health Directive, POLST, or a discussion with a medical provider or your loved ones about your wishes): Yes, advance care planning is on file.       Fall risk  Fallen 2 or more times in the past year?: No  Any " [address] [facility]     Emergency Department     Faculty Attestation    I performed a history and physical examination of the patient and discussed management with the resident. I reviewed the residents note and agree with the documented findings and plan of care. Any areas of disagreement are noted on the chart. I was personally present for the key portions of any procedures. I have documented in the chart those procedures where I was not present during the key portions. I have reviewed the emergency nurses triage note. I agree with the chief complaint, past medical history, past surgical history, allergies, medications, social and family history as documented unless otherwise noted below. For Physician Assistant/ Nurse Practitioner cases/documentation I have personally evaluated this patient and have completed at least one if not all key elements of the E/M (history, physical exam, and MDM). Additional findings are as noted. Patient presents with left leg weakness that started suddenly just prior to coming in. He says he was walking when he suddenly fell due to sudden weakness in the left leg. On arrival here, patient is unable to walk. He is able to lift both legs off of the bed. Sensation is intact. He does admit to drinking earlier in the evening but denies any other drug use. He denies any pain. He is alert and oriented and answering questions appropriately. As patient was unable to ambulate, stroke alert is called. Will get CT of the head as well as CTA of the head and neck and labs and await neurology recommendations.       Mago Cotton MD  Attending Emergency  Physician              Teo Mena MD  09/26/21 2352 fall with injury in the past year?: No    Cognitive Screening   1) Repeat 3 items (Leader, Season, Table)    2) Clock draw: NORMAL  3) 3 item recall: Recalls 2 objects   Results: NORMAL clock, 1-2 items recalled: COGNITIVE IMPAIRMENT LESS LIKELY    Mini-CogTM Copyright BRAULIO Anaya. Licensed by the author for use in [facility]; reprinted with permission (deb@[facility]). All rights reserved.      Do you have sleep apnea, excessive snoring or daytime drowsiness?: no    Reviewed and updated as needed this visit by clinical staff   Tobacco  Allergies  Meds  Problems             Reviewed and updated as needed this visit by Provider    Allergies  Meds  Problems            Social History     Tobacco Use     Smoking status: Former     Packs/day: 1.00     Years: 5.00     Pack years: 5.00     Types: Cigarettes     Start date: 1969     Quit date: 3/20/1974     Years since quittin.6     Smokeless tobacco: Never   Substance Use Topics     Alcohol use: Yes     Comment: About 3-4 a mounth         Alcohol Use 2022   Prescreen: >3 drinks/day or >7 drinks/week? No   Prescreen: >3 drinks/day or >7 drinks/week? -       Anxiety Follow-Up      How are you doing with your anxiety since your last visit? Worsened     Are you having other symptoms that might be associated with anxiety? No    Have you had a significant life event? OTHER: Will be flying to [place] to care for elderly mother.       Are you feeling depressed? Yes:  sometimes.    Do you have any concerns with your use of alcohol or other drugs? No    Social History     Tobacco Use     Smoking status: Former     Packs/day: 1.00     Years: 5.00     Pack years: 5.00     Types: Cigarettes     Start date: 1969     Quit date: 3/20/1974     Years since quittin.6     Smokeless tobacco: Never   Substance Use Topics     Alcohol use: Yes     Comment: About 3-4 a mount     Drug use: No     ELEAZAR-7 SCORE 2021   Total Score - - -   Total  Score - 5 (mild anxiety) 8 (mild anxiety)   Total Score 5 5 8     PHQ 11/4/2021 9/26/2022 11/8/2022   PHQ-9 Total Score 10 10 11   Q9: Thoughts of better off dead/self-harm past 2 weeks Not at all Several days Not at all   F/U: Thoughts of suicide or self-harm - No -   F/U: Safety concerns - No -     Last PHQ-9 11/8/2022   1.  Little interest or pleasure in doing things 3   2.  Feeling down, depressed, or hopeless 1   3.  Trouble falling or staying asleep, or sleeping too much 3   4.  Feeling tired or having little energy 1   5.  Poor appetite or overeating 1   6.  Feeling bad about yourself 1   7.  Trouble concentrating 1   8.  Moving slowly or restless 0   Q9: Thoughts of better off dead/self-harm past 2 weeks 0   PHQ-9 Total Score 11   Difficulty at work, home, or with people -   In the past two weeks have you had thoughts of suicide or self harm? -   Do you have concerns about your personal safety or the safety of others? -     ELEAZAR-7  11/8/2022   1. Feeling nervous, anxious, or on edge 1   2. Not being able to stop or control worrying 1   3. Worrying too much about different things 1   4. Trouble relaxing 3   5. Being so restless that it is hard to sit still 0   6. Becoming easily annoyed or irritable 2   7. Feeling afraid, as if something awful might happen 0   ELEAZAR-7 Total Score 8   If you checked any problems, how difficult have they made it for you to do your work, take care of things at home, or get along with other people? Somewhat difficult       Asthma Follow-Up    Was ACT completed today?    Yes    ACT Total Scores 11/7/2022   ACT TOTAL SCORE -   ASTHMA ER VISITS -   ASTHMA HOSPITALIZATIONS -   ACT TOTAL SCORE (Goal Greater than or Equal to 20) 23   In the past 12 months, how many times did you visit the emergency room for your asthma without being admitted to the hospital? 0   In the past 12 months, how many times were you hospitalized overnight because of your asthma? 0          How many days per week  do you miss taking your asthma controller medication?  I do not have an asthma controller medication    Please describe any recent triggers for your asthma: exercise or sports and Laughing and smoke.    Have you had any Emergency Room Visits, Urgent Care Visits, or Hospital Admissions since your last office visit?  No      Current providers sharing in care for this patient include:     Patient Care Team:  Dunia Desai APRN CNP as PCP - General (Nurse Practitioner - Family)  Dunia Desai APRN CNP as Assigned PCP  Sally Patrick PA-C as Physician Assistant (Dermatology)  Ryann Almanzar APRN CNP as Referring Physician (Nurse Practitioner - Family)  Sally Patrick PA-C as Assigned Surgical Provider    The following health maintenance items are reviewed in Epic and correct as of today:    Health Maintenance   Topic Date Due     ZOSTER IMMUNIZATION (1 of 2) Never done     Pneumococcal Vaccine: 65+ Years (2 - PCV) 01/08/2011     ASTHMA ACTION PLAN  10/03/2020     MAMMO SCREENING  10/03/2021     COVID-19 Vaccine (3 - Booster for Desiree series) 12/30/2021     INFLUENZA VACCINE (1) 09/01/2022     MEDICARE ANNUAL WELLNESS VISIT  11/04/2022     ASTHMA CONTROL TEST  05/08/2023     PHQ-9  05/08/2023     ANNUAL REVIEW OF HM ORDERS  11/08/2023     FALL RISK ASSESSMENT  11/08/2023     DTAP/TDAP/TD IMMUNIZATION (3 - Td or Tdap) 09/15/2026     LIPID  11/04/2026     ADVANCE CARE PLANNING  11/08/2027     COLORECTAL CANCER SCREENING  08/16/2029     DEXA  10/18/2034     HEPATITIS C SCREENING  Completed     DEPRESSION ACTION PLAN  Completed     IPV IMMUNIZATION  Aged Out     MENINGITIS IMMUNIZATION  Aged Out     BP Readings from Last 3 Encounters:   11/08/22 128/88   11/04/21 104/80   10/05/21 120/82    Wt Readings from Last 3 Encounters:   11/08/22 83 kg (182 lb 14.4 oz)   11/04/21 78.5 kg (173 lb)   06/17/21 75.8 kg (167 lb)                  Patient Active Problem List   Diagnosis     Moderate recurrent major  depression (H)     Intermittent asthma     CARDIOVASCULAR SCREENING; LDL GOAL LESS THAN 160     Advanced directives, counseling/discussion     Anxiety     Vitamin D deficiency     Overflow diarrhea     Celiac disease     Age-related osteoporosis without current pathological fracture     Past Surgical History:   Procedure Laterality Date     ABDOMEN SURGERY      ? Enteritis as a child, possibly appendix     APPENDECTOMY       BIOPSY  10/05/21     COLONOSCOPY      Cant remember the date     GENITOURINARY SURGERY      Cant remember the date     HYSTERECTOMY TOTAL ABDOMINAL, BILATERAL SALPINGO-OOPHORECTOMY, COMBINED       OPEN REDUCTION INTERNAL FIXATION WRIST Left 2021    Left distal radius ORIF with Synthes volar plate (narrow plate with 3 proximal screw hole plate), Dr. Fahad Longoria, [facility]     TONSILLECTOMY         Social History     Tobacco Use     Smoking status: Former     Packs/day: 1.00     Years: 5.00     Pack years: 5.00     Types: Cigarettes     Start date: 1969     Quit date: 3/20/1974     Years since quittin.6     Smokeless tobacco: Never   Substance Use Topics     Alcohol use: Yes     Comment: About 3-4 a mounth     Family History   Problem Relation Age of Onset     Cardiovascular Mother      Hypertension Mother      Depression Mother      Cardiovascular Maternal Grandmother      Cancer Father         Kidney     Other Cancer Father      Myocardial Infarction Brother 60        2017     Cerebrovascular Disease Brother 63        2017     Mental Illness Brother      Diabetes Brother      Myocardial Infarction Brother      Hypertension Brother         Had bad stoke      Cerebrovascular Disease Brother      Asthma Brother      Diabetes Brother         Passed away at 60 Heart attack     Hypertension Brother      Other Cancer Maternal Grandfather          Current Outpatient Medications   Medication Sig Dispense Refill     albuterol (PROAIR HFA) 108 (90 Base) MCG/ACT inhaler Inhale  2 puffs into the lungs every 6 hours as needed for shortness of breath / dyspnea 2 Inhaler 1     calcium carbonate (OS-ZACH) 500 MG tablet Take 1 tablet (500 mg) by mouth 2 times daily 180 tablet 1     clobetasol (TEMOVATE) 0.05 % external ointment Apply to AA BID x 1-2 weeks then PRN only 60 g 3     hydrOXYzine (ATARAX) 10 MG tablet Take 1-3 tablets (10-30 mg) by mouth nightly as needed for anxiety (insomnia) 90 tablet 1     LORazepam (ATIVAN) 0.5 MG tablet TAKE 1/2 TO 1 TABLET BY MOUTH EVERY 8 HOURS AS NEEDED FOR ANXIETY 30 tablet 0     Omega-3 Fish Oil 500 MG capsule Take 1 capsule (500 mg) by mouth 2 times daily 180 capsule 3     omeprazole (PRILOSEC) 20 MG DR capsule Take 1 capsule (20 mg) by mouth 2 times daily 180 capsule 3     Probiotic Product (PROBIOTIC DAILY) CAPS Take 1 capsule by mouth daily 90 capsule 3     venlafaxine (EFFEXOR XR) 150 MG 24 hr capsule Take 1 capsule (150 mg) by mouth daily 90 capsule 0     Vitamin D3 (CHOLECALCIFEROL) 25 mcg (1000 units) tablet Take 1 tablet (25 mcg) by mouth daily 90 tablet 3         Breast CA Risk Assessment (FHS-7) 11/2/2021   Do you have a family history of breast, colon, or ovarian cancer? No / Unknown       Pertinent mammograms are reviewed under the imaging tab.    Review of Systems   Constitutional: Negative for chills and fever.   HENT: Negative for congestion, ear pain, hearing loss and sore throat.    Eyes: Positive for visual disturbance. Negative for pain.   Respiratory: Negative for cough and shortness of breath.    Cardiovascular: Negative for chest pain, palpitations and peripheral edema.   Gastrointestinal: Positive for heartburn. Negative for abdominal pain, constipation, diarrhea, hematochezia and nausea.   Breasts:  Negative for tenderness, breast mass and discharge.   Genitourinary: Negative for dysuria, frequency, genital sores, hematuria, pelvic pain, urgency, vaginal bleeding and vaginal discharge.   Musculoskeletal: Negative for arthralgias,  "joint swelling and myalgias.   Skin: Negative for rash.   Neurological: Positive for headaches. Negative for dizziness, weakness and paresthesias.   Psychiatric/Behavioral: Negative for mood changes. The patient is nervous/anxious.        OBJECTIVE:   /88   Pulse 83   Temp 98.1  F (36.7  C) (Tympanic)   Ht 1.549 m (5' 1\")   Wt 83 kg (182 lb 14.4 oz)   SpO2 99%   BMI 34.56 kg/m   Estimated body mass index is 34.56 kg/m  as calculated from the following:    Height as of this encounter: 1.549 m (5' 1\").    Weight as of this encounter: 83 kg (182 lb 14.4 oz).     Physical Exam     GENERAL: healthy, alert and no distress  EYES: Eyes grossly normal to inspection, PERRL and conjunctivae and sclerae normal  HENT: ear canals and TM's normal, nose and mouth without ulcers or lesions  NECK: no adenopathy, left lateral nape of neck with swelling, no erythema or ttp, masses, or scars and thyroid normal to palpation  RESP: lungs clear to auscultation - no rales, rhonchi or wheezes  CV: regular rate and rhythm, normal S1 S2, no S3 or S4, no murmur, click or rub, no peripheral edema  ABDOMEN: soft, nontender, no hepatosplenomegaly, no masses and bowel sounds normal  MS: no gross musculoskeletal defects noted, no edema  SKIN: no suspicious lesions or rashes  NEURO: Normal strength and tone, mentation intact and speech normal  PSYCH: mentation appears normal, affect normal/bright      ASSESSMENT / PLAN:     Austin was seen today for physical.    Diagnoses and all orders for this visit:    Encounter for Medicare annual wellness exam  -     REVIEW OF HEALTH MAINTENANCE PROTOCOL ORDERS    Screening for diabetes mellitus  -     Comprehensive metabolic panel (BMP + Alb, Alk Phos, ALT, AST, Total. Bili, TP)    Screening for lipid disorders  -     Lipid panel reflex to direct LDL Fasting    Screening for deficiency anemia  -     CBC with platelets    Encounter for screening mammogram for breast cancer  -     MA SCREENING " "DIGITAL BILAT - Future  (s+30); Future    Moderate recurrent major depression (H)  Anxiety  PHQ 11/4/2021 9/26/2022 11/8/2022   PHQ-9 Total Score 10 10 11   Q9: Thoughts of better off dead/self-harm past 2 weeks Not at all Several days Not at all   F/U: Thoughts of suicide or self-harm - No -   F/U: Safety concerns - No -     ELEAZAR-7 SCORE 11/4/2021 9/26/2022 11/8/2022   Total Score - - -   Total Score - 5 (mild anxiety) 8 (mild anxiety)   Total Score 5 5 8     Stable on Effexor  mg daily.  Intermittent use of Lorazepam.    -     venlafaxine (EFFEXOR XR) 150 MG 24 hr capsule; Take 1 capsule (150 mg) by mouth daily  -     hydrOXYzine (ATARAX) 10 MG tablet; Take 1-3 tablets (10-30 mg) by mouth nightly as needed for anxiety (insomnia)    Duodenal ulcer without hemorrhage or perforation and without obstruction  Stable on PPI  -     omeprazole (PRILOSEC) 20 MG DR capsule; Take 1 capsule (20 mg) by mouth 2 times daily    Vitamin D deficiency  -     Vitamin D3 (CHOLECALCIFEROL) 25 mcg (1000 units) tablet; Take 1 tablet (25 mcg) by mouth daily    Neck swelling  Plan further evaluation with ultrasound.    -     US Head Neck Soft Tissue; Future      COUNSELING:  Reviewed preventive health counseling, as reflected in patient instructions    Estimated body mass index is 34.56 kg/m  as calculated from the following:    Height as of this encounter: 1.549 m (5' 1\").    Weight as of this encounter: 83 kg (182 lb 14.4 oz).        She reports that she quit smoking about 48 years ago. Her smoking use included cigarettes. She started smoking about 53 years ago. She has a 5.00 pack-year smoking history. She has never used smokeless tobacco.      Appropriate preventive services were discussed with this patient, including applicable screening as appropriate for cardiovascular disease, diabetes, osteopenia/osteoporosis, and glaucoma.  As appropriate for age/gender, discussed screening for colorectal cancer, prostate cancer, breast " cancer, and cervical cancer. Checklist reviewing preventive services available has been given to the patient.    Reviewed patients plan of care and provided an AVS. The Basic Care Plan (routine screening as documented in Health Maintenance) for Austin meets the Care Plan requirement. This Care Plan has been established and reviewed with the Patient.    Counseling Resources:  ATP IV Guidelines  Pooled Cohorts Equation Calculator  Breast Cancer Risk Calculator  Breast Cancer: Medication to Reduce Risk  FRAX Risk Assessment  ICSI Preventive Guidelines  Dietary Guidelines for Americans, 2010  USDA's MyPlate  ASA Prophylaxis  Lung CA Screening    Dunia Desai, [facility]    Identified Health Risks:

## 2023-01-07 ENCOUNTER — HOSPITAL ENCOUNTER (EMERGENCY)
Age: 58
Discharge: HOME | End: 2023-01-07
Attending: EMERGENCY MEDICINE
Payer: MEDICAID

## 2023-01-07 ENCOUNTER — APPOINTMENT (OUTPATIENT)
Dept: GENERAL RADIOLOGY | Age: 58
End: 2023-01-07
Payer: MEDICAID

## 2023-01-07 VITALS
SYSTOLIC BLOOD PRESSURE: 110 MMHG | HEIGHT: 71 IN | DIASTOLIC BLOOD PRESSURE: 77 MMHG | HEART RATE: 81 BPM | OXYGEN SATURATION: 96 % | BODY MASS INDEX: 25.9 KG/M2 | WEIGHT: 185 LBS | RESPIRATION RATE: 18 BRPM | TEMPERATURE: 98.3 F

## 2023-01-07 DIAGNOSIS — S46.912A STRAIN OF LEFT SHOULDER, INITIAL ENCOUNTER: Primary | ICD-10-CM

## 2023-01-07 PROCEDURE — 93005 ELECTROCARDIOGRAM TRACING: CPT | Performed by: EMERGENCY MEDICINE

## 2023-01-07 PROCEDURE — 6360000002 HC RX W HCPCS: Performed by: STUDENT IN AN ORGANIZED HEALTH CARE EDUCATION/TRAINING PROGRAM

## 2023-01-07 PROCEDURE — 99284 EMERGENCY DEPT VISIT MOD MDM: CPT

## 2023-01-07 PROCEDURE — 96372 THER/PROPH/DIAG INJ SC/IM: CPT

## 2023-01-07 PROCEDURE — 6370000000 HC RX 637 (ALT 250 FOR IP): Performed by: STUDENT IN AN ORGANIZED HEALTH CARE EDUCATION/TRAINING PROGRAM

## 2023-01-07 PROCEDURE — 73030 X-RAY EXAM OF SHOULDER: CPT

## 2023-01-07 PROCEDURE — 96374 THER/PROPH/DIAG INJ IV PUSH: CPT

## 2023-01-07 RX ORDER — LIDOCAINE 4 G/G
1 PATCH TOPICAL DAILY
Status: DISCONTINUED | OUTPATIENT
Start: 2023-01-08 | End: 2023-01-07

## 2023-01-07 RX ORDER — LIDOCAINE 4 G/G
1 PATCH TOPICAL DAILY
Status: DISCONTINUED | OUTPATIENT
Start: 2023-01-07 | End: 2023-01-08 | Stop reason: HOSPADM

## 2023-01-07 RX ORDER — LIDOCAINE 50 MG/G
1 PATCH TOPICAL DAILY
Qty: 30 PATCH | Refills: 0 | Status: SHIPPED | OUTPATIENT
Start: 2023-01-07 | End: 2023-02-06

## 2023-01-07 RX ORDER — KETOROLAC TROMETHAMINE 30 MG/ML
30 INJECTION, SOLUTION INTRAMUSCULAR; INTRAVENOUS ONCE
Status: COMPLETED | OUTPATIENT
Start: 2023-01-07 | End: 2023-01-07

## 2023-01-07 RX ORDER — ACETAMINOPHEN 500 MG
1000 TABLET ORAL ONCE
Status: COMPLETED | OUTPATIENT
Start: 2023-01-07 | End: 2023-01-07

## 2023-01-07 RX ORDER — CYCLOBENZAPRINE HCL 10 MG
5 TABLET ORAL 3 TIMES DAILY PRN
Qty: 5 TABLET | Refills: 0 | Status: SHIPPED | OUTPATIENT
Start: 2023-01-07

## 2023-01-07 RX ORDER — ORPHENADRINE CITRATE 30 MG/ML
60 INJECTION INTRAMUSCULAR; INTRAVENOUS ONCE
Status: COMPLETED | OUTPATIENT
Start: 2023-01-07 | End: 2023-01-07

## 2023-01-07 RX ADMIN — KETOROLAC TROMETHAMINE 30 MG: 30 INJECTION, SOLUTION INTRAMUSCULAR; INTRAVENOUS at 22:39

## 2023-01-07 RX ADMIN — ORPHENADRINE CITRATE 60 MG: 30 INJECTION INTRAMUSCULAR; INTRAVENOUS at 22:40

## 2023-01-07 RX ADMIN — ACETAMINOPHEN 1000 MG: 500 TABLET ORAL at 22:38

## 2023-01-07 ASSESSMENT — ENCOUNTER SYMPTOMS
ABDOMINAL PAIN: 0
DIARRHEA: 0
RHINORRHEA: 0
CONSTIPATION: 0
VOMITING: 0
SHORTNESS OF BREATH: 0
NAUSEA: 0
BACK PAIN: 0
COUGH: 0

## 2023-01-07 ASSESSMENT — PAIN SCALES - GENERAL: PAINLEVEL_OUTOF10: 8

## 2023-01-08 NOTE — ED PROVIDER NOTES
[facility]  Emergency Department  Faculty Attestation     I performed a history and physical examination of the patient and discussed management with the resident. I reviewed the residents note and agree with the documented findings and plan of care. Any areas of disagreement are noted on the chart. I was personally present for the key portions of any procedures. I have documented in the chart those procedures where I was not present during the key portions. I have reviewed the emergency nurses triage note. I agree with the chief complaint, past medical history, past surgical history, allergies, medications, social and family history as documented unless otherwise noted below. For Physician Assistant/ Nurse Practitioner cases/documentation I have personally evaluated this patient and have completed at least one if not all key elements of the E/M (history, physical exam, and MDM). Additional findings are as noted. Primary Care Physician:  No primary care provider on file. Screenings:  [unfilled]    CHIEF COMPLAINT       Chief Complaint   Patient presents with    Shoulder Pain     Left shoulder pain with left arm numbness x 1-2 months. Pt denies any recent injury. Pt denies CP or SOB.        RECENT VITALS:   Temp: 98.3 °F (36.8 °C),  Heart Rate: 90, Resp: 18, BP: (!) 161/100    LABS:  Labs Reviewed - No data to display    Radiology  No orders to display         EKG:  EKG Interpretation    Interpreted by me    Rhythm: normal sinus   Rate: normal  Axis: normal  Ectopy: none  Conduction: normal  ST Segments: no acute change  T Waves: no acute change  Q Waves: none  Poor R wave progression anteriorly    Clinical Impression: no acute changes    Attending Physician Additional  Notes    Patient has left shoulder pain for the past 2 weeks after having a work-related injury while he was lifting a heavy spool of plastic and it strained his left shoulder, dropping shoulder down the arm. He has pain with abduction and external rotation. No neck injury. He has paresthesias over the lateral aspect of the proximal left arm. No strokelike symptoms. No symptoms in the left side of the face or left lower extremity. No chest pain shortness of breath. No fever chills or sweats. No direct trauma injury to the shoulder. He is not using analgesics. He admits to alcohol use. Past medical history is significant for depression, TIA, suicidal ideation, psoriasis, DJD, hypertension. On exam he is slightly uncomfortable but nontoxic hypertensive afebrile GCS 15. He has ready complexion and acne rosacea, superficial plaques of psoriasis noted on the extensor surfaces of both arms. Neck is supple nontender forage movement. No exacerbation of pain with neck maneuvers such as Spurling's. No worsening with pressing left to supra clavicular fossa. Clear. Card exam is benign. Face is symmetrical.  GCS 15. Normal pupils next ocular meds. Normal motor strength left lower extremity. Impression is left shoulder strain, consider early f as of capsulitis, rotator cuff injury. Plan is Tylenol and/or Motrin, consider muscle relaxants, ice and/or heat, sober ride. Leighann Dubon.  Los Aguilar MD, [facility]  Attending Emergency  Physician                Abby Hair MD  01/07/23 [id number]

## 2023-01-08 NOTE — ED NOTES
Sw asked by RN to arrange transportation for a hospital discharge. Pt going to [facility]. [facility] [employer] utilized.  Trip # [address], LSW  01/07/23 2436

## 2023-01-08 NOTE — ED NOTES
Pt resting comfortably in cit, reading the newspaper. No needs expressed at this time. Call light within reach. Will cont to monitor.       Lane Heart RN  01/07/23 [id number]

## 2023-01-08 NOTE — ED NOTES
SW used [employer] and [employer] after patient waited over three hours for ride through insurance and no one came.  Trip # [id number]     Michele Chatman, LSW  01/08/23 793 [facility],[address], W  01/08/23 [id number]

## 2023-01-08 NOTE — ED NOTES
Pt to room 19 from triage per hospital W/C with c/o left shoulder pain with numbness to left arm/hand. Pt reports s/s have been ongoing for the pain 1-2 months. Pt also c/o bilateral knee pain and right wrist pain. Pt reports heavy lifting at work, but denies any recent injury. Pt denies any chest pain, SOB, dizziness or nausea/vomiting. Pt placed on monitor, EKG performed at bedside, IV established and labs obtained. Dr. Ammon Mejias at bedside to evaluate pt.       Dorita Stark RN  01/07/23 [id number]

## 2023-01-08 NOTE — ED PROVIDER NOTES
[facility] ED  Emergency Department Encounter  Emergency Medicine Resident     Pt Jorge L Mijares  MRN: [id number]  [place] 1965  Date of evaluation: 1/7/23  PCP:  Tanya Flores MD      [address] [place] [address]       Chief Complaint   Patient presents with    Shoulder Pain     Left shoulder pain with left arm numbness x 1-2 months. Pt denies any recent injury. Pt denies CP or SOB. HISTORY OF PRESENT ILLNESS  (Location/Symptom, Timing/Onset, Context/Setting, Quality, Duration, Modifying Factors, Severity.)      Saulo Vasquez is a 65 y.o. male who presents with left shoulder pain. Patient states this is been ongoing for at least a month. He recalls lifting a large short plastic above his head at work and felt pain at that time. He has had issues ever since and now is complaining of increasing numbness in the left arm and hand. This is usually worse after work. He denies any chest pain or shortness of breath. He reports a few other recent injuries including falling after getting off a bus recently. He was evaluated at another hospital for this. He denies any headache or neck pain. No recent neck injury. Pain is primarily in the posterior shoulder. He has difficulty with full range of motion. PAST MEDICAL / SURGICAL / SOCIAL / FAMILY HISTORY      has a past medical history of Arthritis, Hypertension, Liver disease, Psoriasis, Psychiatric problem, and Seizures ([address] has a past surgical history that includes Tonsillectomy.     Social History     Socioeconomic History    Marital status: Unknown     Spouse name: Not on file    Number of children: Not on file    Years of education: Not on file    Highest education level: Not on file   Occupational History    Not on file   Tobacco Use    Smoking status: Every Day     Packs/day: 0.25     Years: 30.00     Pack years: 7.50     Types: Cigarettes     Start date: 8/18/1990    Smokeless tobacco: Never   Vaping Use    Vaping Use: Never used   Substance and Sexual Activity    Alcohol use: Yes     Comment: weekends    Drug use: Not Currently     Types: Cocaine     Comment: occasionally    Sexual activity: Not on file   Other Topics Concern    Not on file   Social History Narrative    Not on file     Social Determinants of Health     Financial Resource Strain: Not on file   Food Insecurity: Not on file   Transportation Needs: Not on file   Physical Activity: Not on file   Stress: Not on file   Social Connections: Not on file   Intimate Partner Violence: Not on file   Housing Stability: Not on file       No family history on file. Allergies:  Patient has no known allergies. Home Medications:  Prior to Admission medications    Medication Sig Start Date End Date Taking?  Authorizing Provider   cyclobenzaprine (FLEXERIL) 10 MG tablet Take 0.5 tablets by mouth 3 times daily as needed for Muscle spasms 1/7/23  Yes Rito Macdonald DO   lidocaine (LIDODERM) 5 % Place 1 patch onto the skin daily 12 hours on, 12 hours off. 1/7/23 2/6/23 Yes Rito Macdonald DO   aspirin 81 MG EC tablet Take 1 tablet by mouth in the morning. 7/29/22   Fred Maria MD   atorvastatin (LIPITOR) 80 MG tablet Take 1 tablet by mouth nightly 7/29/22 8/28/22  Fred Maria MD   folic acid (FOLVITE) 1 MG tablet Take 1 tablet by mouth in the morning. 7/29/22   Fred Maria MD   mirtazapine (REMERON) 15 MG tablet Take 1 tablet by mouth nightly 7/29/22   Fred Maria MD   vitamin B-1 (THIAMINE) 100 MG tablet Take 1 tablet by mouth in the morning. 7/29/22   Fred Maria MD   traZODone (DESYREL) 50 MG tablet Take 1 tablet by mouth nightly as needed for Sleep 7/29/22   Fred Maria MD   venlafaxine (EFFEXOR XR) 150 MG extended release capsule Take 1 capsule by mouth daily (with breakfast) 7/30/22   Fred Maria MD   buPROPion (WELLBUTRIN XL) 150 MG extended release tablet Take 150 mg by mouth every morning  7/29/22  Historical Provider, MD   escitalopram (LEXAPRO) 10 MG tablet Take 1.5 tablets by mouth daily 7/14/22 7/29/22  Zuly Crisostomo MD         REVIEW OF SYSTEMS       Review of Systems   Constitutional:  Negative for chills and fever. HENT:  Negative for congestion and rhinorrhea. Eyes:  Negative for visual disturbance. Respiratory:  Negative for cough and shortness of breath. Cardiovascular:  Negative for chest pain. Gastrointestinal:  Negative for abdominal pain, constipation, diarrhea, nausea and vomiting. Genitourinary:  Negative for dysuria and frequency. Musculoskeletal:  Positive for arthralgias and myalgias. Negative for back pain, joint swelling and neck pain. Skin:  Negative for rash. Neurological:  Negative for weakness, numbness and headaches. PHYSICAL EXAM      INITIAL VITALS:   /77   Pulse 81   Temp 98.3 °F (36.8 °C) (Oral)   Resp 18   Ht 5' 11\" (1.803 m)   Wt 185 lb (83.9 kg)   SpO2 96%   BMI 25.80 kg/m²     Physical Exam  Constitutional:       General: He is not in acute distress. Appearance: Normal appearance. He is not ill-appearing, toxic-appearing or diaphoretic. HENT:      Head: Normocephalic and atraumatic. Mouth/Throat:      Mouth: Mucous membranes are moist.      Pharynx: Oropharynx is clear. Eyes:      Extraocular Movements: Extraocular movements intact. Cardiovascular:      Rate and Rhythm: Normal rate and regular rhythm. Heart sounds: Normal heart sounds. Pulmonary:      Effort: Pulmonary effort is normal.      Breath sounds: Normal breath sounds. Abdominal:      Palpations: Abdomen is soft. Tenderness: There is no abdominal tenderness. Musculoskeletal:      Cervical back: Normal range of motion and neck supple. Comments: Patient unable to raise left arm above 90 degrees due to increase in pain. He has tenderness palpation of the posterior shoulder and along the lateral side of the scapula. No obvious deformities. No obvious muscle spasm.    Skin: General: Skin is warm and dry. Neurological:      General: No focal deficit present. Mental Status: He is alert and oriented to person, place, and time. Sensory: No sensory deficit. Motor: No weakness. DDX/DIAGNOSTIC RESULTS / EMERGENCY DEPARTMENT COURSE / MDM     Medical Decision Making  81-year-old male presenting with left arm pain and numbness that has been increasing over the past month after injury at work. Vitals are stable. No concerning associated symptoms such as chest pain, shortness of breath, other chest pain equivalents. History and exam are most consistent with musculoskeletal strain, include nerve impingement, adhesive capsulitis, muscle spasm, radiculopathy. Able to move patient's arm through full passive range of motion but he has difficulty with active range of motion due to pain. Strength and sensation intact. Will obtain x-ray to rule out acute abnormality but anticipate discharge after symptomatic treatment. We will also give referral for continued treatment. EKG      All EKG's are interpreted by the Emergency Department Physician who either signs or Co-signs this chart in the absence of a cardiologist.    EMERGENCY DEPARTMENT COURSE:      ED Course as of 01/07/23 2317   Sat Jan 07, 2023   2243 XR SHOULDER LEFT (MIN 2 VIEWS)  IMPRESSION:  No acute osseous abnormality. Mild AC joint osteoarthrosis. [JS]      ED Course User Index  [JS] Danyel Zarate DO     Patient updated on x-ray findings. We discussed symptomatic treatment with lidocaine patches, over-the-counter analgesics and muscle relaxants. He was given these for treatment here as well. He is agreeable to plan to follow-up with orthopedic surgery in addition and neurology. We also discussed following up with PCP for a physical therapy referral.  Patient expressed understanding. Discharged in stable condition.     PROCEDURES:      CONSULTS:  None    CRITICAL CARE:  There was significant risk of life threatening deterioration of patient's condition requiring my direct management. Critical care time 0 minutes, excluding any documented procedures. FINAL IMPRESSION      1.  Strain of left shoulder, initial encounter          DISPOSITION / PLAN     DISPOSITION Decision To Discharge 01/07/2023 10:50:13 PM      PATIENT REFERRED TO:  Mariah Estrada MD  [address]  [phone]    Schedule an appointment as soon as possible for a visit in 1 day      [facility] ED  [address] [facility] [address]  [phone]    If symptoms worsen    Singh Neal, [address] [facility] [place]  DOMINGA Morse [address] [place] [address]  [phone]    Schedule an appointment as soon as possible for a visit   As needed    DISCHARGE MEDICATIONS:  Discharge Medication List as of 1/7/2023 10:52 PM        START taking these medications    Details   cyclobenzaprine (FLEXERIL) 10 MG tablet Take 0.5 tablets by mouth 3 times daily as needed for Muscle spasms, Disp-5 tablet, R-0Print             Lele Verde DO  Emergency Medicine Resident    (Please note that portions of thisnote were completed with a voice recognition program.  Efforts were made to edit the dictations but occasionally words are mis-transcribed.)        Lele Verde DO  Resident  01/07/23 0449

## 2023-01-08 NOTE — DISCHARGE INSTRUCTIONS
You were seen in the emergency department today for shoulder pain. This is likely a chronic injury. You need to see your PCP and discuss physical therapy, he can also follow-up with orthopedic surgery. I attached a referral for this. Use Tylenol and ibuprofen as needed for additional pain control. You can also use a muscle relaxant, do not drive or work while using these as they can make you drowsy. If you have any new or worsening symptoms, please return to the ED for reevaluation. Thank you for visiting [facility] Emergency Department. You need to call Farhad Multani MD to make an appointment as directed for follow up. Should you have any questions regarding your care or further treatment, please call [facility] Emergency Department at [phone]. Take any medications as prescribed, if given any, otherwise for pain Use ibuprofen or Tylenol (unless prescribed medications that have Tylenol in it). You can take over the counter Ibuprofen (advil) tablets (4 tablets every 8 hours or 3 tablets every 6 hours or 2 tablets every 4 hours)    If given narcotics during this ED visit, please do not drive or operate heavy machinery for at least 4-6 hours. PLEASE RETURN TO THE ED IMMEDIATELY for worsening symptoms, or if you develop any concerning symptoms such as: high fever not relieved by tylenol and/or motrin, chills, shortness of breath, chest pain, persistent nausea and/or vomiting, numbness, weakness or tingling in the arms or legs or change in color of the extremities, changes in mental status, persistent headache, blurry vision, inability to urinate, unable to follow up with your physician, or other any other  Care or concern.

## 2023-01-08 NOTE — ED PROVIDER NOTES
Faculty Sign-Out Attestation  Handoff taken on the following patient from prior Attending Physician: Alexandria Alcaraz    I was available and discussed any additional care issues that arose and coordinated the management plans with the resident(s) caring for the patient during my duty period. Any areas of disagreement with residents documentation of care or procedures are noted on the chart. I was personally present for the key portions of any/all procedures during my duty period. I have documented in the chart those procedures where I was not present during the key portions.     Shoulder complaint x 2 months, positional in nature,   Xr osteo arthritis, will discharge per plan    Alesha Tatum DO  Attending Physician       Alesha Tatum DO  01/07/23 2839

## 2023-01-09 LAB
EKG ATRIAL RATE: 90 BPM
EKG P AXIS: 68 DEGREES
EKG P-R INTERVAL: 208 MS
EKG Q-T INTERVAL: 378 MS
EKG QRS DURATION: 96 MS
EKG QTC CALCULATION (BAZETT): 462 MS
EKG R AXIS: -17 DEGREES
EKG T AXIS: 55 DEGREES
EKG VENTRICULAR RATE: 90 BPM

## 2023-10-05 ENCOUNTER — HOSPITAL ENCOUNTER (OUTPATIENT)
Age: 58
Setting detail: SPECIMEN
Discharge: HOME | End: 2023-10-05

## 2023-10-05 LAB — HCV AB SERPL QL IA: NONREACTIVE

## 2023-12-06 NOTE — GROUP NOTE
Group Therapy Note    Date: 4/14/2022    Group Start Time: 1000  Group End Time: 0605  Group Topic: Group Therapy    SHANNAN Smithmeaghan Jose F     Brief 1:1      [facility] C/D    Rooms 219-236 and 8/21    PT did not participate in 1:1 individual brief therapy and engages in conversation regarding discharge and safety planning, and short- and long-term goals. PT unable to identify all goals as well as starting to plan for discharge. Patient's Goal:  To actively participate in 1:1 meeting and to start discharge and safety planning.   Discipline Responsible: /Counselor      Signature:  Iesha Darling, MSW, LSW Lali, pt's daughter called & stated that pt use to see Dr. BERMAN back in 2016. Lali stated that her dad had moved away and now she is trying to bring him back to [place] due to pt keep getting sick. Lali would like to know if Dr. BERMAN will take patient on again.       Please Advise - Lali can be reached at [phone]

## 2023-12-26 ENCOUNTER — HOSPITAL ENCOUNTER (INPATIENT)
Age: 58
LOS: 3 days | Discharge: HOME | DRG: 751 | End: 2023-12-30
Attending: EMERGENCY MEDICINE | Admitting: PSYCHIATRY & NEUROLOGY
Payer: MEDICAID

## 2023-12-26 DIAGNOSIS — R45.851 DEPRESSION WITH SUICIDAL IDEATION: Primary | ICD-10-CM

## 2023-12-26 DIAGNOSIS — F32.A DEPRESSION WITH SUICIDAL IDEATION: Primary | ICD-10-CM

## 2023-12-26 LAB
ALBUMIN SERPL-MCNC: 4.5 G/DL (ref 3.5–5.2)
ALP SERPL-CCNC: 79 U/L (ref 40–129)
ALT SERPL-CCNC: 15 U/L (ref 5–41)
AMPHET UR QL SCN: NEGATIVE
ANION GAP SERPL CALCULATED.3IONS-SCNC: 18 MMOL/L (ref 9–17)
APAP SERPL-MCNC: <5 UG/ML (ref 10–30)
AST SERPL-CCNC: 21 U/L
BARBITURATES UR QL SCN: NEGATIVE
BASOPHILS # BLD: 0.1 K/UL (ref 0–0.2)
BASOPHILS NFR BLD: 1 % (ref 0–2)
BENZODIAZ UR QL: NEGATIVE
BILIRUB SERPL-MCNC: 0.2 MG/DL (ref 0.3–1.2)
BUN SERPL-MCNC: 10 MG/DL (ref 6–20)
CALCIUM SERPL-MCNC: 8.4 MG/DL (ref 8.6–10.4)
CANNABINOIDS UR QL SCN: NEGATIVE
CHLORIDE SERPL-SCNC: 100 MMOL/L (ref 98–107)
CO2 SERPL-SCNC: 20 MMOL/L (ref 20–31)
COCAINE UR QL SCN: NEGATIVE
CREAT SERPL-MCNC: 0.7 MG/DL (ref 0.7–1.2)
EOSINOPHIL # BLD: 0.1 K/UL (ref 0–0.4)
EOSINOPHILS RELATIVE PERCENT: 1 % (ref 0–4)
ERYTHROCYTE [DISTWIDTH] IN BLOOD BY AUTOMATED COUNT: 14.6 % (ref 11.5–14.9)
ETHANOL PERCENT: 0.23 %
ETHANOLAMINE SERPL-MCNC: 231 MG/DL
FENTANYL UR QL: NEGATIVE
GFR SERPL CREATININE-BSD FRML MDRD: >60 ML/MIN/1.73M2
GLUCOSE SERPL-MCNC: 110 MG/DL (ref 70–99)
HCT VFR BLD AUTO: 42.7 % (ref 41–53)
HGB BLD-MCNC: 14 G/DL (ref 13.5–17.5)
LYMPHOCYTES NFR BLD: 2.6 K/UL (ref 1–4.8)
LYMPHOCYTES RELATIVE PERCENT: 27 % (ref 24–44)
MAGNESIUM SERPL-MCNC: 2.1 MG/DL (ref 1.6–2.6)
MCH RBC QN AUTO: 28.9 PG (ref 26–34)
MCHC RBC AUTO-ENTMCNC: 32.8 G/DL (ref 31–37)
MCV RBC AUTO: 88.1 FL (ref 80–100)
METHADONE UR QL: NEGATIVE
MONOCYTES NFR BLD: 1.1 K/UL (ref 0.1–1.3)
MONOCYTES NFR BLD: 11 % (ref 1–7)
NEUTROPHILS NFR BLD: 60 % (ref 36–66)
NEUTS SEG NFR BLD: 5.9 K/UL (ref 1.3–9.1)
OPIATES UR QL SCN: NEGATIVE
OXYCODONE UR QL SCN: NEGATIVE
PCP UR QL SCN: NEGATIVE
PLATELET # BLD AUTO: 278 K/UL (ref 150–450)
PMV BLD AUTO: 6.8 FL (ref 6–12)
POTASSIUM SERPL-SCNC: 3.9 MMOL/L (ref 3.7–5.3)
PROT SERPL-MCNC: 7.4 G/DL (ref 6.4–8.3)
RBC # BLD AUTO: 4.84 M/UL (ref 4.5–5.9)
SALICYLATES SERPL-MCNC: <1 MG/DL (ref 3–10)
SODIUM SERPL-SCNC: 138 MMOL/L (ref 135–144)
TEST INFORMATION: NORMAL
WBC OTHER # BLD: 9.7 K/UL (ref 3.5–11)

## 2023-12-26 PROCEDURE — 80307 DRUG TEST PRSMV CHEM ANLYZR: CPT

## 2023-12-26 PROCEDURE — 99285 EMERGENCY DEPT VISIT HI MDM: CPT

## 2023-12-26 PROCEDURE — 80053 COMPREHEN METABOLIC PANEL: CPT

## 2023-12-26 PROCEDURE — 36415 COLL VENOUS BLD VENIPUNCTURE: CPT

## 2023-12-26 PROCEDURE — 83735 ASSAY OF MAGNESIUM: CPT

## 2023-12-26 PROCEDURE — G0480 DRUG TEST DEF 1-7 CLASSES: HCPCS

## 2023-12-26 PROCEDURE — 80179 DRUG ASSAY SALICYLATE: CPT

## 2023-12-26 PROCEDURE — 85025 COMPLETE CBC W/AUTO DIFF WBC: CPT

## 2023-12-26 PROCEDURE — 80143 DRUG ASSAY ACETAMINOPHEN: CPT

## 2023-12-27 PROCEDURE — APPSS60 APP SPLIT SHARED TIME 46-60 MINUTES

## 2023-12-27 PROCEDURE — 6370000000 HC RX 637 (ALT 250 FOR IP): Performed by: PSYCHIATRY & NEUROLOGY

## 2023-12-27 PROCEDURE — 6370000000 HC RX 637 (ALT 250 FOR IP): Performed by: INTERNAL MEDICINE

## 2023-12-27 PROCEDURE — 99222 1ST HOSP IP/OBS MODERATE 55: CPT | Performed by: INTERNAL MEDICINE

## 2023-12-27 PROCEDURE — 1240000000 HC EMOTIONAL WELLNESS R&B

## 2023-12-27 PROCEDURE — 99222 1ST HOSP IP/OBS MODERATE 55: CPT | Performed by: PSYCHIATRY & NEUROLOGY

## 2023-12-27 RX ORDER — POLYETHYLENE GLYCOL 3350 17 G/17G
17 POWDER, FOR SOLUTION ORAL DAILY PRN
Status: DISCONTINUED | OUTPATIENT
Start: 2023-12-27 | End: 2023-12-30 | Stop reason: HOSPADM

## 2023-12-27 RX ORDER — ACETAMINOPHEN 325 MG/1
650 TABLET ORAL EVERY 4 HOURS PRN
Status: DISCONTINUED | OUTPATIENT
Start: 2023-12-27 | End: 2023-12-30 | Stop reason: HOSPADM

## 2023-12-27 RX ORDER — POLYETHYLENE GLYCOL 3350 17 G
2 POWDER IN PACKET (EA) ORAL
Status: DISCONTINUED | OUTPATIENT
Start: 2023-12-27 | End: 2023-12-30 | Stop reason: HOSPADM

## 2023-12-27 RX ORDER — AMLODIPINE BESYLATE AND BENAZEPRIL HYDROCHLORIDE 5; 10 MG/1; MG/1
1 CAPSULE ORAL EVERY MORNING
Status: DISCONTINUED | OUTPATIENT
Start: 2023-12-28 | End: 2023-12-27 | Stop reason: CLARIF

## 2023-12-27 RX ORDER — LURASIDONE HYDROCHLORIDE 60 MG/1
120 TABLET, FILM COATED ORAL
Status: DISCONTINUED | OUTPATIENT
Start: 2023-12-28 | End: 2023-12-30 | Stop reason: HOSPADM

## 2023-12-27 RX ORDER — VENLAFAXINE HYDROCHLORIDE 150 MG/1
300 CAPSULE, EXTENDED RELEASE ORAL
Status: DISCONTINUED | OUTPATIENT
Start: 2023-12-28 | End: 2023-12-30 | Stop reason: HOSPADM

## 2023-12-27 RX ORDER — AMLODIPINE BESYLATE 5 MG/1
5 TABLET ORAL DAILY
Status: DISCONTINUED | OUTPATIENT
Start: 2023-12-27 | End: 2023-12-28

## 2023-12-27 RX ORDER — MELOXICAM 15 MG/1
15 TABLET ORAL EVERY MORNING
COMMUNITY

## 2023-12-27 RX ORDER — MELOXICAM 7.5 MG/1
15 TABLET ORAL EVERY MORNING
Status: DISCONTINUED | OUTPATIENT
Start: 2023-12-28 | End: 2023-12-30 | Stop reason: HOSPADM

## 2023-12-27 RX ORDER — AMLODIPINE BESYLATE AND BENAZEPRIL HYDROCHLORIDE 5; 10 MG/1; MG/1
1 CAPSULE ORAL EVERY MORNING
Status: ON HOLD | COMMUNITY
End: 2023-12-30 | Stop reason: HOSPADM

## 2023-12-27 RX ORDER — ATORVASTATIN CALCIUM 20 MG/1
40 TABLET, FILM COATED ORAL NIGHTLY
Status: DISCONTINUED | OUTPATIENT
Start: 2023-12-27 | End: 2023-12-30 | Stop reason: HOSPADM

## 2023-12-27 RX ORDER — GAUZE BANDAGE 2" X 2"
100 BANDAGE TOPICAL DAILY
Status: DISCONTINUED | OUTPATIENT
Start: 2023-12-27 | End: 2023-12-27

## 2023-12-27 RX ORDER — FOLIC ACID 1 MG/1
1 TABLET ORAL DAILY
Status: DISCONTINUED | OUTPATIENT
Start: 2023-12-28 | End: 2023-12-30 | Stop reason: HOSPADM

## 2023-12-27 RX ORDER — MIRTAZAPINE 15 MG/1
15 TABLET, FILM COATED ORAL NIGHTLY
Status: DISCONTINUED | OUTPATIENT
Start: 2023-12-27 | End: 2023-12-30 | Stop reason: HOSPADM

## 2023-12-27 RX ORDER — ASPIRIN 81 MG/1
81 TABLET ORAL DAILY
Status: DISCONTINUED | OUTPATIENT
Start: 2023-12-27 | End: 2023-12-30 | Stop reason: HOSPADM

## 2023-12-27 RX ORDER — LURASIDONE HYDROCHLORIDE 120 MG/1
120 TABLET, FILM COATED ORAL
COMMUNITY

## 2023-12-27 RX ORDER — IBUPROFEN 400 MG/1
400 TABLET ORAL EVERY 6 HOURS PRN
Status: DISCONTINUED | OUTPATIENT
Start: 2023-12-27 | End: 2023-12-30 | Stop reason: HOSPADM

## 2023-12-27 RX ORDER — GAUZE BANDAGE 2" X 2"
100 BANDAGE TOPICAL DAILY
Status: DISCONTINUED | OUTPATIENT
Start: 2023-12-28 | End: 2023-12-30 | Stop reason: HOSPADM

## 2023-12-27 RX ORDER — M-VIT,TX,IRON,MINS/CALC/FOLIC 27MG-0.4MG
1 TABLET ORAL DAILY
Status: DISCONTINUED | OUTPATIENT
Start: 2023-12-27 | End: 2023-12-30 | Stop reason: HOSPADM

## 2023-12-27 RX ORDER — MAGNESIUM HYDROXIDE/ALUMINUM HYDROXICE/SIMETHICONE 120; 1200; 1200 MG/30ML; MG/30ML; MG/30ML
30 SUSPENSION ORAL EVERY 6 HOURS PRN
Status: DISCONTINUED | OUTPATIENT
Start: 2023-12-27 | End: 2023-12-30 | Stop reason: HOSPADM

## 2023-12-27 RX ORDER — HYDROXYZINE 50 MG/1
50 TABLET, FILM COATED ORAL 3 TIMES DAILY PRN
Status: DISCONTINUED | OUTPATIENT
Start: 2023-12-27 | End: 2023-12-30 | Stop reason: HOSPADM

## 2023-12-27 RX ORDER — TRAZODONE HYDROCHLORIDE 50 MG/1
50 TABLET ORAL NIGHTLY PRN
Status: DISCONTINUED | OUTPATIENT
Start: 2023-12-27 | End: 2023-12-30 | Stop reason: HOSPADM

## 2023-12-27 RX ORDER — LISINOPRIL 10 MG/1
10 TABLET ORAL DAILY
Status: DISCONTINUED | OUTPATIENT
Start: 2023-12-27 | End: 2023-12-28

## 2023-12-27 RX ADMIN — TRAZODONE HYDROCHLORIDE 50 MG: 50 TABLET ORAL at 20:35

## 2023-12-27 RX ADMIN — ASPIRIN 81 MG: 81 TABLET, COATED ORAL at 17:36

## 2023-12-27 RX ADMIN — LISINOPRIL 10 MG: 10 TABLET ORAL at 17:36

## 2023-12-27 RX ADMIN — AMLODIPINE BESYLATE 5 MG: 5 TABLET ORAL at 17:36

## 2023-12-27 RX ADMIN — ATORVASTATIN CALCIUM 40 MG: 20 TABLET, FILM COATED ORAL at 20:35

## 2023-12-27 RX ADMIN — MIRTAZAPINE 15 MG: 15 TABLET, FILM COATED ORAL at 21:58

## 2023-12-27 RX ADMIN — HYDROXYZINE HYDROCHLORIDE 50 MG: 50 TABLET, FILM COATED ORAL at 17:36

## 2023-12-27 RX ADMIN — Medication 1 TABLET: at 17:36

## 2023-12-27 RX ADMIN — THIAMINE HCL TAB 100 MG 100 MG: 100 TAB at 17:36

## 2023-12-27 NOTE — GROUP NOTE
Recreation Group Note        Date: December 27, 2023 Start Time: 11am  End Time:  11:40am      Number of Participants in Group & Unit Census:  2/14    Topic: Leisure skills    Goal of Group:Patient will identify benefits of leisure for coping      Comments:     Patient did not participate in Recreation group, despite staff encouragement and explanation of benefits. Patient remain seclusive to self. Q15 minute safety checks maintained for patient safety and will continue to encourage patient to attend unit programming.            Signature:  Yesenia Liao

## 2023-12-27 NOTE — BH NOTE
Patient given tobacco quitline number 60570722116 ,patient given information as to the dangers of long term tobacco use. Continue to reinforce the importance of tobacco cessation.

## 2023-12-27 NOTE — BH NOTE
[facility]  Admission Note     Admission Type:   Admission Type: Voluntary    Reason for admission:  Reason for Admission: Suicidal ideations to get hit by train, pt reports increased depression while consuming alcohol    Addictive Behavior:   Addictive Behavior  In the Past 3 Months, Have You Felt or Has Someone Told You That You Have a Problem With  : None    Medical Problems:   Past Medical History:   Diagnosis Date    Arthritis     Hypertension     Liver disease     Psoriasis     Psychiatric problem     Seizures ([address])      Status EXAM:  Mental Status and Behavioral Exam  Normal: No  Level of Assistance: Independent/Self  Facial Expression: Flat  Affect: Appropriate  Level of Consciousness: Alert  Frequency of Checks: 4 times per hour, close  Mood:Normal: No  Mood: Depressed, Anxious  Motor Activity:Normal: Yes  Eye Contact: Good  Observed Behavior: Cooperative, Friendly  Sexual Misconduct History: Current - no  Preception: [place] to person, [place] to time, [place] to place, [place] to situation  Attention:Normal: Yes  Thought Processes: Unremarkable  Thought Content:Normal: Yes  Depression Symptoms: Feelings of helplessness, Feelings of hopelessess, Feelings of worthlessness, Loss of interest  Anxiety Symptoms: Generalized  [place] Symptoms: No problems reported or observed.   Hallucinations: None  Delusions: No  Memory:Normal: Yes  Insight and Judgment: No  Insight and Judgment: Poor judgment, Poor insight    Tobacco Screening:  Practical Counseling, on admission, david X, if applicable and completed (first 3 are required if patient doesn't refuse):            ( ) Recognizing danger situations (included triggers and roadblocks)                    ( ) Coping skills (new ways to manage stress,relaxation techniques, changing routine, distraction)                                                           ( ) Basic information about quitting (benefits of quitting, techniques in how to quit, available

## 2023-12-27 NOTE — CARE COORDINATION
BHI Biopsychosocial Assessment    Current Level of Psychosocial Functioning     Independent X  Dependent    Minimal Assist     Comments:    Psychosocial High Risk Factors (check all that apply)    Unable to obtain meds   Chronic illness/pain    Substance abuse X  Lack of Family Support   Financial stress   Isolation   Inadequate Community Resources  Suicide attempt(s)  Not taking medications   Victim of crime   Developmental Delay  Unable to manage personal needs    Age 72 or older   Homeless X  No transportation   Readmission within 30 days  Unemployment  Traumatic Event    Comments:   Psychiatric Advanced Directives: n/a    Family to Involve in Treatment: none at this time    Sexual Orientation:  n/a    Patient Strengths: connected to [employer], insurance, support system    Patient Barriers: substance use, homelessness, income      Opiate Education Provided:  no- patient denied opiate use      CMHC/mental health history: patient is linked with [facility] and has not been admitted to the [address] since 2022. Plan of Care   medication management, group/individual therapies, family meetings, psycho -education, treatment team meetings to assist with stabilization    Initial Discharge Plan:  return to [facility] and continue psychiatric services at [address] [place] [address] Drive:    Yuly Lobo is a 62 y/o male admitted to [address] [place] [address] for suicidal ideations lasting approximately two days per patient. He states he had been drinking and that the holidays contributed to the increase in his depressed mood. Patient was seen in his room today for assessment. He denies homicidal ideations and denies hallucinations. Plan is for patient to return to [facility] when he is ready for discharge and plans to continue treatment at [facility] there at the [facility]. Social work will offer support and engage in treatment as needed.

## 2023-12-27 NOTE — BH NOTE
Patient unable to get phone numbers last night upon admission due to being tired. Patient offered on day shift, and retrieved phone numbers. Belongings sent to safe.

## 2023-12-27 NOTE — ED NOTES
Pt went to bathroom before change out process, but writer gave a cup of ice water to attempt to collect a urine sample.

## 2023-12-27 NOTE — BH NOTE
On call provider notified of best practice advisory suggesting to place patient on suicide precautions. Provider to discontinue the order as patient does not meet criteria for suicide precautions at this time.

## 2023-12-27 NOTE — H&P
[address] [facility] Internal Medicine    CONSULTATION / HISTORY AND PHYSICAL EXAMINATION            Date:   12/27/2023  Patient name:  Sarah Ramirez  Date of admission:  12/26/2023  9:08 PM  MRN:   [id number]  Account:  [de-identified]  YOB: 1965  PCP:    Darlene Plascencia MD  Room:   [address] [place] [address]  Code Status:    Full Code    Physician Requesting Consult: Tereza Kennedy MD    Reason for Consult:  medical management    Chief Complaint:     Chief Complaint   Patient presents with    Suicidal       History Obtained From:     Patient medical record nursing staff    History of Present Illness:     Patient is 64-year-old male with multiple comorbidities including hypertension, psoriasis, seizure disorder, alcohol abuse anxiety depression, TIA is been admitted at [facility] for further management of depression and suicidal ideation. Currently denies any chest pain shortness of breath. Past Medical History:     Past Medical History:   Diagnosis Date    Arthritis     Hypertension     Liver disease     Psoriasis     Psychiatric problem     Seizures ([address])         Past Surgical History:     Past Surgical History:   Procedure Laterality Date    TONSILLECTOMY          Medications Prior to Admission:     Prior to Admission medications    Medication Sig Start Date End Date Taking?  Authorizing Provider   amLODIPine-benazepril (LOTREL) 5-10 MG per capsule Take 1 capsule by mouth every morning   Yes ProviderCarrie MD   lurasidone (LATUDA) 120 MG tablet Take 1 tablet by mouth Daily with supper   Yes ProviderCarrie MD   meloxicam (MOBIC) 15 MG tablet Take 1 tablet by mouth every morning   Yes Provider, MD Carrie   nicotine polacrilex (NICORETTE) 4 MG gum Take 1 each by mouth 3 times daily as needed for Smoking cessation   Yes ProviderCarrie MD   aspirin 81 MG EC tablet Take 1 tablet by mouth in the morning. 7/29/22   Ky Guillermo MD   folic acid (FOLVITE) 1 MG tablet
 most recent prescription    Past psychiatric medications includes:  Wellbutrin, Lexapro, Remeron, Trazodone, Atarax, Latuda    Adverse reactions from psychotropic medications: [] Yes [x] No         Lifetime Psychiatric Review of Systems         Depression: Endorses     Anxiety: Endorses     Panic Attacks: Endorses in the past, states \"years ago\". [place] or Hypomania: Denies     Phobias: Denies     Obsessions and Compulsions: Denies     Body or Vocal Tics: Denies     Visual Hallucinations: Denies     Auditory Hallucinations: Denies     Delusions/Paranoia: Denies     PTSD: Denies    Past Medical History:        Diagnosis Date    Arthritis     Hypertension     Liver disease     Psoriasis     Psychiatric problem     Seizures ([address])        Past Surgical History:        Procedure Laterality Date    TONSILLECTOMY         Allergies:  Patient has no known allergies. Social History:     Born in: [address] [facility], raised in the [facility]. Family: Parents , 2 brothers , 1 alive brother Denies family relations and has been estranged for a long time. .  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 Lack of finances, inability to return to work  Patient Assets/Supportive Factors:  Willingness to ask for help         DRUG USE HISTORY  Social History     Tobacco Use   Smoking Status Every Day    Current packs/day: 0.25    Average packs/day: 0.3 packs/day for 33.4 years (8.3 ttl pk-yrs)    Types: Cigarettes    Start date: 1990   Smokeless Tobacco Never     Social History     Substance and Sexual Activity   Alcohol Use Yes    Comment: weekends     Social History     Substance and Sexual Activity   Drug Use Not Currently    Types: Cocaine    Comment:

## 2023-12-27 NOTE — GROUP NOTE
Psych-Ed/Relapse Prevention Group Note        Date: December 27, 2023 Start Time: 1:30pm  End Time:  2:00pm      Number of Participants in Group & Unit Census:  2/14    Topic: Leisure and Coping    Goal of Group:Patient will identify benefits of leisure for coping and relaxation      Comments:     Patient did not participate in Psych-Ed/Relapse Prevention group, despite staff encouragement and explanation of benefits. Patient remain seclusive to self. Q15 minute safety checks maintained for patient safety and will continue to encourage patient to attend unit programming.          Signature:  Yesenia Motta

## 2023-12-27 NOTE — ED NOTES
Pt still suicidal when reassessed by writer at sober time. Calling psychiatry to present patient per protocol.

## 2023-12-28 LAB
CHOLEST SERPL-MCNC: 202 MG/DL
CHOLESTEROL/HDL RATIO: 4.7
EST. AVERAGE GLUCOSE BLD GHB EST-MCNC: 114 MG/DL
HBA1C MFR BLD: 5.6 % (ref 4–6)
HDLC SERPL-MCNC: 43 MG/DL
LDLC SERPL CALC-MCNC: 138 MG/DL (ref 0–130)
TRIGL SERPL-MCNC: 107 MG/DL

## 2023-12-28 PROCEDURE — 6370000000 HC RX 637 (ALT 250 FOR IP): Performed by: INTERNAL MEDICINE

## 2023-12-28 PROCEDURE — 6370000000 HC RX 637 (ALT 250 FOR IP): Performed by: PSYCHIATRY & NEUROLOGY

## 2023-12-28 PROCEDURE — APPSS30 APP SPLIT SHARED TIME 16-30 MINUTES

## 2023-12-28 PROCEDURE — 99232 SBSQ HOSP IP/OBS MODERATE 35: CPT | Performed by: INTERNAL MEDICINE

## 2023-12-28 PROCEDURE — 36415 COLL VENOUS BLD VENIPUNCTURE: CPT

## 2023-12-28 PROCEDURE — 80061 LIPID PANEL: CPT

## 2023-12-28 PROCEDURE — 83036 HEMOGLOBIN GLYCOSYLATED A1C: CPT

## 2023-12-28 PROCEDURE — 99232 SBSQ HOSP IP/OBS MODERATE 35: CPT | Performed by: PSYCHIATRY & NEUROLOGY

## 2023-12-28 PROCEDURE — 1240000000 HC EMOTIONAL WELLNESS R&B

## 2023-12-28 RX ORDER — LISINOPRIL 10 MG/1
10 TABLET ORAL DAILY
Status: DISCONTINUED | OUTPATIENT
Start: 2023-12-29 | End: 2023-12-30 | Stop reason: HOSPADM

## 2023-12-28 RX ORDER — AMLODIPINE BESYLATE 10 MG/1
10 TABLET ORAL DAILY
Status: DISCONTINUED | OUTPATIENT
Start: 2023-12-29 | End: 2023-12-30 | Stop reason: HOSPADM

## 2023-12-28 RX ADMIN — TRAZODONE HYDROCHLORIDE 50 MG: 50 TABLET ORAL at 21:07

## 2023-12-28 RX ADMIN — MELOXICAM 15 MG: 7.5 TABLET ORAL at 08:33

## 2023-12-28 RX ADMIN — Medication 1 TABLET: at 08:32

## 2023-12-28 RX ADMIN — HYDROXYZINE HYDROCHLORIDE 50 MG: 50 TABLET, FILM COATED ORAL at 21:07

## 2023-12-28 RX ADMIN — LISINOPRIL 10 MG: 10 TABLET ORAL at 08:32

## 2023-12-28 RX ADMIN — THIAMINE HCL TAB 100 MG 100 MG: 100 TAB at 08:33

## 2023-12-28 RX ADMIN — ATORVASTATIN CALCIUM 40 MG: 20 TABLET, FILM COATED ORAL at 21:07

## 2023-12-28 RX ADMIN — LURASIDONE HYDROCHLORIDE 120 MG: 60 TABLET, FILM COATED ORAL at 18:19

## 2023-12-28 RX ADMIN — ASPIRIN 81 MG: 81 TABLET, COATED ORAL at 08:32

## 2023-12-28 RX ADMIN — NICOTINE POLACRILEX 2 MG: 2 LOZENGE ORAL at 18:18

## 2023-12-28 RX ADMIN — FOLIC ACID 1 MG: 1 TABLET ORAL at 08:32

## 2023-12-28 RX ADMIN — AMLODIPINE BESYLATE 5 MG: 5 TABLET ORAL at 08:32

## 2023-12-28 RX ADMIN — VENLAFAXINE HYDROCHLORIDE 300 MG: 150 CAPSULE, EXTENDED RELEASE ORAL at 08:33

## 2023-12-28 RX ADMIN — MIRTAZAPINE 15 MG: 15 TABLET, FILM COATED ORAL at 21:07

## 2023-12-28 NOTE — GROUP NOTE
Group Therapy Note    Date: 12/28/2023    Group Start Time: 1330  Group End Time: 7736  Group Topic: Psychoeducation    ROHAN [facility]        Group Therapy Note    Attendees: 8/14    Psych-Ed/Relapse Prevention Group Note        Date: December 28, 2023 Start Time: 1:30pm  End Time: 2:15pm      Number of Participants in Group & Unit Census:  8/14    Topic:  interpersonal skills, coping skills, self-expression    Goal of Group: To improve interpersonal skills and coping skills awareness through collaborating with peers and self-expression. Comments:     Patient did not participate in Psych-Ed/Relapse Prevention group, despite staff encouragement and explanation of benefits. Patient remain seclusive to self. Q15 minute safety checks maintained for patient safety and will continue to encourage patient to attend unit programming.         Signature:  Susy Callaway CTRS

## 2023-12-28 NOTE — GROUP NOTE
Group Therapy Note    Date: 12/28/2023    Group Start Time: 9185  Group End Time: 36  Group Topic: Cognitive Skills    STROHAN BHGRIS Perez Yesenia Elijahdraganhafelice        Group Therapy Note    Attendees: 6/13    Cognitive Skills Group Note        Date: December 28, 2023 Start Time: 10:50am  End Time: 11:30am      Number of Participants in Group & Unit Census:  6/13    Topic:  interpersonal skills, memory recall, concentration     Goal of Group: To improve interpersonal skills and memory recall through collaborating with peers and concentrating on a presented task. Comments:     Patient did not participate in Cognitive Skills group, despite staff encouragement and explanation of benefits. Patient remain seclusive to self. Q15 minute safety checks maintained for patient safety and will continue to encourage patient to attend unit programming.         Signature:  ABEBE GreggS

## 2023-12-28 NOTE — GROUP NOTE
Group Therapy Note    Date: 12/27/2023    Group Start Time: 2000  Group End Time: 2100  Group Topic: Wrap-Up    SHANNAN Fields, [address] [facility]        Group Therapy Note    Attendees: 9 out of 13 patient refused to attend wrap up group at 2000 after encouragement from staff. 1:1 talk time provided as alternative to group session.  Patient declined      Discipline Responsible: [address] [facility]      Signature:  Mikayla Brown

## 2023-12-29 PROCEDURE — 90833 PSYTX W PT W E/M 30 MIN: CPT | Performed by: PSYCHIATRY & NEUROLOGY

## 2023-12-29 PROCEDURE — 6370000000 HC RX 637 (ALT 250 FOR IP): Performed by: INTERNAL MEDICINE

## 2023-12-29 PROCEDURE — 6370000000 HC RX 637 (ALT 250 FOR IP): Performed by: PSYCHIATRY & NEUROLOGY

## 2023-12-29 PROCEDURE — 1240000000 HC EMOTIONAL WELLNESS R&B

## 2023-12-29 PROCEDURE — APPSS30 APP SPLIT SHARED TIME 16-30 MINUTES

## 2023-12-29 PROCEDURE — 99232 SBSQ HOSP IP/OBS MODERATE 35: CPT | Performed by: PSYCHIATRY & NEUROLOGY

## 2023-12-29 PROCEDURE — 99232 SBSQ HOSP IP/OBS MODERATE 35: CPT | Performed by: INTERNAL MEDICINE

## 2023-12-29 RX ORDER — BENZOCAINE/MENTHOL 6 MG-10 MG
LOZENGE MUCOUS MEMBRANE 2 TIMES DAILY
Status: DISCONTINUED | OUTPATIENT
Start: 2023-12-29 | End: 2023-12-30 | Stop reason: HOSPADM

## 2023-12-29 RX ADMIN — TRAZODONE HYDROCHLORIDE 50 MG: 50 TABLET ORAL at 21:09

## 2023-12-29 RX ADMIN — HYDROCORTISONE: 0.01 CREAM TOPICAL at 21:42

## 2023-12-29 RX ADMIN — VENLAFAXINE HYDROCHLORIDE 300 MG: 150 CAPSULE, EXTENDED RELEASE ORAL at 09:28

## 2023-12-29 RX ADMIN — ATORVASTATIN CALCIUM 40 MG: 20 TABLET, FILM COATED ORAL at 21:09

## 2023-12-29 RX ADMIN — LURASIDONE HYDROCHLORIDE 120 MG: 60 TABLET, FILM COATED ORAL at 17:20

## 2023-12-29 RX ADMIN — FOLIC ACID 1 MG: 1 TABLET ORAL at 09:28

## 2023-12-29 RX ADMIN — THIAMINE HCL TAB 100 MG 100 MG: 100 TAB at 09:28

## 2023-12-29 RX ADMIN — AMLODIPINE BESYLATE 10 MG: 10 TABLET ORAL at 09:28

## 2023-12-29 RX ADMIN — NICOTINE POLACRILEX 2 MG: 2 LOZENGE ORAL at 16:00

## 2023-12-29 RX ADMIN — MIRTAZAPINE 15 MG: 15 TABLET, FILM COATED ORAL at 21:09

## 2023-12-29 RX ADMIN — Medication 1 TABLET: at 09:27

## 2023-12-29 RX ADMIN — LISINOPRIL 10 MG: 10 TABLET ORAL at 09:28

## 2023-12-29 RX ADMIN — NICOTINE POLACRILEX 2 MG: 2 LOZENGE ORAL at 09:27

## 2023-12-29 RX ADMIN — HYDROXYZINE HYDROCHLORIDE 50 MG: 50 TABLET, FILM COATED ORAL at 19:48

## 2023-12-29 RX ADMIN — HYDROXYZINE HYDROCHLORIDE 50 MG: 50 TABLET, FILM COATED ORAL at 09:28

## 2023-12-29 RX ADMIN — ASPIRIN 81 MG: 81 TABLET, COATED ORAL at 09:27

## 2023-12-29 RX ADMIN — MELOXICAM 15 MG: 7.5 TABLET ORAL at 09:28

## 2023-12-29 NOTE — GROUP NOTE
Group Therapy Note    Date: 12/29/2023    Group Start Time: 8681  Group End Time: 1430  Group Topic: Recreational    STCZ TRISTONI Yesenia Dorsey        Group Therapy Note    Attendees: 1/12       Patient's Goal:  To improve interpersonal skills and leisure awareness through collaborating with peers and concentrating on a presented task. Notes:  Patient attended and participated in group. Patient was able to collaborate with this writer and concentrate on a presented task. Patient was pleasant and cooperative. Status After Intervention:  Improved    Participation Level:  Active Listener and Interactive    Participation Quality: Appropriate, Attentive, and Sharing      Speech:  normal      Thought Process/Content: Logical and Linear      Affective Functioning: Congruent      Mood: euthymic      Level of consciousness:  Alert and Attentive      Response to Learning: Able to verbalize current knowledge/experience, Able to retain information, and Progressing to goal      Endings: None Reported    Modes of Intervention: Socialization, Exploration, and Activity      Discipline Responsible: Psychoeducational Specialist      Signature:  Yesenia Ambrose

## 2023-12-29 NOTE — GROUP NOTE
Group Therapy Note    Date: 12/29/2023    Group Start Time: 1050  Group End Time: 1130  Group Topic: Cognitive Skills    Hugh Smith        Group Therapy Note    Attendees: 8/13     Patient's Goal:  To improve interpersonal skills and decision-making through collaborating with peers and concentrating on a presented task. Notes:  Patient attended and participated in group. Patient was able to collaborate with peers and concentrate on a presented task. Patient was pleasant and cooperative. Status After Intervention:  Improved    Participation Level:  Active Listener and Interactive    Participation Quality: Appropriate, Attentive, and Sharing    Speech:  normal      Thought Process/Content: Logical and Linear      Affective Functioning: Constricted      Mood: Dysphoric       Level of consciousness:  Alert and Attentive    Response to Learning: Able to verbalize current knowledge/experience, Able to retain information, and Progressing to goal      Endings: None Reported    Modes of Intervention: Socialization, Exploration, Problem-solving, and Activity      Discipline Responsible: Psychoeducational Specialist      Signature:  Hugh Orellana

## 2023-12-30 VITALS
HEIGHT: 71 IN | BODY MASS INDEX: 32.2 KG/M2 | OXYGEN SATURATION: 98 % | SYSTOLIC BLOOD PRESSURE: 139 MMHG | DIASTOLIC BLOOD PRESSURE: 93 MMHG | RESPIRATION RATE: 15 BRPM | HEART RATE: 75 BPM | TEMPERATURE: 97.8 F | WEIGHT: 230 LBS

## 2023-12-30 PROCEDURE — 6370000000 HC RX 637 (ALT 250 FOR IP): Performed by: PSYCHIATRY & NEUROLOGY

## 2023-12-30 PROCEDURE — 99238 HOSP IP/OBS DSCHRG MGMT 30/<: CPT | Performed by: PSYCHIATRY & NEUROLOGY

## 2023-12-30 PROCEDURE — 6370000000 HC RX 637 (ALT 250 FOR IP): Performed by: INTERNAL MEDICINE

## 2023-12-30 RX ORDER — HYDROXYZINE 50 MG/1
50 TABLET, FILM COATED ORAL 3 TIMES DAILY PRN
Qty: 30 TABLET | Refills: 0 | Status: SHIPPED | OUTPATIENT
Start: 2023-12-30 | End: 2024-01-09

## 2023-12-30 RX ORDER — AMLODIPINE BESYLATE AND BENAZEPRIL HYDROCHLORIDE 5; 10 MG/1; MG/1
1 CAPSULE ORAL DAILY
Qty: 30 CAPSULE | Refills: 0 | Status: SHIPPED | OUTPATIENT
Start: 2023-12-30

## 2023-12-30 RX ORDER — VENLAFAXINE HYDROCHLORIDE 150 MG/1
300 CAPSULE, EXTENDED RELEASE ORAL
Qty: 60 CAPSULE | Refills: 0 | Status: SHIPPED | OUTPATIENT
Start: 2023-12-30

## 2023-12-30 RX ORDER — ASPIRIN 81 MG/1
81 TABLET ORAL DAILY
Qty: 30 TABLET | Refills: 3 | Status: SHIPPED | OUTPATIENT
Start: 2023-12-30

## 2023-12-30 RX ORDER — ATORVASTATIN CALCIUM 40 MG/1
40 TABLET, FILM COATED ORAL NIGHTLY
Qty: 30 TABLET | Refills: 3 | Status: SHIPPED | OUTPATIENT
Start: 2023-12-30

## 2023-12-30 RX ADMIN — LISINOPRIL 10 MG: 10 TABLET ORAL at 08:44

## 2023-12-30 RX ADMIN — ASPIRIN 81 MG: 81 TABLET, COATED ORAL at 08:44

## 2023-12-30 RX ADMIN — Medication 1 TABLET: at 08:44

## 2023-12-30 RX ADMIN — THIAMINE HCL TAB 100 MG 100 MG: 100 TAB at 08:45

## 2023-12-30 RX ADMIN — NICOTINE POLACRILEX 2 MG: 2 LOZENGE ORAL at 08:44

## 2023-12-30 RX ADMIN — AMLODIPINE BESYLATE 10 MG: 10 TABLET ORAL at 08:45

## 2023-12-30 RX ADMIN — VENLAFAXINE HYDROCHLORIDE 300 MG: 150 CAPSULE, EXTENDED RELEASE ORAL at 08:44

## 2023-12-30 RX ADMIN — FOLIC ACID 1 MG: 1 TABLET ORAL at 08:44

## 2023-12-30 RX ADMIN — MELOXICAM 15 MG: 7.5 TABLET ORAL at 08:44

## 2023-12-30 RX ADMIN — HYDROXYZINE HYDROCHLORIDE 50 MG: 50 TABLET, FILM COATED ORAL at 08:44

## 2023-12-30 NOTE — BH NOTE
Patient given tobacco quitline number 58191518772 at this time, refusing to call at this time, states \" I just dont want to quit now\"- patient given information as to the dangers of long term tobacco use. Continue to reinforce the importance of tobacco cessation.

## 2023-12-30 NOTE — PROGRESS NOTES
[address] [facility]    CONSULTATION / HISTORY AND PHYSICAL EXAMINATION            Date:   12/29/2023  Patient name:  Brittany Carter  Date of admission:  12/26/2023  9:08 PM  MRN:   [id number]  Account:  [de-identified]  YOB: 1965  PCP:    Marliss Fothergill, MD  Room:   [address] [place] [address]  Code Status:    Full Code    Physician Requesting Consult: Nubia Gordon MD    Reason for Consult:  medical management    Chief Complaint:     Chief Complaint   Patient presents with    Suicidal       History Obtained From:     Patient medical record nursing staff    History of Present Illness:     Patient is 72-year-old male with multiple comorbidities including hypertension, psoriasis, seizure disorder, alcohol abuse anxiety depression, TIA is been admitted at [facility] for further management of depression and suicidal ideation. Currently denies any chest pain shortness of breath. Past Medical History:     Past Medical History:   Diagnosis Date    Arthritis     Hypertension     Liver disease     Psoriasis     Psychiatric problem     Seizures ([address])         Past Surgical History:     Past Surgical History:   Procedure Laterality Date    TONSILLECTOMY          Medications Prior to Admission:     Prior to Admission medications    Medication Sig Start Date End Date Taking?  Authorizing Provider   amLODIPine-benazepril (LOTREL) 5-10 MG per capsule Take 1 capsule by mouth every morning   Yes ProviderCarrie MD   lurasidone (LATUDA) 120 MG tablet Take 1 tablet by mouth Daily with supper   Yes ProviderCarrie MD   meloxicam (MOBIC) 15 MG tablet Take 1 tablet by mouth every morning   Yes Provider, MD Carrie   nicotine polacrilex (NICORETTE) 4 MG gum Take 1 each by mouth 3 times daily as needed for Smoking cessation   Yes ProviderCarrie MD   aspirin 81 MG EC tablet Take 1 tablet by mouth in the morning. 7/29/22   Karen Guillermo MD   folic acid (FOLVITE) 1 MG tablet
[address] Internal Medicine    CONSULTATION / HISTORY AND PHYSICAL EXAMINATION            Date:   12/28/2023  Patient name:  Maribell Her  Date of admission:  12/26/2023  9:08 PM  MRN:   [id number]  Account:  [de-identified]  YOB: 1965  PCP:    Little Scales MD  Room:   [address] [place] [address]  Code Status:    Full Code    Physician Requesting Consult: Gallo Mullins MD    Reason for Consult:  medical management    Chief Complaint:     Chief Complaint   Patient presents with    Suicidal       History Obtained From:     Patient medical record nursing staff    History of Present Illness:     Patient is 52-year-old male with multiple comorbidities including hypertension, psoriasis, seizure disorder, alcohol abuse anxiety depression, TIA is been admitted at [facility] for further management of depression and suicidal ideation. Currently denies any chest pain shortness of breath. Past Medical History:     Past Medical History:   Diagnosis Date    Arthritis     Hypertension     Liver disease     Psoriasis     Psychiatric problem     Seizures ([address])         Past Surgical History:     Past Surgical History:   Procedure Laterality Date    TONSILLECTOMY          Medications Prior to Admission:     Prior to Admission medications    Medication Sig Start Date End Date Taking?  Authorizing Provider   amLODIPine-benazepril (LOTREL) 5-10 MG per capsule Take 1 capsule by mouth every morning   Yes ProviderCarrie MD   lurasidone (LATUDA) 120 MG tablet Take 1 tablet by mouth Daily with supper   Yes ProviderCarrie MD   meloxicam (MOBIC) 15 MG tablet Take 1 tablet by mouth every morning   Yes ProviderCarrie MD   nicotine polacrilex (NICORETTE) 4 MG gum Take 1 each by mouth 3 times daily as needed for Smoking cessation   Yes ProviderCarrie MD   aspirin 81 MG EC tablet Take 1 tablet by mouth in the morning. 7/29/22   Javon Guillermo MD   folic acid (FOLVITE) 1 MG tablet
Behavioral Services  Medicare Certification Upon Admission    I certify that this patient's inpatient psychiatric hospital admission is medically necessary for:    [x] (1) Treatment which could reasonably be expected to improve this patient's condition,       [x] (2) Or for diagnostic study;     AND     [x](2) The inpatient psychiatric services are provided while the individual is under the care of a physician and are included in the individualized plan of care.     Estimated length of stay/service 4 to 7 days    Plan for post-hospital care home with outpatient community mental health follow-up    Electronically signed by Latrice Michaud MD on 12/27/2023 at 9:45 PM
Pharmacy Medication History Note      List of current medications patient is taking is complete. Source of information: [place], [employer] [place] Ricardo (Camila), Patient    Changes made to medication list:  Medications removed (include reason, ex. therapy complete or physician discontinued, noncompliance): Atorvastatin - not been on since 2022  Cyclobenzaprine - therapy complete     Medications flagged for provider review:  Folic acid, Remeron, Thiamine    Medications added/doses adjusted: Added Lurasidone 120 mg every evening   Added Amlodipine-Benazepril 5-10 mg daily  Adjusted Venlafaxine to 300 mg daily   Added Meloxicam 15 mg every morning  Added Nicotine 4 mg gum three times daily as needed    Other notes (ex. Recent course of antibiotics, Coumadin dosing):  OARRS negative   Patient Mac Mckenna was increased last week at [facility] appointment. Patient has not yet picked up this new script from the pharmacy. His dose before the increase was 80 mg daily, but should be increased to 120 mg daily. Current Home Medication List at Time of Admission:  Prior to Admission medications    Medication Sig   amLODIPine-benazepril (LOTREL) 5-10 MG per capsule Take 1 capsule by mouth every morning   lurasidone (LATUDA) 120 MG tablet Take 1 tablet by mouth Daily with supper   meloxicam (MOBIC) 15 MG tablet Take 1 tablet by mouth every morning   nicotine polacrilex (NICORETTE) 4 MG gum Take 1 each by mouth 3 times daily as needed for Smoking cessation   aspirin 81 MG EC tablet Take 1 tablet by mouth in the morning. traZODone (DESYREL) 50 MG tablet Take 1 tablet by mouth nightly as needed for Sleep   venlafaxine (EFFEXOR XR) 150 MG extended release capsule Take 1 capsule by mouth daily (with breakfast)  Patient taking differently: Take 2 capsules by mouth daily (with breakfast)         Please let me know if you have any questions about this encounter. Thank you!     Electronically signed by Bernie Blackmon, [facility] on
RT ASSESSMENT TREATMENT GOALS    [x]Pt Goal:  Pt will identify 1-2 positive coping skills by time of discharge. []Pt Goal:  Pt will identify 1-2 positive aspects of self by time of discharge. []Pt Goal:  Pt will remain on task/topic for 15-30 minutes during group by time of discharge. [x]Pt Goal:  Pt will identify 1-2 aspects of relapse prevention plan by time of discharge. [x]Pt Goal:  Pt will join in conversation with peers 1-2 times per group by time of discharge. []Pt Goal:  Pt will identify 1-2 new leisure interests by time of discharge. []Pt Goal:  Pt will not voice any delusional content by time of discharge.
Medications: lisinopril (PRINIVIL;ZESTRIL) tablet 10 mg, 10 mg, Oral, Daily **AND** amLODIPine (NORVASC) tablet 10 mg, 10 mg, Oral, Daily  acetaminophen (TYLENOL) tablet 650 mg, 650 mg, Oral, Q4H PRN  aluminum & magnesium hydroxide-simethicone (MAALOX) 200-200-20 MG/5ML suspension 30 mL, 30 mL, Oral, Q6H PRN  ibuprofen (ADVIL;MOTRIN) tablet 400 mg, 400 mg, Oral, Q6H PRN  hydrOXYzine HCl (ATARAX) tablet 50 mg, 50 mg, Oral, TID PRN  traZODone (DESYREL) tablet 50 mg, 50 mg, Oral, Nightly PRN  polyethylene glycol (GLYCOLAX) packet 17 g, 17 g, Oral, Daily PRN  nicotine polacrilex (COMMIT) lozenge 2 mg, 2 mg, Oral, Q2H PRN  aspirin EC tablet 81 mg, 81 mg, Oral, Daily  atorvastatin (LIPITOR) tablet 40 mg, 40 mg, Oral, Nightly  therapeutic multivitamin-minerals 1 tablet, 1 tablet, Oral, Daily  folic acid (FOLVITE) tablet 1 mg, 1 mg, Oral, Daily  mirtazapine (REMERON) tablet 15 mg, 15 mg, Oral, Nightly  thiamine mononitrate tablet 100 mg, 100 mg, Oral, Daily  lurasidone (LATUDA) tablet 120 mg, 120 mg, Oral, Dinner  meloxicam (MOBIC) tablet 15 mg, 15 mg, Oral, QAM  venlafaxine (EFFEXOR XR) extended release capsule 300 mg, 300 mg, Oral, Daily with breakfast    ASSESSMENT  Major depressive disorder, recurrent severe without psychotic features ([address])         PATIENT HANDOFF  Patient symptoms: Showing modest improvement  Monitor need and frequency of PRN medications. Encourage participation in groups and milieu. Medication changes and discharge planning per attending  Follow-up daily while inpatient. Patient continues to be monitored in the inpatient psychiatric facility at [facility] for safety and stabilization. Patient continues to need, on a daily basis, active treatment furnished directly by or requiring the supervision of inpatient psychiatric personnel. Electronically signed by SHARAN Hernandez CNP on 12/29/2023 at 5:14 PM    **This report has been created using voice recognition software.  It may contain minor
interchangeable tests. Chol/HDL Ratio 12/28/2023 4.7  <5 Final            Triglycerides 12/28/2023 107  <150 mg/dL Final    Comment:    Triglyceride Guidelines:     <150   Desirable   150-199  Borderline   200-499  High     >499   Very high   Based on AHA Guidelines for fasting triglyceride, October 2012. Hemoglobin A1C 12/28/2023 5.6  4.0 - 6.0 % Final    Estimated Avg Glucose 12/28/2023 114  mg/dL Final    Comment: The ADA and AACC recommend providing the estimated average glucose result to permit better   patient understanding of their HBA1c result. Reviewed patient's current plan of care and vital signs with nursing staff.     Labs reviewed: [x] Yes    Medications  Current Facility-Administered Medications: acetaminophen (TYLENOL) tablet 650 mg, 650 mg, Oral, Q4H PRN  aluminum & magnesium hydroxide-simethicone (MAALOX) 200-200-20 MG/5ML suspension 30 mL, 30 mL, Oral, Q6H PRN  ibuprofen (ADVIL;MOTRIN) tablet 400 mg, 400 mg, Oral, Q6H PRN  hydrOXYzine HCl (ATARAX) tablet 50 mg, 50 mg, Oral, TID PRN  traZODone (DESYREL) tablet 50 mg, 50 mg, Oral, Nightly PRN  polyethylene glycol (GLYCOLAX) packet 17 g, 17 g, Oral, Daily PRN  nicotine polacrilex (COMMIT) lozenge 2 mg, 2 mg, Oral, Q2H PRN  aspirin EC tablet 81 mg, 81 mg, Oral, Daily  atorvastatin (LIPITOR) tablet 40 mg, 40 mg, Oral, Nightly  therapeutic multivitamin-minerals 1 tablet, 1 tablet, Oral, Daily  lisinopril (PRINIVIL;ZESTRIL) tablet 10 mg, 10 mg, Oral, Daily **AND** amLODIPine (NORVASC) tablet 5 mg, 5 mg, Oral, Daily  folic acid (FOLVITE) tablet 1 mg, 1 mg, Oral, Daily  mirtazapine (REMERON) tablet 15 mg, 15 mg, Oral, Nightly  thiamine mononitrate tablet 100 mg, 100 mg, Oral, Daily  lurasidone (LATUDA) tablet 120 mg, 120 mg, Oral, Dinner  meloxicam (MOBIC) tablet 15 mg, 15 mg, Oral, QAM  venlafaxine (EFFEXOR XR) extended release capsule 300 mg, 300 mg, Oral, Daily with breakfast    ASSESSMENT  Major depressive disorder, recurrent

## 2023-12-30 NOTE — DISCHARGE SUMMARY
Provider Discharge Summary     Patient ID:  Samanta Farrell  [id number]  70 y.o.  1965    Admit date: 12/26/2023    Discharge date and time: 12/30/2023  6:54 PM     Admitting Physician: Nilay Henao MD     Discharge Physician: Radha Richter MD    Admission Diagnoses: Depression with suicidal ideation [F32. A, R45.851]    Discharge Diagnoses:      Major depressive disorder, recurrent severe without psychotic features [facility])     Patient Active Problem List   Diagnosis Code    Major depression, recurrent ([address]) F33.9    Major depressive disorder, recurrent severe without psychotic features ([address]) F33.2    TIA (transient ischemic attack) G45.9    Left leg weakness R29.898    Depression with suicidal ideation F32. A, R45.851    Cocaine abuse, episodic (HCC) F14.10    Alcohol abuse, episodic V27.03    Acute alcoholic intoxication with complication ([address]) Z31.096    Abuse of smoked substance ([address] [facility]) F18.10    Suicidal ideation R45.851        Admission Condition: poor    Discharged Condition: stable    Indication for Admission: threat to self    History of Present Illnes (present tense wording is of findings from admission exam and are not necessarily indicative of current findings):   Samanta Farrell is a 63 y.o. male who has a past medical history of arthritis, hypertension, liver disease, psoriasis, seizures, alcohol use disorder, anxiety, depression, and transient ischemic attack. Patient presented to the ED due to depression with suicidal ideation. Per ED documentation, \"Patient reports recently has been feeling anxious and depressed. He states that he has a lot of things going on, he states that his depression was worse tonight and he was feeling suicidal.  Patient reports that he still feeling suicidal reports he had a plan to jump in the river or lay on train tracks and get hit by a train. Reports he is attempted suicide in the past.  He denies any homicidal ideation denies any visual or auditory hallucinations.   Does

## 2023-12-30 NOTE — PLAN OF CARE
[facility]  Day 3 Interdisciplinary Treatment Plan NOTE    Review Date & Time: 12/29/2023 1315    Admission Type:   Admission Type: Voluntary    Reason for admission:  Reason for Admission: Suicidal ideations to get hit by train, pt reports increased depression while consuming alcohol  Estimated Length of Stay: 5-7 days  Estimated Discharge Date Update: to be determined by physician    PATIENT STRENGTHS:  Patient Strengths    Patient Strengths and Limitations:Limitations: Inappropriate/potentially harmful leisure interests, Difficulty problem solving/relies on others to help solve problems, Multiple barriers to leisure interests  Addictive Behavior:Addictive Behavior  In the Past 3 Months, Have You Felt or Has Someone Told You That You Have a Problem With  : None  Medical Problems:  Past Medical History:   Diagnosis Date    Arthritis     Hypertension     Liver disease     Psoriasis     Psychiatric problem     Seizures ([address])        Risk:  Fall Risk   Landon Scale Landon Scale Score: 22  BVC    Change in scores no Changes to plan of Care no    Status EXAM:   Mental Status and Behavioral Exam  Normal: No  Level of Assistance: Independent/Self  Facial Expression: Flat  Affect: Blunt  Level of Consciousness: Alert  Frequency of Checks: 4 times per hour, close  Mood:Normal: No  Mood: Depressed, Anxious  Motor Activity:Normal: No  Motor Activity: Decreased  Eye Contact: Fair  Observed Behavior: Cooperative  Sexual Misconduct History: Current - no  Preception: [place] to person, [place] to time, [place] to place  Attention:Normal: Yes  Thought Processes: Unremarkable  Thought Content:Normal: No  Thought Content: Preoccupations  Depression Symptoms: Feelings of helplessness  Anxiety Symptoms: Generalized  [place] Symptoms: No problems reported or observed.   Hallucinations: None  Delusions: No  Memory:Normal: No  Memory: Poor remote  Insight and Judgment: No  Insight and Judgment: Poor judgment, Poor
Problem: Depression  Goal: Will be euthymic at discharge  Description: INTERVENTIONS:  1. Administer medication as ordered  2. Provide emotional support via 1:1 interaction with staff  3. Encourage involvement in milieu/groups/activities  4. Monitor for social isolation  12/27/2023 2153 by Shavonne Daigle RN  Outcome: Progressing    Pt denies thoughts of self-harm, safety checks maintained Q15 minutes. Continues to endorse increased depression and anxiety. Isolates to room for extended periods. Cooperative and took medications. No complaints about sleep or appetite. Hygiene encouraged.
Problem: Self Harm/Suicidality  Goal: Will have no self-injury during hospital stay  Description: INTERVENTIONS:  1. Ensure constant observer at bedside with Q15M safety checks  2. Maintain a safe environment  3. Secure patient belongings  4. Ensure family/visitors adhere to safety recommendations  5. Ensure safety tray has been added to patient's diet order  6. Every shift and PRN: Re-assess suicidal risk via Frequent Screener    12/28/2023 1150 by Bianka Campbell LPN  Outcome: Progressing  Note: Patient denies suicidal/homicidal ideation. Patient agreeable to seek out staff should thoughts of self harm/harming others arise. Patient denies hallucinations. Patient is positive for anxiety/depression rating at a 7/10. Patient is pleasant and cooperative, isolative to room. Patient is medication compliant and behavior controlled. Comfort and reassurance provided. Safety checks maintained every 15 minutes and as needed. Problem: Depression  Goal: Will be euthymic at discharge  Description: INTERVENTIONS:  1. Administer medication as ordered  2. Provide emotional support via 1:1 interaction with staff  3. Encourage involvement in milieu/groups/activities  4.  Monitor for social isolation  12/28/2023 1150 by Bianka Campbell LPN  Outcome: Progressing
Problem: Self Harm/Suicidality  Goal: Will have no self-injury during hospital stay  Description: INTERVENTIONS:  1. Ensure constant observer at bedside with Q15M safety checks  2. Maintain a safe environment  3. Secure patient belongings  4. Ensure family/visitors adhere to safety recommendations  5. Ensure safety tray has been added to patient's diet order  6. Every shift and PRN: Re-assess suicidal risk via Frequent Screener    12/29/2023 0023 by Luis Downing LPN  Outcome: Progressing     Problem: Depression  Goal: Will be euthymic at discharge  Description: INTERVENTIONS:  1. Administer medication as ordered  2. Provide emotional support via 1:1 interaction with staff  3. Encourage involvement in milieu/groups/activities  4. Monitor for social isolation  12/29/2023 0023 by Luis Downing LPN  Outcome: Progressing  12/28/2023 1150 by John Waller LPN  Outcome: Progressing     Note: Patient denies suicidal/homicidal ideation. Patient agreeable to seek out staff should thoughts of self harm/harming others arise. Patient denies hallucinations. Patient is positive for anxiety/depression rating at a 7/10. Patient is pleasant and cooperative, isolative to room. Patient is medication compliant and behavior controlled. Comfort and reassurance provided. Safety checks maintained every 15 minutes and as needed.
Problem: Self Harm/Suicidality  Goal: Will have no self-injury during hospital stay  Description: INTERVENTIONS:  1. Ensure constant observer at bedside with Q15M safety checks  2. Maintain a safe environment  3. Secure patient belongings  4. Ensure family/visitors adhere to safety recommendations  5. Ensure safety tray has been added to patient's diet order  6. Every shift and PRN: Re-assess suicidal risk via Frequent Screener    12/29/2023 1115 by Jeane Mosqueda LPN  Note: Patient denies suicidal/homicidal ideations but reports feeling anxious and depressed. He has fair eye contact and openly communicates, patient feels helpless, lacks motivation and has few coping skills. Patient is medication compliant with no behaviors as programming is encouraged. Problem: Depression  Goal: Will be euthymic at discharge  Description: INTERVENTIONS:  1. Administer medication as ordered  2. Provide emotional support via 1:1 interaction with staff  3. Encourage involvement in milieu/groups/activities  4.  Monitor for social isolation  12/29/2023 1122 by Jeane Mosqueda LPN  Note: Patient is attempting to progress towards goal.
Problem: Self Harm/Suicidality  Goal: Will have no self-injury during hospital stay  Description: INTERVENTIONS:  1. Ensure constant observer at bedside with Q15M safety checks  2. Maintain a safe environment  3. Secure patient belongings  4. Ensure family/visitors adhere to safety recommendations  5. Ensure safety tray has been added to patient's diet order  6. Every shift and PRN: Re-assess suicidal risk via Frequent Screener    12/29/2023 2104 by Shane Darden RN  Outcome: Progressing     Problem: Depression  Goal: Will be euthymic at discharge  Description: INTERVENTIONS:  1. Administer medication as ordered  2. Provide emotional support via 1:1 interaction with staff  3. Encourage involvement in milieu/groups/activities  4. Monitor for social isolation  12/29/2023 2104 by Shane Darden RN  Outcome: Progressing     The patient has been in the day room, aloof from peers. He reports experiencing some anxiousness and depression. He denies all else. He denies current thoughts of self harm. He reports that he has been able to cope with his moods as it is \"nothing new. \" Writer encouraged the patient to seek assistance should his mood worsen. He voiced an understanding. Writer will continue to offer emotional support. Q15 minute checks to continue for safety.
Problem: Self Harm/Suicidality  Goal: Will have no self-injury during hospital stay  Description: INTERVENTIONS:  1. Ensure constant observer at bedside with Q15M safety checks  2. Maintain a safe environment  3. Secure patient belongings  4. Ensure family/visitors adhere to safety recommendations  5. Ensure safety tray has been added to patient's diet order  6. Every shift and PRN: Re-assess suicidal risk via Frequent Screener    Outcome: Progressing     Problem: Depression  Goal: Will be euthymic at discharge  Description: INTERVENTIONS:  1. Administer medication as ordered  2. Provide emotional support via 1:1 interaction with staff  3. Encourage involvement in milieu/groups/activities  4. Monitor for social isolation  Outcome: Progressing       Pt isolative to room, denies any AH/VH. Pt states he does have thoughts of SI but verbally contracts for safety. Pt states he feels safe here, not current plan or intent. 15 minute checks and safe environment maintained. Writer encouraged patient to shower and perform ADLs, pt refused and stated that he was very tired.
individualized assessments and care planning    Outcome: needs reinforcement    PATIENT GOALS: to be discussed with patient within 72 hours    PLAN/TREATMENT RECOMMENDATIONS:     continue group therapy , medications compliance, goal setting, individualized assessments and care, continue to monitor pt on unit      SHORT-TERM GOALS: patient refused to attend treatment team at this time to discuss goals  Time frame for Short-Term Goals: 5-7 days    LONG-TERM GOALS: patient refused to attend treatment team at this time to discuss goals  Time frame for Long-Term Goals: 6 months  Members Present in Team Meeting: See Signature Sheet    Bisi Hurtado RN

## 2023-12-30 NOTE — BH NOTE
[facility]  Discharge Note     Pt belongings: Retrieved from room/safe, reviewed and packed to take with. Dental Appliances: None  Vision - Corrective Lenses: None  Hearing Aid: None  Jewelry: None  Body Piercings Removed: N/A  Clothing: Shirt, Pants, Footwear, Hat, Jacket/Coat (Blue shirt, jeans, boots, hat, grey hat x2, belt, sunglasses)  Other Valuables: Wallet, Lighter/Matches, Personal Toiletries, Cigarettes, Money (Book x2, toiletries, lighter, skin cream x2, [place] ID, Visa #[id number], #[id number], 3 packs of cigarettes, [address] card, $22.75, notebook, pens, hand , candy, bottle pop, misc papers, book bag)       Patient discharged to [facility] via cab. Instructed on discharge instructions, pt verbalizes understanding and signs AVS, AVS faxed. Pt in control at time of discharge and denies suicidal/homicidal ideations. Pt ambulates to [facility] with psych staff x2. Rx available at [facility] No complaints voiced at this time. Status EXAM upon discharge:  Mental Status and Behavioral Exam  Normal: No  Level of Assistance: Independent/Self  Facial Expression: Flat  Affect: Blunt  Level of Consciousness: Alert  Frequency of Checks: 4 times per hour, close  Mood:Normal: No  Mood: Anxious  Motor Activity:Normal: No  Motor Activity: Decreased  Eye Contact: Fair  Observed Behavior: Cooperative  Sexual Misconduct History: Current - no  Preception: [place] to person, [place] to time, [place] to place  Attention:Normal: Yes  Attention: Others (comment) (proccupied)  Thought Processes: Unremarkable  Thought Content:Normal: No  Thought Content: Preoccupations  Depression Symptoms: Feelings of helplessness  Anxiety Symptoms: Generalized  [place] Symptoms: No problems reported or observed.   Hallucinations: None  Delusions: No  Memory:Normal: No  Memory: Poor recent  Insight and Judgment: No  Insight and Judgment: Poor judgment, Poor insight    Hilario Campbell LPN

## 2024-01-11 NOTE — PROGRESS NOTES
[facility]  CDU / OBSERVATION ENCOUNTER  ATTENDING NOTE       I performed a history and physical examination of the patient and discussed management with the resident or midlevel provider. I reviewed the resident or midlevel provider's note and agree with the documented findings and plan of care. Any areas of disagreement are noted on the chart. I was personally present for the key portions of any procedures. I have documented in the chart those procedures where I was not present during the key portions. I have reviewed the nurses notes. I agree with the chief complaint, past medical history, past surgical history, allergies, medications, social and family history as documented unless otherwise noted below. The Family history, social history, and ROS are effectively unchanged since admission unless noted elsewhere in the chart. Patient with past medical history of depression and alcohol abuse staying at [facility] [address] [place] [address] O. Patient was evaluated in ED after sudden onset left leg weakness with difficulty ambulating and his leg giving out. Patient fell twice. He did not hit his head or have any significant trauma. Patient denies numbness tingling or weakness. Patient denies dysarthria. Patient was evaluated emergency department and concern was for possible TIA. Patient was evaluated with CT, CTA, patient will have MRI of brain and echo. Patient had dual antiplatelet therapy loaded. Appreciate neurology following. Patient denies back pain. No other signs of neurologic involvement. Electrolytes normal.    Patient back to baseline. Seen by neurology. Patient for EEG and further work-up as planned tomorrow.     Terrea Meckel MD  Attending Emergency  Physician 4 = No assist / stand by assistance

## 2024-04-01 ENCOUNTER — TRANSCRIBE ORDERS (OUTPATIENT)
Dept: ADMINISTRATIVE | Age: 59
End: 2024-04-01

## 2024-04-01 DIAGNOSIS — F17.210 CIGARETTE NICOTINE DEPENDENCE WITHOUT COMPLICATION: Primary | ICD-10-CM

## 2024-04-01 DIAGNOSIS — Z12.2 ENCOUNTER FOR SCREENING FOR MALIGNANT NEOPLASM OF RESPIRATORY ORGANS: ICD-10-CM

## 2024-08-13 ENCOUNTER — HOSPITAL ENCOUNTER (OUTPATIENT)
Age: 59
Setting detail: SPECIMEN
Discharge: HOME | End: 2024-08-13

## 2024-08-13 LAB
ALBUMIN SERPL-MCNC: 4.5 G/DL (ref 3.5–5.2)
ALBUMIN/GLOB SERPL: 1 {RATIO} (ref 1–2.5)
ALP SERPL-CCNC: 98 U/L (ref 40–129)
ALT SERPL-CCNC: 22 U/L (ref 10–50)
ANION GAP SERPL CALCULATED.3IONS-SCNC: 14 MMOL/L (ref 9–16)
AST SERPL-CCNC: 24 U/L (ref 10–50)
BASOPHILS # BLD: 0.06 K/UL (ref 0–0.2)
BASOPHILS NFR BLD: 1 % (ref 0–2)
BILIRUB SERPL-MCNC: 0.4 MG/DL (ref 0–1.2)
BUN SERPL-MCNC: 12 MG/DL (ref 6–20)
CALCIUM SERPL-MCNC: 9.8 MG/DL (ref 8.6–10.4)
CHLORIDE SERPL-SCNC: 101 MMOL/L (ref 98–107)
CHOLEST SERPL-MCNC: 132 MG/DL (ref 0–199)
CHOLESTEROL/HDL RATIO: 4
CO2 SERPL-SCNC: 24 MMOL/L (ref 20–31)
CREAT SERPL-MCNC: 0.9 MG/DL (ref 0.7–1.2)
EOSINOPHIL # BLD: 0.09 K/UL (ref 0–0.44)
EOSINOPHILS RELATIVE PERCENT: 1 % (ref 1–4)
ERYTHROCYTE [DISTWIDTH] IN BLOOD BY AUTOMATED COUNT: 17.5 % (ref 11.8–14.4)
GFR, ESTIMATED: >90 ML/MIN/1.73M2
GLUCOSE SERPL-MCNC: 131 MG/DL (ref 74–99)
HCT VFR BLD AUTO: 45.1 % (ref 40.7–50.3)
HDLC SERPL-MCNC: 33 MG/DL
HGB BLD-MCNC: 13.9 G/DL (ref 13–17)
IMM GRANULOCYTES # BLD AUTO: 0.17 K/UL (ref 0–0.3)
IMM GRANULOCYTES NFR BLD: 2 %
LDLC SERPL CALC-MCNC: 69 MG/DL (ref 0–100)
LYMPHOCYTES NFR BLD: 1.95 K/UL (ref 1.1–3.7)
LYMPHOCYTES RELATIVE PERCENT: 24 % (ref 24–43)
MCH RBC QN AUTO: 28.1 PG (ref 25.2–33.5)
MCHC RBC AUTO-ENTMCNC: 30.8 G/DL (ref 28.4–34.8)
MCV RBC AUTO: 91.3 FL (ref 82.6–102.9)
MONOCYTES NFR BLD: 0.61 K/UL (ref 0.1–1.2)
MONOCYTES NFR BLD: 8 % (ref 3–12)
NEUTROPHILS NFR BLD: 65 % (ref 36–65)
NEUTS SEG NFR BLD: 5.2 K/UL (ref 1.5–8.1)
NRBC BLD-RTO: 0 PER 100 WBC
PLATELET # BLD AUTO: ABNORMAL K/UL (ref 138–453)
PLATELET, FLUORESCENCE: 387 K/UL (ref 138–453)
PLATELETS.RETICULATED NFR BLD AUTO: 2 % (ref 1.1–10.3)
POTASSIUM SERPL-SCNC: 4.3 MMOL/L (ref 3.7–5.3)
PROT SERPL-MCNC: 8.2 G/DL (ref 6.6–8.7)
PSA SERPL-MCNC: 1.5 NG/ML (ref 0–4)
RBC # BLD AUTO: 4.94 M/UL (ref 4.21–5.77)
SODIUM SERPL-SCNC: 139 MMOL/L (ref 136–145)
TRIGL SERPL-MCNC: 148 MG/DL
VLDLC SERPL CALC-MCNC: 30 MG/DL
WBC OTHER # BLD: 8.1 K/UL (ref 3.5–11.3)

## 2024-08-29 ENCOUNTER — TELEPHONE (OUTPATIENT)
Dept: ORTHOPEDIC SURGERY | Age: 59
End: 2024-08-29

## 2024-08-29 NOTE — TELEPHONE ENCOUNTER
Received a referral for this patient to be seen in our office. On the referral it is stated that the patient does not currently have a phone, and that he will be calling to schedule.

## 2024-09-25 ENCOUNTER — OFFICE VISIT (OUTPATIENT)
Dept: ORTHOPEDIC SURGERY | Age: 59
End: 2024-09-25

## 2024-09-25 VITALS — WEIGHT: 230 LBS | BODY MASS INDEX: 32.2 KG/M2 | HEIGHT: 71 IN

## 2024-09-25 DIAGNOSIS — M25.512 LEFT SHOULDER PAIN, UNSPECIFIED CHRONICITY: Primary | ICD-10-CM

## 2024-09-25 RX ORDER — IBUPROFEN 600 MG/1
600 TABLET, FILM COATED ORAL 3 TIMES DAILY PRN
Qty: 30 TABLET | Refills: 2 | Status: SHIPPED | OUTPATIENT
Start: 2024-09-25

## 2024-10-03 ENCOUNTER — HOSPITAL ENCOUNTER (OUTPATIENT)
Dept: PHYSICAL THERAPY | Age: 59
Setting detail: THERAPIES SERIES
Discharge: HOME | End: 2024-10-03
Payer: MEDICAID

## 2024-10-03 PROCEDURE — 97161 PT EVAL LOW COMPLEX 20 MIN: CPT

## 2024-10-03 NOTE — CONSULTS
[x] [facility]  Outpatient Physical Therapy  [address]  Phone: [phone]  Fax: [phone] [] [facility] Outpatient Rehabilitation & Therapy  [address]   Phone: [phone]  Fax: [phone]  [] [facility]  [address]  Phone: [phone]   Fax: [phone]     Physical Therapy Evaluation    Date:  10/3/2024  Patient: Ramiro Estes  : 1965  MRN: [id number]  Physician: Lukas Raman DO/ Edwin Fernandez DO    Insurance: [facility].   Medical Diagnosis: Left shoulder pain M25.512    Rehab Codes: M25.512, M25.611  Onset Date: 24                                 Next 's appt: MRI 10/8, Ortho      Subjective:   CC: Patient reports chronic left sided shoulder pain, decreased ROM, and weakness for past 2 years. Patient is unable to reach over head without significant pain. He also struggles wit heavy lifting, pushing or pulling. Lately he tries to avoid using his left arm altogether.     HPI: Patient injured his left shoulder while lifting something over his shoulder in 2023. He does have an old MRI that confirms supraspinatus tear. He was scheduled for repair but had to cancel due to transportation. He does not drive and is currently homeless. Orthopedics has ordered PT, a new MRI, and will consider repair at next apportionment pending results.     PMHx/Comorbidities:  Past Medical History:   Diagnosis Date    Arthritis     Hypertension     Liver disease     Psoriasis     Psychiatric problem     Seizures (H[facility])        Medications: [x] Refer to full medical record [] None [] Other:  Allergies:      [x] Refer to full medical record  [] None [] Other:  23   Tests: [] X-Ray: [x] MRI:  [] Other:  1. Small full-thickness tear of the critical zone of the posterior supraspinatus   2. Severe fatty atrophy of subscapularis despite intact tendon     Function:    ADL/IADL [x]

## 2024-10-08 ENCOUNTER — HOSPITAL ENCOUNTER (OUTPATIENT)
Dept: MRI IMAGING | Age: 59
Discharge: HOME | End: 2024-10-10
Payer: MEDICAID

## 2024-10-08 DIAGNOSIS — M25.512 LEFT SHOULDER PAIN, UNSPECIFIED CHRONICITY: ICD-10-CM

## 2024-10-08 PROCEDURE — 73221 MRI JOINT UPR EXTREM W/O DYE: CPT

## 2024-10-10 NOTE — PRE-CERTIFICATION NOTE
[x] [facility]  Outpatient Rehabilitation &  Therapy  [address]  [phone]  [phone]          Therapy Pre-certification Note      10/10/2024    Ramiro Estes  1965   [id number]      Insurance approval was received for Physical Therapy from [facility] on 10/10/24.  Approval was received for 48 units each of: 01121, 68769, 43347, 57185 and 12 units of 10402 from 10/7/24 to 11/29/24.  Authorization number [id number].    Patient was contacted to be scheduled for 10/15/24 at 0900 and 10/17/24 at 1015      Electronically signed by Griffin Velazco PTA on 10/10/2024 at 2:56 PM

## 2024-10-15 ENCOUNTER — HOSPITAL ENCOUNTER (OUTPATIENT)
Dept: PHYSICAL THERAPY | Age: 59
Setting detail: THERAPIES SERIES
Discharge: HOME | End: 2024-10-15
Payer: MEDICAID

## 2024-10-15 PROCEDURE — 97110 THERAPEUTIC EXERCISES: CPT

## 2024-10-15 NOTE — FLOWSHEET NOTE
[x] [facility]  Outpatient Rehabilitation &  Therapy  [address]  [phone]  [phone]     Physical Therapy Daily Treatment Note    Date:  10/15/2024  Patient Name:  Ramiro Estes    :  1965  MRN: [id number]  Physician: Lukas Raman DO/ Edwin Fernandez DO                          Insurance: [facility] 48 units each of: 20439, 84605, 47541, 49689 and 12 units of 84433 from 10/7/24 to 24   Medical Diagnosis: Left shoulder pain M25.512                    Rehab Codes: M25.512, M25.611  Onset Date: 24                                 Next 's appt: MRI 10/8, Ortho    Visit# / total visits: 2     Cancels/No Shows: 0/0    Subjective:    Pain:  [x] Yes  [] No Location: L shoulder Pain Rating: (0-10 scale) Average: 5/10   Highest: 10/10           Lowest: 0/10   Pain altered Tx:  [x] No  [] Yes  Action:  Comments: States his shoulder doesn't hurt until he moves it.    Objective:  Modalities:   Precautions [] No  [x] Yes: MRI IMPRESSION:  1. 7 x 7 mm focus of full-thickness tear of the mid distal supraspinatus.  2. Mild tendinosis of the anterior distal infraspinatus.  3. Advanced acromioclavicular osteoarthrosis, exerting mass effect on the  myotendinous junction of supraspinatus.  4. Tiny tear of the inferior labrum. Small tear in the posterior labrum.  5. Mild thickening of the inferior joint capsule, which can be seen in the  setting of adhesive capsulitis  Exercises:  Exercise Reps/ Time Weight/ Level Comments   Pulleys 2 mins  flexion         Seated      Shrugs 10x     Shoulder rolls bwd 10x     Scapular retraction 10x     Bicep curls 10x           Table slides flexion 10x     Table slides scaption 10x  sore   Table slides circles 10x  CW/CCW   Table slides abduction 10x           Supine  PVC    Chest press 10x     flexion 10x     Horizontal abduction 10x     IR/ER 10x     Protraction 10x           Sidelying  AROM    Abduction 10x     ER 10x     Horizontal abduction

## 2024-10-16 NOTE — CARE COORDINATION
[x] [facility]  Outpatient Rehabilitation &  Therapy  [address]  [phone]  [phone] [] [facility]  Outpatient Rehabilitation &  Therapy  [address]  P: [phone]  F: [phone] [] [facility]  Outpatient Rehabilitation &  Therapy  [address]  P: [phone]  F: [phone] [] [facility]  Outpatient Rehabilitation &  Therapy  [address]  [phone]  [phone] [] [facility]  Outpatient Rehabilitation &  Therapy  [address]   P: [phone]  F: [phone]  [] [facility]  Outpatient Rehabilitation &  Therapy  [address]  P: [phone]  F: [phone] [] [facility]  Outpatient Rehabilitation &  Therapy  [address]  P: [phone]  F: [phone] [] [facility]  Outpatient Rehabilitation &  Therapy  [address]  P: [phone]  F: [phone] [] [facility]  Outpatient Rehabilitation &  Therapy  [address]  P: [phone]  F: [phone]  [] [facility] Outpatient Rehabilitation &  Therapy  [address]  P: [phone]  F: [phone]     THERAPY RESPONSIBILITY OF CARE TRANSFER FORM       PATIENT NAME: Ramiro Estes  MRN: [id number]   : 1965      TRANSFERRING FACILITY:    [] [place]   [] [facility]   [] [facility]   [] [facility]   [] [facility] [] [place]   [x] [facility]  [] [facility]  [] [facility]   [] [place]  [] [place]   [] Other:          ACCEPTING FACILITY  [] [place]   [] [facility]   [] [facility]   [] [facility]   [] [facility] [] [place]   [x] [facility] [facility]  [] [facility]  [] [facility]   [] [place]  [] [place]   [] Other:         REASON FOR TRANSFER: Primary PT on Leave      TRANSFER

## 2024-10-17 ENCOUNTER — HOSPITAL ENCOUNTER (OUTPATIENT)
Dept: PHYSICAL THERAPY | Age: 59
Setting detail: THERAPIES SERIES
Discharge: HOME | End: 2024-10-17
Payer: MEDICAID

## 2024-10-17 PROCEDURE — 97110 THERAPEUTIC EXERCISES: CPT

## 2024-10-17 PROCEDURE — 97016 VASOPNEUMATIC DEVICE THERAPY: CPT

## 2024-10-17 NOTE — FLOWSHEET NOTE
[x] [facility]  Outpatient Rehabilitation &  Therapy  [address]  [phone]  [phone]     Physical Therapy Daily Treatment Note    Date:  10/17/2024  Patient Name:  Ramiro Estes    :  1965  MRN: [id number]  Physician: Lukas Raman DO/ Edwin Fernandez DO                          Insurance: [facility] 48 units each of: 19919, 26372, 51093, 89864 and 12 units of 45886 from 10/7/24 to 24   Medical Diagnosis: Left shoulder pain M25.512                    Rehab Codes: M25.512, M25.611  Onset Date: 24                                 Next 's appt: MRI 10/8, Ortho    Visit# / total visits: 3/12     Cancels/No Shows: 0/0    Subjective:    Pain:  [x] Yes  [] No Location: L shoulder Pain Rating: (0-10 scale)  0/10   Pain altered Tx:  [x] No  [] Yes  Action:  Comments: States is pain free at rest, Pain in superior left shoulder with wt bearing, lift/carry items, reaching overhead.    Objective:  Modalities: Trial- Vasopneumatic compression to left shoulder seated in chair with arm supported - cold (low pressure, 36 ° ) x 10 minutes after exercises     Precautions [] No  [x] Yes: MRI IMPRESSION:  1. 7 x 7 mm focus of full-thickness tear of the mid distal supraspinatus.  2. Mild tendinosis of the anterior distal infraspinatus.  3. Advanced acromioclavicular osteoarthrosis, exerting mass effect on the  myotendinous junction of supraspinatus.  4. Tiny tear of the inferior labrum. Small tear in the posterior labrum.  5. Mild thickening of the inferior joint capsule, which can be seen in the  setting of adhesive capsulitis  Exercises:  Exercise Reps/ Time Weight/ Level Comments   posture x  Educated and instructed in sitting with lumbar roll and why this aligned position is beneficial with retracted scapula and chin retracted, Use of pillow for support of left arm in supine and sidelying  (Thera act)           Pulleys 2 mins  Flexion- max 120 deg   UBE- fwd/bkwd 5 min L2 added

## 2024-10-22 ENCOUNTER — HOSPITAL ENCOUNTER (OUTPATIENT)
Dept: PHYSICAL THERAPY | Age: 59
Setting detail: THERAPIES SERIES
Discharge: HOME | End: 2024-10-22
Payer: MEDICAID

## 2024-10-22 PROCEDURE — 97110 THERAPEUTIC EXERCISES: CPT

## 2024-10-22 PROCEDURE — 97016 VASOPNEUMATIC DEVICE THERAPY: CPT

## 2024-10-22 NOTE — FLOWSHEET NOTE
[x] [facility]  Outpatient Rehabilitation &  Therapy  [address]  [phone]  [phone]     Physical Therapy Daily Treatment Note    Date:  10/22/2024  Patient Name:  Ramiro Estes    :  1965  MRN: [id number]  Physician: Lukas Raman DO/ Edwin Fernandez DO                          Insurance: [facility] 48 units each of: 13322, 39649, 36440, 55514 and 12 units of 41044 from 10/7/24 to 24   Medical Diagnosis: Left shoulder pain M25.512                    Rehab Codes: M25.512, M25.611  Onset Date: 24                                 Next 's appt: MRI 10/8, Ortho    Visit# / total visits:      Cancels/No Shows: 0/0    Subjective:    Pain:  [x] Yes  [] No Location: L shoulder Pain Rating: (0-10 scale)  0/10   Pain altered Tx:  [x] No  [] Yes  Action:  Comments: States no change in symptoms     Objective:  Modalities:   Vasopneumatic compression to left shoulder seated in chair with arm supported - cold (low pressure, 36 ° ) x 15 minutes after exercises     Precautions [] No  [x] Yes: MRI IMPRESSION:  1. 7 x 7 mm focus of full-thickness tear of the mid distal supraspinatus.  2. Mild tendinosis of the anterior distal infraspinatus.  3. Advanced acromioclavicular osteoarthrosis, exerting mass effect on the  myotendinous junction of supraspinatus.  4. Tiny tear of the inferior labrum. Small tear in the posterior labrum.  5. Mild thickening of the inferior joint capsule, which can be seen in the  setting of adhesive capsulitis  Exercises:  Exercise Reps/ Time Weight/ Level Comments   posture x  Reviewed in sitting with lumbar roll and why this aligned position is beneficial with retracted scapula and chin retracted, Use of pillow for support of left arm in supine and sidelying  (Thera act)           Pulleys 2 mins  Flexion- max 120 deg, abd 125 deg   UBE- fwd/bkwd 5 min L2     Seated      Shrugs    HEP   Shoulder rolls bwd   HEP   Scapular retraction   HEP   Bicep curls

## 2024-10-24 ENCOUNTER — HOSPITAL ENCOUNTER (OUTPATIENT)
Dept: PHYSICAL THERAPY | Age: 59
Setting detail: THERAPIES SERIES
Discharge: HOME | End: 2024-10-24
Payer: MEDICAID

## 2024-10-24 PROCEDURE — 97110 THERAPEUTIC EXERCISES: CPT

## 2024-10-24 PROCEDURE — 97016 VASOPNEUMATIC DEVICE THERAPY: CPT

## 2024-10-24 NOTE — FLOWSHEET NOTE
[x] [facility]  Outpatient Rehabilitation &  Therapy  [address]  [phone]  [phone]     Physical Therapy Daily Treatment Note    Date:  10/24/2024  Patient Name:  Ramiro Estes    :  1965  MRN: [id number]  Physician: Lukas Raman DO/ Edwin Fernandez DO                          Insurance: [facility] 48 units each of: 39225, 86182, 94955, 77442 and 12 units of 69336 from 10/7/24 to 24   Medical Diagnosis: Left shoulder pain M25.512                    Rehab Codes: M25.512, M25.611  Onset Date: 24                                 Next 's appt: MRI 10/8, Ortho    Visit# / total visits:      Cancels/No Shows: 0/0    Subjective:    Pain:  [x] Yes  [] No Location: L shoulder Pain Rating: (0-10 scale)  0/10   Pain altered Tx:  [x] No  [] Yes  Action:  Comments: States still no change in symptoms.      Objective:  Modalities:   Vasopneumatic compression to left shoulder seated in chair with arm supported - cold (low pressure, 36 ° ) x 10 minutes after exercises     Precautions [] No  [x] Yes: MRI IMPRESSION:  1. 7 x 7 mm focus of full-thickness tear of the mid distal supraspinatus.  2. Mild tendinosis of the anterior distal infraspinatus.  3. Advanced acromioclavicular osteoarthrosis, exerting mass effect on the  myotendinous junction of supraspinatus.  4. Tiny tear of the inferior labrum. Small tear in the posterior labrum.  5. Mild thickening of the inferior joint capsule, which can be seen in the  setting of adhesive capsulitis  Exercises:  Exercise Reps/ Time Weight/ Level Comments   posture    Reviewed in sitting with lumbar roll and why this aligned position is beneficial with retracted scapula and chin retracted, Use of pillow for support of left arm in supine and sidelying  (Thera act)           Pulleys 2 mins ea  Flexion- max 130 deg, abd 125 deg   UBE- fwd/bkwd 5 min L3  Inc level   Seated      Shrugs  15 x  HEP   Shoulder rolls bwd 15 x  HEP   Scapular

## 2024-10-29 ENCOUNTER — HOSPITAL ENCOUNTER (OUTPATIENT)
Dept: PHYSICAL THERAPY | Age: 59
Setting detail: THERAPIES SERIES
Discharge: HOME | End: 2024-10-29
Payer: MEDICAID

## 2024-10-29 PROCEDURE — 97016 VASOPNEUMATIC DEVICE THERAPY: CPT

## 2024-10-29 PROCEDURE — 97110 THERAPEUTIC EXERCISES: CPT

## 2024-10-29 NOTE — FLOWSHEET NOTE
[x] [facility]  Outpatient Rehabilitation &  Therapy  [address]  [phone]  [phone]     Physical Therapy Daily Treatment Note    Date:  10/29/2024  Patient Name:  Ramiro Estes    :  1965  MRN: [id number]  Physician: Lukas Raman DO/ Edwin Fernandez DO                          Insurance: [facility] 48 units each of: 32881, 26836, 62922, 07868 and 12 units of 43615 from 10/7/24 to 24   Medical Diagnosis: Left shoulder pain M25.512                    Rehab Codes: M25.512, M25.611  Onset Date: 24                                 Next 's appt: MRI 10/8, Ortho    Visit# / total visits:      Cancels/No Shows: 0/0    Subjective:    Pain:  [x] Yes  [] No Location: L shoulder Pain Rating: (0-10 scale)  0/10   Pain altered Tx:  [x] No  [] Yes  Action:  Comments: States still no pain at rest and pain with all movements. Getting more painful with less movement.      Objective:  Modalities:   Vasopneumatic compression to left shoulder seated in chair with arm supported - cold (low pressure, 36 ° ) x 15 minutes after exercises     Precautions [] No  [x] Yes: MRI IMPRESSION:  1. 7 x 7 mm focus of full-thickness tear of the mid distal supraspinatus.  2. Mild tendinosis of the anterior distal infraspinatus.  3. Advanced acromioclavicular osteoarthrosis, exerting mass effect on the  myotendinous junction of supraspinatus.  4. Tiny tear of the inferior labrum. Small tear in the posterior labrum.  5. Mild thickening of the inferior joint capsule, which can be seen in the  setting of adhesive capsulitis  Exercises:  Exercise Reps/ Time Weight/ Level Comments   posture    Reviewed in sitting with lumbar roll and why this aligned position is beneficial with retracted scapula and chin retracted, Use of pillow for support of left arm in supine and sidelying  (Thera act)           Pulleys 2 mins ea  Flexion- max 130 deg, abd 130 deg   UBE- fwd/bkwd 5 min L3      Seated      Shrugs  15

## 2024-10-31 ENCOUNTER — HOSPITAL ENCOUNTER (OUTPATIENT)
Dept: PHYSICAL THERAPY | Age: 59
Setting detail: THERAPIES SERIES
Discharge: HOME | End: 2024-10-31
Payer: MEDICAID

## 2024-10-31 ENCOUNTER — TELEPHONE (OUTPATIENT)
Dept: ORTHOPEDIC SURGERY | Age: 59
End: 2024-10-31

## 2024-10-31 PROCEDURE — 97016 VASOPNEUMATIC DEVICE THERAPY: CPT

## 2024-10-31 PROCEDURE — 97110 THERAPEUTIC EXERCISES: CPT

## 2024-10-31 NOTE — TELEPHONE ENCOUNTER
Pt called in on behalf of pt noting pt isn't tolerating PT well, he has gained some ROM but is extremely painful. Moved pts appt forward for sooner assessment and determination of conservative vs surgical treatment.

## 2024-11-05 ENCOUNTER — HOSPITAL ENCOUNTER (OUTPATIENT)
Dept: PHYSICAL THERAPY | Age: 59
Setting detail: THERAPIES SERIES
Discharge: HOME | End: 2024-11-05
Payer: MEDICAID

## 2024-11-05 PROCEDURE — 97110 THERAPEUTIC EXERCISES: CPT

## 2024-11-05 PROCEDURE — 97016 VASOPNEUMATIC DEVICE THERAPY: CPT

## 2024-11-05 NOTE — PROGRESS NOTES
90 and ER to 70 degrees to improve reaching tolerance. -Progress  ? Strength:4/5 L shoulder flexion and abduction to improve lifting and carrying.- Not Met   ? Function:UEFI score to 40% impaired or better to impaired ADLs.- Not Met   Independent with Home Exercise Programs- MET     LTG: (to be met in 12 treatments)  Patient is able to lift and carry a 12 lb box with his left arm with 2/10 pain.   Patient is able to lift a 3lb weight over shoulder height.      Patient goals: Improve L shoulder pain and function.     Pt. Education:  [] Yes  [x] No  [] Reviewed Prior HEP/Ed  Method of Education: [] Verbal  [] Demo  [] Written  Comprehension of Education:  [] Verbalizes understanding.  [] Demonstrates understanding.  [] Needs review.  [] Demonstrates/verbalizes HEP/Ed previously given.     Exercises: HANDOUT ONLY   Access Code: [email or web address]  URL: [email or web address]  Date: 10/03/2024  Prepared by: Melo Chaidez     Exercises  - Seated Shoulder Flexion Towel Slide at Table Top  - 1 x daily - 7 x weekly - 3 sets - 10 reps  - Seated Shoulder Abduction Towel Slide at Table Top  - 1 x daily - 7 x weekly - 3 sets - 10 reps  - Standing Backward Shoulder Rolls  - 1 x daily - 7 x weekly - 3 sets - 10 reps  - Supine Shoulder Flexion AAROM with Hands Clasped  - 1 x daily - 7 x weekly - 3 sets - 10 reps  - Seated Scapular Retraction with External Rotation  - 1 x daily - 7 x weekly - 3 sets - 10 reps          Plan: [x] Continue current frequency toward long and short term goals.    * Called Ortho office Re: persistent pain and aggravation of shoulder with exercises-  They are going to move up Ortho appt and see PT 1 x per week focusing on ROM until decision on how to manage him.   [x] Specific Instructions for subsequent treatments:  L shoulder ROM, strengthening and functional lift training when able.       Time In: 1005          Time Out:  1107      Electronically signed by:  Kortney Khan, PT

## 2024-11-06 ENCOUNTER — OFFICE VISIT (OUTPATIENT)
Dept: ORTHOPEDIC SURGERY | Age: 59
End: 2024-11-06

## 2024-11-06 VITALS — WEIGHT: 230 LBS | HEIGHT: 71 IN | BODY MASS INDEX: 32.2 KG/M2

## 2024-11-06 DIAGNOSIS — M12.812 LEFT ROTATOR CUFF TEAR ARTHROPATHY: ICD-10-CM

## 2024-11-06 DIAGNOSIS — M75.102 LEFT ROTATOR CUFF TEAR ARTHROPATHY: ICD-10-CM

## 2024-11-06 DIAGNOSIS — M75.102 TEAR OF LEFT ROTATOR CUFF, UNSPECIFIED TEAR EXTENT, UNSPECIFIED WHETHER TRAUMATIC: Primary | ICD-10-CM

## 2024-11-06 PROCEDURE — G8484 FLU IMMUNIZE NO ADMIN: HCPCS | Performed by: STUDENT IN AN ORGANIZED HEALTH CARE EDUCATION/TRAINING PROGRAM

## 2024-11-06 PROCEDURE — 3017F COLORECTAL CA SCREEN DOC REV: CPT | Performed by: STUDENT IN AN ORGANIZED HEALTH CARE EDUCATION/TRAINING PROGRAM

## 2024-11-06 PROCEDURE — G8417 CALC BMI ABV UP PARAM F/U: HCPCS | Performed by: STUDENT IN AN ORGANIZED HEALTH CARE EDUCATION/TRAINING PROGRAM

## 2024-11-06 PROCEDURE — 4004F PT TOBACCO SCREEN RCVD TLK: CPT | Performed by: STUDENT IN AN ORGANIZED HEALTH CARE EDUCATION/TRAINING PROGRAM

## 2024-11-06 PROCEDURE — G8427 DOCREV CUR MEDS BY ELIG CLIN: HCPCS | Performed by: STUDENT IN AN ORGANIZED HEALTH CARE EDUCATION/TRAINING PROGRAM

## 2024-11-06 RX ORDER — BUPIVACAINE HYDROCHLORIDE 2.5 MG/ML
2 INJECTION, SOLUTION INFILTRATION; PERINEURAL ONCE
Status: COMPLETED | OUTPATIENT
Start: 2024-11-06 | End: 2024-11-06

## 2024-11-06 RX ORDER — METHYLPREDNISOLONE ACETATE 80 MG/ML
80 INJECTION, SUSPENSION INTRA-ARTICULAR; INTRALESIONAL; INTRAMUSCULAR; SOFT TISSUE ONCE
Status: COMPLETED | OUTPATIENT
Start: 2024-11-06 | End: 2024-11-06

## 2024-11-06 RX ADMIN — METHYLPREDNISOLONE ACETATE 80 MG: 80 INJECTION, SUSPENSION INTRA-ARTICULAR; INTRALESIONAL; INTRAMUSCULAR; SOFT TISSUE at 11:05

## 2024-11-06 RX ADMIN — BUPIVACAINE HYDROCHLORIDE 2 ML: 2.5 INJECTION, SOLUTION INFILTRATION; PERINEURAL at 11:05

## 2024-11-06 NOTE — PROGRESS NOTES
[facility] ORTHO SPECIAL[facility]  [facility]  [place] [address]  Dept: [phone]  Dept Fax: [phone]        Ambulatory   Follow Up    Subjective:   Ramiro Estes is a 59 year old RHD male who presents to our office today for evaluation of their left shoulder pain.  The patient was last seen in our office on 9/25/2024 nurse where we prescribed him a 6-8 week course of PT and ibuprofen in an attempt to treat their full-thickness rotator cuff tear.  Since his last visit the patient has had a MRI of the left shoulder which was conducted on 10/8/2024 which demonstrated significant fatty infiltration of their supraspinatus tendon and subscapularis tendons.  The patient was originally seen by [facility] in the acute setting of this injury course and was scheduled for a rotator cuff repair however the patient had to cancel.  As a result of the cancellation, the surgeon that had previously seen him dropped him from his practice altogether.    The patient states that since his original injury which occurred approximately 3 years ago after lifting a heavy role of plastic overhead.  The patient states that since that time the pain has significantly improved as he no longer has pain at rest it is only with movement of the shoulder.  The patient states that the pain that he experiences to the shoulder does not keep him up at night.  The patient denies any recent trauma or additional inciting events.  The patient states that the recent course of physical therapy that he underwent only exacerbated his pain, however it did improve his range of motion significantly.  The patient has tried an injection to the left shoulder previously for which she states he did receive about 80 to 90% relief for approximately 4 to 5 days before the relief slowly faded.  The patient is currently homeless and does not work.  Past medical history is significant for plantar fasciitis, psoriasis

## 2024-11-07 ENCOUNTER — APPOINTMENT (OUTPATIENT)
Dept: PHYSICAL THERAPY | Age: 59
End: 2024-11-07
Payer: MEDICAID

## 2024-11-12 ENCOUNTER — HOSPITAL ENCOUNTER (OUTPATIENT)
Dept: PHYSICAL THERAPY | Age: 59
Setting detail: THERAPIES SERIES
Discharge: HOME | End: 2024-11-12
Payer: MEDICAID

## 2024-11-12 PROCEDURE — 97016 VASOPNEUMATIC DEVICE THERAPY: CPT

## 2024-11-12 PROCEDURE — 97110 THERAPEUTIC EXERCISES: CPT

## 2024-11-12 NOTE — FLOWSHEET NOTE
[x] [facility]  Outpatient Rehabilitation &  Therapy  [address]  [phone]  [phone]     Physical Therapy Daily Treatment Note     Date:  2024  Patient Name:  Ramiro Estes    :  1965  MRN: [id number]  Physician: Lukas Raman DO/ Edwin Fernandez DO                          Insurance: [facility] 48 units each of: 02498, 07486, 78549, 72573 and 12 units of 78009 from 10/7/24 to 24   Medical Diagnosis: Left shoulder pain M25.512                    Rehab Codes: M25.512, M25.611  Onset Date: 24                                 Next 's appt:    Visit# / total visits:      Cancels/No Shows: 0/0    Subjective:    Pain:  [x] Yes  [] No Location: L shoulder Pain Rating: (0-10 scale)  0/10   Pain altered Tx:  [x] No  [] Yes  Action:  Comments: States Dr injected left shoulder superior area 24 and has improved but pain continues.       Objective:  Modalities:   Vasopneumatic compression to left shoulder seated in chair with arm supported - cold (low pressure, 36 ° ) x 10 minutes after exercises     Precautions [] No  [x] Yes: MRI IMPRESSION:  1. 7 x 7 mm focus of full-thickness tear of the mid distal supraspinatus.  2. Mild tendinosis of the anterior distal infraspinatus.  3. Advanced acromioclavicular osteoarthrosis, exerting mass effect on the  myotendinous junction of supraspinatus.  4. Tiny tear of the inferior labrum. Small tear in the posterior labrum.  5. Mild thickening of the inferior joint capsule, which can be seen in the  setting of adhesive capsulitis  Exercises:  Exercise Reps/ Time Weight/ Level Comments   posture    Reviewed in sitting with lumbar roll and why this aligned position is beneficial with retracted scapula and chin retracted, Use of pillow for support of left arm in supine and sidelying  (Thera act)           Pulleys 2 mins ea  Flexion-     UBE- fwd/bkwd 6 min L4        Seated      Shrugs  15 x  HEP   Shoulder rolls bwd 15 x  HEP

## 2024-11-14 ENCOUNTER — APPOINTMENT (OUTPATIENT)
Dept: PHYSICAL THERAPY | Age: 59
End: 2024-11-14
Payer: MEDICAID

## 2024-11-19 ENCOUNTER — HOSPITAL ENCOUNTER (OUTPATIENT)
Dept: PHYSICAL THERAPY | Age: 59
Setting detail: THERAPIES SERIES
Discharge: HOME | End: 2024-11-19
Payer: MEDICAID

## 2024-11-21 ENCOUNTER — HOSPITAL ENCOUNTER (OUTPATIENT)
Dept: PHYSICAL THERAPY | Age: 59
Setting detail: THERAPIES SERIES
Discharge: HOME | End: 2024-11-21
Payer: MEDICAID

## 2024-11-21 PROCEDURE — 97110 THERAPEUTIC EXERCISES: CPT

## 2024-11-21 PROCEDURE — 97016 VASOPNEUMATIC DEVICE THERAPY: CPT

## 2024-11-26 ENCOUNTER — HOSPITAL ENCOUNTER (OUTPATIENT)
Dept: PHYSICAL THERAPY | Age: 59
Setting detail: THERAPIES SERIES
Discharge: HOME | End: 2024-11-26
Payer: MEDICAID

## 2024-11-27 NOTE — PRE-CERTIFICATION NOTE
[x] [facility]  Outpatient Rehabilitation &  Therapy  [address]  [phone]  [phone] [] [facility]  Outpatient Rehabilitation &  Therapy  [address]  P: [phone]  F: [phone] [] [facility]  Outpatient Rehabilitation &  Therapy  [address]  P: [phone]  F: [phone] [] [facility]  Outpatient Rehabilitation &  Therapy  [address]  [phone]  [phone] [] [facility]  Outpatient Rehabilitation &  Therapy  [address]   P: [phone]  F: [phone]  [] [facility]  Outpatient Rehabilitation &  Therapy  [address]  P: [phone]  F: [phone] [] [facility]  Outpatient Rehabilitation &  Therapy  [address]  P: [phone]  F: [phone] [] [facility]  Outpatient Rehabilitation &  Therapy  [address]  P: [phone]  F: [phone] [] [facility]  Outpatient Rehabilitation &  Therapy  [address]  P: [phone]  F: [phone]  [] [facility] Outpatient Rehabilitation &  Therapy  [address]  P: [phone]  F: [phone]          Therapy Pre-certification Note      11/27/2024    Ramiro Estes  1965   [id number]      Insurance approval was received for Physical Therapy from [facility] on 11/27/2024.  Approval was received from 11/30/2024 to 1/31/2025.  Authorization number [id number].    44996, 40 units  77665, 40 units  39973, 40 units  48410, 10 units  13270, 40 units    Patient was contacted to be scheduled and would like to keep his scheduled appt for 12/10/2024.      Electronically signed by Alycia Pierson PT on 11/27/2024 at 8:49 AM

## 2024-12-03 ENCOUNTER — APPOINTMENT (OUTPATIENT)
Dept: PHYSICAL THERAPY | Age: 59
End: 2024-12-03
Payer: MEDICAID

## 2024-12-05 ENCOUNTER — APPOINTMENT (OUTPATIENT)
Dept: PHYSICAL THERAPY | Age: 59
End: 2024-12-05
Payer: MEDICAID

## 2024-12-10 ENCOUNTER — HOSPITAL ENCOUNTER (OUTPATIENT)
Dept: PHYSICAL THERAPY | Age: 59
Setting detail: THERAPIES SERIES
Discharge: HOME | End: 2024-12-10
Payer: MEDICAID

## 2024-12-10 PROCEDURE — 97016 VASOPNEUMATIC DEVICE THERAPY: CPT

## 2024-12-10 PROCEDURE — 97110 THERAPEUTIC EXERCISES: CPT

## 2024-12-10 NOTE — FLOWSHEET NOTE
with some exercises today also.  [x] Patient would continue to benefit from skilled physical therapy services in order to: improve L shoulder ROM, strength, and ADL function.     STG: (to be met in 8 treatments) (Keep goals for 18 v)   ? Pain:  5/10 at worst with shoulder flexion over shoulder height.- Not Met   ? ROM: L shoulder flexion to 130 degrees, abduction to 90 and ER to 70 degrees to improve reaching tolerance. -Progress  ? Strength:4/5 L shoulder flexion and abduction to improve lifting and carrying.- Not Met   ? Function:UEFI score to 40% impaired or better to impaired ADLs.- Not Met   Independent with Home Exercise Programs- MET     LTG: (to be met in 20 treatments)  Patient is able to lift and carry a 12 lb box with his left arm with 2/10 pain.   Patient is able to lift a 3lb weight over shoulder height.      Patient goals: Improve L shoulder pain and function.     Pt. Education:  [] Yes  [x] No  [] Reviewed Prior HEP/Ed  Method of Education: [] Verbal  [] Demo  [] Written  Comprehension of Education:  [] Verbalizes understanding.  [] Demonstrates understanding.  [] Needs review.  [] Demonstrates/verbalizes HEP/Ed previously given.     Exercises: HANDOUT ONLY   Access Code: [email or web address]  URL: [email or web address]  Date: 10/03/2024  Prepared by: Melo Chaidez        Plan:    [x] Continue current frequency toward long and short term goals.       [x] Specific Instructions for subsequent treatments:  L shoulder ROM, strengthening and functional lift training when able.        Requested Frequency/Duration: 2times per week for 10 treatments.      [x] Continue current frequency toward long and short term goals.       [x] Specific Instructions for subsequent treatments:  L shoulder ROM, strengthening and functional lift training when able.       Time In: 1008          Time Out:  1118       Electronically signed by:  Kortney Khan PT

## 2024-12-17 ENCOUNTER — HOSPITAL ENCOUNTER (OUTPATIENT)
Dept: PHYSICAL THERAPY | Age: 59
Setting detail: THERAPIES SERIES
Discharge: HOME | End: 2024-12-17
Payer: MEDICAID

## 2024-12-17 PROCEDURE — 97110 THERAPEUTIC EXERCISES: CPT

## 2024-12-17 PROCEDURE — 97016 VASOPNEUMATIC DEVICE THERAPY: CPT

## 2024-12-17 NOTE — FLOWSHEET NOTE
[x] [facility]  Outpatient Rehabilitation &  Therapy  [address]  [phone]  [phone]     Physical Therapy Daily Treatment Note     Date:  2024  Patient Name:  Ramiro Estes      :  1965    MRN: [id number]  Physician: Lukas Raman DO/ Edwin Fernandez DO                            Insurance: [facility] 48 units each of: 18131, 22432, 40395, 08922 and 12 units of 86592 from 10/7/24 to 24 previous,  Current: Insurance approval was received for Physical Therapy from [facility] on 2024.  Approval was received from 2024 to 2025.  Authorization number [id number].  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 Not able to complete bed ex's due to close proximity of bunk beds. Overall pain is unchanged with use of UE - no pain when not using his arm below chest level.      Objective:  Modalities:   Vasopneumatic compression to left shoulder seated in chair with arm supported - cold (low pressure, 36 ° ) x 10 minutes after exercises     Precautions [] No  [x] Yes: MRI IMPRESSION:  1. 7 x 7 mm focus of full-thickness tear of the mid distal supraspinatus.  2. Mild tendinosis of the anterior distal infraspinatus.  3. Advanced acromioclavicular osteoarthrosis, exerting mass effect on the  myotendinous junction of supraspinatus.  4. Tiny tear of the inferior labrum. Small tear in the posterior labrum.  5. Mild thickening of the inferior joint capsule, which can be seen in

## 2024-12-19 ENCOUNTER — APPOINTMENT (OUTPATIENT)
Dept: PHYSICAL THERAPY | Age: 59
End: 2024-12-19
Payer: MEDICAID

## 2024-12-23 ENCOUNTER — HOSPITAL ENCOUNTER (OUTPATIENT)
Dept: PHYSICAL THERAPY | Age: 59
Setting detail: THERAPIES SERIES
Discharge: HOME | End: 2024-12-23
Payer: MEDICAID

## 2024-12-23 PROCEDURE — 97016 VASOPNEUMATIC DEVICE THERAPY: CPT

## 2024-12-23 PROCEDURE — 97110 THERAPEUTIC EXERCISES: CPT

## 2024-12-23 NOTE — FLOWSHEET NOTE
L Flexion-  125 deg, L abd- 130 deg    UBE- fwd/bkwd 6 min L5      X   Seated       Shrugs 2x10  Standing 12/16/24 w/3 lb weight X   Shoulder rolls bwd 15 x  HEP    Scapular retraction 15 x  HEP    Bicep curls 10x2 3 lbs  Inc wt X          Table slides flexion 15x       Table slides scaption 15x      Table slides circles  15 x       Table slides abduction 15x       Ball on mat 2 min       Shoulder ER stretch on table 10 x 10 sec       scaption 10 x       ER with hands behind neck 10 x  Hold due to inc pain                  Supine        Chest press   1 lb     flexion  1 lb     Horizontal abduction  1 lb     IR/ER  1 lb     Protraction  1 lb     PROM with joint mobs x   Did sitting- abd and IR worst/most painful            Sidelying        Abduction 10 x 1 lb      ER 10 x 1 lb     Horizontal abduction 10 x 1 lb                   Standing       Wall climb and lift away at top then lower   10 x    X   ER stretch on wall. 10 x 10 sec     X   Corner stretch 7 x 10 sec  Added     Wax on wax off 10x ea      Push edges of comfort    X    Cane IR  10 x    X   Cane ext  10 x   X          Back against wall:       - Shoulder flexion 10x  Weak - heavy shoulder hike  Added 12/17/24 X   - Shoulder Abduction 10x  Limited ROM/painful  Added 12/17/24 X          Bands:       - Lat Extension 2x10 Blue Added 12/17/24 X   - Tricep Extension 2x10 Blue Added 12/17/24 X   - Rows 2x10 Blue Added 12/17/24 X   - IR 2x10 Blue Added 12/17/24 X   - Bicep Curls 2x10 Blue Added 12/17/24 X           Treatment Charges: Mins Units   []  Modalities     [x]  Ther Exercise 30 2   []  Manual Therapy     []  Ther Activities     []  Neuro Re-ed     [x]  Vasocompression 15 1   [] Gait     []  Other     Total Billable time 45 3       Assessment: [] Progressing toward goals. Continued focus on functional standing ex's as to avoid aggravation of shoulder symptoms and maintain ROM throughout shoulder. Educated pt this should be his main goal - especially if he

## 2024-12-30 ENCOUNTER — HOSPITAL ENCOUNTER (OUTPATIENT)
Dept: PHYSICAL THERAPY | Age: 59
Setting detail: THERAPIES SERIES
Discharge: HOME | End: 2024-12-30
Payer: MEDICAID

## 2024-12-30 PROCEDURE — 97110 THERAPEUTIC EXERCISES: CPT

## 2024-12-30 NOTE — FLOWSHEET NOTE
[x] [facility]  Outpatient Rehabilitation &  Therapy  [address]  [phone]  [phone]     Physical Therapy Daily Treatment Note     Date:  2024  Patient Name:  Ramiro Estes      :  1965    MRN: [id number]  Physician: Lukas Raman DO/ Edwin Fernandez DO                            Insurance: [facility] 48 units each of: 98039, 42570, 46993, 03380 and 12 units of 00021 from 10/7/24 to 24 previous,  Current: Insurance approval was received for Physical Therapy from [facility] on 2024.  Approval was received from 2024 to 2025.  Authorization number [id number].  39668, 40 units  21679, 40 units  42108, 40 units  43275, 10 units  74983, 40 units  Medical Diagnosis: Left shoulder pain M25.512                    Rehab Codes: M25.512, M25.611  Onset Date: 24                                  Next 's appt:      Visit# / total visits:    Cancels/No Shows: 0/0    Subjective:    Pain:  [x] Yes  [] No Location: L shoulder   Pain Rating: (0-10 scale)  0/10   Pain altered Tx:  [x] No  [] Yes  Action:  Comments: has been keeping up on HEP. Overall pain remains the same, feels like he just may go ahead with the surgery to repair his shoulder.     Objective:  Modalities:   Vasopneumatic compression to left shoulder seated in chair with arm supported - cold (low pressure, 36 ° ) x 10 minutes after exercises     Precautions [] No  [x] Yes: MRI IMPRESSION:  1. 7 x 7 mm focus of full-thickness tear of the mid distal supraspinatus.  2. Mild tendinosis of the anterior distal infraspinatus.  3. Advanced acromioclavicular osteoarthrosis, exerting mass effect on the  myotendinous junction of supraspinatus.  4. Tiny tear of the inferior labrum. Small tear in the posterior labrum.  5. Mild thickening of the inferior joint capsule, which can be seen in the  setting of adhesive capsulitis  Exercises:  Exercise Reps/ Time Weight/ Level Comments 24    Pulleys 2 mins ea

## 2025-01-08 ENCOUNTER — OFFICE VISIT (OUTPATIENT)
Dept: ORTHOPEDIC SURGERY | Age: 60
End: 2025-01-08
Payer: MEDICAID

## 2025-01-08 VITALS — BODY MASS INDEX: 32.2 KG/M2 | HEIGHT: 71 IN | WEIGHT: 230 LBS

## 2025-01-08 DIAGNOSIS — M12.812 LEFT ROTATOR CUFF TEAR ARTHROPATHY: Primary | ICD-10-CM

## 2025-01-08 DIAGNOSIS — M75.102 LEFT ROTATOR CUFF TEAR ARTHROPATHY: Primary | ICD-10-CM

## 2025-01-08 PROCEDURE — G8427 DOCREV CUR MEDS BY ELIG CLIN: HCPCS | Performed by: ORTHOPAEDIC SURGERY

## 2025-01-08 PROCEDURE — 3017F COLORECTAL CA SCREEN DOC REV: CPT | Performed by: ORTHOPAEDIC SURGERY

## 2025-01-08 PROCEDURE — 4004F PT TOBACCO SCREEN RCVD TLK: CPT | Performed by: ORTHOPAEDIC SURGERY

## 2025-01-08 PROCEDURE — 99213 OFFICE O/P EST LOW 20 MIN: CPT | Performed by: ORTHOPAEDIC SURGERY

## 2025-01-08 PROCEDURE — G8417 CALC BMI ABV UP PARAM F/U: HCPCS | Performed by: ORTHOPAEDIC SURGERY

## 2025-01-08 NOTE — PROGRESS NOTES
[facility]  [address]  [place] [facility] [address]  Dept: [phone]  Dept Fax: [phone]        Ambulatory   Follow Up    Subjective:   Ramiro Estes is a 59-year-old right-hand-dominant male that presents today for follow-up concerning his left shoulder pain.  The patient was last seen in our office on 11/6/2024 for which they were given a prescription for physical therapy.  The patient states that with that physical therapy only seem to aggravate his shoulder further.  The patient states that they have had minimal relief with the ibuprofen and Tylenol, [place] for which they have been prescribed previously.  Additionally the patient does state that they received about a 1 weeks worth of relief with the corticosteroid injection that they were provided at the last.  This relief was about 70 to 80% of the patient's total pain.    The patient states that since his original injury which occurred approximately 3 years ago after lifting a heavy role of plastic overhead. The patient states that since that time the pain has significantly improved as he no longer has pain at rest it is only with movement of the shoulder. The patient states that the pain that he experiences to the shoulder does not keep him up at night. The patient denies any recent trauma or additional inciting events. The patient states that the recent course of physical therapy that he underwent only exacerbated his pain, however it did improve his range of motion significantly. The patient has tried an injection to the left shoulder previously for which she states he did receive about 80 to 90% relief for approximately 4 to 5 days before the relief slowly faded. The patient is currently homeless and does not work. Past medical history is significant for plantar fasciitis, psoriasis most notably of the left arm and forearm, hypertension, depression with suicidal ideation, and

## 2025-01-15 NOTE — PROGRESS NOTES
I performed a history and physical examination of the patient and discussed management with the resident. I reviewed the /resident physician note and agree with the documented findings and plan of care. Any areas of disagreement are noted on the chart.   I have personally evaluated this patient and have completed at least one if not all key elements of the E/M (history, physical exam,procedure and MDM).  Additional findings are as noted. I agree with the chief complaint, past medical history, past surgical history, allergies, medications, social and family history as documented unless otherwise noted below.     Electronically signed by MAC CARUSO DO on 1/15/2025 at 8:13 AM    Xolair Counseling:  Patient informed of potential adverse effects including but not limited to fever, muscle aches, rash and allergic reactions.  The patient verbalized understanding of the proper use and possible adverse effects of Xolair.  All of the patient's questions and concerns were addressed.